# Patient Record
Sex: MALE | Race: WHITE | NOT HISPANIC OR LATINO | Employment: OTHER | URBAN - METROPOLITAN AREA
[De-identification: names, ages, dates, MRNs, and addresses within clinical notes are randomized per-mention and may not be internally consistent; named-entity substitution may affect disease eponyms.]

---

## 2017-02-08 RX ORDER — ALBUTEROL SULFATE 2.5 MG/3ML
2.5 SOLUTION RESPIRATORY (INHALATION) EVERY 6 HOURS PRN
COMMUNITY
End: 2019-03-04 | Stop reason: SDUPTHER

## 2017-02-08 RX ORDER — DIPHENOXYLATE HYDROCHLORIDE AND ATROPINE SULFATE 2.5; .025 MG/1; MG/1
1 TABLET ORAL DAILY
COMMUNITY
End: 2018-01-24 | Stop reason: SDUPTHER

## 2017-02-08 RX ORDER — BUDESONIDE AND FORMOTEROL FUMARATE DIHYDRATE 160; 4.5 UG/1; UG/1
2 AEROSOL RESPIRATORY (INHALATION) 2 TIMES DAILY
COMMUNITY
End: 2018-01-24 | Stop reason: SDUPTHER

## 2017-02-08 RX ORDER — ALBUTEROL SULFATE 90 UG/1
2 AEROSOL, METERED RESPIRATORY (INHALATION) EVERY 6 HOURS PRN
COMMUNITY
End: 2018-01-24 | Stop reason: ALTCHOICE

## 2017-02-16 ENCOUNTER — GENERIC CONVERSION - ENCOUNTER (OUTPATIENT)
Dept: OTHER | Facility: OTHER | Age: 62
End: 2017-02-16

## 2017-02-16 ENCOUNTER — LAB CONVERSION - ENCOUNTER (OUTPATIENT)
Dept: OTHER | Facility: OTHER | Age: 62
End: 2017-02-16

## 2017-02-16 LAB
ANTI-NUCLEAR ANTIBODY (ANA) (HISTORICAL): NEGATIVE
BASOPHILS # BLD AUTO: 0.9 %
BASOPHILS # BLD AUTO: 58 CELLS/UL (ref 0–200)
DEPRECATED RDW RBC AUTO: 14.3 % (ref 11–15)
EOSINOPHIL # BLD AUTO: 250 CELLS/UL (ref 15–500)
EOSINOPHIL # BLD AUTO: 3.9 %
ERYTHROCYTE SEDIMENTATION RATE (HISTORICAL): 2 MM/H
HCT VFR BLD AUTO: 48.9 % (ref 38.5–50)
HGB BLD-MCNC: 16.1 G/DL (ref 13.2–17.1)
LYME IGG/IGM AB (HISTORICAL): NORMAL INDEX
LYMPHOCYTES # BLD AUTO: 1344 CELLS/UL (ref 850–3900)
LYMPHOCYTES # BLD AUTO: 21 %
MCH RBC QN AUTO: 32.1 PG (ref 27–33)
MCHC RBC AUTO-ENTMCNC: 32.8 G/DL (ref 32–36)
MCV RBC AUTO: 97.8 FL (ref 80–100)
MONOCYTES # BLD AUTO: 589 CELLS/UL (ref 200–950)
MONOCYTES (HISTORICAL): 9.2 %
NEUTROPHILS # BLD AUTO: 4160 CELLS/UL (ref 1500–7800)
NEUTROPHILS # BLD AUTO: 65 %
PLATELET # BLD AUTO: 119 THOUSAND/UL (ref 140–400)
PMV BLD AUTO: 9.6 FL (ref 7.5–12.5)
RBC # BLD AUTO: 5 MILLION/UL (ref 4.2–5.8)
TSH SERPL DL<=0.05 MIU/L-ACNC: 2.01 MIU/L (ref 0.4–4.5)
WBC # BLD AUTO: 6.4 THOUSAND/UL (ref 3.8–10.8)

## 2017-02-20 ENCOUNTER — ALLSCRIPTS OFFICE VISIT (OUTPATIENT)
Dept: OTHER | Facility: OTHER | Age: 62
End: 2017-02-20

## 2017-02-20 ENCOUNTER — APPOINTMENT (OUTPATIENT)
Dept: LAB | Facility: MEDICAL CENTER | Age: 62
End: 2017-02-20
Payer: COMMERCIAL

## 2017-02-20 ENCOUNTER — HOSPITAL ENCOUNTER (OUTPATIENT)
Dept: RADIOLOGY | Facility: MEDICAL CENTER | Age: 62
Discharge: HOME | End: 2017-02-20
Payer: COMMERCIAL

## 2017-02-20 DIAGNOSIS — J44.9 CHRONIC OBSTRUCTIVE PULMONARY DISEASE (HCC): ICD-10-CM

## 2017-02-20 DIAGNOSIS — G47.33 OBSTRUCTIVE SLEEP APNEA: ICD-10-CM

## 2017-02-20 DIAGNOSIS — N18.30 CHRONIC KIDNEY DISEASE, STAGE III (MODERATE) (HCC): ICD-10-CM

## 2017-02-20 DIAGNOSIS — R91.1 SOLITARY PULMONARY NODULE: ICD-10-CM

## 2017-02-20 LAB
ALBUMIN SERPL BCP-MCNC: 3.7 G/DL (ref 3.5–5)
ALP SERPL-CCNC: 95 U/L (ref 46–116)
ALT SERPL W P-5'-P-CCNC: 34 U/L (ref 12–78)
ANION GAP SERPL CALCULATED.3IONS-SCNC: 7 MMOL/L (ref 4–13)
AST SERPL W P-5'-P-CCNC: 19 U/L (ref 5–45)
BILIRUB SERPL-MCNC: 0.59 MG/DL (ref 0.2–1)
BUN SERPL-MCNC: 22 MG/DL (ref 5–25)
CALCIUM SERPL-MCNC: 8.9 MG/DL (ref 8.3–10.1)
CHLORIDE SERPL-SCNC: 109 MMOL/L (ref 100–108)
CO2 SERPL-SCNC: 29 MMOL/L (ref 21–32)
CREAT SERPL-MCNC: 1.65 MG/DL (ref 0.6–1.3)
GFR SERPL CREATININE-BSD FRML MDRD: 42.8 ML/MIN/1.73SQ M
GLUCOSE SERPL-MCNC: 82 MG/DL (ref 65–140)
POTASSIUM SERPL-SCNC: 4.5 MMOL/L (ref 3.5–5.3)
PROT SERPL-MCNC: 6.8 G/DL (ref 6.4–8.2)
SODIUM SERPL-SCNC: 145 MMOL/L (ref 136–145)

## 2017-02-20 PROCEDURE — 80053 COMPREHEN METABOLIC PANEL: CPT

## 2017-02-20 PROCEDURE — 71020 HB CHEST X-RAY 2VW FRONTAL&LATL: CPT

## 2017-02-20 PROCEDURE — 36415 COLL VENOUS BLD VENIPUNCTURE: CPT

## 2017-02-21 ENCOUNTER — TRANSCRIBE ORDERS (OUTPATIENT)
Dept: ADMINISTRATIVE | Facility: HOSPITAL | Age: 62
End: 2017-02-21

## 2017-02-21 ENCOUNTER — ALLSCRIPTS OFFICE VISIT (OUTPATIENT)
Dept: OTHER | Facility: OTHER | Age: 62
End: 2017-02-21

## 2017-02-21 DIAGNOSIS — R91.1 PULMONARY NODULE: Primary | ICD-10-CM

## 2017-02-21 DIAGNOSIS — R91.1 COIN LESION: ICD-10-CM

## 2017-02-23 ENCOUNTER — ANESTHESIA (OUTPATIENT)
Dept: PERIOP | Facility: HOSPITAL | Age: 62
End: 2017-02-23
Payer: COMMERCIAL

## 2017-02-23 ENCOUNTER — HOSPITAL ENCOUNTER (OUTPATIENT)
Facility: HOSPITAL | Age: 62
Setting detail: OUTPATIENT SURGERY
Discharge: HOME | End: 2017-02-23
Attending: SURGERY | Admitting: SURGERY
Payer: COMMERCIAL

## 2017-02-23 ENCOUNTER — ANESTHESIA EVENT (OUTPATIENT)
Dept: PERIOP | Facility: HOSPITAL | Age: 62
End: 2017-02-23
Payer: COMMERCIAL

## 2017-02-23 VITALS
HEIGHT: 75 IN | SYSTOLIC BLOOD PRESSURE: 106 MMHG | HEART RATE: 77 BPM | DIASTOLIC BLOOD PRESSURE: 60 MMHG | TEMPERATURE: 99.6 F | RESPIRATION RATE: 16 BRPM | WEIGHT: 315 LBS | OXYGEN SATURATION: 95 % | BODY MASS INDEX: 39.17 KG/M2

## 2017-02-23 PROCEDURE — C1781 MESH (IMPLANTABLE): HCPCS | Performed by: SURGERY

## 2017-02-23 DEVICE — BARD VENTRALEX HERNIA PATCH, 4.3 CM (1.7"), SMALL CIRCLE WITH STRAP
Type: IMPLANTABLE DEVICE | Site: ABDOMEN | Status: FUNCTIONAL
Brand: VENTRALEX

## 2017-02-23 RX ORDER — OXYCODONE HYDROCHLORIDE 5 MG/1
5 TABLET ORAL EVERY 4 HOURS PRN
Qty: 30 TABLET | Refills: 0
Start: 2017-02-23 | End: 2017-03-05

## 2017-02-23 RX ORDER — PROPOFOL 10 MG/ML
INJECTION, EMULSION INTRAVENOUS AS NEEDED
Status: DISCONTINUED | OUTPATIENT
Start: 2017-02-23 | End: 2017-02-23 | Stop reason: SURG

## 2017-02-23 RX ORDER — ALBUTEROL SULFATE 2.5 MG/3ML
2.5 SOLUTION RESPIRATORY (INHALATION) ONCE
Status: DISCONTINUED | OUTPATIENT
Start: 2017-02-23 | End: 2017-02-23 | Stop reason: HOSPADM

## 2017-02-23 RX ORDER — FENTANYL CITRATE 50 UG/ML
INJECTION, SOLUTION INTRAMUSCULAR; INTRAVENOUS AS NEEDED
Status: DISCONTINUED | OUTPATIENT
Start: 2017-02-23 | End: 2017-02-23 | Stop reason: SURG

## 2017-02-23 RX ORDER — LIDOCAINE HYDROCHLORIDE 10 MG/ML
INJECTION, SOLUTION INFILTRATION; PERINEURAL AS NEEDED
Status: DISCONTINUED | OUTPATIENT
Start: 2017-02-23 | End: 2017-02-23 | Stop reason: SURG

## 2017-02-23 RX ORDER — SUCCINYLCHOLINE CHLORIDE 20 MG/ML
INJECTION INTRAMUSCULAR; INTRAVENOUS AS NEEDED
Status: DISCONTINUED | OUTPATIENT
Start: 2017-02-23 | End: 2017-02-23 | Stop reason: SURG

## 2017-02-23 RX ORDER — ONDANSETRON 2 MG/ML
4 INJECTION INTRAMUSCULAR; INTRAVENOUS ONCE
Status: DISCONTINUED | OUTPATIENT
Start: 2017-02-23 | End: 2017-02-23 | Stop reason: HOSPADM

## 2017-02-23 RX ORDER — SODIUM CHLORIDE, SODIUM LACTATE, POTASSIUM CHLORIDE, CALCIUM CHLORIDE 600; 310; 30; 20 MG/100ML; MG/100ML; MG/100ML; MG/100ML
20 INJECTION, SOLUTION INTRAVENOUS CONTINUOUS
Status: DISCONTINUED | OUTPATIENT
Start: 2017-02-23 | End: 2017-02-23 | Stop reason: HOSPADM

## 2017-02-23 RX ORDER — OXYCODONE HYDROCHLORIDE AND ACETAMINOPHEN 5; 325 MG/1; MG/1
2 TABLET ORAL EVERY 4 HOURS PRN
Status: DISCONTINUED | OUTPATIENT
Start: 2017-02-23 | End: 2017-02-23 | Stop reason: HOSPADM

## 2017-02-23 RX ORDER — ONDANSETRON 2 MG/ML
INJECTION INTRAMUSCULAR; INTRAVENOUS AS NEEDED
Status: DISCONTINUED | OUTPATIENT
Start: 2017-02-23 | End: 2017-02-23 | Stop reason: SURG

## 2017-02-23 RX ORDER — BUPIVACAINE HYDROCHLORIDE AND EPINEPHRINE 5; 5 MG/ML; UG/ML
INJECTION, SOLUTION PERINEURAL AS NEEDED
Status: DISCONTINUED | OUTPATIENT
Start: 2017-02-23 | End: 2017-02-23 | Stop reason: HOSPADM

## 2017-02-23 RX ADMIN — SODIUM CHLORIDE, SODIUM LACTATE, POTASSIUM CHLORIDE, AND CALCIUM CHLORIDE: .6; .31; .03; .02 INJECTION, SOLUTION INTRAVENOUS at 11:07

## 2017-02-23 RX ADMIN — OXYCODONE HYDROCHLORIDE AND ACETAMINOPHEN 2 TABLET: 5; 325 TABLET ORAL at 14:15

## 2017-02-23 RX ADMIN — ONDANSETRON 4 MG: 2 INJECTION INTRAMUSCULAR; INTRAVENOUS at 11:59

## 2017-02-23 RX ADMIN — SODIUM CHLORIDE, SODIUM LACTATE, POTASSIUM CHLORIDE, AND CALCIUM CHLORIDE 20 ML/HR: .6; .31; .03; .02 INJECTION, SOLUTION INTRAVENOUS at 10:50

## 2017-02-23 RX ADMIN — CEFAZOLIN SODIUM 1000 MG: 1 SOLUTION INTRAVENOUS at 11:41

## 2017-02-23 RX ADMIN — SUCCINYLCHOLINE CHLORIDE 100 MG: 20 INJECTION, SOLUTION INTRAMUSCULAR; INTRAVENOUS at 11:38

## 2017-02-23 RX ADMIN — HYDROMORPHONE HYDROCHLORIDE 0.4 MG: 1 INJECTION, SOLUTION INTRAMUSCULAR; INTRAVENOUS; SUBCUTANEOUS at 13:20

## 2017-02-23 RX ADMIN — LIDOCAINE HYDROCHLORIDE 100 MG: 10 INJECTION, SOLUTION INFILTRATION; PERINEURAL at 11:38

## 2017-02-23 RX ADMIN — SODIUM CHLORIDE, SODIUM LACTATE, POTASSIUM CHLORIDE, AND CALCIUM CHLORIDE 20 ML/HR: .6; .31; .03; .02 INJECTION, SOLUTION INTRAVENOUS at 13:20

## 2017-02-23 RX ADMIN — CEFAZOLIN SODIUM 2000 MG: 2 SOLUTION INTRAVENOUS at 11:41

## 2017-02-23 RX ADMIN — HYDROMORPHONE HYDROCHLORIDE 0.4 MG: 1 INJECTION, SOLUTION INTRAMUSCULAR; INTRAVENOUS; SUBCUTANEOUS at 13:33

## 2017-02-23 RX ADMIN — FENTANYL CITRATE 100 MCG: 50 INJECTION, SOLUTION INTRAMUSCULAR; INTRAVENOUS at 11:46

## 2017-02-23 RX ADMIN — PROPOFOL 200 MG: 10 INJECTION, EMULSION INTRAVENOUS at 11:38

## 2017-02-23 RX ADMIN — HYDROMORPHONE HYDROCHLORIDE 0.4 MG: 1 INJECTION, SOLUTION INTRAMUSCULAR; INTRAVENOUS; SUBCUTANEOUS at 13:03

## 2017-03-08 ENCOUNTER — ALLSCRIPTS OFFICE VISIT (OUTPATIENT)
Dept: OTHER | Facility: OTHER | Age: 62
End: 2017-03-08

## 2017-03-14 ENCOUNTER — HOSPITAL ENCOUNTER (OUTPATIENT)
Dept: CT IMAGING | Facility: HOSPITAL | Age: 62
Discharge: HOME | End: 2017-03-14
Attending: INTERNAL MEDICINE
Payer: COMMERCIAL

## 2017-03-14 DIAGNOSIS — R91.1 SOLITARY PULMONARY NODULE: ICD-10-CM

## 2017-03-14 PROCEDURE — 71250 CT THORAX DX C-: CPT

## 2017-04-12 ENCOUNTER — GENERIC CONVERSION - ENCOUNTER (OUTPATIENT)
Dept: OTHER | Facility: OTHER | Age: 62
End: 2017-04-12

## 2017-05-24 ENCOUNTER — ALLSCRIPTS OFFICE VISIT (OUTPATIENT)
Dept: OTHER | Facility: OTHER | Age: 62
End: 2017-05-24

## 2017-05-24 DIAGNOSIS — E78.5 HYPERLIPIDEMIA: ICD-10-CM

## 2017-05-24 DIAGNOSIS — N18.30 CHRONIC KIDNEY DISEASE, STAGE III (MODERATE) (HCC): ICD-10-CM

## 2017-05-24 DIAGNOSIS — J45.40 MODERATE PERSISTENT ASTHMA, UNCOMPLICATED: ICD-10-CM

## 2017-05-24 DIAGNOSIS — G47.9 SLEEP DISORDER: ICD-10-CM

## 2017-05-24 DIAGNOSIS — J44.9 CHRONIC OBSTRUCTIVE PULMONARY DISEASE (HCC): ICD-10-CM

## 2017-05-24 DIAGNOSIS — D69.6 THROMBOCYTOPENIA (HCC): ICD-10-CM

## 2017-05-24 LAB
BILIRUB UR QL STRIP: NEGATIVE
CLARITY UR: NORMAL
COLOR UR: YELLOW
GLUCOSE (HISTORICAL): NEGATIVE
HGB UR QL STRIP.AUTO: NEGATIVE
KETONES UR STRIP-MCNC: NEGATIVE MG/DL
LEUKOCYTE ESTERASE UR QL STRIP: NEGATIVE
NITRITE UR QL STRIP: NEGATIVE
PH UR STRIP.AUTO: 5 [PH]
PROT UR STRIP-MCNC: NEGATIVE MG/DL
SP GR UR STRIP.AUTO: 1.01
UROBILINOGEN UR QL STRIP.AUTO: NEGATIVE

## 2017-06-15 LAB
A/G RATIO (HISTORICAL): 1.8 (CALC) (ref 1–2.5)
ALBUMIN SERPL BCP-MCNC: 4 G/DL (ref 3.6–5.1)
ALP SERPL-CCNC: 85 U/L (ref 40–115)
ALT SERPL W P-5'-P-CCNC: 24 U/L (ref 9–46)
AST SERPL W P-5'-P-CCNC: 21 U/L (ref 10–35)
BASOPHILS # BLD AUTO: 0.6 %
BASOPHILS # BLD AUTO: 43 CELLS/UL (ref 0–200)
BILIRUB SERPL-MCNC: 0.9 MG/DL (ref 0.2–1.2)
BUN SERPL-MCNC: 26 MG/DL (ref 7–25)
BUN/CREA RATIO (HISTORICAL): 15 (CALC) (ref 6–22)
CALCIUM SERPL-MCNC: 9.3 MG/DL (ref 8.6–10.3)
CHLORIDE SERPL-SCNC: 108 MMOL/L (ref 98–110)
CHOLEST SERPL-MCNC: 137 MG/DL (ref 125–200)
CHOLEST/HDLC SERPL: 3.8 (CALC)
CO2 SERPL-SCNC: 26 MMOL/L (ref 20–31)
CREAT SERPL-MCNC: 1.7 MG/DL (ref 0.7–1.25)
DEPRECATED RDW RBC AUTO: 14.9 % (ref 11–15)
EGFR AFRICAN AMERICAN (HISTORICAL): 49 ML/MIN/1.73M2
EGFR-AMERICAN CALC (HISTORICAL): 43 ML/MIN/1.73M2
EOSINOPHIL # BLD AUTO: 298 CELLS/UL (ref 15–500)
EOSINOPHIL # BLD AUTO: 4.2 %
GAMMA GLOBULIN (HISTORICAL): 2.2 G/DL (CALC) (ref 1.9–3.7)
GLUCOSE (HISTORICAL): 94 MG/DL (ref 65–99)
HCT VFR BLD AUTO: 46.4 % (ref 38.5–50)
HDLC SERPL-MCNC: 36 MG/DL
HGB BLD-MCNC: 15.6 G/DL (ref 13.2–17.1)
LDL CHOLESTEROL (HISTORICAL): 83 MG/DL (CALC)
LYMPHOCYTES # BLD AUTO: 1519 CELLS/UL (ref 850–3900)
LYMPHOCYTES # BLD AUTO: 21.4 %
MCH RBC QN AUTO: 32.7 PG (ref 27–33)
MCHC RBC AUTO-ENTMCNC: 33.7 G/DL (ref 32–36)
MCV RBC AUTO: 97.2 FL (ref 80–100)
MONOCYTES # BLD AUTO: 738 CELLS/UL (ref 200–950)
MONOCYTES (HISTORICAL): 10.4 %
NEUTROPHILS # BLD AUTO: 4501 CELLS/UL (ref 1500–7800)
NEUTROPHILS # BLD AUTO: 63.4 %
NON-HDL-CHOL (CHOL-HDL) (HISTORICAL): 101 MG/DL (CALC)
PLATELET # BLD AUTO: 129 THOUSAND/UL (ref 140–400)
PMV BLD AUTO: 9.4 FL (ref 7.5–12.5)
POTASSIUM SERPL-SCNC: 4.6 MMOL/L (ref 3.5–5.3)
RBC # BLD AUTO: 4.77 MILLION/UL (ref 4.2–5.8)
SODIUM SERPL-SCNC: 142 MMOL/L (ref 135–146)
TOTAL PROTEIN (HISTORICAL): 6.2 G/DL (ref 6.1–8.1)
TRIGL SERPL-MCNC: 92 MG/DL
WBC # BLD AUTO: 7.1 THOUSAND/UL (ref 3.8–10.8)

## 2017-08-02 ENCOUNTER — ALLSCRIPTS OFFICE VISIT (OUTPATIENT)
Dept: OTHER | Facility: OTHER | Age: 62
End: 2017-08-02

## 2017-09-18 ENCOUNTER — HOSPITAL ENCOUNTER (OUTPATIENT)
Dept: CT IMAGING | Facility: HOSPITAL | Age: 62
Discharge: HOME | End: 2017-09-18
Attending: INTERNAL MEDICINE
Payer: COMMERCIAL

## 2017-09-18 DIAGNOSIS — R91.1 SOLITARY PULMONARY NODULE: ICD-10-CM

## 2017-09-18 PROCEDURE — 71250 CT THORAX DX C-: CPT

## 2017-09-20 ENCOUNTER — ALLSCRIPTS OFFICE VISIT (OUTPATIENT)
Dept: OTHER | Facility: OTHER | Age: 62
End: 2017-09-20

## 2017-09-25 ENCOUNTER — TRANSCRIBE ORDERS (OUTPATIENT)
Dept: ADMINISTRATIVE | Facility: HOSPITAL | Age: 62
End: 2017-09-25

## 2017-09-25 DIAGNOSIS — R91.1 COIN LESION: Primary | ICD-10-CM

## 2017-12-01 DIAGNOSIS — D69.6 THROMBOCYTOPENIA (HCC): ICD-10-CM

## 2017-12-01 DIAGNOSIS — N18.30 CHRONIC KIDNEY DISEASE, STAGE III (MODERATE) (HCC): ICD-10-CM

## 2017-12-01 DIAGNOSIS — E78.5 HYPERLIPIDEMIA: ICD-10-CM

## 2017-12-01 DIAGNOSIS — J44.9 CHRONIC OBSTRUCTIVE PULMONARY DISEASE (HCC): ICD-10-CM

## 2017-12-01 DIAGNOSIS — Z12.5 ENCOUNTER FOR SCREENING FOR MALIGNANT NEOPLASM OF PROSTATE: ICD-10-CM

## 2018-01-05 ENCOUNTER — APPOINTMENT (OUTPATIENT)
Dept: LAB | Facility: MEDICAL CENTER | Age: 63
End: 2018-01-05
Payer: COMMERCIAL

## 2018-01-05 DIAGNOSIS — Z12.5 ENCOUNTER FOR SCREENING FOR MALIGNANT NEOPLASM OF PROSTATE: ICD-10-CM

## 2018-01-05 DIAGNOSIS — J44.9 CHRONIC OBSTRUCTIVE PULMONARY DISEASE (HCC): ICD-10-CM

## 2018-01-05 DIAGNOSIS — N18.30 CHRONIC KIDNEY DISEASE, STAGE III (MODERATE) (HCC): ICD-10-CM

## 2018-01-05 DIAGNOSIS — D69.6 THROMBOCYTOPENIA (HCC): ICD-10-CM

## 2018-01-05 DIAGNOSIS — E78.5 HYPERLIPIDEMIA: ICD-10-CM

## 2018-01-05 LAB
ALBUMIN SERPL BCP-MCNC: 3.9 G/DL (ref 3.5–5)
ALP SERPL-CCNC: 88 U/L (ref 46–116)
ALT SERPL W P-5'-P-CCNC: 44 U/L (ref 12–78)
ANION GAP SERPL CALCULATED.3IONS-SCNC: 9 MMOL/L (ref 4–13)
AST SERPL W P-5'-P-CCNC: 29 U/L (ref 5–45)
BASOPHILS # BLD AUTO: 0.06 THOUSANDS/ΜL (ref 0–0.1)
BASOPHILS NFR BLD AUTO: 1 % (ref 0–1)
BILIRUB SERPL-MCNC: 0.98 MG/DL (ref 0.2–1)
BUN SERPL-MCNC: 22 MG/DL (ref 5–25)
CALCIUM SERPL-MCNC: 9 MG/DL (ref 8.3–10.1)
CHLORIDE SERPL-SCNC: 109 MMOL/L (ref 100–108)
CHOLEST SERPL-MCNC: 151 MG/DL (ref 50–200)
CO2 SERPL-SCNC: 25 MMOL/L (ref 21–32)
CREAT SERPL-MCNC: 1.54 MG/DL (ref 0.6–1.3)
EOSINOPHIL # BLD AUTO: 0.31 THOUSAND/ΜL (ref 0–0.61)
EOSINOPHIL NFR BLD AUTO: 4 % (ref 0–6)
ERYTHROCYTE [DISTWIDTH] IN BLOOD BY AUTOMATED COUNT: 14.1 % (ref 11.6–15.1)
GFR SERPL CREATININE-BSD FRML MDRD: 48 ML/MIN/1.73SQ M
GLUCOSE P FAST SERPL-MCNC: 78 MG/DL (ref 65–99)
HCT VFR BLD AUTO: 47.6 % (ref 36.5–49.3)
HDLC SERPL-MCNC: 33 MG/DL (ref 40–60)
HGB BLD-MCNC: 16 G/DL (ref 12–17)
LDLC SERPL CALC-MCNC: 92 MG/DL (ref 0–100)
LYMPHOCYTES # BLD AUTO: 1.57 THOUSANDS/ΜL (ref 0.6–4.47)
LYMPHOCYTES NFR BLD AUTO: 22 % (ref 14–44)
MCH RBC QN AUTO: 32.9 PG (ref 26.8–34.3)
MCHC RBC AUTO-ENTMCNC: 33.6 G/DL (ref 31.4–37.4)
MCV RBC AUTO: 98 FL (ref 82–98)
MONOCYTES # BLD AUTO: 0.86 THOUSAND/ΜL (ref 0.17–1.22)
MONOCYTES NFR BLD AUTO: 12 % (ref 4–12)
NEUTROPHILS # BLD AUTO: 4.28 THOUSANDS/ΜL (ref 1.85–7.62)
NEUTS SEG NFR BLD AUTO: 61 % (ref 43–75)
NRBC BLD AUTO-RTO: 0 /100 WBCS
PLATELET # BLD AUTO: 135 THOUSANDS/UL (ref 149–390)
PMV BLD AUTO: 11.3 FL (ref 8.9–12.7)
POTASSIUM SERPL-SCNC: 4.1 MMOL/L (ref 3.5–5.3)
PROT SERPL-MCNC: 7 G/DL (ref 6.4–8.2)
PSA SERPL-MCNC: 1.8 NG/ML (ref 0–4)
RBC # BLD AUTO: 4.87 MILLION/UL (ref 3.88–5.62)
SODIUM SERPL-SCNC: 143 MMOL/L (ref 136–145)
TRIGL SERPL-MCNC: 129 MG/DL
WBC # BLD AUTO: 7.1 THOUSAND/UL (ref 4.31–10.16)

## 2018-01-05 PROCEDURE — 80061 LIPID PANEL: CPT

## 2018-01-05 PROCEDURE — 80053 COMPREHEN METABOLIC PANEL: CPT

## 2018-01-05 PROCEDURE — 36415 COLL VENOUS BLD VENIPUNCTURE: CPT

## 2018-01-05 PROCEDURE — G0103 PSA SCREENING: HCPCS

## 2018-01-05 PROCEDURE — 85025 COMPLETE CBC W/AUTO DIFF WBC: CPT

## 2018-01-09 ENCOUNTER — ALLSCRIPTS OFFICE VISIT (OUTPATIENT)
Dept: OTHER | Facility: OTHER | Age: 63
End: 2018-01-09

## 2018-01-09 ENCOUNTER — TRANSCRIBE ORDERS (OUTPATIENT)
Dept: ADMINISTRATIVE | Facility: HOSPITAL | Age: 63
End: 2018-01-09

## 2018-01-09 DIAGNOSIS — J44.9 CHRONIC OBSTRUCTIVE PULMONARY DISEASE, UNSPECIFIED COPD TYPE (HCC): Primary | ICD-10-CM

## 2018-01-10 NOTE — PROGRESS NOTES
Assessment   1  Chronic obstructive pulmonary disease (496) (J44 9)   2  ARACELIS (obstructive sleep apnea) (327 23) (G47 33)   3  Pulmonary nodule (793 11) (R91 1)    Plan   Chronic obstructive pulmonary disease    · Breo Ellipta 100-25 MCG/INH Inhalation Aerosol Powder Breath Activated; USE 1    INHALATION ONCE DAILY   Rx By: Rosemary Aranda; Dispense: 30 Days ; #:1 Aerosol Powder Breath Activated; Refill: 11; For: Chronic obstructive pulmonary disease; GRISELDA = N; Print Rx   · Complete PFT; Status:Hold For - Scheduling; Requested for:Scheduled for Mon     01/15/2018 @ 7:15am [employer];    Perform:[facility]; Last Updated Marie Canal; 1/9/2018 3:14:33 PM;Ordered; For:Chronic obstructive pulmonary disease; Ordered By:Jackie Rivas;  ARACELIS (obstructive sleep apnea)    · CPAP Machine; Status:Active; Requested AAO:[id number]; Perform:Not Applicable; J:[id number]; Ordered;For:ARACELIS (obstructive sleep apnea); Ordered By:Jackie Rivas;  for CPAP? : Heated Humidification  of Mask for CPAP? : FULL FACE  for initial CPAP set up? : CHANGE MACHINE TO AUTO CPAP PRESSURE 7-15        CMH20  is the patient's Weight? : 368 lb  is the patient's Height? : 75 in  Study? : Yes    Discussion/Summary   Discussion Summary:    I suspect that some of his increasing shortness of breath is related to progressive weight gain  We will update pulmonary function testing to see if COPD/degree of obstruction has change  I did give him a sample of Breo Ellipta 100 mcg to use once daily in place of Symbicort  I also provided him with a co-pay coupon that will make the inhaler more affordable  After we have made that change, we can always consider transitioning Spiriva to Incruse Ellipta an use co-pay coupon as well  I will call him with PFT results  He is already scheduled for CT of the chest in March to follow up on previously noted pulmonary nodule     suspect that his issues with CPAP per also related to progressive weight gain  We will change his CPAP machine to an auto CPAP with pressures ranging between 7 and 15 cm water pressure  Hopefully, this will provide improved patient comfort  Goals and Barriers: The patient has the current Goals: Improved tolerance of CPAP  The patent has the current Barriers: Ongoing weight gain  Patient's Capacity to Self-Care: Patient is able to Self-Care  Active Problems   1  Arthritis (716 90) (M19 90)   2  Chronic obstructive pulmonary disease (496) (J44 9)   3  CKD (chronic kidney disease) stage 3, GFR 30-59 ml/min (585 3) (N18 3)   4  Depression screen (V79 0) (Z13 89)   5  Encounter for counseling for travel (V65 49) (Z71 89)   6  Hyperlipidemia with low HDL (272 4,272 5) (E78 5,E78 6)   7  Moderate persistent asthma (493 90) (J45 40)   8  Morbid obesity (278 01) (E66 01)   9  Muscle strain (848 9) (T14 8XXA)   10  Need for immunization against influenza (V04 81) (Z23)   11  Need for pneumococcal vaccination (V03 82) (Z23)   12  Nocturnal hypoxemia (327 24) (G47 34)   13  ARACELIS (obstructive sleep apnea) (327 23) (G47 33)   14  Pulmonary nodule (793 11) (R91 1)   15  Screening for colon cancer (V76 51) (Z12 11)   16  Screening for prostate cancer (V76 44) (Z12 5)   17  Skin lesion (709 9) (L98 9)   18  Sleep disorder (780 50) (G47 9)   19  Thrombocytopenia (287 5) (D69 6)   20  Vitamin D deficiency (268 9) (E55 9)    History of Present Illness   HPI: Mervin Faye is here today for follow-up regarding COPD and sleep apnea  He feels that his pulmonary status has declined since his last visit with me  He complains of worsening exertional dyspnea  He has no significant cough, wheeze or sputum production  He is compliant with Symbicort and Spiriva, but finds that extremely expensive and is asking for an alternative  is also having issues over the past month feeling as if he is not getting enough air with his CPAP   Often times, this is preceded by does are dreams and awakening with increasing shortness of breath  He has gained 10 lb since the holidays  When compared with his sleep study performed in August 2016, he has gained 25 lb  I reviewed his CPAP compliance data and he is using the machine at least 9 hours per night  AHI is 1 5 with a CPAP pressure of 7 cm water  Review of Systems   Complete-Male Pulm:      Constitutional: recent 10 lb weight gain  Eyes: no complaints of vision problems  ENT: no rhinitis, no PND, no epistaxis  Cardiovascular: lower extremity edema, but-- no chest pain  Respiratory: as noted in HPI  Gastrointestinal: no complaints of esophageal reflux, no abdominal pain  Musculoskeletal: no arthralgias, no joint swelling, no myalgias  Integumentary: no rash, no lesions  Neurological: no headache, no fainting, no weakness  Psychiatric: no anxiety, no depression  Endocrine: Negative  Hematologic/Lymphatic: no complaints of swollen glands  Past Medical History   1  History of Acute Respiratory Distress (518 82)   2  History of Atypical chest pain (786 59) (R07 89)   3  History of Back Pain   4  History of Decreased glomerular filtration rate (GFR) (794 4) (R94 4)   5  History of Genital warts (078 11) (A63 0)   6  History of Hamstring strain (843 8) (S76 319A)   7  History of Herniated lumbar intervertebral disc (722 10) (M51 26)   8  History of lipoma (V13 89) (Z86 018)   9  History of Lateral pain of left hip (719 45) (M25 552)   10  History of Low HDL (under 40) (272 5) (E78 6)   11  History of Lower back pain (724 2) (M54 5)   12  History of Lumbar radiculopathy (724 4) (M54 16)   13  History of Polyp of sigmoid colon (211 3) (D12 5)   14  History of Spasm of lumbar paraspinous muscle (724 8) (M62 830)   15  History of Umbilical hernia (090 2) (K42 9)   16  History of Uncomplicated alcohol abuse (305 00) (F10 10)    Surgical History   1  History of Adenoidectomy   2  History of Hip Surgery   3   History of Lipectomy Of Thigh   4  History of Tonsillectomy   5  History of Umbilical Hernia Repair   6  History of Wrist Excision Of Ganglion  Surgical History Reviewed: The surgical history was reviewed and updated today  Family History   Mother    1  Family history of cardiac disorder (V17 49) (Z82 49)   2  Family history of hypertension (V17 49) (Z82 49)  Father    3  Family history of chronic obstructive pulmonary disease (V17 6) (Z82 5)  Grandmother    4  Family history of diabetes mellitus (V18 0) (Z83 3)   5  Family history of glaucoma (V19 11) (Z83 511)   6  Family history of lung cancer (V16 1) (Z80 1)  Grandfather    7  Family history of cardiac disorder (V17 49) (Z82 49)   8  Family history of lung cancer (V16 1) (Z80 1)   9  Family history of Pulmonary emphysema, unspecified emphysema type  Family History Reviewed: The family history was reviewed and updated today  Social History    · Always uses seat belt   · Former smoker   · Smoked one pack per day for 25 years, quit October 2014   · Full-time employment   ·    · Recovering Alcoholic   · Uses Safety Equipment - Seatbelts  Social History Reviewed: The social history was reviewed and is unchanged  Current Meds    1  Albuterol Sulfate (2 5 MG/3ML) 0 083% Inhalation Nebulization Solution; USE 1 UNIT     DOSE EVERY 4-6 HOURS AS NEEDED FOR WHEEZING   Requested for: 17FDV1026;     Last Rx:20Sep2017 Ordered   2  Cyclobenzaprine HCl - 10 MG Oral Tablet; TAKE 1 TABLET AT BEDTIME AS NEEDED; Therapy: 11WFU5106 to (Renew:20Oct2017)  Requested for: 72MGS4670; Last     Rx:20Sep2017 Ordered   3  Multiple Vitamins Oral Tablet; TAKE 1 TABLET DAILY; Therapy: 98Udx0494 to (Evaluate:19Nov2014) Recorded   4  ProAir  (90 Base) MCG/ACT Inhalation Aerosol Solution; TAKE 2 PUFFS EVERY 4     HOURS AS NEEDED; Therapy: 74CZW5127 to (Joann Stephen)  Requested for: 91Svs5787; Last     Rx:07Aug2017 Ordered   5   Spiriva Respimat 2 5 MCG/ACT Inhalation Aerosol Solution; TAKE 2 PUFFS EVERY DAY; Therapy: 39Pcj9125 to (Evaluate:31Jan2018)  Requested for: 46Xws2001; Last     Rx:76Kjn4767 Ordered   6  Symbicort 160-4 5 MCG/ACT Inhalation Aerosol; inhale 2 puffs twice daily  rinse mouth     after use; Therapy: 84Dea2300 to (Evaluate:30Jan2018)  Requested for: 02Oct2017; Last     Rx:02Oct2017 Ordered   7  Vitamin D 1000 UNIT CAPS; TAKE AS DIRECTED; Therapy: 52Orm1613 to Recorded  Medication List Reviewed: The medication list was reviewed and updated today  Allergies   1  Darvon CAPS    Vitals   Vital Signs    Recorded: 79TUU7129 02:57PM Recorded: 79MDP1844 02:25PM   Temperature  97 9 F, Tympanic   Heart Rate  96   Respiration  16   Systolic  384, LUE, Sitting   Diastolic  82, LUE, Sitting   Height  6 ft 3 in   Weight 378 lb 2 oz    BMI Calculated 47 26    BSA Calculated 2 88    O2 Saturation  95     Physical Exam        Constitutional      General appearance: No acute distress, well appearing and well nourished  Ears, Nose, Mouth, and Throat      Oropharynx: Normal with no erythema, edema, exudate or lesions  Neck      Neck: Supple, symmetric, trachea midline, no masses  Pulmonary      Respiratory effort: No increased work of breathing or signs of respiratory distress  Auscultation of lungs: Clear to auscultation, no rales, no crackles, no wheezing  Cardiovascular      Auscultation of heart: Normal rate and rhythm, normal S1 and S2, no murmurs  Examination of extremities for edema and/or varicosities: Abnormal  -- trace LE edema  Abdomen      Abdomen: Abnormal  -- Obese, soft, nontender  Lymphatic      Palpation of lymph nodes in neck: No lymphadenopathy  Neurologic      Mental Status: Normal  Not confused, no evidence of dementia, good comprehension, good concentration         Psychiatric      Orientation to person, place and time: Normal        Mood and affect: Normal  Thank you very much for allowing me to participate in the care of this patient  If you have any questions, please do not hesitate to contact me        Future Appointments      Date/Time Provider Specialty Site   01/24/2018 06:30 PM Clare Paredes [facility]   04/11/2018 10:00 AM Yamileth Moya DO Pulmonary Medicine [facility] Jayce Lantigua    Electronically signed by : Fareed Mejía DO; Jan 9 2018  3:33PM EST                       (Author)

## 2018-01-12 VITALS
HEIGHT: 75 IN | TEMPERATURE: 98.4 F | RESPIRATION RATE: 12 BRPM | HEART RATE: 80 BPM | DIASTOLIC BLOOD PRESSURE: 78 MMHG | SYSTOLIC BLOOD PRESSURE: 148 MMHG | BODY MASS INDEX: 39.17 KG/M2 | WEIGHT: 315 LBS

## 2018-01-12 VITALS
WEIGHT: 315 LBS | HEART RATE: 78 BPM | RESPIRATION RATE: 16 BRPM | HEIGHT: 75 IN | TEMPERATURE: 97.8 F | BODY MASS INDEX: 39.17 KG/M2 | DIASTOLIC BLOOD PRESSURE: 72 MMHG | OXYGEN SATURATION: 96 % | SYSTOLIC BLOOD PRESSURE: 136 MMHG

## 2018-01-13 VITALS
DIASTOLIC BLOOD PRESSURE: 84 MMHG | RESPIRATION RATE: 14 BRPM | TEMPERATURE: 97.1 F | HEIGHT: 75 IN | BODY MASS INDEX: 39.17 KG/M2 | OXYGEN SATURATION: 96 % | WEIGHT: 315 LBS | SYSTOLIC BLOOD PRESSURE: 124 MMHG | HEART RATE: 89 BPM

## 2018-01-14 VITALS
RESPIRATION RATE: 14 BRPM | OXYGEN SATURATION: 97 % | WEIGHT: 315 LBS | BODY MASS INDEX: 39.17 KG/M2 | DIASTOLIC BLOOD PRESSURE: 78 MMHG | TEMPERATURE: 97.4 F | SYSTOLIC BLOOD PRESSURE: 114 MMHG | HEART RATE: 86 BPM | HEIGHT: 75 IN

## 2018-01-14 VITALS
HEIGHT: 75 IN | SYSTOLIC BLOOD PRESSURE: 122 MMHG | OXYGEN SATURATION: 97 % | TEMPERATURE: 97.3 F | HEART RATE: 89 BPM | BODY MASS INDEX: 39.17 KG/M2 | DIASTOLIC BLOOD PRESSURE: 78 MMHG | WEIGHT: 315 LBS | RESPIRATION RATE: 14 BRPM

## 2018-01-14 VITALS
WEIGHT: 315 LBS | BODY MASS INDEX: 39.17 KG/M2 | HEIGHT: 75 IN | TEMPERATURE: 97.1 F | OXYGEN SATURATION: 96 % | HEART RATE: 105 BPM | SYSTOLIC BLOOD PRESSURE: 120 MMHG | RESPIRATION RATE: 16 BRPM | DIASTOLIC BLOOD PRESSURE: 80 MMHG

## 2018-01-15 ENCOUNTER — GENERIC CONVERSION - ENCOUNTER (OUTPATIENT)
Dept: PULMONOLOGY | Facility: HOSPITAL | Age: 63
End: 2018-01-15

## 2018-01-15 ENCOUNTER — HOSPITAL ENCOUNTER (OUTPATIENT)
Dept: PULMONOLOGY | Facility: HOSPITAL | Age: 63
Discharge: HOME | End: 2018-01-15
Attending: INTERNAL MEDICINE
Payer: COMMERCIAL

## 2018-01-15 DIAGNOSIS — J44.9 CHRONIC OBSTRUCTIVE PULMONARY DISEASE, UNSPECIFIED COPD TYPE (HCC): ICD-10-CM

## 2018-01-15 PROCEDURE — 94726 PLETHYSMOGRAPHY LUNG VOLUMES: CPT

## 2018-01-15 PROCEDURE — 94760 N-INVAS EAR/PLS OXIMETRY 1: CPT

## 2018-01-15 PROCEDURE — 94060 EVALUATION OF WHEEZING: CPT

## 2018-01-15 PROCEDURE — 94729 DIFFUSING CAPACITY: CPT

## 2018-01-15 RX ORDER — ALBUTEROL SULFATE 2.5 MG/3ML
2.5 SOLUTION RESPIRATORY (INHALATION) ONCE
Status: COMPLETED | OUTPATIENT
Start: 2018-01-15 | End: 2018-01-15

## 2018-01-15 RX ADMIN — ALBUTEROL SULFATE 2.5 MG: 2.5 SOLUTION RESPIRATORY (INHALATION) at 07:33

## 2018-01-15 NOTE — RESULT NOTES
Verified Results  (Q) SED RATE BY MODIFIED WESTERGREN [id number] 08:06POLI Williamson     Test Name Result Flag Reference   SED RATE BY MODIFIED$WESTERGREN 2 mm/h  < OR = 20     (1) CBC/PLT/DIFF [id number] 08:06POLI Williamson     Test Name Result Flag Reference   WHITE BLOOD CELL COUNT 6 4 Thousand/uL  3 8-10 8   RED BLOOD CELL COUNT 5 00 Million/uL  4 20-5 80   HEMOGLOBIN 16 1 g/dL  13 2-17 1   HEMATOCRIT 48 9 %  38 5-50 0   MCV 97 8 fL  80 0-100 0   MCH 32 1 pg  27 0-33 0   MCHC 32 8 g/dL  32 0-36 0   RDW 14 3 %  11 0-15 0   PLATELET COUNT 484 Thousand/uL L 140-400   MPV 9 6 fL  7 5-12 5   ABSOLUTE NEUTROPHILS 4160 cells/uL  2036-3786   ABSOLUTE LYMPHOCYTES 1344 cells/uL  850-3900   ABSOLUTE MONOCYTES 589 cells/uL  200-950   ABSOLUTE EOSINOPHILS 250 cells/uL     ABSOLUTE BASOPHILS 58 cells/uL  0-200   NEUTROPHILS 65 0 %     LYMPHOCYTES 21 0 %     MONOCYTES 9 2 %     EOSINOPHILS 3 9 %     BASOPHILS 0 9 %       (Q) LYME DISEASE AB, TOTAL W/REFL WB (IGG, IGM) 58GOV2818 08:06POLI Williamson     Test Name Result Flag Reference   LYME AB SCREEN < OR = 0 90 index     Index                 Interpretation                     -----                 --------------                     < or = 0 90           Negative                     0  91-1 09             Equivocal                     > or = 1 10           Positive     The use of purified VlsE-1 and PepC10 antigens in this  assay provides improved specificity compared to assays  that utilize whole cell lysates of B  burgdorferi, the  causative agent of Lyme disease, and slightly better  sensitivity compared to the C6 antibody assay  As recommended by the Food and Drug   Administration (FDA), all samples with positive or   equivocal results in a Borrelia burgdorferi antibody    EIA (screening) will be tested using a blot method     Positive or equivocal screening test results should not   be interpreted as truly positive until verified as such   using a supplemental assay (e g , B  burgdorferi blot)  The screening test and/or blot for B  burgdorferi   antibodies may be falsely negative in early stages of   Lyme disease, including the period when erythema   migrans is apparent  (Q) ELIOT SCREEN, IFA, WITH REFLEX TO TITER AND PATTERN [id number] 08:06AM Chavez Seek     Test Name Result Flag Reference   ELIOT SCREEN, IFA NEGATIVE  NEGATIVE   ELIOT IFA is a first line screen for detecting the  presence of up to approximately 150 autoantibodies in  various autoimmune diseases  A negative ELIOT IFA result  suggests ELIOT-associated autoimmune diseases are not  present at this time  Visit Physician FAQs for interpretation of all  antibodies in the [place], prevalence, and association  with diseases at http://[employer]/  faq/YGC963     (Q) TSH, 3RD GENERATION [id number] 08:06AM Reece Patterson Mar   REPORT COMMENT:  FASTING:UNKNOWN     Test Name Result Flag Reference   TSH 2 01 mIU/L  0 40-4 50

## 2018-01-18 NOTE — RESULT NOTES
Verified Results  * XR CHEST PA & LATERAL [id number] 12:07PM Kathy Lazo   TW Order Number: [id number]     Test Name Result Flag Reference   XR CHEST PA & LATERAL (Report)     CHEST - DUAL ENERGY     INDICATION: COPD  Fatigue  COMPARISON: 7/2/2012     VIEWS: PA (including soft tissue/bone algorithms) and lateral projections; 5 images     FINDINGS:        Cardiomediastinal silhouette appears unremarkable  The lungs are clear  No pneumothorax or pleural effusion  Visualized osseous structures appear within normal limits for the patient's age  IMPRESSION:     No active pulmonary disease  Workstation performed: [id number]     Signed by:   Sagar Carrasquillo DO   3/22/16     (1) COMPREHENSIVE METABOLIC PANEL 46XCT5054 12:07WP Henlawson Violet   REPORT COMMENT:  FASTING:YES     Test Name Result Flag Reference   GLUCOSE 83 mg/dL  65-99   Fasting reference interval   UREA NITROGEN (BUN) 21 mg/dL  7-25   CREATININE 1 54 mg/dL H 0 70-1 33   For patients >52years of age, the reference limit  for Creatinine is approximately 13% higher for people  identified as -American  eGFR NON-AFR   AMERICAN 49 mL/min/1 73m2 L > OR = 60   eGFR AFRICAN AMERICAN 56 mL/min/1 73m2 L > OR = 60   BUN/CREATININE RATIO 14 (calc)  6-22   SODIUM 143 mmol/L  135-146   POTASSIUM 4 5 mmol/L  3 5-5 3   CHLORIDE 107 mmol/L     CARBON DIOXIDE 25 mmol/L  19-30   CALCIUM 9 5 mg/dL  8 6-10 3   PROTEIN, TOTAL 6 4 g/dL  6 1-8 1   ALBUMIN 4 3 g/dL  3 6-5 1   GLOBULIN 2 1 g/dL (calc)  1 9-3 7   ALBUMIN/GLOBULIN RATIO 2 0 (calc)  1 0-2 5   BILIRUBIN, TOTAL 0 9 mg/dL  0 2-1 2   ALKALINE PHOSPHATASE 93 U/L     AST 26 U/L  10-35   ALT 29 U/L  9-46     (1) LIPID PANEL, FASTING 22Mar2016 11:26AM Henlawsonjose Moffettbeatrice     Test Name Result Flag Reference   CHOLESTEROL, TOTAL 145 mg/dL  125-200   HDL CHOLESTEROL 31 mg/dL L > OR = 40   TRIGLICERIDES 564 mg/dL  <150   LDL-CHOLESTEROL 89 mg/dL (calc)  <130   Desirable range <100 mg/dL for patients with CHD or  diabetes and <70 mg/dL for diabetic patients with  known heart disease  CHOL/HDLC RATIO 4 7 (calc)  < OR = 5 0   NON HDL CHOLESTEROL 114 mg/dL (calc)     Target for non-HDL cholesterol is 30 mg/dL higher than   LDL cholesterol target

## 2018-01-23 VITALS
RESPIRATION RATE: 16 BRPM | OXYGEN SATURATION: 95 % | HEIGHT: 75 IN | WEIGHT: 315 LBS | HEART RATE: 96 BPM | SYSTOLIC BLOOD PRESSURE: 140 MMHG | BODY MASS INDEX: 39.17 KG/M2 | TEMPERATURE: 97.9 F | DIASTOLIC BLOOD PRESSURE: 82 MMHG

## 2018-01-23 PROBLEM — E66.01 MORBID OBESITY (HCC): Status: ACTIVE | Noted: 2017-09-20

## 2018-01-23 PROBLEM — N18.30 CKD (CHRONIC KIDNEY DISEASE) STAGE 3, GFR 30-59 ML/MIN (HCC): Status: ACTIVE | Noted: 2017-02-20

## 2018-01-23 PROBLEM — R91.1 PULMONARY NODULE: Status: ACTIVE | Noted: 2017-02-21

## 2018-01-23 PROBLEM — T14.8XXA MUSCLE STRAIN: Status: ACTIVE | Noted: 2017-09-20

## 2018-01-23 PROBLEM — L98.9 SKIN LESION: Status: ACTIVE | Noted: 2017-05-24

## 2018-01-23 RX ORDER — BIOTIN 1 MG
TABLET ORAL
COMMUNITY
Start: 2014-08-21

## 2018-01-23 RX ORDER — TIOTROPIUM BROMIDE INHALATION SPRAY 3.12 UG/1
SPRAY, METERED RESPIRATORY (INHALATION)
Refills: 5 | COMMUNITY
Start: 2017-12-29 | End: 2018-05-07 | Stop reason: ALTCHOICE

## 2018-01-23 RX ORDER — ALBUTEROL SULFATE 2.5 MG/3ML
1 SOLUTION RESPIRATORY (INHALATION)
COMMUNITY
End: 2018-01-24 | Stop reason: SDUPTHER

## 2018-01-23 RX ORDER — ALBUTEROL SULFATE 90 UG/1
2 AEROSOL, METERED RESPIRATORY (INHALATION) EVERY 4 HOURS PRN
COMMUNITY
Start: 2014-02-24 | End: 2019-03-04 | Stop reason: SDUPTHER

## 2018-01-23 RX ORDER — BUDESONIDE AND FORMOTEROL FUMARATE DIHYDRATE 160; 4.5 UG/1; UG/1
2 AEROSOL RESPIRATORY (INHALATION) 2 TIMES DAILY
COMMUNITY
Start: 2014-08-21 | End: 2018-01-29 | Stop reason: SDUPTHER

## 2018-01-23 RX ORDER — MULTIVITAMIN WITH IRON
1 TABLET ORAL DAILY
COMMUNITY
Start: 2014-08-21

## 2018-01-23 RX ORDER — CYCLOBENZAPRINE HCL 10 MG
1 TABLET ORAL
COMMUNITY
Start: 2017-09-20 | End: 2018-05-07 | Stop reason: ALTCHOICE

## 2018-01-23 RX ORDER — FLUTICASONE FUROATE AND VILANTEROL 100; 25 UG/1; UG/1
POWDER RESPIRATORY (INHALATION) DAILY
COMMUNITY
Start: 2018-01-09 | End: 2018-01-24 | Stop reason: ALTCHOICE

## 2018-01-24 ENCOUNTER — OFFICE VISIT (OUTPATIENT)
Dept: FAMILY MEDICINE CLINIC | Facility: CLINIC | Age: 63
End: 2018-01-24
Payer: COMMERCIAL

## 2018-01-24 ENCOUNTER — TELEPHONE (OUTPATIENT)
Dept: PULMONOLOGY | Facility: CLINIC | Age: 63
End: 2018-01-24

## 2018-01-24 VITALS
HEART RATE: 87 BPM | DIASTOLIC BLOOD PRESSURE: 84 MMHG | WEIGHT: 315 LBS | BODY MASS INDEX: 39.17 KG/M2 | OXYGEN SATURATION: 97 % | TEMPERATURE: 97.8 F | SYSTOLIC BLOOD PRESSURE: 134 MMHG | HEIGHT: 75 IN

## 2018-01-24 DIAGNOSIS — J44.9 CHRONIC OBSTRUCTIVE PULMONARY DISEASE, UNSPECIFIED COPD TYPE (HCC): ICD-10-CM

## 2018-01-24 DIAGNOSIS — Z12.11 SCREEN FOR COLON CANCER: ICD-10-CM

## 2018-01-24 DIAGNOSIS — E78.5 HYPERLIPIDEMIA WITH LOW HDL: ICD-10-CM

## 2018-01-24 DIAGNOSIS — G47.33 OSA (OBSTRUCTIVE SLEEP APNEA): Primary | ICD-10-CM

## 2018-01-24 DIAGNOSIS — E66.01 MORBID OBESITY (HCC): ICD-10-CM

## 2018-01-24 DIAGNOSIS — D69.6 THROMBOCYTOPENIA (HCC): ICD-10-CM

## 2018-01-24 DIAGNOSIS — E78.6 HYPERLIPIDEMIA WITH LOW HDL: ICD-10-CM

## 2018-01-24 DIAGNOSIS — N18.30 CKD (CHRONIC KIDNEY DISEASE) STAGE 3, GFR 30-59 ML/MIN (HCC): ICD-10-CM

## 2018-01-24 PROCEDURE — 99214 OFFICE O/P EST MOD 30 MIN: CPT | Performed by: PHYSICIAN ASSISTANT

## 2018-01-24 NOTE — PROGRESS NOTES
Assessment/Plan:    Chronic obstructive pulmonary disease (HCC)  Copd   Moderate  To severe  Pt  Currently  Stable  F/u  With  Pulmonary  s  directed    ARACELIS (obstructive sleep apnea)  Sleep  Apnea  On  cpap    CKD (chronic kidney disease) stage 3, GFR 30-59 ml/min  ckc  Stable  No  nsaids    Hyperlipidemia with low HDL  lipds  Are  Diet  Controlled   hdl  Is  Low  Mor[facility] ([facility]  Exercise  And  Weight loss  Encouraged  Exercise  Is  Limited  Due to  Arthritis  Of  The  knees    Thrombocytopenia (HCC)  Chronic  Thrombocytopenia  Stable  Carmen Dice       Diagnoses and all orders for this visit:    ARACELIS (obstructive sleep apnea)    Chronic obstructive pulmonary disease, unspecified COPD type ([facility] [address] )    Hyperlipidemia with low HDL    Morbid obesity (HCC)    CKD (chronic kidney disease) stage 3, GFR 30-59 ml/min    Thrombocytopenia (HCC)    Screen for colon cancer  -     Ambulatory referral to Gastroenterology; Future    Other orders  -     Discontinue: fluticasone furoate-vilanterol (BREO ELLIPTA); Inhale daily  -     cyclobenzaprine (FLEXERIL) 10 mg tablet; Take 1 tablet by mouth  -     Multiple Vitamins tablet; Take 1 tablet by mouth daily  -     SPIRIVA RESPIMAT 2 5 MCG/ACT AERS; TAKE 2 PUFFS EVERY DAY  -     Cholecalciferol (VITAMIN D3) 1000 units CAPS; Take by mouth  -     Discontinue: albuterol (2 5 mg/3 mL) 0 083 % nebulizer solution; Inhale 1 each  -     albuterol (PROAIR HFA) 90 mcg/act inhaler; Inhale 2 puffs every 4 (four) hours as needed  -     budesonide-formoterol (SYMBICORT) 160-4 5 mcg/act inhaler; Inhale 2 puffs 2 (two) times a day          Subjective:      Patient ID: Marine Sesay is a 65 y o  male  Patient presents for follow up chronic conditions  Patient has COPD currently stable COPD is moderate to severe patient has nebulizer for which he uses albuterol inhalation solution as needed ProAir for rescue inhaler patient currently on Symbicort and Spiriva    Patient is Co manage by Pulmonary  Patient also has sleep apnea  Patient has chronic kidney disease  Lipids with low HDL patient is also overweight  Labs reviewed with patient show PSA of 1 8 platelets 328 BUN 22 creatinine 1 54 GFR is 48  Renal functions are stable lipid panel shows total cholesterol 151 triglycerides 129 LDL  HDL at 33 and LDL at 92        The following portions of the patient's history were reviewed and updated as appropriate: allergies, current medications, past family history, past medical history, past social history, past surgical history and problem list     Review of Systems   Constitutional: Negative for appetite change and fatigue  Eyes: Negative for discharge  Respiratory: Positive for cough and shortness of breath  Negative for wheezing  Cardiovascular: Positive for leg swelling  Negative for chest pain  Gastrointestinal: Negative for abdominal pain, constipation and diarrhea  Genitourinary: Negative for difficulty urinating  Musculoskeletal: Positive for gait problem  Arthralgias: knee  arthritis  Neurological: Negative for dizziness, syncope and headaches  Objective:     Physical Exam   Constitutional: He is oriented to person, place, and time  He appears well-developed  Morbidly  obese   HENT:   Head: Normocephalic  Left Ear: External ear normal    Mouth/Throat: No oropharyngeal exudate  Eyes: Pupils are equal, round, and reactive to light  Neck: Neck supple  No thyromegaly present  Cardiovascular: Normal rate, regular rhythm and normal heart sounds  No murmur heard  Pulmonary/Chest: Effort normal  No stridor  He has wheezes  Lymphadenopathy:     He has no cervical adenopathy  Neurological: He is alert and oriented to person, place, and time  Skin: Skin is warm and dry

## 2018-01-24 NOTE — PROGRESS NOTES
Assessment/Plan:    No problem-specific Assessment & Plan notes found for this encounter  Diagnoses and all orders for this visit:    ARACELIS (obstructive sleep apnea)    Chronic obstructive pulmonary disease, unspecified COPD type ([facility] [address] )    Hyperlipidemia with low HDL    Morbid obesity (HCC)    CKD (chronic kidney disease) stage 3, GFR 30-59 ml/min    Other orders  -     fluticasone furoate-vilanterol (BREO ELLIPTA); Inhale daily  -     cyclobenzaprine (FLEXERIL) 10 mg tablet; Take 1 tablet by mouth  -     Multiple Vitamins tablet; Take 1 tablet by mouth daily  -     SPIRIVA RESPIMAT 2 5 MCG/ACT AERS; TAKE 2 PUFFS EVERY DAY  -     Cholecalciferol (VITAMIN D3) 1000 units CAPS; Take by mouth  -     Discontinue: albuterol (2 5 mg/3 mL) 0 083 % nebulizer solution; Inhale 1 each  -     albuterol (PROAIR HFA) 90 mcg/act inhaler; Inhale 2 puffs every 4 (four) hours as needed  -     budesonide-formoterol (SYMBICORT) 160-4 5 mcg/act inhaler; Inhale 2 puffs 2 (two) times a day          Subjective:      Patient ID: Maribel Murray is a 65 y o  male  Patient presents for follow up chronic conditions  Patient has severe COPD and sleep apnea and patient is morbidly obese  Patient has recently seen Pulmonary  Patient has diffuse airway of bilateral lower knees  Patient has chronic kidney disease  Hyperlipidemia with low LDL  Current labs reviewed with patient show PSA of 1 8 platelets 348 and stable BUN 22 creatinine 1 54 with a GFR of 48 renal functions are stable    Total cholesterol is 151 triglycerides        The following portions of the patient's history were reviewed and updated as appropriate: allergies, current medications, past family history, past medical history, past social history, past surgical history and problem list     Review of Systems      Objective:     Physical Exam

## 2018-01-29 DIAGNOSIS — J44.9 COPD (CHRONIC OBSTRUCTIVE PULMONARY DISEASE) (HCC): Primary | ICD-10-CM

## 2018-01-29 RX ORDER — BUDESONIDE AND FORMOTEROL FUMARATE DIHYDRATE 160; 4.5 UG/1; UG/1
AEROSOL RESPIRATORY (INHALATION)
Qty: 10.2 INHALER | Refills: 3 | Status: SHIPPED | OUTPATIENT
Start: 2018-01-29 | End: 2018-01-30 | Stop reason: SDUPTHER

## 2018-01-30 DIAGNOSIS — J44.9 COPD (CHRONIC OBSTRUCTIVE PULMONARY DISEASE) (HCC): ICD-10-CM

## 2018-01-30 RX ORDER — BUDESONIDE AND FORMOTEROL FUMARATE DIHYDRATE 160; 4.5 UG/1; UG/1
AEROSOL RESPIRATORY (INHALATION)
Qty: 10.2 INHALER | Refills: 3 | Status: SHIPPED | OUTPATIENT
Start: 2018-01-30 | End: 2018-05-07 | Stop reason: ALTCHOICE

## 2018-01-31 ENCOUNTER — TELEPHONE (OUTPATIENT)
Dept: PULMONOLOGY | Facility: CLINIC | Age: 63
End: 2018-01-31

## 2018-01-31 NOTE — TELEPHONE ENCOUNTER
Spoke with patient  Results reviewed  Questions answered  He would like to use up his remaining supply of Symbicort and then try the Patti Diver sample that I gave him in the office

## 2018-03-26 ENCOUNTER — HOSPITAL ENCOUNTER (OUTPATIENT)
Dept: CT IMAGING | Facility: HOSPITAL | Age: 63
Discharge: HOME | End: 2018-03-26
Attending: INTERNAL MEDICINE
Payer: COMMERCIAL

## 2018-03-26 DIAGNOSIS — R91.1 COIN LESION: ICD-10-CM

## 2018-03-26 PROCEDURE — 71250 CT THORAX DX C-: CPT

## 2018-05-07 ENCOUNTER — OFFICE VISIT (OUTPATIENT)
Dept: PULMONOLOGY | Facility: CLINIC | Age: 63
End: 2018-05-07

## 2018-05-07 VITALS
BODY MASS INDEX: 39.17 KG/M2 | SYSTOLIC BLOOD PRESSURE: 148 MMHG | WEIGHT: 315 LBS | TEMPERATURE: 97.3 F | OXYGEN SATURATION: 96 % | HEART RATE: 101 BPM | HEIGHT: 75 IN | DIASTOLIC BLOOD PRESSURE: 90 MMHG

## 2018-05-07 DIAGNOSIS — R91.1 PULMONARY NODULE: ICD-10-CM

## 2018-05-07 DIAGNOSIS — G47.33 OSA (OBSTRUCTIVE SLEEP APNEA): Primary | ICD-10-CM

## 2018-05-07 DIAGNOSIS — J44.9 COPD, SEVERE (HCC): ICD-10-CM

## 2018-05-07 PROCEDURE — 94618 PULMONARY STRESS TESTING: CPT | Performed by: INTERNAL MEDICINE

## 2018-05-07 PROCEDURE — 99214 OFFICE O/P EST MOD 30 MIN: CPT | Performed by: INTERNAL MEDICINE

## 2018-05-07 RX ORDER — FLUTICASONE FUROATE AND VILANTEROL TRIFENATATE 100; 25 UG/1; UG/1
1 POWDER RESPIRATORY (INHALATION) DAILY
COMMUNITY
Start: 2018-05-02 | End: 2018-06-06 | Stop reason: ALTCHOICE

## 2018-05-07 RX ORDER — ACETAMINOPHEN AND CODEINE PHOSPHATE 300; 30 MG/1; MG/1
TABLET ORAL
Refills: 0 | COMMUNITY
Start: 2018-04-05 | End: 2018-06-06 | Stop reason: ALTCHOICE

## 2018-05-07 NOTE — PATIENT INSTRUCTIONS
Continue Breo Ellipta once daily with incruse once daily  You may use Symbicort in place of [employer], but they should not be used simultaneously  Please mail paperwork to my office for me to fill out to apply for disability

## 2018-05-07 NOTE — PROGRESS NOTES
Pulmonary Follow Up Note   Yomi Kwon 65 y o  male MRN: [id number]  5/7/2018      Assessment/Plan:    COPD, severe [facility]  He was instructed to use either Breo or Symbicort on a daily basis  Once his Symbicort supply is exhausted, he will use Breo as his ICS/LABA  I also provided him with samples of Incruse Ellipta to use once daily  We will supply him with a co-pay coupon for this to make it affordable  During the 6 minute walk today, he dropped no lower than 89%, therefore he does not qualify for oxygen at this point  We can reassess at a future visit  He will continue to monitor at home as well  ARACELIS (obstructive sleep apnea)  He will continue with CPAP during hours of sleep  He has demonstrated good compliance and is deriving significant benefit from it  Pulmonary nodule  There is a stable 5 mm pulmonary nodule  We may go back to once yearly chest CT for lung cancer screening purposes  This will be due in March 2019  Visit orders:    Diagnoses and all orders for this visit:    ARACELIS (obstructive sleep apnea)    COPD, severe (HCC)  -     POCT 6 minute walk    Pulmonary nodule    Other orders  -     acetaminophen-codeine (TYLENOL #3) 300-30 mg per tablet; TAKE 2 TABLETS BY MOUTH EVERY 6 HOURS AS NEEDED FOR PAIN  -     BREO ELLIPTA; 1 puff daily    -     POTASSIUM AMINOBENZOATE PO; Take by mouth        Return in about 2 months (around 7/7/2018)  History of Present Illness   HPI:  Yomi Kwon is a 65 y o  male who is here for follow-up regarding severe COPD and obstructive sleep apnea  Patient complains progressive worsening of exertional dyspnea  He purchased a pulse oximeter and finds that his saturations will drop as low as 84% with ambulation  He does not use supplemental oxygen at home  He has an occasional cough with wheezing  At our last visit, I gave him Lashaun Gray to use in place Symbicort    Unfortunately, he misunderstood and has been using both inhaler simultaneously  He is no longer using Spiriva, stating that it was too costly  He has been using the nebulizer at least once per day, usually at night  He is compliant with CPAP during hours of sleep  I have reviewed his compliance data and he is using it on average, 9 hours and 27 minutes per night  Average pressure is 8 2 cm water  Average AHI 0 9  He also states that has been very difficult for him to work and is asking whether he would qualify for disability  Review of Systems   Constitutional: Negative for chills, fever and unexpected weight change  HENT: Negative for postnasal drip and sore throat  Eyes: Negative for visual disturbance  Respiratory:        As noted in HPI   Cardiovascular: Negative for chest pain  Gastrointestinal: Negative for abdominal pain, diarrhea and vomiting  Genitourinary: Negative for difficulty urinating  Skin: Negative for rash  Neurological: Negative for headaches  Hematological: Negative for adenopathy  Psychiatric/Behavioral: Negative  All other systems reviewed and are negative        Historical Information   Past Medical History:   Diagnosis Date    Arthritis     Asthma     Chest pain     atypical    Chronic kidney disease     COPD (chronic obstructive pulmonary disease) (HCC)     CPAP (continuous positive airway pressure) dependence     Decreased glomerular filtration rate     Hamstring strain     Herniated lumbar intervertebral disc     History of alcohol use     uncomplicated    History of back pain     lower    History of colon polyps     sigmoid    History of genital warts     History of lipoma     History of muscle spasm     lumbar paraspinous muscle    History of umbilical hernia     Lateral pain of left hip     Low HDL (under 40)     Lumbar radiculopathy     Obesity     Respiratory distress     acute    Shortness of breath     Sleep apnea     CPAP PT WILL BRING     Past Surgical History:   Procedure Laterality Date  ADENOIDECTOMY      APPENDECTOMY      COLONOSCOPY      HAND SURGERY Left     CYST REMOVAL    HIP SURGERY Bilateral     LIPECTOMY      thigh    LIPOMA RESECTION Left     HIP    SC REPAIR UMBILICAL CNFR,1+U/W,EJJBA N/A 2/23/2017    Procedure: REPAIR HERNIA UMBILICAL;  Surgeon: Blake Victor MD;  [facility];  Service: General    TONSILLECTOMY      WRIST GANGLION EXCISION       Family History   Problem Relation Age of Onset    Heart disease Mother      cardiac disorder    Hypertension Mother     COPD Father     Heart disease Family      cardiac disorder    Cancer Family      lung    Emphysema Family      pulmonary unspecified emphysema    Diabetes Family     Glaucoma Family     Cancer Family      lung       History   Smoking Status    Former Smoker    Packs/day: 1 00    Years: 25 00    Types: Cigarettes    Quit date: 10/2014   Smokeless Tobacco    Never Used     Meds/Allergies     Current Outpatient Prescriptions:     acetaminophen-codeine (TYLENOL #3) 300-30 mg per tablet, TAKE 2 TABLETS BY MOUTH EVERY 6 HOURS AS NEEDED FOR PAIN, Disp: , Rfl: 0    albuterol (2 5 mg/3 mL) 0 083 % nebulizer solution, Take 2 5 mg by nebulization every 6 (six) hours as needed for wheezing, Disp: , Rfl:     albuterol (PROAIR HFA) 90 mcg/act inhaler, Inhale 2 puffs every 4 (four) hours as needed, Disp: , Rfl:     BREO ELLIPTA, 1 puff daily  , Disp: , Rfl:     Cholecalciferol (VITAMIN D3) 1000 units CAPS, Take by mouth, Disp: , Rfl:     Multiple Vitamins tablet, Take 1 tablet by mouth daily, Disp: , Rfl:     Nutritional Supplements (VITAMIN D MAINTENANCE PO), Take 1 capsule by mouth daily, Disp: , Rfl:     POTASSIUM AMINOBENZOATE PO, Take by mouth, Disp: , Rfl:   Allergies   Allergen Reactions    Darvon [Propoxyphene] Other (See Comments)     hallucinations     Vitals: Blood pressure 148/90, pulse 101, temperature (!) 97 3 °F (36 3 °C), temperature source Tympanic, height 6' 3" (1 905 m), weight (!) 170 kg (375 lb 9 6 oz), SpO2 96 %  Body mass index is 46 95 kg/m²  Oxygen Therapy  SpO2: 96 %  Oxygen Therapy: None (Room air)    Physical Exam   Physical Exam   Constitutional: He is oriented to person, place, and time  No distress  HENT:   Head: Normocephalic  Mouth/Throat: No oropharyngeal exudate  Eyes: Pupils are equal, round, and reactive to light  No scleral icterus  Neck: Neck supple  No JVD present  Cardiovascular: Normal rate and regular rhythm  Pulmonary/Chest: He has no wheezes  He has no rales  Decreased breath sounds throughout   Abdominal: Soft  There is no tenderness  Musculoskeletal: He exhibits edema (Trace)  Lymphadenopathy:     He has no cervical adenopathy  Neurological: He is alert and oriented to person, place, and time  Skin: Skin is warm and dry  Psychiatric: He has a normal mood and affect  Labs: I have personally reviewed pertinent lab results  Lab Results   Component Value Date    WBC 7 10 01/05/2018    HGB 16 0 01/05/2018    HCT 47 6 01/05/2018    MCV 98 01/05/2018     (L) 01/05/2018     Lab Results   Component Value Date    GLUCOSE 82 02/20/2017    CALCIUM 9 0 01/05/2018     01/05/2018    K 4 1 01/05/2018    CO2 25 01/05/2018     (H) 01/05/2018    BUN 22 01/05/2018    CREATININE 1 54 (H) 01/05/2018     No results found for: IGE  Lab Results   Component Value Date    ALT 44 01/05/2018    AST 29 01/05/2018    ALKPHOS 88 01/05/2018    BILITOT 0 98 01/05/2018       Imaging and other studies: I have personally reviewed pertinent films in PACS chest CT from March 2018 shows stable 5 mm left lower lobe nodule    There are additional fissural nodules and a small density in the right costophrenic angle, also stable    Pulmonary function testing:  Performed 01/15/2018   FEV1/FVC ratio 60%   FEV1 40% predicted  FVC 51% predicted  No response to bronchodilators  TLC 79 % predicted   % predicted  DLCO corrected for hemoglobin 34 % predicted  This study shows severe obstruction with reduced vital capacity due to body habitus and mild air trapping  Diffusion capacity is severely reduced  6MWT - performed today  Baseline pulse oximetry is 95% on room air with a heart rate of 103 beats per minute  Patient walked for 6 minutes, needing to take a break for a few minutes due to extreme shortness of breath and hip pain  Saturations dropped to a low of 89%  Maximal heart rate 130 beats per minute  Patient demonstrated moderate distress a conclusion of testing  He took 2 inhalations of his rescue inhaler

## 2018-05-07 NOTE — ASSESSMENT & PLAN NOTE
He will continue with CPAP during hours of sleep  He has demonstrated good compliance and is deriving significant benefit from it

## 2018-05-07 NOTE — ASSESSMENT & PLAN NOTE
There is a stable 5 mm pulmonary nodule  We may go back to once yearly chest CT for lung cancer screening purposes  This will be due in March 2019

## 2018-05-07 NOTE — ASSESSMENT & PLAN NOTE
He was instructed to use either Breo or Symbicort on a daily basis  Once his Symbicort supply is exhausted, he will use Breo as his ICS/LABA  I also provided him with samples of Incruse Ellipta to use once daily  We will supply him with a co-pay coupon for this to make it affordable  During the 6 minute walk today, he dropped no lower than 89%, therefore he does not qualify for oxygen at this point  We can reassess at a future visit  He will continue to monitor at home as well

## 2018-06-03 DIAGNOSIS — J44.9 COPD (CHRONIC OBSTRUCTIVE PULMONARY DISEASE) (HCC): ICD-10-CM

## 2018-06-04 RX ORDER — BUDESONIDE AND FORMOTEROL FUMARATE DIHYDRATE 160; 4.5 UG/1; UG/1
AEROSOL RESPIRATORY (INHALATION)
Qty: 10.2 INHALER | Refills: 3 | Status: SHIPPED | OUTPATIENT
Start: 2018-06-04 | End: 2020-01-17

## 2018-06-06 ENCOUNTER — OFFICE VISIT (OUTPATIENT)
Dept: FAMILY MEDICINE CLINIC | Facility: CLINIC | Age: 63
End: 2018-06-06
Payer: COMMERCIAL

## 2018-06-06 VITALS
DIASTOLIC BLOOD PRESSURE: 76 MMHG | BODY MASS INDEX: 39.17 KG/M2 | TEMPERATURE: 97.9 F | OXYGEN SATURATION: 96 % | HEART RATE: 107 BPM | HEIGHT: 75 IN | WEIGHT: 315 LBS | SYSTOLIC BLOOD PRESSURE: 142 MMHG

## 2018-06-06 DIAGNOSIS — N18.30 CKD (CHRONIC KIDNEY DISEASE) STAGE 3, GFR 30-59 ML/MIN (HCC): ICD-10-CM

## 2018-06-06 DIAGNOSIS — E78.6 HYPERLIPIDEMIA WITH LOW HDL: ICD-10-CM

## 2018-06-06 DIAGNOSIS — Z86.19 HISTORY OF GENITAL WARTS: ICD-10-CM

## 2018-06-06 DIAGNOSIS — J44.9 COPD, SEVERE (HCC): Primary | ICD-10-CM

## 2018-06-06 DIAGNOSIS — E66.01 MORBID OBESITY (HCC): ICD-10-CM

## 2018-06-06 DIAGNOSIS — D69.6 THROMBOCYTOPENIA (HCC): ICD-10-CM

## 2018-06-06 DIAGNOSIS — E78.5 HYPERLIPIDEMIA WITH LOW HDL: ICD-10-CM

## 2018-06-06 PROBLEM — Z87.898 HISTORY OF ALCOHOL USE: Status: RESOLVED | Noted: 2018-06-06 | Resolved: 2018-06-06

## 2018-06-06 PROBLEM — T14.8XXA MUSCLE STRAIN: Status: RESOLVED | Noted: 2017-09-20 | Resolved: 2018-06-06

## 2018-06-06 PROBLEM — R06.03 RESPIRATORY DISTRESS: Status: RESOLVED | Noted: 2018-06-06 | Resolved: 2018-06-06

## 2018-06-06 PROBLEM — R07.9 CHEST PAIN: Status: RESOLVED | Noted: 2018-06-06 | Resolved: 2018-06-06

## 2018-06-06 PROBLEM — S76.319A HAMSTRING STRAIN: Status: RESOLVED | Noted: 2018-06-06 | Resolved: 2018-06-06

## 2018-06-06 PROBLEM — Z87.39 HISTORY OF MUSCLE SPASM: Status: RESOLVED | Noted: 2018-06-06 | Resolved: 2018-06-06

## 2018-06-06 PROBLEM — R06.02 SHORTNESS OF BREATH: Status: RESOLVED | Noted: 2018-06-06 | Resolved: 2018-06-06

## 2018-06-06 PROCEDURE — 99214 OFFICE O/P EST MOD 30 MIN: CPT | Performed by: PHYSICIAN ASSISTANT

## 2018-06-06 RX ORDER — PODOFILOX 5 MG/ML
SOLUTION TOPICAL 2 TIMES DAILY
Qty: 3.5 ML | Refills: 3 | Status: SHIPPED | OUTPATIENT
Start: 2018-06-06 | End: 2019-08-27 | Stop reason: ALTCHOICE

## 2018-06-06 NOTE — PROGRESS NOTES
Assessment/Plan:       Diagnoses and all orders for this visit:    COPD, severe ([facility] [address] )  Comments:    COPD severe but currently stable continue current regimen    CKD (chronic kidney disease) stage 3, GFR 30-59 ml/min  Comments:    Chronic kidney disease blood pressure is normal no NSAIDs c  Orders:  -     Comprehensive metabolic panel    Hyperlipidemia with low HDL  -     Comprehensive metabolic panel  -     Lipid panel    Morbid obesity (HCC)  Comments:    Exercise and weight loss encouraged  Thrombocytopenia (HCC)  Comments:    Chronic thrombocytopenia check CBC  Orders:  -     CBC and differential    History of genital warts  -     podofilox (CONDYLOX) 0 5 % external solution; Apply topically 2 (two) times a day          Subjective:      Patient ID: Karan Askew is a 59 y o  male  Patient presents will find for follow up chronic conditions  Patient has COPD severe  Patient is followed by Pulmonary  Patient has albuterol for his nebulizer  He is on in cruise and Symbicort for maintenance and ProAir rescue  Patient also has obstructive sleep apnea on CPAP  Patient has hyperlipidemia which is diet controlled  Chronic kidney disease from past chronic NSAID use  Patient has diffuse degenerative joint disease  Patient due for labs  The following portions of the patient's history were reviewed and updated as appropriate:   He  has a past medical history of Arthritis; Asthma; Chest pain; Chronic kidney disease; COPD (chronic obstructive pulmonary disease) (HCC); CPAP (continuous positive airway pressure) dependence; Decreased glomerular filtration rate; Hamstring strain; Herniated lumbar intervertebral disc; History of alcohol use; History of back pain; History of colon polyps; History of genital warts; History of lipoma; History of muscle spasm; History of umbilical hernia; Lateral pain of left hip; Low HDL (under 40); Lumbar radiculopathy; Obesity; Respiratory distress;  Shortness of breath; and Sleep apnea  He   Patient Active Problem List    Diagnosis Date Noted    History of genital warts 06/06/2018    Morbid obesity ([facility] [address] ) 09/20/2017    Skin lesion 05/24/2017    Pulmonary nodule 02/21/2017    CKD (chronic kidney disease) stage 3, GFR 30-59 ml/min 02/20/2017    ARACELIS (obstructive sleep apnea) 07/22/2016    Nocturnal hypoxemia 04/01/2016    Hyperlipidemia with low HDL 11/19/2015    Arthritis 08/20/2015    Moderate persistent asthma 08/20/2015    Thrombocytopenia (HCC) 12/11/2014    Sleep disorder 08/24/2012    Vitamin D deficiency 08/24/2012    COPD, severe ([facility] [address] ) 07/12/2012     Current Outpatient Prescriptions   Medication Sig Dispense Refill    albuterol (2 5 mg/3 mL) 0 083 % nebulizer solution Take 2 5 mg by nebulization every 6 (six) hours as needed for wheezing      albuterol (PROAIR HFA) 90 mcg/act inhaler Inhale 2 puffs every 4 (four) hours as needed      Cholecalciferol (VITAMIN D3) 1000 units CAPS Take by mouth      Multiple Vitamins tablet Take 1 tablet by mouth daily      Nutritional Supplements (VITAMIN D MAINTENANCE PO) Take 1 capsule by mouth daily      podofilox (CONDYLOX) 0 5 % external solution Apply topically 2 (two) times a day 3 5 mL 3    POTASSIUM AMINOBENZOATE PO Take by mouth      SYMBICORT 160-4 5 MCG/ACT inhaler INHALE 2 PUFFS TWICE DAILY  RINSE MOUTH AFTER USE  10 2 Inhaler 3    Umeclidinium Bromide (INCRUSE ELLIPTA) 62 5 MCG/INH AEPB Inhale 1 puff daily 30 each 11     No current facility-administered medications for this visit        Current Outpatient Prescriptions on File Prior to Visit   Medication Sig    albuterol (2 5 mg/3 mL) 0 083 % nebulizer solution Take 2 5 mg by nebulization every 6 (six) hours as needed for wheezing    albuterol (PROAIR HFA) 90 mcg/act inhaler Inhale 2 puffs every 4 (four) hours as needed    Cholecalciferol (VITAMIN D3) 1000 units CAPS Take by mouth    Multiple Vitamins tablet Take 1 tablet by mouth daily    Nutritional Supplements (VITAMIN D MAINTENANCE PO) Take 1 capsule by mouth daily    POTASSIUM AMINOBENZOATE PO Take by mouth    SYMBICORT 160-4 5 MCG/ACT inhaler INHALE 2 PUFFS TWICE DAILY  RINSE MOUTH AFTER USE   Umeclidinium Bromide (INCRUSE ELLIPTA) 62 5 MCG/INH AEPB Inhale 1 puff daily     No current facility-administered medications on file prior to visit  He is allergic to darvon [propoxyphene]       Review of Systems   Constitutional: Negative for activity change, appetite change, fatigue and unexpected weight change  HENT: Negative for ear pain and sore throat  Eyes: Negative for visual disturbance  Respiratory: Positive for cough, shortness of breath and wheezing  Cardiovascular: Negative for chest pain and leg swelling  Gastrointestinal: Negative for abdominal pain, constipation and diarrhea  Genitourinary: Negative for difficulty urinating  Musculoskeletal: Positive for arthralgias, back pain and gait problem  Skin: Negative for rash  Neurological: Negative for dizziness, syncope and light-headedness  Psychiatric/Behavioral: The patient is not nervous/anxious  Objective:      /76 (BP Location: Right arm, Patient Position: Sitting, Cuff Size: Standard)   Pulse (!) 107   Temp 97 9 °F (36 6 °C)   Ht 6' 3" (1 905 m)   Wt (!) 169 kg (373 lb 9 6 oz)   SpO2 96%   BMI 46 70 kg/m²          Physical Exam   Constitutional: He is oriented to person, place, and time  He appears well-developed and well-nourished  No distress  Morbidly  Obese  White  male   HENT:   Right Ear: External ear normal    Left Ear: External ear normal    Mouth/Throat: Oropharynx is clear and moist    Eyes: Conjunctivae are normal  Pupils are equal, round, and reactive to light  Neck: Neck supple  No thyromegaly present  Cardiovascular: Normal rate and regular rhythm  Exam reveals distant heart sounds  No murmur heard  Pulmonary/Chest: No respiratory distress  He has no wheezes   He has no rales  Decreased  Breath  Sounds  all  fields   Musculoskeletal: He exhibits no edema  Lymphadenopathy:     He has no cervical adenopathy  Neurological: He is alert and oriented to person, place, and time  Skin: Skin is warm and dry  Psychiatric: He has a normal mood and affect  His behavior is normal  Thought content normal    Nursing note and vitals reviewed

## 2018-06-19 ENCOUNTER — TELEPHONE (OUTPATIENT)
Dept: PULMONOLOGY | Facility: CLINIC | Age: 63
End: 2018-06-19

## 2018-07-31 ENCOUNTER — OFFICE VISIT (OUTPATIENT)
Dept: PULMONOLOGY | Facility: CLINIC | Age: 63
End: 2018-07-31

## 2018-07-31 VITALS
TEMPERATURE: 95.9 F | WEIGHT: 315 LBS | OXYGEN SATURATION: 96 % | HEART RATE: 87 BPM | BODY MASS INDEX: 42.66 KG/M2 | HEIGHT: 72 IN | SYSTOLIC BLOOD PRESSURE: 110 MMHG | DIASTOLIC BLOOD PRESSURE: 70 MMHG

## 2018-07-31 DIAGNOSIS — G47.34 NOCTURNAL HYPOXEMIA: ICD-10-CM

## 2018-07-31 DIAGNOSIS — G47.33 OSA (OBSTRUCTIVE SLEEP APNEA): ICD-10-CM

## 2018-07-31 DIAGNOSIS — J44.9 COPD, SEVERE (HCC): Primary | ICD-10-CM

## 2018-07-31 PROCEDURE — 99212 OFFICE O/P EST SF 10 MIN: CPT | Performed by: INTERNAL MEDICINE

## 2018-07-31 NOTE — PROGRESS NOTES
Pulmonary Follow Up Note   Juana Mckeon 59 y o  male MRN: [id number]  7/31/2018      Assessment/Plan:    COPD, severe [facility])   He will continue with Symbicort twice daily and Incruse Ellipta once daily  ARACELIS (obstructive sleep apnea)    He is compliant with CPAP during hours of sleep and is demonstrating significant benefit      Return in about 4 months (around 11/30/2018)  History of Present Illness   HPI:  Juana Mckeon is a 59 y o  male whoIs here today for follow-up regarding severe COPD and obstructive sleep apnea  He has been doing well from pulmonary perspective  He is maintained on Symbicort and Incruse Ellipta  He rarely needs a rescue inhaler  He does have exertional dyspnea which improves with resting  As of May 11th, he has retired from working and is currently getting disability  He denies cough or sputum production  He denies wheezing  Review of Systems    He has lost 8 pound since last visit  He denies chest pains or palpitations  He has lower extremity edema toward the end of the day  He denies abdominal pain      Historical Information   Past Medical History:   Diagnosis Date    Arthritis     Asthma     Chest pain     atypical    Chronic kidney disease     [facility]     CPAP (continuous positive airway pressure) dependence     Decreased glomerular filtration rate     Hamstring strain     Herniated lumbar intervertebral disc     History of alcohol use     uncomplicated    History of back pain     lower    History of colon polyps     sigmoid    History of genital warts     History of lipoma     History of muscle spasm     lumbar paraspinous muscle    History of umbilical hernia     Lateral pain of left hip     Low HDL (under 40)     Lumbar radiculopathy     Obesity     Respiratory distress     acute    Shortness of breath     Sleep apnea     CPAP PT WILL BRING     Past Surgical History:   Procedure Laterality Date  ADENOIDECTOMY      APPENDECTOMY      COLONOSCOPY      HAND SURGERY Left     CYST REMOVAL    HIP SURGERY Bilateral     LIPECTOMY      thigh    LIPOMA RESECTION Left     HIP    [place] REPAIR UMBILICAL UFKW,7+V/E,UUOVZ N/A 2/23/2017    Procedure: REPAIR HERNIA UMBILICAL;  Surgeon: Ash Nelson MD;  Location: [facility];  Service: General    TONSILLECTOMY      WRIST GANGLION EXCISION       Family History   Problem Relation Age of Onset    Heart disease Mother         cardiac disorder    Hypertension Mother     COPD Father     Heart disease Family         cardiac disorder    Cancer Family         lung    Emphysema Family         pulmonary unspecified emphysema    Diabetes Family     Glaucoma Family     Cancer Family         lung       History   Smoking Status    Former Smoker    Packs/day: 1 00    Years: 25 00    Types: Cigarettes    Start date: 0    Quit date: 10/2014   Smokeless Tobacco    Never Used         Meds/Allergies     Current Outpatient Prescriptions:     albuterol (2 5 mg/3 mL) 0 083 % nebulizer solution, Take 2 5 mg by nebulization every 6 (six) hours as needed for wheezing, Disp: , Rfl:     albuterol (PROAIR HFA) 90 mcg/act inhaler, Inhale 2 puffs every 4 (four) hours as needed, Disp: , Rfl:     Cholecalciferol (VITAMIN D3) 1000 units CAPS, Take by mouth, Disp: , Rfl:     Multiple Vitamins tablet, Take 1 tablet by mouth daily, Disp: , Rfl:     podofilox (CONDYLOX) 0 5 % external solution, Apply topically 2 (two) times a day, Disp: 3 5 mL, Rfl: 3    POTASSIUM AMINOBENZOATE PO, Take by mouth, Disp: , Rfl:     SYMBICORT 160-4 5 MCG/ACT inhaler, INHALE 2 PUFFS TWICE DAILY   RINSE MOUTH AFTER USE , Disp: 10 2 Inhaler, Rfl: 3    Umeclidinium Bromide (INCRUSE ELLIPTA) 62 5 MCG/INH AEPB, Inhale 1 puff daily, Disp: 30 each, Rfl: 11  Allergies   Allergen Reactions    Darvon [Propoxyphene] Other (See Comments)     hallucinations       Vitals: Blood pressure 110/70, pulse 87, temperature (!) 95 9 °F (35 5 °C), temperature source Tympanic, height 6' (1 829 m), weight (!) 167 kg (368 lb 6 4 oz), SpO2 96 %  Body mass index is 49 96 kg/m²  Oxygen Therapy  SpO2: 96 %    Physical Exam   Physical Exam   Constitutional: He is oriented to person, place, and time  Eyes: No scleral icterus  Pulmonary/Chest: He has no wheezes  He has no rales  Musculoskeletal: He exhibits edema (  Trace)  Neurological: He is alert and oriented to person, place, and time  Labs: I have personally reviewed pertinent lab results  Lab Results   Component Value Date    WBC 7 10 01/05/2018    HGB 16 0 01/05/2018    HCT 47 6 01/05/2018    MCV 98 01/05/2018     (L) 01/05/2018     Lab Results   Component Value Date    GLUCOSE 82 02/20/2017    CALCIUM 9 0 01/05/2018     01/05/2018    K 4 1 01/05/2018    CO2 25 01/05/2018     (H) 01/05/2018    BUN 22 01/05/2018    CREATININE 1 54 (H) 01/05/2018     No results found for: IGE  Lab Results   Component Value Date    ALT 44 01/05/2018    AST 29 01/05/2018    ALKPHOS 88 01/05/2018    BILITOT 0 98 01/05/2018     Pulmonary function testing:  Performed 01/15/2018   FEV1/FVC ratio 60%   FEV1 40% predicted  FVC 51% predicted  No response to bronchodilators  TLC 79 % predicted   % predicted  DLCO corrected for hemoglobin 34 % predicted  This study shows severe obstruction with reduced vital capacity due to body habitus and mild air trapping  Diffusion capacity is severely reduced          Other Studies: I have personally reviewed pertinent reports  CPAP compliance report for the period of 6/30/18 through 7/29/18 is reviewed  Patient used the device 100 % of nights    Average use is 8 hours 56 minutes

## 2018-09-07 ENCOUNTER — OFFICE VISIT (OUTPATIENT)
Dept: FAMILY MEDICINE CLINIC | Facility: CLINIC | Age: 63
End: 2018-09-07

## 2018-09-07 VITALS
SYSTOLIC BLOOD PRESSURE: 128 MMHG | DIASTOLIC BLOOD PRESSURE: 68 MMHG | OXYGEN SATURATION: 96 % | HEART RATE: 101 BPM | HEIGHT: 72 IN | TEMPERATURE: 97.8 F | WEIGHT: 315 LBS | BODY MASS INDEX: 42.66 KG/M2

## 2018-09-07 DIAGNOSIS — N18.30 CKD (CHRONIC KIDNEY DISEASE) STAGE 3, GFR 30-59 ML/MIN (HCC): ICD-10-CM

## 2018-09-07 DIAGNOSIS — E78.6 HYPERLIPIDEMIA WITH LOW HDL: ICD-10-CM

## 2018-09-07 DIAGNOSIS — J44.9 COPD, SEVERE (HCC): Primary | ICD-10-CM

## 2018-09-07 DIAGNOSIS — E66.01 MORBID OBESITY WITH BMI OF 45.0-49.9, ADULT (HCC): ICD-10-CM

## 2018-09-07 DIAGNOSIS — Z23 NEED FOR INFLUENZA VACCINATION: ICD-10-CM

## 2018-09-07 DIAGNOSIS — E78.5 HYPERLIPIDEMIA WITH LOW HDL: ICD-10-CM

## 2018-09-07 DIAGNOSIS — G47.33 OSA (OBSTRUCTIVE SLEEP APNEA): ICD-10-CM

## 2018-09-07 PROCEDURE — 99213 OFFICE O/P EST LOW 20 MIN: CPT | Performed by: PHYSICIAN ASSISTANT

## 2018-09-07 NOTE — PROGRESS NOTES
Assessment/Plan:     Diagnoses and all orders for this visit:    COPD, severe ([facility] )  Comments:  Severe COPD continue inhalers and neb treatments as directed    Need for influenza vaccination  -     Cancel: influenza vaccine, [id number], quadrivalent, recombinant, PF, 0 5 mL, for patients 18 yr+ ([id number])    ARACELIS (obstructive sleep apnea)  Comments:   continue CPAP  CKD (chronic kidney disease) stage 3, GFR 30-59 ml/min  Comments:    Patient has no current medical insurance to proceed with labs    Hyperlipidemia with low HDL  Comments:   lipids are diet controlled  Morbid obesity with BMI of 45 0-49 9, adult ([facility] )  Comments:    Exercise and weight loss suggested          Subjective:      Patient ID: Emory Helms is a 59 y o  male  Patient presents for follow up chronic conditions patient would also like flu vaccine today  Patient 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  Patient currently has no medical insurance due to the high cost   Patient is waiting for open enrollment in [employer]  Patient states he was unable to do his lab work for this reason  Patient has severe COPD  Short of breath with minimal exertion  Patient is on Symbicort in cruise ProAir albuterol nebulizer treatments  As needed  Patient does see pulmonology as well  Patient has obstructive sleep apnea on CPAP at night  Patient should be on nocturnal O2  But cannot afford at this time  Patient has known dyslipidemia with low HDL  No medication just diet  Patient also has history of chronic kidney disease  Hypertension is controlled with no meds  Patient will try to proceed with labs when insurance is obtained  Patient is morbidly obese  Patient also has severe osteoarthritis of his knees ambulates with cane has pain in knees with ambulation  The following portions of the patient's history were reviewed and updated as appropriate:   He  has a past medical history of Arthritis; Asthma;  Chest pain; Chronic kidney disease; COPD (chronic obstructive pulmonary disease) ([facility]); CPAP (continuous positive airway pressure) dependence; Decreased glomerular filtration rate; Hamstring strain; Herniated lumbar intervertebral disc; History of alcohol use; History of back pain; History of colon polyps; History of genital warts; History of lipoma; History of muscle spasm; History of umbilical hernia; Lateral pain of left hip; Low HDL (under 40); Lumbar radiculopathy; Obesity; Respiratory distress; Shortness of breath; and Sleep apnea  He   Patient Active Problem List    Diagnosis Date Noted    History of genital warts 06/06/2018    Morbid obesity with BMI of 45 0-49 9, adult ([place] [address] ) 09/20/2017    Skin lesion 05/24/2017    Pulmonary nodule 02/21/2017    CKD (chronic kidney disease) stage 3, GFR 30-59 ml/min 02/20/2017    ARACELIS (obstructive sleep apnea) 07/22/2016    Nocturnal hypoxemia 04/01/2016    Hyperlipidemia with low HDL 11/19/2015    Arthritis 08/20/2015    Moderate persistent asthma 08/20/2015    Thrombocytopenia ([facility]) 12/11/2014    Sleep disorder 08/24/2012    Vitamin D deficiency 08/24/2012    COPD, severe ([place] [address] ) 07/12/2012     Current Outpatient Prescriptions   Medication Sig Dispense Refill    albuterol (2 5 mg/3 mL) 0 083 % nebulizer solution Take 2 5 mg by nebulization every 6 (six) hours as needed for wheezing      albuterol (PROAIR HFA) 90 mcg/act inhaler Inhale 2 puffs every 4 (four) hours as needed      Cholecalciferol (VITAMIN D3) 1000 units CAPS Take by mouth      Multiple Vitamins tablet Take 1 tablet by mouth daily      podofilox (CONDYLOX) 0 5 % external solution Apply topically 2 (two) times a day 3 5 mL 3    POTASSIUM AMINOBENZOATE PO Take by mouth      SYMBICORT 160-4 5 MCG/ACT inhaler INHALE 2 PUFFS TWICE DAILY   RINSE MOUTH AFTER USE  10 2 Inhaler 3    Umeclidinium Bromide (INCRUSE ELLIPTA) 62 5 MCG/INH AEPB Inhale 1 puff daily 30 each 11     No current facility-administered medications for this visit  Current Outpatient Prescriptions on File Prior to Visit   Medication Sig    albuterol (2 5 mg/3 mL) 0 083 % nebulizer solution Take 2 5 mg by nebulization every 6 (six) hours as needed for wheezing    albuterol (PROAIR HFA) 90 mcg/act inhaler Inhale 2 puffs every 4 (four) hours as needed    Cholecalciferol (VITAMIN D3) 1000 units CAPS Take by mouth    Multiple Vitamins tablet Take 1 tablet by mouth daily    podofilox (CONDYLOX) 0 5 % external solution Apply topically 2 (two) times a day    POTASSIUM AMINOBENZOATE PO Take by mouth    SYMBICORT 160-4 5 MCG/ACT inhaler INHALE 2 PUFFS TWICE DAILY  RINSE MOUTH AFTER USE   Umeclidinium Bromide (INCRUSE ELLIPTA) 62 5 MCG/INH AEPB Inhale 1 puff daily     No current facility-administered medications on file prior to visit  He is allergic to darvon [propoxyphene]       Review of Systems   Constitutional: Negative for activity change, appetite change and unexpected weight change  HENT: Negative for ear pain and sore throat  Eyes: Negative for visual disturbance  Respiratory: Positive for shortness of breath  Cardiovascular: Negative for chest pain and leg swelling  Gastrointestinal: Negative for abdominal pain, constipation and diarrhea  Genitourinary: Negative for difficulty urinating  Musculoskeletal: Positive for arthralgias and gait problem  Skin: Negative for rash  Neurological: Negative for dizziness and headaches  Hematological: Bruises/bleeds easily  Psychiatric/Behavioral: Positive for sleep disturbance  Objective:        Physical Exam   Constitutional: He is oriented to person, place, and time  He appears well-developed and well-nourished  Morbidly  Obese  White  male   HENT:   Right Ear: External ear normal    Left Ear: External ear normal    Mouth/Throat: Oropharynx is clear and moist    Eyes: Conjunctivae are normal  Pupils are equal, round, and reactive to light  Neck: Neck supple   No thyromegaly present  Cardiovascular: Normal rate, regular rhythm and normal heart sounds  No murmur heard  Pulmonary/Chest: No respiratory distress  Pt  Has  Sob  With  Exertion  Lungs  have  Decreased  Bs  No  cough  No  Rales  Or  Rhonchi  Musculoskeletal:   Trace  Pretibial  edema   Lymphadenopathy:     He has no cervical adenopathy  Neurological: He is alert and oriented to person, place, and time  Skin: Skin is warm and dry  Psychiatric: He has a normal mood and affect  His behavior is normal  Judgment and thought content normal    Nursing note and vitals reviewed

## 2019-01-22 ENCOUNTER — TRANSCRIBE ORDERS (OUTPATIENT)
Dept: ADMINISTRATIVE | Facility: HOSPITAL | Age: 64
End: 2019-01-22

## 2019-01-22 ENCOUNTER — APPOINTMENT (OUTPATIENT)
Dept: LAB | Facility: MEDICAL CENTER | Age: 64
End: 2019-01-22
Payer: COMMERCIAL

## 2019-01-22 ENCOUNTER — OFFICE VISIT (OUTPATIENT)
Dept: FAMILY MEDICINE CLINIC | Facility: CLINIC | Age: 64
End: 2019-01-22

## 2019-01-22 VITALS
DIASTOLIC BLOOD PRESSURE: 74 MMHG | HEART RATE: 107 BPM | OXYGEN SATURATION: 96 % | WEIGHT: 315 LBS | BODY MASS INDEX: 42.66 KG/M2 | SYSTOLIC BLOOD PRESSURE: 126 MMHG | HEIGHT: 72 IN | TEMPERATURE: 97.8 F

## 2019-01-22 DIAGNOSIS — N18.30 CKD (CHRONIC KIDNEY DISEASE) STAGE 3, GFR 30-59 ML/MIN (HCC): ICD-10-CM

## 2019-01-22 DIAGNOSIS — Z12.5 SCREENING FOR PROSTATE CANCER: ICD-10-CM

## 2019-01-22 DIAGNOSIS — E78.6 FAMILIAL LIPOPROTEIN DEFICIENCY: ICD-10-CM

## 2019-01-22 DIAGNOSIS — E78.5 HYPERLIPIDEMIA WITH LOW HDL: ICD-10-CM

## 2019-01-22 DIAGNOSIS — Z12.5 SCREENING FOR PROSTATE CANCER: Primary | ICD-10-CM

## 2019-01-22 DIAGNOSIS — E66.01 MORBID OBESITY WITH BMI OF 45.0-49.9, ADULT (HCC): ICD-10-CM

## 2019-01-22 DIAGNOSIS — E78.5 HYPERLIPIDEMIA, UNSPECIFIED HYPERLIPIDEMIA TYPE: ICD-10-CM

## 2019-01-22 DIAGNOSIS — J44.9 COPD, SEVERE (HCC): ICD-10-CM

## 2019-01-22 DIAGNOSIS — E78.6 HYPERLIPIDEMIA WITH LOW HDL: ICD-10-CM

## 2019-01-22 DIAGNOSIS — J45.40 MODERATE PERSISTENT ASTHMA, UNSPECIFIED WHETHER COMPLICATED: Primary | ICD-10-CM

## 2019-01-22 LAB
ALBUMIN SERPL BCP-MCNC: 3.8 G/DL (ref 3.5–5)
ALP SERPL-CCNC: 100 U/L (ref 46–116)
ALT SERPL W P-5'-P-CCNC: 37 U/L (ref 12–78)
ANION GAP SERPL CALCULATED.3IONS-SCNC: 6 MMOL/L (ref 4–13)
AST SERPL W P-5'-P-CCNC: 28 U/L (ref 5–45)
BILIRUB SERPL-MCNC: 0.7 MG/DL (ref 0.2–1)
BUN SERPL-MCNC: 22 MG/DL (ref 5–25)
CALCIUM SERPL-MCNC: 9.5 MG/DL (ref 8.3–10.1)
CHLORIDE SERPL-SCNC: 105 MMOL/L (ref 100–108)
CHOLEST SERPL-MCNC: 158 MG/DL (ref 50–200)
CO2 SERPL-SCNC: 28 MMOL/L (ref 21–32)
CREAT SERPL-MCNC: 1.7 MG/DL (ref 0.6–1.3)
GFR SERPL CREATININE-BSD FRML MDRD: 42 ML/MIN/1.73SQ M
GLUCOSE SERPL-MCNC: 142 MG/DL (ref 65–140)
HDLC SERPL-MCNC: 34 MG/DL (ref 40–60)
LDLC SERPL CALC-MCNC: 94 MG/DL (ref 0–100)
NONHDLC SERPL-MCNC: 124 MG/DL
POTASSIUM SERPL-SCNC: 4 MMOL/L (ref 3.5–5.3)
PROT SERPL-MCNC: 7 G/DL (ref 6.4–8.2)
PSA SERPL-MCNC: 2.1 NG/ML (ref 0–4)
SODIUM SERPL-SCNC: 139 MMOL/L (ref 136–145)
TRIGL SERPL-MCNC: 150 MG/DL

## 2019-01-22 PROCEDURE — 36415 COLL VENOUS BLD VENIPUNCTURE: CPT | Performed by: PHYSICIAN ASSISTANT

## 2019-01-22 PROCEDURE — 99213 OFFICE O/P EST LOW 20 MIN: CPT | Performed by: PHYSICIAN ASSISTANT

## 2019-01-22 PROCEDURE — 80053 COMPREHEN METABOLIC PANEL: CPT | Performed by: PHYSICIAN ASSISTANT

## 2019-01-22 PROCEDURE — 80061 LIPID PANEL: CPT | Performed by: PHYSICIAN ASSISTANT

## 2019-01-22 PROCEDURE — G0103 PSA SCREENING: HCPCS | Performed by: PHYSICIAN ASSISTANT

## 2019-01-22 NOTE — PROGRESS NOTES
Assessment/Plan:     Diagnoses and all orders for this visit:    Moderate persistent asthma, unspecified whether complicated    COPD, severe ([facility] )  Comments:  COPD is currently stable continue current inhalers  CKD (chronic kidney disease) stage 3, GFR 30-59 ml/min ([facility])  Comments:  No NSAIDs over-the-counter  Orders:  -     Comprehensive metabolic panel    Morbid obesity with BMI of 45 0-49 9, adult ([facility])  Comments:  Exercise and weight loss encouraged    Screening for prostate cancer  -     PSA, Total Screen    Hyperlipidemia with low HDL  -     Lipid panel          Subjective:      Patient ID: Rosario Puente is a 59 y o  male  Patient presents for follow up chronic conditions  Patient currently has no active medical insurance  No lab work was completed due to that  Colonoscopy is on hold  Patient has obstructive sleep apnea on CPAP  He also has severe COPD  Patient is on in cruise, Symbicort, ProAir, and albuterol via nebulizer as needed  Patient has hyperlipidemia which is currently diet controlled  Patient has chronic kidney disease stage 3 which is nondiabetic in nature  The following portions of the patient's history were reviewed and updated as appropriate:   He  has a past medical history of Arthritis; Asthma; Chest pain; Chronic kidney disease; COPD (chronic obstructive pulmonary disease) ([facility]); CPAP (continuous positive airway pressure) dependence; Decreased glomerular filtration rate; Hamstring strain; Herniated lumbar intervertebral disc; History of alcohol use; History of back pain; History of colon polyps; History of genital warts; History of lipoma; History of muscle spasm; History of umbilical hernia; Lateral pain of left hip; Low HDL (under 40); Lumbar radiculopathy; Obesity; Respiratory distress; Shortness of breath; and Sleep apnea    He   Patient Active Problem List    Diagnosis Date Noted    Screening for prostate cancer 01/22/2019    History of genital warts 06/06/2018    Morbid obesity with BMI of 45 0-49 9, adult ([place] [id number] ) 09/20/2017    Skin lesion 05/24/2017    Pulmonary nodule 02/21/2017    CKD (chronic kidney disease) stage 3, GFR 30-59 ml/min ([facility]) 02/20/2017    ARACELIS on CPAP 07/22/2016    Nocturnal hypoxemia 04/01/2016    Hyperlipidemia with low HDL 11/19/2015    Arthritis 08/20/2015    Moderate persistent asthma 08/20/2015    Thrombocytopenia ([facility]) 12/11/2014    Sleep disorder 08/24/2012    Vitamin D deficiency 08/24/2012    COPD, severe ([place] [address] ) 07/12/2012     Current Outpatient Prescriptions   Medication Sig Dispense Refill    albuterol (2 5 mg/3 mL) 0 083 % nebulizer solution Take 2 5 mg by nebulization every 6 (six) hours as needed for wheezing      albuterol (PROAIR HFA) 90 mcg/act inhaler Inhale 2 puffs every 4 (four) hours as needed      Cholecalciferol (VITAMIN D3) 1000 units CAPS Take by mouth      Multiple Vitamins tablet Take 1 tablet by mouth daily      podofilox (CONDYLOX) 0 5 % external solution Apply topically 2 (two) times a day 3 5 mL 3    POTASSIUM AMINOBENZOATE PO Take by mouth      SYMBICORT 160-4 5 MCG/ACT inhaler INHALE 2 PUFFS TWICE DAILY  RINSE MOUTH AFTER USE  10 2 Inhaler 3    Umeclidinium Bromide (INCRUSE ELLIPTA) 62 5 MCG/INH AEPB Inhale 1 puff daily 30 each 11     No current facility-administered medications for this visit        Current Outpatient Prescriptions on File Prior to Visit   Medication Sig    albuterol (2 5 mg/3 mL) 0 083 % nebulizer solution Take 2 5 mg by nebulization every 6 (six) hours as needed for wheezing    albuterol (PROAIR HFA) 90 mcg/act inhaler Inhale 2 puffs every 4 (four) hours as needed    Cholecalciferol (VITAMIN D3) 1000 units CAPS Take by mouth    Multiple Vitamins tablet Take 1 tablet by mouth daily    podofilox (CONDYLOX) 0 5 % external solution Apply topically 2 (two) times a day    POTASSIUM AMINOBENZOATE PO Take by mouth    SYMBICORT 160-4 5 MCG/ACT inhaler INHALE 2 PUFFS TWICE DAILY  RINSE MOUTH AFTER USE   Umeclidinium Bromide (INCRUSE ELLIPTA) 62 5 MCG/INH AEPB Inhale 1 puff daily     No current facility-administered medications on file prior to visit  He is allergic to darvon [propoxyphene]       Review of Systems   Constitutional: Negative for activity change, appetite change, chills, fatigue and fever  HENT: Negative for ear pain and sore throat  Eyes: Negative for visual disturbance  Respiratory: Negative for cough and shortness of breath  Cardiovascular: Negative for chest pain, palpitations and leg swelling  Gastrointestinal: Negative for abdominal pain, blood in stool, constipation, diarrhea and nausea  Genitourinary: Negative for difficulty urinating  Nocturia times 2-3  No hesitancy   Musculoskeletal: Positive for arthralgias and gait problem  Negative for back pain and myalgias  Osteoarthritis of both knees  Patient ambulates with cane  Skin: Negative for rash  Neurological: Negative for dizziness, syncope and headaches  Psychiatric/Behavioral: Negative for sleep disturbance  Objective:        Physical Exam   Constitutional: He is oriented to person, place, and time  He appears well-developed and well-nourished  No distress  Morbidly  Obese   White   male   HENT:   Head: Normocephalic and atraumatic  Right Ear: Tympanic membrane, external ear and ear canal normal    Left Ear: Tympanic membrane, external ear and ear canal normal    Nose: Nose normal  No rhinorrhea  Right sinus exhibits no maxillary sinus tenderness and no frontal sinus tenderness  Left sinus exhibits no maxillary sinus tenderness and no frontal sinus tenderness  Mouth/Throat: Oropharynx is clear and moist  No oropharyngeal exudate or posterior oropharyngeal erythema  Eyes: Pupils are equal, round, and reactive to light  Conjunctivae are normal    Neck: Normal range of motion  Neck supple     Cardiovascular: Normal rate, regular rhythm and normal heart sounds  No murmur heard  Pulmonary/Chest: Effort normal and breath sounds normal  No respiratory distress  He has no wheezes  He has no rales  Musculoskeletal: Normal range of motion  He exhibits tenderness  He exhibits no edema  Patient has tenderness right ankle due to fall 2 weeks ago  Has a sprain   Lymphadenopathy:     He has no cervical adenopathy  Neurological: He is alert and oriented to person, place, and time  Skin: Skin is warm and dry  He is not diaphoretic  Psychiatric: He has a normal mood and affect  His behavior is normal  Judgment and thought content normal    Nursing note and vitals reviewed

## 2019-01-28 ENCOUNTER — TELEPHONE (OUTPATIENT)
Dept: FAMILY MEDICINE CLINIC | Facility: CLINIC | Age: 64
End: 2019-01-28

## 2019-01-28 NOTE — TELEPHONE ENCOUNTER
----- Message from Gentry Posey PA-C sent at 1/24/2019 10:27 AM EST -----  Call  Pt  His  lipids  Are  Ok   psa  Is  Normal    Renal  Functions  Are  Stable

## 2019-03-04 DIAGNOSIS — J44.9 COPD, SEVERE (HCC): Primary | ICD-10-CM

## 2019-03-04 RX ORDER — ALBUTEROL SULFATE 2.5 MG/3ML
2.5 SOLUTION RESPIRATORY (INHALATION) EVERY 6 HOURS PRN
Qty: 50 VIAL | Refills: 3 | Status: SHIPPED | OUTPATIENT
Start: 2019-03-04 | End: 2020-08-21 | Stop reason: SDUPTHER

## 2019-03-04 RX ORDER — ALBUTEROL SULFATE 90 UG/1
2 AEROSOL, METERED RESPIRATORY (INHALATION) EVERY 4 HOURS PRN
Qty: 1 INHALER | Refills: 5 | Status: SHIPPED | OUTPATIENT
Start: 2019-03-04 | End: 2020-05-20

## 2019-03-12 LAB
DIFF CAP.CO: 23.17 ML/MMHG SEC
FEV1/FVC: 53 %
FEV1: 1.68 LITERS
FVC VOL RESPIRATORY: 3.18 LITERS

## 2019-03-12 ASSESSMENT — PULMONARY FUNCTION TESTS
FEV1/FVC: 53
FEV1: 1.68
FVC: 3.18

## 2019-04-18 ENCOUNTER — OFFICE VISIT (OUTPATIENT)
Dept: PULMONOLOGY | Facility: CLINIC | Age: 64
End: 2019-04-18

## 2019-04-18 VITALS
SYSTOLIC BLOOD PRESSURE: 136 MMHG | TEMPERATURE: 98 F | WEIGHT: 315 LBS | DIASTOLIC BLOOD PRESSURE: 90 MMHG | OXYGEN SATURATION: 94 % | HEIGHT: 74 IN | HEART RATE: 112 BPM | BODY MASS INDEX: 40.43 KG/M2

## 2019-04-18 DIAGNOSIS — J44.9 COPD, SEVERE (HCC): ICD-10-CM

## 2019-04-18 DIAGNOSIS — Z99.89 OSA ON CPAP: Primary | ICD-10-CM

## 2019-04-18 DIAGNOSIS — J45.40 MODERATE PERSISTENT ASTHMA, UNSPECIFIED WHETHER COMPLICATED: ICD-10-CM

## 2019-04-18 DIAGNOSIS — G47.33 OSA ON CPAP: Primary | ICD-10-CM

## 2019-04-18 PROCEDURE — 99214 OFFICE O/P EST MOD 30 MIN: CPT | Performed by: INTERNAL MEDICINE

## 2019-08-01 ENCOUNTER — OFFICE VISIT (OUTPATIENT)
Dept: PULMONOLOGY | Facility: CLINIC | Age: 64
End: 2019-08-01
Payer: COMMERCIAL

## 2019-08-01 ENCOUNTER — APPOINTMENT (OUTPATIENT)
Dept: RADIOLOGY | Facility: MEDICAL CENTER | Age: 64
End: 2019-08-01
Payer: COMMERCIAL

## 2019-08-01 ENCOUNTER — HOSPITAL ENCOUNTER (OUTPATIENT)
Dept: RADIOLOGY | Facility: MEDICAL CENTER | Age: 64
Discharge: HOME | End: 2019-08-01
Payer: COMMERCIAL

## 2019-08-01 VITALS
SYSTOLIC BLOOD PRESSURE: 140 MMHG | OXYGEN SATURATION: 97 % | HEIGHT: 75 IN | HEART RATE: 97 BPM | TEMPERATURE: 97.7 F | WEIGHT: 315 LBS | DIASTOLIC BLOOD PRESSURE: 82 MMHG | BODY MASS INDEX: 39.17 KG/M2

## 2019-08-01 DIAGNOSIS — R91.1 PULMONARY NODULE: ICD-10-CM

## 2019-08-01 DIAGNOSIS — Z99.89 OSA ON CPAP: ICD-10-CM

## 2019-08-01 DIAGNOSIS — J44.9 COPD, SEVERE (HCC): Primary | ICD-10-CM

## 2019-08-01 DIAGNOSIS — G47.33 OSA ON CPAP: ICD-10-CM

## 2019-08-01 DIAGNOSIS — J44.9 COPD, SEVERE (HCC): ICD-10-CM

## 2019-08-01 PROCEDURE — 99214 OFFICE O/P EST MOD 30 MIN: CPT | Performed by: INTERNAL MEDICINE

## 2019-08-01 PROCEDURE — G0297 LDCT FOR LUNG CA SCREEN: HCPCS

## 2019-08-01 NOTE — ASSESSMENT & PLAN NOTE
He is demonstrating compliance with nocturnal CPAP  He is deriving significant benefit and will continue nightly use

## 2019-08-01 NOTE — ASSESSMENT & PLAN NOTE
He is now on disability related to severe COPD  He is compliant with Symbicort twice daily  I encouraged him to use the Spiriva on a daily basis which I suspect will reduce his rescue inhaler needs  I provided him with additional samples today to help with the cost   He will be tested for alpha 1 antitrypsin deficiency today

## 2019-08-01 NOTE — ASSESSMENT & PLAN NOTE
Pulmonary nodule demonstrated stability on CT from March 2018  Patient has a 25 pack year smoking history, just under the cutoff for annual chest CT for lung cancer screening purposes  While I suspect that the lung nodule is benign, I would like to update chest x-ray to confirm gross stability

## 2019-08-27 ENCOUNTER — OFFICE VISIT (OUTPATIENT)
Dept: FAMILY MEDICINE CLINIC | Facility: CLINIC | Age: 64
End: 2019-08-27

## 2019-08-27 VITALS
BODY MASS INDEX: 39.17 KG/M2 | HEART RATE: 108 BPM | SYSTOLIC BLOOD PRESSURE: 124 MMHG | OXYGEN SATURATION: 94 % | DIASTOLIC BLOOD PRESSURE: 78 MMHG | TEMPERATURE: 96.4 F | RESPIRATION RATE: 24 BRPM | HEIGHT: 75 IN | WEIGHT: 315 LBS

## 2019-08-27 DIAGNOSIS — E78.6 HYPERLIPIDEMIA WITH LOW HDL: ICD-10-CM

## 2019-08-27 DIAGNOSIS — E66.01 MORBID OBESITY WITH BMI OF 45.0-49.9, ADULT (HCC): ICD-10-CM

## 2019-08-27 DIAGNOSIS — J44.9 COPD, SEVERE (HCC): Primary | ICD-10-CM

## 2019-08-27 DIAGNOSIS — Z86.19 HISTORY OF GENITAL WARTS: ICD-10-CM

## 2019-08-27 DIAGNOSIS — R16.1 SPLENOMEGALY: ICD-10-CM

## 2019-08-27 DIAGNOSIS — N18.30 CKD (CHRONIC KIDNEY DISEASE) STAGE 3, GFR 30-59 ML/MIN (HCC): ICD-10-CM

## 2019-08-27 DIAGNOSIS — D69.6 THROMBOCYTOPENIA (HCC): ICD-10-CM

## 2019-08-27 DIAGNOSIS — Z11.59 ENCOUNTER FOR HEPATITIS C SCREENING TEST FOR LOW RISK PATIENT: ICD-10-CM

## 2019-08-27 DIAGNOSIS — E78.5 HYPERLIPIDEMIA WITH LOW HDL: ICD-10-CM

## 2019-08-27 PROCEDURE — 99213 OFFICE O/P EST LOW 20 MIN: CPT | Performed by: PHYSICIAN ASSISTANT

## 2019-08-27 RX ORDER — IMIQUIMOD 12.5 MG/.25G
1 CREAM TOPICAL 3 TIMES WEEKLY
Qty: 12 EACH | Refills: 0 | Status: SHIPPED | OUTPATIENT
Start: 2019-08-28 | End: 2021-02-13

## 2019-08-27 NOTE — PROGRESS NOTES
BMI Counseling: Body mass index is 48 62 kg/m²  Discussed the patient's BMI with him  The BMI is above average  BMI counseling and education was provided to the patient  Nutrition recommendations include reducing portion sizes, decreasing overall calorie intake and moderation in carbohydrate intake  Exercise recommendations include exercising 3-5 times per week  pt's  Exercise  Is limited   Due  To copdAssessment/Plan:     Diagnoses and all orders for this visit:    COPD, severe ([facility] )  Comments:  Continue current regimen as per Pulmonary  CKD (chronic kidney disease) stage 3, GFR 30-59 ml/min ([facility])  Comments:  Check labs  Patient is aware not to take any over-the-counter NSAIDs    Hyperlipidemia with low HDL  Comments:  Continue low-fat diet  Exercise to promote weight loss  Orders:  -     Comprehensive metabolic panel    Morbid obesity with BMI of 45 0-49 9, adult ([facility])    Thrombocytopenia ([facility])  Comments:  Check CBC  Orders:  -     CBC and differential    History of genital warts  Comments:  Try aldara c ream  Orders:  -     imiquimod (ALDARA) 5 % cream; Apply 1 packet topically 3 (three) times a week    Splenomegaly  Comments: This is stable on CT results  Encounter for hepatitis C screening test for low risk patient  -     Hepatitis C antibody; Future          Subjective:      Patient ID: Matthew Camarena is a 62 y o  male  Patient presents for short interval follow-up for his chronic conditions  Patient has moderate to severe COPD  He is under care of pulmonology  He is currently on Spiriva Respimat, Symbicort, ProAir, and nebulizer treatments  Patient gets very short of breath with little ambulation  Patient is also on CPAP due to obstructive sleep apnea  Patient states he uses this regularly  He is sleeping well and has poor attention span during the day  Patient has chronic kidney disease stage 3 which is not due to diabetes  Hyperlipidemia which is currently diet controlled  Thrombocytopenia S CBC platelets were stable  Patient was concerned he had a recent CT scan on his lungs  He has stable nodules and stable lymph nodes in the upper abdomen  Test indicated a stable enlarged spleen  Explained to patient this is probably due to the thrombocytopenia also due to a patient's large body habitus  The following portions of the patient's history were reviewed and updated as appropriate:   He   Patient Active Problem List    Diagnosis Date Noted    Splenomegaly 08/27/2019    Screening for prostate cancer 01/22/2019    History of genital warts 06/06/2018    Morbid obesity with BMI of 45 0-49 9, adult ([facility] ) 09/20/2017    Skin lesion 05/24/2017    Pulmonary nodule 02/21/2017    CKD (chronic kidney disease) stage 3, GFR 30-59 ml/min ([facility]) 02/20/2017    ARACELIS on CPAP 07/22/2016    Nocturnal hypoxemia 04/01/2016    Hyperlipidemia with low HDL 11/19/2015    Arthritis 08/20/2015    Moderate persistent asthma 08/20/2015    Thrombocytopenia ([facility]) 12/11/2014    Sleep disorder 08/24/2012    Vitamin D deficiency 08/24/2012    COPD, severe ([facility] ) 07/12/2012     Current Outpatient Medications   Medication Sig Dispense Refill    albuterol (2 5 mg/3 mL) 0 083 % nebulizer solution Take 1 vial (2 5 mg total) by nebulization every 6 (six) hours as needed for wheezing 50 vial 3    albuterol (PROAIR HFA) 90 mcg/act inhaler Inhale 2 puffs every 4 (four) hours as needed for shortness of breath 1 Inhaler 5    Cholecalciferol (VITAMIN D3) 1000 units CAPS Take by mouth      Multiple Vitamins tablet Take 1 tablet by mouth daily      POTASSIUM AMINOBENZOATE PO Take by mouth      SYMBICORT 160-4 5 MCG/ACT inhaler INHALE 2 PUFFS TWICE DAILY   RINSE MOUTH AFTER USE  10 2 Inhaler 3    tiotropium (SPIRIVA RESPIMAT) 2 5 MCG/ACT AERS inhaler Inhale 2 puffs daily      [START ON 8/28/2019] imiquimod (ALDARA) 5 % cream Apply 1 packet topically 3 (three) times a week 12 each 0     No current facility-administered medications for this visit  Current Outpatient Medications on File Prior to Visit   Medication Sig    albuterol (2 5 mg/3 mL) 0 083 % nebulizer solution Take 1 vial (2 5 mg total) by nebulization every 6 (six) hours as needed for wheezing    albuterol (PROAIR HFA) 90 mcg/act inhaler Inhale 2 puffs every 4 (four) hours as needed for shortness of breath    Cholecalciferol (VITAMIN D3) 1000 units CAPS Take by mouth    Multiple Vitamins tablet Take 1 tablet by mouth daily    POTASSIUM AMINOBENZOATE PO Take by mouth    SYMBICORT 160-4 5 MCG/ACT inhaler INHALE 2 PUFFS TWICE DAILY  RINSE MOUTH AFTER USE   tiotropium (SPIRIVA RESPIMAT) 2 5 MCG/ACT AERS inhaler Inhale 2 puffs daily    [DISCONTINUED] podofilox (CONDYLOX) 0 5 % external solution Apply topically 2 (two) times a day (Patient not taking: Reported on 8/27/2019)     No current facility-administered medications on file prior to visit  He is allergic to darvon [propoxyphene]       Review of Systems   Constitutional: Negative for activity change, appetite change, chills, fatigue and fever  HENT: Negative for ear pain and sore throat  Eyes: Negative for visual disturbance  Respiratory: Positive for cough, shortness of breath and wheezing  Cardiovascular: Negative for chest pain, palpitations and leg swelling  Gastrointestinal: Negative for abdominal pain, blood in stool, constipation, diarrhea and nausea  Genitourinary: Negative for difficulty urinating  Nocturia  X  3   Musculoskeletal: Positive for arthralgias  Negative for back pain and myalgias  Knee  Pain  Due  To  oa   Skin: Negative for rash  Neurological: Negative for dizziness, syncope and headaches  Psychiatric/Behavioral: Negative for sleep disturbance  Objective:        Physical Exam   Constitutional: He appears well-developed and well-nourished  No distress     Morbidly  obese  White  male   HENT:   Head: Normocephalic and atraumatic  Left Ear: External ear normal    Mouth/Throat: Oropharynx is clear and moist    Skin: He is not diaphoretic  Nursing note and vitals reviewed

## 2019-09-05 ENCOUNTER — APPOINTMENT (OUTPATIENT)
Dept: LAB | Facility: MEDICAL CENTER | Age: 64
End: 2019-09-05
Payer: COMMERCIAL

## 2019-09-05 DIAGNOSIS — Z11.59 ENCOUNTER FOR HEPATITIS C SCREENING TEST FOR LOW RISK PATIENT: ICD-10-CM

## 2019-09-05 LAB
ALBUMIN SERPL BCP-MCNC: 4.2 G/DL (ref 3.5–5)
ALP SERPL-CCNC: 93 U/L (ref 46–116)
ALT SERPL W P-5'-P-CCNC: 39 U/L (ref 12–78)
ANION GAP SERPL CALCULATED.3IONS-SCNC: 8 MMOL/L (ref 4–13)
AST SERPL W P-5'-P-CCNC: 30 U/L (ref 5–45)
BASOPHILS # BLD AUTO: 0.07 THOUSANDS/ΜL (ref 0–0.1)
BASOPHILS NFR BLD AUTO: 1 % (ref 0–1)
BILIRUB SERPL-MCNC: 1.03 MG/DL (ref 0.2–1)
BUN SERPL-MCNC: 24 MG/DL (ref 5–25)
CALCIUM SERPL-MCNC: 9.3 MG/DL (ref 8.3–10.1)
CHLORIDE SERPL-SCNC: 110 MMOL/L (ref 100–108)
CO2 SERPL-SCNC: 25 MMOL/L (ref 21–32)
CREAT SERPL-MCNC: 1.68 MG/DL (ref 0.6–1.3)
EOSINOPHIL # BLD AUTO: 0.35 THOUSAND/ΜL (ref 0–0.61)
EOSINOPHIL NFR BLD AUTO: 6 % (ref 0–6)
ERYTHROCYTE [DISTWIDTH] IN BLOOD BY AUTOMATED COUNT: 14 % (ref 11.6–15.1)
GFR SERPL CREATININE-BSD FRML MDRD: 43 ML/MIN/1.73SQ M
GLUCOSE SERPL-MCNC: 99 MG/DL (ref 65–140)
HCT VFR BLD AUTO: 48.1 % (ref 36.5–49.3)
HGB BLD-MCNC: 16 G/DL (ref 12–17)
IMM GRANULOCYTES # BLD AUTO: 0.01 THOUSAND/UL (ref 0–0.2)
IMM GRANULOCYTES NFR BLD AUTO: 0 % (ref 0–2)
LYMPHOCYTES # BLD AUTO: 1.55 THOUSANDS/ΜL (ref 0.6–4.47)
LYMPHOCYTES NFR BLD AUTO: 24 % (ref 14–44)
MCH RBC QN AUTO: 32.7 PG (ref 26.8–34.3)
MCHC RBC AUTO-ENTMCNC: 33.3 G/DL (ref 31.4–37.4)
MCV RBC AUTO: 98 FL (ref 82–98)
MONOCYTES # BLD AUTO: 0.77 THOUSAND/ΜL (ref 0.17–1.22)
MONOCYTES NFR BLD AUTO: 12 % (ref 4–12)
NEUTROPHILS # BLD AUTO: 3.61 THOUSANDS/ΜL (ref 1.85–7.62)
NEUTS SEG NFR BLD AUTO: 57 % (ref 43–75)
NRBC BLD AUTO-RTO: 0 /100 WBCS
PLATELET # BLD AUTO: 138 THOUSANDS/UL (ref 149–390)
PMV BLD AUTO: 11.2 FL (ref 8.9–12.7)
POTASSIUM SERPL-SCNC: 4.3 MMOL/L (ref 3.5–5.3)
PROT SERPL-MCNC: 7 G/DL (ref 6.4–8.2)
RBC # BLD AUTO: 4.9 MILLION/UL (ref 3.88–5.62)
SODIUM SERPL-SCNC: 143 MMOL/L (ref 136–145)
WBC # BLD AUTO: 6.36 THOUSAND/UL (ref 4.31–10.16)

## 2019-09-05 PROCEDURE — 85025 COMPLETE CBC W/AUTO DIFF WBC: CPT | Performed by: PHYSICIAN ASSISTANT

## 2019-09-05 PROCEDURE — 36415 COLL VENOUS BLD VENIPUNCTURE: CPT | Performed by: PHYSICIAN ASSISTANT

## 2019-09-05 PROCEDURE — 86803 HEPATITIS C AB TEST: CPT

## 2019-09-05 PROCEDURE — 80053 COMPREHEN METABOLIC PANEL: CPT | Performed by: PHYSICIAN ASSISTANT

## 2019-09-06 LAB — HCV AB SER QL: NORMAL

## 2019-10-11 DIAGNOSIS — J44.9 COPD, SEVERE (HCC): Primary | ICD-10-CM

## 2019-10-14 RX ORDER — UMECLIDINIUM 62.5 UG/1
AEROSOL, POWDER ORAL
Qty: 1 INHALER | Refills: 11 | Status: SHIPPED | OUTPATIENT
Start: 2019-10-14 | End: 2021-02-17

## 2019-12-03 ENCOUNTER — OFFICE VISIT (OUTPATIENT)
Dept: PULMONOLOGY | Facility: CLINIC | Age: 64
End: 2019-12-03

## 2019-12-03 VITALS
TEMPERATURE: 97.4 F | HEART RATE: 90 BPM | WEIGHT: 315 LBS | SYSTOLIC BLOOD PRESSURE: 134 MMHG | BODY MASS INDEX: 40.43 KG/M2 | HEIGHT: 74 IN | DIASTOLIC BLOOD PRESSURE: 74 MMHG | OXYGEN SATURATION: 94 %

## 2019-12-03 DIAGNOSIS — G47.33 OSA ON CPAP: ICD-10-CM

## 2019-12-03 DIAGNOSIS — J44.9 COPD, SEVERE (HCC): ICD-10-CM

## 2019-12-03 DIAGNOSIS — J45.40 MODERATE PERSISTENT ASTHMA, UNSPECIFIED WHETHER COMPLICATED: Primary | ICD-10-CM

## 2019-12-03 DIAGNOSIS — Z99.89 OSA ON CPAP: ICD-10-CM

## 2019-12-03 PROCEDURE — 99214 OFFICE O/P EST MOD 30 MIN: CPT | Performed by: INTERNAL MEDICINE

## 2019-12-03 NOTE — ASSESSMENT & PLAN NOTE
He is using CPAP during hours of sleep and is doing well  He is using it on a nightly basis and is deriving benefit  He wakes up feeling refreshed

## 2019-12-03 NOTE — PROGRESS NOTES
Pulmonary Follow Up Note   Halle Flores 62 y o  male MRN: [id number]  12/3/2019      Assessment/Plan:     COPD, severe ([facility] )    He is having some increased cough over the past few months  I suggested the possibility of transitioning his inhaler regimen to Trelegy  He would rather continue with Symbicort Spiriva for now  He will try using his nebulizer more frequently, up to 4 times per day  If he does not have improvement in the cough, he was instructed to call the office  Recent chest CT was unremarkable  ARACELIS on CPAP    He is using CPAP during hours of sleep and is doing well  He is using it on a nightly basis and is deriving benefit  He wakes up feeling refreshed  Visit orders:    Diagnoses and all orders for this visit:    Moderate persistent asthma, unspecified whether complicated    COPD, severe ([facility] )    ARACELIS on CPAP        Return in about 4 months (around 4/3/2020)  History of Present Illness   HPI:  Halle Flores is a 62 y o  male who is here today for follow-up regarding severe COPD and obstructive sleep apnea  He remains dyspneic with exertion which has been stable  Over the past few months, he notes some increase in his cough  Sputum is clear in color  He has episodic wheezing  He is using his nebulizer twice daily  He is compliant with Symbicort and Spiriva  He denies fevers or chills  Weight is overall stable  He has CPAP which he is using during hours of sleep  Review of Systems   Constitutional: Negative for chills, fever and unexpected weight change  HENT: Negative for postnasal drip and sore throat  Eyes: Negative for visual disturbance  Respiratory:        As noted in HPI   Cardiovascular: Negative for chest pain  Gastrointestinal: Negative for abdominal pain, diarrhea and vomiting  Genitourinary: Negative for difficulty urinating  Skin: Negative for rash  Neurological: Negative for headaches  Hematological: Negative for adenopathy  Psychiatric/Behavioral: Negative  All other systems reviewed and are negative          Historical Information   Past Medical History:   Diagnosis Date    Arthritis     Asthma     Chest pain     atypical    Chronic kidney disease     COPD (chronic obstructive pulmonary disease) (HCC)     CPAP (continuous positive airway pressure) dependence     Decreased glomerular filtration rate     Hamstring strain     Herniated lumbar intervertebral disc     History of alcohol use     uncomplicated    History of back pain     lower    History of colon polyps     sigmoid    History of genital warts     History of lipoma     History of muscle spasm     lumbar paraspinous muscle    History of umbilical hernia     Lateral pain of left hip     Low HDL (under 40)     Lumbar radiculopathy     Obesity     Respiratory distress     acute    Shortness of breath     Sleep apnea     CPAP PT WILL BRING     Past Surgical History:   Procedure Laterality Date    ADENOIDECTOMY      APPENDECTOMY      COLONOSCOPY      HAND SURGERY Left     CYST REMOVAL    HIP SURGERY Bilateral     LIPECTOMY      thigh    LIPOMA RESECTION Left     HIP    MO REPAIR UMBILICAL IHAC,3+N/S,CWABU N/A 2/23/2017    Procedure: REPAIR HERNIA UMBILICAL;  Surgeon: Santos Fleming MD;  Location: [facility];  Service: General    TONSILLECTOMY      WRIST GANGLION EXCISION       Family History   Problem Relation Age of Onset    Heart disease Mother         cardiac disorder    Hypertension Mother     COPD Father     Heart disease Family         cardiac disorder    Cancer Family         lung    Emphysema Family         pulmonary unspecified emphysema    Diabetes Family     Glaucoma Family     Cancer Family         lung       Social History     Tobacco Use   Smoking Status Former Smoker    Packs/day: 2 00    Years: 25 00    Pack years: 50 00    Types: Cigarettes    Start date: 0    Last attempt to quit: 10/2014    Years since quittin 1   Smokeless Tobacco Never Used     Meds/Allergies     Current Outpatient Medications:     albuterol (2 5 mg/3 mL) 0 083 % nebulizer solution, Take 1 vial (2 5 mg total) by nebulization every 6 (six) hours as needed for wheezing, Disp: 50 vial, Rfl: 3    albuterol (PROAIR HFA) 90 mcg/act inhaler, Inhale 2 puffs every 4 (four) hours as needed for shortness of breath, Disp: 1 Inhaler, Rfl: 5    Cholecalciferol (VITAMIN D3) 1000 units CAPS, Take by mouth, Disp: , Rfl:     imiquimod (ALDARA) 5 % cream, Apply 1 packet topically 3 (three) times a week, Disp: 12 each, Rfl: 0    INCRUSE ELLIPTA 62 5 MCG/INH AEPB inhaler, INHALE 1 PUFF DAILY, Disp: 1 Inhaler, Rfl: 11    Multiple Vitamins tablet, Take 1 tablet by mouth daily, Disp: , Rfl:     POTASSIUM AMINOBENZOATE PO, Take by mouth, Disp: , Rfl:     SYMBICORT 160-4 5 MCG/ACT inhaler, INHALE 2 PUFFS TWICE DAILY  RINSE MOUTH AFTER USE , Disp: 10 2 Inhaler, Rfl: 3  Allergies   Allergen Reactions    Darvon [Propoxyphene] Other (See Comments)     hallucinations       Vitals: Blood pressure 134/74, pulse 90, temperature (!) 97 4 °F (36 3 °C), temperature source Tympanic, height 6' 2" (1 88 m), weight (!) 170 kg (375 lb), SpO2 94 %  Body mass index is 48 15 kg/m²  Oxygen Therapy  SpO2: 94 %  Oxygen Therapy: None (Room air)      Physical Exam   Constitutional: He is oriented to person, place, and time  No distress  HENT:   Head: Normocephalic  Mouth/Throat: No oropharyngeal exudate  Eyes: Pupils are equal, round, and reactive to light  No scleral icterus  Neck: Neck supple  No JVD present  Cardiovascular: Normal rate and regular rhythm  Pulmonary/Chest: He has no wheezes  He has no rales  Abdominal: Soft  There is no tenderness  Musculoskeletal: He exhibits no edema  Lymphadenopathy:     He has no cervical adenopathy  Neurological: He is alert and oriented to person, place, and time  Skin: Skin is warm and dry     Psychiatric: He has a normal mood and affect  Labs: I have personally reviewed pertinent lab results  Lab Results   Component Value Date    WBC 6 36 09/05/2019    HGB 16 0 09/05/2019    HCT 48 1 09/05/2019    MCV 98 09/05/2019     (L) 09/05/2019     Lab Results   Component Value Date    CALCIUM 9 3 09/05/2019     06/14/2017    K 4 3 09/05/2019    CO2 25 09/05/2019     (H) 09/05/2019    BUN 24 09/05/2019    CREATININE 1 68 (H) 09/05/2019     No results found for: IGE  Lab Results   Component Value Date    ALT 39 09/05/2019    AST 30 09/05/2019    ALKPHOS 93 09/05/2019    BILITOT 0 9 06/14/2017     Imaging and other studies: I have personally reviewed pertinent reports  and I have personally reviewed pertinent films in PACS  Chest CT from 8/1/19 shows stable right-sided nodules and posterior left lower lobe nodule  Pulmonary function testing:  Performed   3/12/19   FEV1/FVC ratio 46%   FEV1 36% predicted  FVC 68% predicted    (+) response to bronchodilators   post bronchodilator FEV1 is 47% predicted

## 2019-12-03 NOTE — ASSESSMENT & PLAN NOTE
He is having some increased cough over the past few months  I suggested the possibility of transitioning his inhaler regimen to Trelegy  He would rather continue with Symbicort Spiriva for now  He will try using his nebulizer more frequently, up to 4 times per day  If he does not have improvement in the cough, he was instructed to call the office  Recent chest CT was unremarkable

## 2019-12-09 ENCOUNTER — OFFICE VISIT (OUTPATIENT)
Dept: FAMILY MEDICINE CLINIC | Facility: CLINIC | Age: 64
End: 2019-12-09

## 2019-12-09 ENCOUNTER — APPOINTMENT (OUTPATIENT)
Dept: RADIOLOGY | Facility: MEDICAL CENTER | Age: 64
End: 2019-12-09
Payer: COMMERCIAL

## 2019-12-09 VITALS
RESPIRATION RATE: 20 BRPM | WEIGHT: 315 LBS | SYSTOLIC BLOOD PRESSURE: 140 MMHG | DIASTOLIC BLOOD PRESSURE: 72 MMHG | BODY MASS INDEX: 40.43 KG/M2 | HEART RATE: 105 BPM | TEMPERATURE: 97.9 F | OXYGEN SATURATION: 94 % | HEIGHT: 74 IN

## 2019-12-09 DIAGNOSIS — M54.6 ACUTE RIGHT-SIDED THORACIC BACK PAIN: Primary | ICD-10-CM

## 2019-12-09 DIAGNOSIS — M54.6 ACUTE RIGHT-SIDED THORACIC BACK PAIN: ICD-10-CM

## 2019-12-09 PROCEDURE — 72072 X-RAY EXAM THORAC SPINE 3VWS: CPT

## 2019-12-09 PROCEDURE — 99213 OFFICE O/P EST LOW 20 MIN: CPT | Performed by: PHYSICIAN ASSISTANT

## 2019-12-09 RX ORDER — GUAIFENESIN 600 MG
1200 TABLET, EXTENDED RELEASE 12 HR ORAL DAILY
COMMUNITY

## 2019-12-09 RX ORDER — CYCLOBENZAPRINE HCL 10 MG
10 TABLET ORAL 3 TIMES DAILY PRN
Qty: 30 TABLET | Refills: 3 | Status: SHIPPED | OUTPATIENT
Start: 2019-12-09 | End: 2021-09-14

## 2019-12-09 NOTE — PROGRESS NOTES
Assessment/Plan:     Diagnoses and all orders for this visit:    Acute right-sided thoracic back pain  Comments:  Patient will rest   We will obtain x-ray to rule out compression fracture  Will start p o  Flexeril 3 times a day as needed spasms  Orders:  -     XR spine thoracic 3 vw; Future  -     cyclobenzaprine (FLEXERIL) 10 mg tablet; Take 1 tablet (10 mg total) by mouth 3 (three) times a day as needed for muscle spasms    Other orders  -     guaiFENesin (MUCINEX) 600 mg 12 hr tablet; Take 1,200 mg by mouth every 12 (twelve) hours          Subjective:      Patient ID: Verna Gómez is a 62 y o  male  Patient presents with right-sided lower back pain  Patient states pain is been present 2 weeks  Two weeks ago he had a lift something heavy to put in the car  The pain is on the right side which waxes and wanes  Patient states the pain becomes excruciating  States almost like he is having his spasm  The pain is non radiating into the buttocks or down the leg  No change in bowel or bladder habits  Patient has point tenderness on the right side of the lower thoracic area  Will obtain x-ray  Patient to take Tylenol over-the-counter due to kidney disease  Will add Flexeril muscle relaxer        The following portions of the patient's history were reviewed and updated as appropriate:   He   Patient Active Problem List    Diagnosis Date Noted    Acute right-sided thoracic back pain 12/09/2019    Splenomegaly 08/27/2019    Screening for prostate cancer 01/22/2019    History of genital warts 06/06/2018    Morbid obesity with BMI of 45 0-49 9, adult ([facility] ) 09/20/2017    Skin lesion 05/24/2017    Pulmonary nodule 02/21/2017    CKD (chronic kidney disease) stage 3, GFR 30-59 ml/min ([facility]) 02/20/2017    ARACELIS on CPAP 07/22/2016    Nocturnal hypoxemia 04/01/2016    Hyperlipidemia with low HDL 11/19/2015    Arthritis 08/20/2015    Moderate persistent asthma 08/20/2015    Thrombocytopenia ([facility] ) 12/11/2014    Sleep disorder 08/24/2012    Vitamin D deficiency 08/24/2012    COPD, severe ([facility] ) 07/12/2012     Current Outpatient Medications   Medication Sig Dispense Refill    albuterol (2 5 mg/3 mL) 0 083 % nebulizer solution Take 1 vial (2 5 mg total) by nebulization every 6 (six) hours as needed for wheezing 50 vial 3    albuterol (PROAIR HFA) 90 mcg/act inhaler Inhale 2 puffs every 4 (four) hours as needed for shortness of breath 1 Inhaler 5    Cholecalciferol (VITAMIN D3) 1000 units CAPS Take by mouth      guaiFENesin (MUCINEX) 600 mg 12 hr tablet Take 1,200 mg by mouth every 12 (twelve) hours      imiquimod (ALDARA) 5 % cream Apply 1 packet topically 3 (three) times a week 12 each 0    INCRUSE ELLIPTA 62 5 MCG/INH AEPB inhaler INHALE 1 PUFF DAILY 1 Inhaler 11    Multiple Vitamins tablet Take 1 tablet by mouth daily      POTASSIUM AMINOBENZOATE PO Take by mouth      SYMBICORT 160-4 5 MCG/ACT inhaler INHALE 2 PUFFS TWICE DAILY  RINSE MOUTH AFTER USE  10 2 Inhaler 3    cyclobenzaprine (FLEXERIL) 10 mg tablet Take 1 tablet (10 mg total) by mouth 3 (three) times a day as needed for muscle spasms 30 tablet 3     No current facility-administered medications for this visit        Current Outpatient Medications on File Prior to Visit   Medication Sig    albuterol (2 5 mg/3 mL) 0 083 % nebulizer solution Take 1 vial (2 5 mg total) by nebulization every 6 (six) hours as needed for wheezing    albuterol (PROAIR HFA) 90 mcg/act inhaler Inhale 2 puffs every 4 (four) hours as needed for shortness of breath    Cholecalciferol (VITAMIN D3) 1000 units CAPS Take by mouth    guaiFENesin (MUCINEX) 600 mg 12 hr tablet Take 1,200 mg by mouth every 12 (twelve) hours    imiquimod (ALDARA) 5 % cream Apply 1 packet topically 3 (three) times a week    INCRUSE ELLIPTA 62 5 MCG/INH AEPB inhaler INHALE 1 PUFF DAILY    Multiple Vitamins tablet Take 1 tablet by mouth daily    POTASSIUM AMINOBENZOATE PO Take by mouth    SYMBICORT 160-4 5 MCG/ACT inhaler INHALE 2 PUFFS TWICE DAILY  RINSE MOUTH AFTER USE  No current facility-administered medications on file prior to visit  He is allergic to darvon [propoxyphene]       Review of Systems   Constitutional: Positive for activity change  Negative for appetite change  Musculoskeletal: Positive for arthralgias and back pain  Skin: Negative for rash  Objective:        Physical Exam   Constitutional: He is oriented to person, place, and time  He appears well-developed and well-nourished  No distress  Obese  Weight   Male  Ambulates  With  Cane  Due  To  oa   knees   HENT:   Head: Normocephalic and atraumatic  Right Ear: External ear normal    Left Ear: External ear normal    Eyes: Conjunctivae are normal    Musculoskeletal: He exhibits tenderness  He exhibits no edema  Patient has point tenderness around right side of T11-T10 no ecchymosis or swelling seen in the area  Lower extremity strength intact to passive resistance  Patellar deep tendon reflexes unable to elicit  Neurological: He is alert and oriented to person, place, and time  Skin: Skin is warm and dry  He is not diaphoretic  Psychiatric: He has a normal mood and affect  His behavior is normal  Judgment and thought content normal    Nursing note and vitals reviewed

## 2020-01-17 DIAGNOSIS — J44.9 COPD (CHRONIC OBSTRUCTIVE PULMONARY DISEASE) (HCC): ICD-10-CM

## 2020-01-17 RX ORDER — BUDESONIDE AND FORMOTEROL FUMARATE DIHYDRATE 160; 4.5 UG/1; UG/1
AEROSOL RESPIRATORY (INHALATION)
Qty: 10.2 INHALER | Refills: 3 | Status: SHIPPED | OUTPATIENT
Start: 2020-01-17 | End: 2021-02-17

## 2020-02-27 ENCOUNTER — OFFICE VISIT (OUTPATIENT)
Dept: FAMILY MEDICINE CLINIC | Facility: CLINIC | Age: 65
End: 2020-02-27

## 2020-02-27 VITALS
OXYGEN SATURATION: 95 % | TEMPERATURE: 97.8 F | HEIGHT: 74 IN | DIASTOLIC BLOOD PRESSURE: 70 MMHG | RESPIRATION RATE: 16 BRPM | SYSTOLIC BLOOD PRESSURE: 130 MMHG | WEIGHT: 315 LBS | BODY MASS INDEX: 40.43 KG/M2 | HEART RATE: 120 BPM

## 2020-02-27 DIAGNOSIS — G47.33 OSA ON CPAP: ICD-10-CM

## 2020-02-27 DIAGNOSIS — N18.30 CKD (CHRONIC KIDNEY DISEASE) STAGE 3, GFR 30-59 ML/MIN (HCC): ICD-10-CM

## 2020-02-27 DIAGNOSIS — Z99.89 OSA ON CPAP: ICD-10-CM

## 2020-02-27 DIAGNOSIS — Z12.5 SCREENING FOR PROSTATE CANCER: ICD-10-CM

## 2020-02-27 DIAGNOSIS — D69.6 THROMBOCYTOPENIA (HCC): ICD-10-CM

## 2020-02-27 DIAGNOSIS — E78.5 HYPERLIPIDEMIA WITH LOW HDL: ICD-10-CM

## 2020-02-27 DIAGNOSIS — E66.01 MORBID OBESITY WITH BMI OF 45.0-49.9, ADULT (HCC): ICD-10-CM

## 2020-02-27 DIAGNOSIS — J44.9 COPD, SEVERE (HCC): Primary | ICD-10-CM

## 2020-02-27 DIAGNOSIS — E78.6 HYPERLIPIDEMIA WITH LOW HDL: ICD-10-CM

## 2020-02-27 PROBLEM — M54.6 ACUTE RIGHT-SIDED THORACIC BACK PAIN: Status: RESOLVED | Noted: 2019-12-09 | Resolved: 2020-02-27

## 2020-02-27 PROCEDURE — 99213 OFFICE O/P EST LOW 20 MIN: CPT | Performed by: PHYSICIAN ASSISTANT

## 2020-02-27 PROCEDURE — 1036F TOBACCO NON-USER: CPT | Performed by: PHYSICIAN ASSISTANT

## 2020-02-27 PROCEDURE — 3008F BODY MASS INDEX DOCD: CPT | Performed by: PHYSICIAN ASSISTANT

## 2020-02-27 NOTE — PROGRESS NOTES
BMI Counseling: Body mass index is 50 46 kg/m²  The BMI is above normal  Nutrition recommendations include decreasing portion sizes, encouraging healthy choices of fruits and vegetables, limiting drinks that contain sugar and moderation in carbohydrate intake  No pharmacotherapy was ordered  Severe  Copd/  Severe   ddd      Assessment/Plan:     Diagnoses and all orders for this visit:    COPD, severe ([facility] )  Comments:  Continue current regimen of nebulizer and inhalers  Follow-up with pulmonology    CKD (chronic kidney disease) stage 3, GFR 30-59 ml/min ([facility])  Comments:  Patient knows not to take over-the-counter NSAIDs  Orders:  -     Comprehensive metabolic panel; Future    Hyperlipidemia with low HDL  Comments:  Continue low-fat diet  Orders:  -     Comprehensive metabolic panel; Future  -     Lipid panel; Future    Screening for prostate cancer  -     PSA, Total Screen; Future    ARACELIS on CPAP    Morbid obesity with BMI of 45 0-49 9, [facility])  Comments:  Exercise is limited due to health problems  Discussed decreasing quantity of food intake    Thrombocytopenia (HCC)  Comments:  Check CBC  Orders:  -     CBC and differential; Future          Subjective:      Patient ID: Al Cardozo is a 62 y o  male  Patient presents for follow up chronic conditions  Patient has severe COPD  He also has sleep apnea on CPAP  He has a nebulizer at home for she uses albuterol inhalation solution  He has ProAir, Mucinex, Incruse and Symbicort inhalers  Patient follows regularly with his pulmonologist   Patient has chronic kidney disease stage 3 which is not due to diabetes  Patient's lipids are currently diet controlled  Patient has a history of thrombocytopenia  We have been monitoring this regularly  Patient states he now has a rash on his elbows  The rash is consistent with psoriasis  Patient also has a rash on his right brow for his CPAP mask is rubbing    Patient has gained 10 lb his blood pressure is elevated today will recheck his blood pressure  Concerning since patient already has chronic kidney disease      The following portions of the patient's history were reviewed and updated as appropriate:   He   Patient Active Problem List    Diagnosis Date Noted    Splenomegaly 08/27/2019    Screening for prostate cancer 01/22/2019    History of genital warts 06/06/2018    Morbid obesity with BMI of 45 0-49 9, adult ([place] [address] ) 09/20/2017    Skin lesion 05/24/2017    Pulmonary nodule 02/21/2017    CKD (chronic kidney disease) stage 3, GFR 30-59 ml/min ([facility]) 02/20/2017    ARACELIS on CPAP 07/22/2016    Nocturnal hypoxemia 04/01/2016    Hyperlipidemia with low HDL 11/19/2015    Arthritis 08/20/2015    Moderate persistent asthma 08/20/2015    Thrombocytopenia ([place] [address] ) 12/11/2014    Sleep disorder 08/24/2012    Vitamin D deficiency 08/24/2012    COPD, severe ([place] [address] ) 07/12/2012     Current Outpatient Medications   Medication Sig Dispense Refill    albuterol (2 5 mg/3 mL) 0 083 % nebulizer solution Take 1 vial (2 5 mg total) by nebulization every 6 (six) hours as needed for wheezing 50 vial 3    albuterol (PROAIR HFA) 90 mcg/act inhaler Inhale 2 puffs every 4 (four) hours as needed for shortness of breath 1 Inhaler 5    Cholecalciferol (VITAMIN D3) 1000 units CAPS Take by mouth      cyclobenzaprine (FLEXERIL) 10 mg tablet Take 1 tablet (10 mg total) by mouth 3 (three) times a day as needed for muscle spasms 30 tablet 3    guaiFENesin (MUCINEX) 600 mg 12 hr tablet Take 1,200 mg by mouth every 12 (twelve) hours      imiquimod (ALDARA) 5 % cream Apply 1 packet topically 3 (three) times a week 12 each 0    INCRUSE ELLIPTA 62 5 MCG/INH AEPB inhaler INHALE 1 PUFF DAILY 1 Inhaler 11    Multiple Vitamins tablet Take 1 tablet by mouth daily      POTASSIUM AMINOBENZOATE PO Take by mouth      SYMBICORT 160-4 5 MCG/ACT inhaler INHALE 2 PUFFS TWICE DAILY   RINSE MOUTH AFTER USE  10 2 Inhaler 3     No current facility-administered medications for this visit  Current Outpatient Medications on File Prior to Visit   Medication Sig    albuterol (2 5 mg/3 mL) 0 083 % nebulizer solution Take 1 vial (2 5 mg total) by nebulization every 6 (six) hours as needed for wheezing    albuterol (PROAIR HFA) 90 mcg/act inhaler Inhale 2 puffs every 4 (four) hours as needed for shortness of breath    Cholecalciferol (VITAMIN D3) 1000 units CAPS Take by mouth    cyclobenzaprine (FLEXERIL) 10 mg tablet Take 1 tablet (10 mg total) by mouth 3 (three) times a day as needed for muscle spasms    guaiFENesin (MUCINEX) 600 mg 12 hr tablet Take 1,200 mg by mouth every 12 (twelve) hours    imiquimod (ALDARA) 5 % cream Apply 1 packet topically 3 (three) times a week    INCRUSE ELLIPTA 62 5 MCG/INH AEPB inhaler INHALE 1 PUFF DAILY    Multiple Vitamins tablet Take 1 tablet by mouth daily    POTASSIUM AMINOBENZOATE PO Take by mouth    SYMBICORT 160-4 5 MCG/ACT inhaler INHALE 2 PUFFS TWICE DAILY  RINSE MOUTH AFTER USE  No current facility-administered medications on file prior to visit  He is allergic to darvon [propoxyphene]       Review of Systems   Constitutional: Negative for activity change, appetite change and unexpected weight change  Eyes: Negative for visual disturbance  Respiratory: Positive for shortness of breath  Cardiovascular: Negative for chest pain and leg swelling  Gastrointestinal: Negative for abdominal pain, constipation, diarrhea and nausea  Genitourinary: Negative for difficulty urinating and dysuria  Nocturia   Times  2-3   Musculoskeletal: Positive for arthralgias and gait problem  Neurological: Negative for dizziness and headaches  Objective:        Physical Exam   Constitutional: He is oriented to person, place, and time  He appears well-developed and well-nourished  No distress  Morbidly  Obese   White  male   HENT:   Head: Normocephalic and atraumatic     Right Ear: External ear normal    Left Ear: External ear normal    Mouth/Throat: Oropharynx is clear and moist    Eyes: Conjunctivae are normal    Neck: No thyromegaly present  Cardiovascular: Normal rate and regular rhythm  Exam reveals distant heart sounds  No murmur heard  Pulmonary/Chest: Effort normal and breath sounds normal  He has no rales  Musculoskeletal: He exhibits edema  ambulates  With  Cane  Pt  Has   Nonpitting  Lower   Leg  Edema  Lymphadenopathy:     He has no cervical adenopathy  Neurological: He is alert and oriented to person, place, and time  Skin: Skin is warm and dry  He is not diaphoretic  No erythema  Patient has a callus looking lesion on the left elbow  His white silvery scales which may be consistent with psoriasis  On right brow patient has a raise try scaly patch of skin  Patient will continue cortisone at night  Add Lotrimin over-the-counter during the day   Psychiatric: He has a normal mood and affect  His behavior is normal  Judgment and thought content normal    Nursing note and vitals reviewed

## 2020-05-20 DIAGNOSIS — J44.9 COPD, SEVERE (HCC): ICD-10-CM

## 2020-05-20 RX ORDER — ALBUTEROL SULFATE 90 UG/1
2 AEROSOL, METERED RESPIRATORY (INHALATION) EVERY 4 HOURS PRN
Qty: 8.5 INHALER | Refills: 5 | Status: SHIPPED | OUTPATIENT
Start: 2020-05-20 | End: 2021-06-08

## 2020-06-02 ENCOUNTER — TELEPHONE (OUTPATIENT)
Dept: PULMONOLOGY | Facility: CLINIC | Age: 65
End: 2020-06-02

## 2020-06-03 ENCOUNTER — OFFICE VISIT (OUTPATIENT)
Dept: PULMONOLOGY | Facility: CLINIC | Age: 65
End: 2020-06-03

## 2020-06-03 VITALS
DIASTOLIC BLOOD PRESSURE: 90 MMHG | BODY MASS INDEX: 40.43 KG/M2 | SYSTOLIC BLOOD PRESSURE: 148 MMHG | TEMPERATURE: 97.2 F | HEIGHT: 74 IN | HEART RATE: 105 BPM | WEIGHT: 315 LBS | OXYGEN SATURATION: 96 % | RESPIRATION RATE: 18 BRPM

## 2020-06-03 DIAGNOSIS — G47.33 OSA ON CPAP: ICD-10-CM

## 2020-06-03 DIAGNOSIS — R06.00 DOE (DYSPNEA ON EXERTION): ICD-10-CM

## 2020-06-03 DIAGNOSIS — J44.9 COPD, SEVERE (HCC): Primary | ICD-10-CM

## 2020-06-03 DIAGNOSIS — Z99.89 OSA ON CPAP: ICD-10-CM

## 2020-06-03 DIAGNOSIS — J45.40 MODERATE PERSISTENT ASTHMA, UNSPECIFIED WHETHER COMPLICATED: ICD-10-CM

## 2020-06-03 DIAGNOSIS — R06.02 SOB (SHORTNESS OF BREATH) ON EXERTION: ICD-10-CM

## 2020-06-03 PROBLEM — R06.09 DOE (DYSPNEA ON EXERTION): Status: ACTIVE | Noted: 2020-06-03

## 2020-06-03 PROCEDURE — 3008F BODY MASS INDEX DOCD: CPT | Performed by: INTERNAL MEDICINE

## 2020-06-03 PROCEDURE — 1036F TOBACCO NON-USER: CPT | Performed by: INTERNAL MEDICINE

## 2020-06-03 PROCEDURE — 99215 OFFICE O/P EST HI 40 MIN: CPT | Performed by: INTERNAL MEDICINE

## 2020-07-23 ENCOUNTER — APPOINTMENT (OUTPATIENT)
Dept: LAB | Facility: MEDICAL CENTER | Age: 65
End: 2020-07-23
Payer: COMMERCIAL

## 2020-07-23 ENCOUNTER — HOSPITAL ENCOUNTER (OUTPATIENT)
Dept: NON INVASIVE DIAGNOSTICS | Facility: MEDICAL CENTER | Age: 65
Discharge: HOME | End: 2020-07-23
Payer: COMMERCIAL

## 2020-07-23 DIAGNOSIS — Z12.5 SCREENING FOR PROSTATE CANCER: ICD-10-CM

## 2020-07-23 DIAGNOSIS — N18.30 CKD (CHRONIC KIDNEY DISEASE) STAGE 3, GFR 30-59 ML/MIN (HCC): ICD-10-CM

## 2020-07-23 DIAGNOSIS — E78.6 HYPERLIPIDEMIA WITH LOW HDL: ICD-10-CM

## 2020-07-23 DIAGNOSIS — D69.6 THROMBOCYTOPENIA (HCC): ICD-10-CM

## 2020-07-23 DIAGNOSIS — R06.02 SOB (SHORTNESS OF BREATH) ON EXERTION: ICD-10-CM

## 2020-07-23 DIAGNOSIS — E78.5 HYPERLIPIDEMIA WITH LOW HDL: ICD-10-CM

## 2020-07-23 DIAGNOSIS — J44.9 COPD, SEVERE (HCC): ICD-10-CM

## 2020-07-23 LAB
ALBUMIN SERPL BCP-MCNC: 3.6 G/DL (ref 3.5–5)
ALP SERPL-CCNC: 88 U/L (ref 46–116)
ALT SERPL W P-5'-P-CCNC: 44 U/L (ref 12–78)
ANION GAP SERPL CALCULATED.3IONS-SCNC: 5 MMOL/L (ref 4–13)
AST SERPL W P-5'-P-CCNC: 30 U/L (ref 5–45)
BASOPHILS # BLD AUTO: 0.04 THOUSANDS/ΜL (ref 0–0.1)
BASOPHILS NFR BLD AUTO: 1 % (ref 0–1)
BILIRUB SERPL-MCNC: 1.04 MG/DL (ref 0.2–1)
BUN SERPL-MCNC: 24 MG/DL (ref 5–25)
CALCIUM SERPL-MCNC: 9.5 MG/DL (ref 8.3–10.1)
CHLORIDE SERPL-SCNC: 107 MMOL/L (ref 100–108)
CHOLEST SERPL-MCNC: 142 MG/DL (ref 50–200)
CO2 SERPL-SCNC: 29 MMOL/L (ref 21–32)
CREAT SERPL-MCNC: 1.68 MG/DL (ref 0.6–1.3)
EOSINOPHIL # BLD AUTO: 0.27 THOUSAND/ΜL (ref 0–0.61)
EOSINOPHIL NFR BLD AUTO: 4 % (ref 0–6)
ERYTHROCYTE [DISTWIDTH] IN BLOOD BY AUTOMATED COUNT: 14 % (ref 11.6–15.1)
GFR SERPL CREATININE-BSD FRML MDRD: 42 ML/MIN/1.73SQ M
GLUCOSE P FAST SERPL-MCNC: 107 MG/DL (ref 65–99)
HCT VFR BLD AUTO: 48 % (ref 36.5–49.3)
HDLC SERPL-MCNC: 27 MG/DL
HGB BLD-MCNC: 16.1 G/DL (ref 12–17)
IMM GRANULOCYTES # BLD AUTO: 0.03 THOUSAND/UL (ref 0–0.2)
IMM GRANULOCYTES NFR BLD AUTO: 0 % (ref 0–2)
LDLC SERPL CALC-MCNC: 92 MG/DL (ref 0–100)
LYMPHOCYTES # BLD AUTO: 1.13 THOUSANDS/ΜL (ref 0.6–4.47)
LYMPHOCYTES NFR BLD AUTO: 17 % (ref 14–44)
MCH RBC QN AUTO: 33.2 PG (ref 26.8–34.3)
MCHC RBC AUTO-ENTMCNC: 33.5 G/DL (ref 31.4–37.4)
MCV RBC AUTO: 99 FL (ref 82–98)
MONOCYTES # BLD AUTO: 1.01 THOUSAND/ΜL (ref 0.17–1.22)
MONOCYTES NFR BLD AUTO: 15 % (ref 4–12)
NEUTROPHILS # BLD AUTO: 4.29 THOUSANDS/ΜL (ref 1.85–7.62)
NEUTS SEG NFR BLD AUTO: 63 % (ref 43–75)
NONHDLC SERPL-MCNC: 115 MG/DL
NRBC BLD AUTO-RTO: 0 /100 WBCS
PLATELET # BLD AUTO: 131 THOUSANDS/UL (ref 149–390)
PMV BLD AUTO: 11.7 FL (ref 8.9–12.7)
POTASSIUM SERPL-SCNC: 4.3 MMOL/L (ref 3.5–5.3)
PROT SERPL-MCNC: 6.6 G/DL (ref 6.4–8.2)
PSA SERPL-MCNC: 4.6 NG/ML (ref 0–4)
RBC # BLD AUTO: 4.85 MILLION/UL (ref 3.88–5.62)
SODIUM SERPL-SCNC: 141 MMOL/L (ref 136–145)
TRIGL SERPL-MCNC: 115 MG/DL
WBC # BLD AUTO: 6.77 THOUSAND/UL (ref 4.31–10.16)

## 2020-07-23 PROCEDURE — G0103 PSA SCREENING: HCPCS

## 2020-07-23 PROCEDURE — 93306 TTE W/DOPPLER COMPLETE: CPT

## 2020-07-23 PROCEDURE — 80061 LIPID PANEL: CPT

## 2020-07-23 PROCEDURE — 80053 COMPREHEN METABOLIC PANEL: CPT

## 2020-07-23 PROCEDURE — 36415 COLL VENOUS BLD VENIPUNCTURE: CPT

## 2020-07-23 PROCEDURE — 85025 COMPLETE CBC W/AUTO DIFF WBC: CPT

## 2020-07-23 PROCEDURE — 93306 TTE W/DOPPLER COMPLETE: CPT | Performed by: INTERNAL MEDICINE

## 2020-07-28 DIAGNOSIS — J44.9 COPD, SEVERE (HCC): Primary | ICD-10-CM

## 2020-08-03 ENCOUNTER — HOSPITAL ENCOUNTER (OUTPATIENT)
Dept: RADIOLOGY | Facility: MEDICAL CENTER | Age: 65
Discharge: HOME | End: 2020-08-03
Payer: COMMERCIAL

## 2020-08-03 DIAGNOSIS — J44.9 COPD, SEVERE (HCC): ICD-10-CM

## 2020-08-03 PROCEDURE — G0297 LDCT FOR LUNG CA SCREEN: HCPCS

## 2020-08-07 ENCOUNTER — TELEPHONE (OUTPATIENT)
Dept: PULMONOLOGY | Facility: CLINIC | Age: 65
End: 2020-08-07

## 2020-08-07 DIAGNOSIS — R91.1 PULMONARY NODULE: Primary | ICD-10-CM

## 2020-08-07 NOTE — TELEPHONE ENCOUNTER
Prince Martinez from [facility] calling with significant findings CT lung

## 2020-08-07 NOTE — TELEPHONE ENCOUNTER
Called patient reported that he has a stable left lower lobe nodule however a new secondary nodule has been noted upon this CT    -  patient agreeable to repeat chest CT in 3 months

## 2020-08-12 ENCOUNTER — TELEPHONE (OUTPATIENT)
Dept: FAMILY MEDICINE CLINIC | Facility: CLINIC | Age: 65
End: 2020-08-12

## 2020-08-21 ENCOUNTER — OFFICE VISIT (OUTPATIENT)
Dept: FAMILY MEDICINE CLINIC | Facility: CLINIC | Age: 65
End: 2020-08-21

## 2020-08-21 VITALS
HEIGHT: 74 IN | BODY MASS INDEX: 40.43 KG/M2 | WEIGHT: 315 LBS | OXYGEN SATURATION: 95 % | HEART RATE: 100 BPM | TEMPERATURE: 97.2 F | DIASTOLIC BLOOD PRESSURE: 76 MMHG | RESPIRATION RATE: 20 BRPM | SYSTOLIC BLOOD PRESSURE: 140 MMHG

## 2020-08-21 DIAGNOSIS — J44.9 COPD, SEVERE (HCC): ICD-10-CM

## 2020-08-21 DIAGNOSIS — E66.01 MORBID OBESITY WITH BMI OF 45.0-49.9, ADULT (HCC): ICD-10-CM

## 2020-08-21 DIAGNOSIS — E78.5 HYPERLIPIDEMIA WITH LOW HDL: ICD-10-CM

## 2020-08-21 DIAGNOSIS — J44.9 COPD, SEVERE (HCC): Primary | ICD-10-CM

## 2020-08-21 DIAGNOSIS — E78.6 HYPERLIPIDEMIA WITH LOW HDL: ICD-10-CM

## 2020-08-21 DIAGNOSIS — R97.20 ELEVATED PSA: ICD-10-CM

## 2020-08-21 DIAGNOSIS — N18.30 CKD (CHRONIC KIDNEY DISEASE) STAGE 3, GFR 30-59 ML/MIN (HCC): ICD-10-CM

## 2020-08-21 PROCEDURE — 99213 OFFICE O/P EST LOW 20 MIN: CPT | Performed by: PHYSICIAN ASSISTANT

## 2020-08-21 PROCEDURE — 3008F BODY MASS INDEX DOCD: CPT | Performed by: PHYSICIAN ASSISTANT

## 2020-08-21 PROCEDURE — 1036F TOBACCO NON-USER: CPT | Performed by: PHYSICIAN ASSISTANT

## 2020-08-21 RX ORDER — ALBUTEROL SULFATE 2.5 MG/3ML
2.5 SOLUTION RESPIRATORY (INHALATION) EVERY 6 HOURS PRN
Qty: 50 VIAL | Refills: 3 | Status: SHIPPED | OUTPATIENT
Start: 2020-08-21

## 2020-08-21 NOTE — PROGRESS NOTES
Assessment/Plan:     Diagnoses and all orders for this visit:    COPD, severe ([facility] 75 )  Comments:  Continue current regimen  Follow-up with Pulmonary as directed  Orders:  -     albuterol (2 5 mg/3 mL) 0 083 % nebulizer solution; Take 1 vial (2 5 mg total) by nebulization every 6 (six) hours as needed for wheezing    CKD (chronic kidney disease) stage 3, GFR 30-59 ml/min ([facility])  Comments: This is stable  No over-the-counter NSAIDs    Hyperlipidemia with low HDL  Comments:  Currently this is diet controlled  Morbid obesity with BMI of 45 0-49 9, adult ([place] [address] )  Comments:  Weight loss encouraged    Elevated PSA  Comments:  Referred to Urology  Orders:  -     Ambulatory referral to Urology; Future    COPD, severe ([facility] )  -     albuterol (2 5 mg/3 mL) 0 083 % nebulizer solution; Take 1 vial (2 5 mg total) by nebulization every 6 (six) hours as needed for wheezing          Subjective:      Patient ID: Brian Thibodeaux is a 60 y o  male  Patient presents in the office for follow up chronic conditions  Patient has advanced COPD  He is under care pulmonology  He has a nebulizer  He is on Incruse and Symbicort currently with Proventil HFA as needed  He is also on CPAP  Patient has nondiabetic chronic kidney disease stage 3  This is presumed to be from extensive NSAID use from his osteoarthritis of his knees  Lipids are currently diet controlled  He does not have hypertension  Lab work reviewed with patient shows fasting blood sugar 107 BUN and creatinine are stable with a GFR at 42  CBC shows stable thrombocytopenia  Patient's PSA has doubled since last year    Does have symptoms of nocturia, hesitancy and weak stream   We will be sending patient to urology for eval        The following portions of the patient's history were reviewed and updated as appropriate:   He   Patient Active Problem List    Diagnosis Date Noted    Elevated PSA 08/21/2020    GARNETT (dyspnea on exertion) 06/03/2020    Splenomegaly 08/27/2019  Screening for prostate cancer 01/22/2019    History of genital warts 06/06/2018    Morbid obesity with BMI of 45 0-49 9, adult ([facility] ) 09/20/2017    Skin lesion 05/24/2017    Pulmonary nodule 02/21/2017    CKD (chronic kidney disease) stage 3, GFR 30-59 ml/min ([facility]) 02/20/2017    ARACELIS on CPAP 07/22/2016    Nocturnal hypoxemia 04/01/2016    Hyperlipidemia with low HDL 11/19/2015    Arthritis 08/20/2015    Moderate persistent asthma 08/20/2015    Thrombocytopenia ([facility]) 12/11/2014    Sleep disorder 08/24/2012    Vitamin D deficiency 08/24/2012    COPD, severe ([facility] ) 07/12/2012     Current Outpatient Medications   Medication Sig Dispense Refill    albuterol (2 5 mg/3 mL) 0 083 % nebulizer solution Take 1 vial (2 5 mg total) by nebulization every 6 (six) hours as needed for wheezing 50 vial 3    albuterol (PROVENTIL HFA,VENTOLIN HFA) 90 mcg/act inhaler INHALE 2 PUFFS EVERY 4 (FOUR) HOURS AS NEEDED FOR SHORTNESS OF BREATH 8 5 Inhaler 5    Cholecalciferol (VITAMIN D3) 1000 units CAPS Take by mouth      cyclobenzaprine (FLEXERIL) 10 mg tablet Take 1 tablet (10 mg total) by mouth 3 (three) times a day as needed for muscle spasms 30 tablet 3    guaiFENesin (MUCINEX) 600 mg 12 hr tablet Take 1,200 mg by mouth every 12 (twelve) hours      imiquimod (ALDARA) 5 % cream Apply 1 packet topically 3 (three) times a week 12 each 0    INCRUSE ELLIPTA 62 5 MCG/INH AEPB inhaler INHALE 1 PUFF DAILY 1 Inhaler 11    Multiple Vitamins tablet Take 1 tablet by mouth daily      POTASSIUM AMINOBENZOATE PO Take by mouth      SYMBICORT 160-4 5 MCG/ACT inhaler INHALE 2 PUFFS TWICE DAILY  RINSE MOUTH AFTER USE  10 2 Inhaler 3     No current facility-administered medications for this visit        Current Outpatient Medications on File Prior to Visit   Medication Sig    albuterol (PROVENTIL HFA,VENTOLIN HFA) 90 mcg/act inhaler INHALE 2 PUFFS EVERY 4 (FOUR) HOURS AS NEEDED FOR SHORTNESS OF BREATH    Cholecalciferol (VITAMIN D3) 1000 units CAPS Take by mouth    cyclobenzaprine (FLEXERIL) 10 mg tablet Take 1 tablet (10 mg total) by mouth 3 (three) times a day as needed for muscle spasms    guaiFENesin (MUCINEX) 600 mg 12 hr tablet Take 1,200 mg by mouth every 12 (twelve) hours    imiquimod (ALDARA) 5 % cream Apply 1 packet topically 3 (three) times a week    INCRUSE ELLIPTA 62 5 MCG/INH AEPB inhaler INHALE 1 PUFF DAILY    Multiple Vitamins tablet Take 1 tablet by mouth daily    POTASSIUM AMINOBENZOATE PO Take by mouth    SYMBICORT 160-4 5 MCG/ACT inhaler INHALE 2 PUFFS TWICE DAILY  RINSE MOUTH AFTER USE  No current facility-administered medications on file prior to visit  He is allergic to darvon [propoxyphene]       Review of Systems   Constitutional: Negative for activity change, appetite change, chills, fatigue and fever  HENT: Negative for ear pain and sore throat  Eyes: Negative for visual disturbance  Respiratory: Positive for shortness of breath  Negative for cough  Cardiovascular: Positive for leg swelling  Negative for chest pain and palpitations  Gastrointestinal: Negative for abdominal pain, blood in stool, constipation, diarrhea and nausea  Genitourinary: Negative for difficulty urinating  Nocturia, hesitancy and stream is weak   Musculoskeletal: Negative for arthralgias, back pain and myalgias  Skin: Negative for rash  Neurological: Negative for dizziness, syncope and headaches  Psychiatric/Behavioral: Negative for sleep disturbance  Objective:        Physical Exam  Vitals signs and nursing note reviewed  Constitutional:       General: He is not in acute distress  Appearance: He is obese  He is not ill-appearing  HENT:      Head: Normocephalic and atraumatic        Right Ear: Tympanic membrane and external ear normal       Left Ear: Tympanic membrane and external ear normal    Eyes:      Conjunctiva/sclera: Conjunctivae normal    Cardiovascular:      Rate and Rhythm: Normal rate and regular rhythm  Heart sounds: No murmur  Pulmonary:      Effort: Pulmonary effort is normal       Breath sounds: Normal breath sounds  No wheezing  Musculoskeletal:      Right lower leg: No edema  Left lower leg: No edema  Lymphadenopathy:      Cervical: No cervical adenopathy  Skin:     General: Skin is warm and dry  Neurological:      General: No focal deficit present  Mental Status: He is alert  Psychiatric:         Mood and Affect: Mood normal          Behavior: Behavior normal          Thought Content:  Thought content normal          Judgment: Judgment normal

## 2020-11-17 ENCOUNTER — CONSULT (OUTPATIENT)
Dept: UROLOGY | Facility: CLINIC | Age: 65
End: 2020-11-17
Payer: MEDICARE

## 2020-11-17 VITALS
TEMPERATURE: 98.4 F | SYSTOLIC BLOOD PRESSURE: 144 MMHG | BODY MASS INDEX: 40.43 KG/M2 | HEIGHT: 74 IN | DIASTOLIC BLOOD PRESSURE: 76 MMHG | HEART RATE: 102 BPM | WEIGHT: 315 LBS

## 2020-11-17 DIAGNOSIS — R35.1 BPH ASSOCIATED WITH NOCTURIA: Primary | ICD-10-CM

## 2020-11-17 DIAGNOSIS — N40.1 BPH ASSOCIATED WITH NOCTURIA: Primary | ICD-10-CM

## 2020-11-17 DIAGNOSIS — R97.20 ELEVATED PSA: ICD-10-CM

## 2020-11-17 PROCEDURE — 99204 OFFICE O/P NEW MOD 45 MIN: CPT | Performed by: UROLOGY

## 2020-11-17 RX ORDER — TAMSULOSIN HYDROCHLORIDE 0.4 MG/1
0.4 CAPSULE ORAL
Qty: 30 CAPSULE | Refills: 9 | Status: SHIPPED | OUTPATIENT
Start: 2020-11-17 | End: 2021-05-18

## 2020-11-20 ENCOUNTER — OFFICE VISIT (OUTPATIENT)
Dept: FAMILY MEDICINE CLINIC | Facility: CLINIC | Age: 65
End: 2020-11-20
Payer: MEDICARE

## 2020-11-20 VITALS
TEMPERATURE: 97.1 F | OXYGEN SATURATION: 97 % | HEIGHT: 74 IN | HEART RATE: 108 BPM | DIASTOLIC BLOOD PRESSURE: 74 MMHG | SYSTOLIC BLOOD PRESSURE: 126 MMHG | RESPIRATION RATE: 24 BRPM | BODY MASS INDEX: 40.43 KG/M2 | WEIGHT: 315 LBS

## 2020-11-20 DIAGNOSIS — Z00.00 MEDICARE ANNUAL WELLNESS VISIT, INITIAL: ICD-10-CM

## 2020-11-20 DIAGNOSIS — Z00.00 WELCOME TO MEDICARE PREVENTIVE VISIT: Primary | ICD-10-CM

## 2020-11-20 PROCEDURE — G0403 EKG FOR INITIAL PREVENT EXAM: HCPCS | Performed by: PHYSICIAN ASSISTANT

## 2020-11-20 PROCEDURE — G0402 INITIAL PREVENTIVE EXAM: HCPCS | Performed by: PHYSICIAN ASSISTANT

## 2020-11-20 RX ORDER — ZINC GLUCONATE 50 MG
50 TABLET ORAL DAILY
COMMUNITY
End: 2021-09-14

## 2021-01-05 ENCOUNTER — OFFICE VISIT (OUTPATIENT)
Dept: FAMILY MEDICINE CLINIC | Facility: CLINIC | Age: 66
End: 2021-01-05
Payer: MEDICARE

## 2021-01-05 VITALS
HEART RATE: 121 BPM | OXYGEN SATURATION: 95 % | RESPIRATION RATE: 28 BRPM | BODY MASS INDEX: 40.43 KG/M2 | TEMPERATURE: 97.5 F | WEIGHT: 315 LBS | HEIGHT: 74 IN | SYSTOLIC BLOOD PRESSURE: 150 MMHG | DIASTOLIC BLOOD PRESSURE: 80 MMHG

## 2021-01-05 DIAGNOSIS — J45.40 MODERATE PERSISTENT ASTHMA, UNSPECIFIED WHETHER COMPLICATED: ICD-10-CM

## 2021-01-05 DIAGNOSIS — J44.9 COPD, SEVERE (HCC): Primary | ICD-10-CM

## 2021-01-05 DIAGNOSIS — J44.1 COPD EXACERBATION (HCC): ICD-10-CM

## 2021-01-05 PROCEDURE — 99214 OFFICE O/P EST MOD 30 MIN: CPT | Performed by: PHYSICIAN ASSISTANT

## 2021-01-05 RX ORDER — PREDNISONE 10 MG/1
10 TABLET ORAL 2 TIMES DAILY WITH MEALS
Qty: 10 TABLET | Refills: 0 | Status: SHIPPED | OUTPATIENT
Start: 2021-01-05 | End: 2021-02-17 | Stop reason: ALTCHOICE

## 2021-01-05 RX ORDER — AZITHROMYCIN 250 MG/1
TABLET, FILM COATED ORAL
Qty: 6 TABLET | Refills: 0 | Status: SHIPPED | OUTPATIENT
Start: 2021-01-05 | End: 2021-01-10

## 2021-01-05 NOTE — PROGRESS NOTES
Assessment/Plan:     Diagnoses and all orders for this visit:    COPD, severe ([facility] )  Comments:  Continue current inhalers  Suggest to use nebulizer    COPD exacerbation ([facility] 75 )  Comments:  P o  Prednisone and azithromycin ordered  Orders:  -     azithromycin (ZITHROMAX) 250 mg tablet; Take 2 tablets today then 1 tablet daily x 4 days  -     predniSONE 10 mg tablet; Take 1 tablet (10 mg total) by mouth 2 (two) times a day with meals    Moderate persistent asthma, unspecified whether complicated          Subjective:      Patient ID: Cierra Mata is a 60 y o  male  Patient presents in the office with cough and congestion  Patient developed COVID symptoms around December 4th  He was diagnosed with COVID December 11th  Patient states he was pretty sick  He has greatly improved  However his cough persists  Does have a lot of postnasal drip and the cough is productive at times  He has no more fever  He has severe COPD he is currently on Symbicort, Incruse and Proventil inhaler  He does have a nebulizer if needed  He has been taking the COVID vitamin therapy is zinc vitamin-D and Mucinex    Patient states at home sometimes his oxygen does drop down into the 82 use with ambulation with quick rebound into the 90s with rest       The following portions of the patient's history were reviewed and updated as appropriate:   He   Patient Active Problem List    Diagnosis Date Noted    BPH associated with nocturia 11/17/2020    Elevated PSA 08/21/2020    GARNETT (dyspnea on exertion) 06/03/2020    Splenomegaly 08/27/2019    Screening for prostate cancer 01/22/2019    History of genital warts 06/06/2018    Morbid obesity with BMI of 45 0-49 9, adult ([facility] ) 09/20/2017    Skin lesion 05/24/2017    Pulmonary nodule 02/21/2017    CKD (chronic kidney disease) stage 3, GFR 30-59 ml/min 02/20/2017    ARACELIS on CPAP 07/22/2016    Nocturnal hypoxemia 04/01/2016    Hyperlipidemia with low HDL 11/19/2015    Arthritis 08/20/2015    Moderate persistent asthma 08/20/2015    Thrombocytopenia ([facility] ) 12/11/2014    Sleep disorder 08/24/2012    Vitamin D deficiency 08/24/2012    COPD, severe ([facility] ) 07/12/2012     Current Outpatient Medications   Medication Sig Dispense Refill    albuterol (2 5 mg/3 mL) 0 083 % nebulizer solution Take 1 vial (2 5 mg total) by nebulization every 6 (six) hours as needed for wheezing 50 vial 3    albuterol (PROVENTIL HFA,VENTOLIN HFA) 90 mcg/act inhaler INHALE 2 PUFFS EVERY 4 (FOUR) HOURS AS NEEDED FOR SHORTNESS OF BREATH 8 5 Inhaler 5    azithromycin (ZITHROMAX) 250 mg tablet Take 2 tablets today then 1 tablet daily x 4 days 6 tablet 0    Cholecalciferol (VITAMIN D3) 1000 units CAPS Take by mouth      cyclobenzaprine (FLEXERIL) 10 mg tablet Take 1 tablet (10 mg total) by mouth 3 (three) times a day as needed for muscle spasms 30 tablet 3    guaiFENesin (MUCINEX) 600 mg 12 hr tablet Take 1,200 mg by mouth every 12 (twelve) hours      imiquimod (ALDARA) 5 % cream Apply 1 packet topically 3 (three) times a week 12 each 0    INCRUSE ELLIPTA 62 5 MCG/INH AEPB inhaler INHALE 1 PUFF DAILY 1 Inhaler 11    Multiple Vitamins tablet Take 1 tablet by mouth daily      POTASSIUM AMINOBENZOATE PO Take by mouth      predniSONE 10 mg tablet Take 1 tablet (10 mg total) by mouth 2 (two) times a day with meals 10 tablet 0    SYMBICORT 160-4 5 MCG/ACT inhaler INHALE 2 PUFFS TWICE DAILY  RINSE MOUTH AFTER USE  10 2 Inhaler 3    tamsulosin (FLOMAX) 0 4 mg Take 1 capsule (0 4 mg total) by mouth daily with dinner 30 capsule 9    zinc gluconate 50 mg tablet Take 50 mg by mouth daily       No current facility-administered medications for this visit        Current Outpatient Medications on File Prior to Visit   Medication Sig    albuterol (2 5 mg/3 mL) 0 083 % nebulizer solution Take 1 vial (2 5 mg total) by nebulization every 6 (six) hours as needed for wheezing    albuterol (PROVENTIL HFA,VENTOLIN HFA) 90 mcg/act inhaler INHALE 2 PUFFS EVERY 4 (FOUR) HOURS AS NEEDED FOR SHORTNESS OF BREATH    Cholecalciferol (VITAMIN D3) 1000 units CAPS Take by mouth    cyclobenzaprine (FLEXERIL) 10 mg tablet Take 1 tablet (10 mg total) by mouth 3 (three) times a day as needed for muscle spasms    guaiFENesin (MUCINEX) 600 mg 12 hr tablet Take 1,200 mg by mouth every 12 (twelve) hours    imiquimod (ALDARA) 5 % cream Apply 1 packet topically 3 (three) times a week    INCRUSE ELLIPTA 62 5 MCG/INH AEPB inhaler INHALE 1 PUFF DAILY    Multiple Vitamins tablet Take 1 tablet by mouth daily    POTASSIUM AMINOBENZOATE PO Take by mouth    SYMBICORT 160-4 5 MCG/ACT inhaler INHALE 2 PUFFS TWICE DAILY  RINSE MOUTH AFTER USE   tamsulosin (FLOMAX) 0 4 mg Take 1 capsule (0 4 mg total) by mouth daily with dinner    zinc gluconate 50 mg tablet Take 50 mg by mouth daily     No current facility-administered medications on file prior to visit  He is allergic to darvon [propoxyphene]       Review of Systems   Constitutional: Positive for fatigue  Negative for activity change, appetite change, chills and fever  HENT: Positive for postnasal drip  Negative for ear pain, rhinorrhea, sinus pressure, sinus pain, sneezing and sore throat  Respiratory: Positive for cough and shortness of breath  Objective:        Physical Exam  Vitals signs and nursing note reviewed  Constitutional:       General: He is not in acute distress  Appearance: He is well-developed  He is obese  He is not ill-appearing or diaphoretic  HENT:      Head: Normocephalic and atraumatic  Right Ear: Tympanic membrane, ear canal and external ear normal       Left Ear: Tympanic membrane, ear canal and external ear normal       Nose:      Right Sinus: No maxillary sinus tenderness or frontal sinus tenderness  Left Sinus: No maxillary sinus tenderness or frontal sinus tenderness     Eyes:      Conjunctiva/sclera: Conjunctivae normal       Pupils: Pupils are equal, round, and reactive to light  Cardiovascular:      Rate and Rhythm: Regular rhythm  Tachycardia present  Heart sounds: Normal heart sounds  No murmur  Pulmonary:      Effort: Pulmonary effort is normal  No respiratory distress  Breath sounds: Normal breath sounds  No stridor  No wheezing, rhonchi or rales  Lymphadenopathy:      Cervical: No cervical adenopathy  Skin:     General: Skin is warm and dry  Neurological:      General: No focal deficit present  Mental Status: He is alert and oriented to person, place, and time  Psychiatric:         Mood and Affect: Mood normal          Behavior: Behavior normal          Thought Content:  Thought content normal          Judgment: Judgment normal

## 2021-02-12 DIAGNOSIS — Z86.19 HISTORY OF GENITAL WARTS: ICD-10-CM

## 2021-02-13 RX ORDER — IMIQUIMOD 12.5 MG/.25G
1 CREAM TOPICAL 3 TIMES WEEKLY
Qty: 12 PACKET | Refills: 0 | Status: SHIPPED | OUTPATIENT
Start: 2021-02-15

## 2021-02-17 ENCOUNTER — OFFICE VISIT (OUTPATIENT)
Dept: PULMONOLOGY | Facility: CLINIC | Age: 66
End: 2021-02-17
Payer: MEDICARE

## 2021-02-17 VITALS
HEART RATE: 110 BPM | WEIGHT: 315 LBS | OXYGEN SATURATION: 97 % | DIASTOLIC BLOOD PRESSURE: 90 MMHG | BODY MASS INDEX: 39.17 KG/M2 | SYSTOLIC BLOOD PRESSURE: 140 MMHG | TEMPERATURE: 97.5 F | HEIGHT: 75 IN

## 2021-02-17 DIAGNOSIS — Z86.16 HISTORY OF 2019 NOVEL CORONAVIRUS DISEASE (COVID-19): ICD-10-CM

## 2021-02-17 DIAGNOSIS — G47.33 OSA ON CPAP: ICD-10-CM

## 2021-02-17 DIAGNOSIS — J44.9 COPD, SEVERE (HCC): Primary | ICD-10-CM

## 2021-02-17 DIAGNOSIS — R91.1 PULMONARY NODULE: ICD-10-CM

## 2021-02-17 DIAGNOSIS — Z99.89 OSA ON CPAP: ICD-10-CM

## 2021-02-17 PROCEDURE — 99214 OFFICE O/P EST MOD 30 MIN: CPT | Performed by: INTERNAL MEDICINE

## 2021-02-17 NOTE — ASSESSMENT & PLAN NOTE
Kait Maciel has severe COPD and continues to have symptoms despite taking Symbicort on a regular basis  He finds Incruse Ellipta to be beneficial, however tries to use it sparingly due to its cost   He has albuterol which he is using several times per day  I have given him a sample of Trelegy Ellipta to try using in place of the Symbicort and Incruse  My hope is that this is more affordable and also provide him with maximal, triple therapy benefit

## 2021-02-17 NOTE — ASSESSMENT & PLAN NOTE
CT of the chest in August showed a new nodule in the left lower lobe for which he will have repeat CT performed  He has the prescription and states he will schedule it at his convenience  I will call him with those results

## 2021-02-17 NOTE — PROGRESS NOTES
Pulmonary Follow Up Note   Som Turner 60 y o  male MRN: [id number]  2/17/2021      Assessment/Plan:     COPD, severe ([address] )   Peter Hernández has severe COPD and continues to have symptoms despite taking Symbicort on a regular basis  He finds Incruse Ellipta to be beneficial, however tries to use it sparingly due to its cost   He has albuterol which he is using several times per day  I have given him a sample of Trelegy Ellipta to try using in place of the Symbicort and Incruse  My hope is that this is more affordable and also provide him with maximal, triple therapy benefit  ARACELIS on CPAP   I have reviewed compliance data for January 18th through 2/16/21  He is using the device every night for an average of 10 hours and 39 minutes  Average AHI is 0 4  He is demonstrating compliance and is deriving clinical benefit  Ongoing use is medically necessary  He is up-to-date on his supplies  History of 2019 novel coronavirus disease (COVID-19)    He tested positive for COVID in December  He is on the waiting list to get the COVID vaccine through [facility]  Pulmonary nodule    CT of the chest in August showed a new nodule in the left lower lobe for which he will have repeat CT performed  He has the prescription and states he will schedule it at his convenience  I will call him with those results  Visit orders:    Diagnoses and all orders for this visit:    COPD, severe ([address] )  -     fluticasone-umeclidinium-vilanterol (TRELEGY) 100-62 5-25 MCG/INH inhaler; Inhale 1 puff daily Rinse mouth after use  ARACELIS on CPAP    History of 2019 novel coronavirus disease (COVID-19)    Pulmonary nodule        No follow-ups on file  History of Present Illness   HPI:  Som Turner is a 60 y o  male who  Is here today for follow-up regarding severe COPD and obstructive sleep apnea  He was diagnosed with COVID-19 in December, shortly after his wife contracted it at work    He developed excessive fatigue, cough and shortness of breath  He took approximately 5 weeks to recover, and still feels somewhat weak  He is trying to increase his activity levels  While he was sick, he was using his CPAP more frequently  He has a pulse ox and states that he briefly dropped into the 80s, but largely remained above 90%  He reports that his pulmonary status has returned fairly close to his baseline  He still has shortness of breath with exertion  He is compliant with Symbicort twice daily and has Incruse but uses it sparingly due to the cost   He uses his albuterol inhaler several times per day  He is wearing CPAP every night and wakes up feeling refreshed  Review of Systems   Constitutional: Negative for chills, fever and unexpected weight change  HENT: Negative for postnasal drip and sore throat  Eyes: Negative for visual disturbance  Respiratory:        As noted in HPI   Cardiovascular: Negative for chest pain  Gastrointestinal: Negative for abdominal pain, diarrhea and vomiting  Musculoskeletal: Negative for arthralgias  Skin: Negative for rash  Neurological: Negative for headaches  Hematological: Negative for adenopathy  Psychiatric/Behavioral: Negative  All other systems reviewed and are negative          Medical, Family and Social history reviewed and updated as appropriate    Historical Information   Past Medical History:   Diagnosis Date    Arthritis     Asthma     Chest pain     atypical    Chronic kidney disease     COPD [facility]chronic obstructive pulmonary disease) [facility]     CPAP (continuous positive airway pressure) dependence     Decreased glomerular filtration rate     Hamstring strain     Herniated lumbar intervertebral disc     History of alcohol use     uncomplicated    History of back pain     lower    History of colon polyps     sigmoid    History of genital warts     History of lipoma     History of muscle spasm     lumbar paraspinous muscle    History of umbilical hernia  Lateral pain of left hip     Low HDL (under 40)     Lumbar radiculopathy     Obesity     Respiratory distress     acute    Shortness of breath     Sleep apnea     CPAP PT WILL BRING     Past Surgical History:   Procedure Laterality Date    ADENOIDECTOMY      APPENDECTOMY      COLONOSCOPY      HAND SURGERY Left     CYST REMOVAL    HIP SURGERY Bilateral     LIPECTOMY      thigh    LIPOMA RESECTION Left     HIP    NM REPAIR UMBILICAL DOOD,7+L/Z,TRZGH N/A 2017    Procedure: REPAIR HERNIA UMBILICAL;  Surgeon: Bijal Valdez MD;  Location: [facility];  Service: General    TONSILLECTOMY      WRIST GANGLION EXCISION       Family History   Problem Relation Age of Onset    Heart disease Mother         cardiac disorder    Hypertension Mother     COPD Father     Heart disease Family         cardiac disorder    Cancer Family         lung    Emphysema Family         pulmonary unspecified emphysema    Diabetes Family     Glaucoma Family     Cancer Family         lung       Social History     Tobacco Use   Smoking Status Former Smoker    Packs/day: 2 00    Years: 25 00    Pack years: 50 00    Types: Cigarettes    Start date: 0    Quit date: 10/2014    Years since quittin 3   Smokeless Tobacco Never Used         Meds/Allergies     Current Outpatient Medications:     albuterol (2 5 mg/3 mL) 0 083 % nebulizer solution, Take 1 vial (2 5 mg total) by nebulization every 6 (six) hours as needed for wheezing, Disp: 50 vial, Rfl: 3    albuterol (PROVENTIL HFA,VENTOLIN HFA) 90 mcg/act inhaler, INHALE 2 PUFFS EVERY 4 (FOUR) HOURS AS NEEDED FOR SHORTNESS OF BREATH, Disp: 8 5 Inhaler, Rfl: 5    Cholecalciferol (VITAMIN D3) 1000 units CAPS, Take by mouth, Disp: , Rfl:     guaiFENesin (MUCINEX) 600 mg 12 hr tablet, Take 1,200 mg by mouth every 12 (twelve) hours, Disp: , Rfl:     imiquimod (ALDARA) 5 % cream, APPLY 1 PACKET TOPICALLY 3 (THREE) TIMES A WEEK, Disp: 12 packet, Rfl: 0    Multiple Vitamins tablet, Take 1 tablet by mouth daily, Disp: , Rfl:     POTASSIUM AMINOBENZOATE PO, Take by mouth, Disp: , Rfl:     tamsulosin (FLOMAX) 0 4 mg, Take 1 capsule (0 4 mg total) by mouth daily with dinner, Disp: 30 capsule, Rfl: 9    zinc gluconate 50 mg tablet, Take 50 mg by mouth daily, Disp: , Rfl:     cyclobenzaprine (FLEXERIL) 10 mg tablet, Take 1 tablet (10 mg total) by mouth 3 (three) times a day as needed for muscle spasms (Patient not taking: Reported on 2/17/2021), Disp: 30 tablet, Rfl: 3    fluticasone-umeclidinium-vilanterol (TRELEGY) 100-62 5-25 MCG/INH inhaler, Inhale 1 puff daily Rinse mouth after use , Disp: 1 Inhaler, Rfl: 5  Allergies   Allergen Reactions    Darvon [Propoxyphene] Other (See Comments)     hallucinations       Vitals: Blood pressure 140/90, pulse (!) 110, temperature 97 5 °F (36 4 °C), temperature source Temporal, height 6' 3" (1 905 m), weight (!) 179 kg (395 lb), SpO2 97 %  Body mass index is 49 37 kg/m²  Oxygen Therapy  SpO2: 97 %  Oxygen Therapy: None (Room air)    Physical Exam   Physical Exam  Constitutional:       General: He is not in acute distress  HENT:      Head: Normocephalic  Eyes:      General: No scleral icterus  Neck:      Musculoskeletal: Neck supple  Vascular: No JVD  Cardiovascular:      Rate and Rhythm: Normal rate and regular rhythm  Pulmonary:      Breath sounds: No wheezing or rhonchi  Abdominal:      Palpations: Abdomen is soft  Tenderness: There is no abdominal tenderness  Lymphadenopathy:      Cervical: No cervical adenopathy  Skin:     General: Skin is warm and dry  Neurological:      Mental Status: He is alert and oriented to person, place, and time  Psychiatric:         Mood and Affect: Mood normal          Behavior: Behavior normal          Labs: I have personally reviewed pertinent lab results    Lab Results   Component Value Date    WBC 6 77 07/23/2020    HGB 16 1 07/23/2020    HCT 48 0 07/23/2020    MCV 99 (H) 07/23/2020     (L) 07/23/2020     Lab Results   Component Value Date    CALCIUM 9 5 07/23/2020     06/14/2017    K 4 3 07/23/2020    CO2 29 07/23/2020     07/23/2020    BUN 24 07/23/2020    CREATININE 1 68 (H) 07/23/2020     No results found for: IGE  Lab Results   Component Value Date    ALT 44 07/23/2020    AST 30 07/23/2020    ALKPHOS 88 07/23/2020    BILITOT 0 9 06/14/2017       Imaging and other studies: I have personally reviewed pertinent reports  and I have personally reviewed pertinent films in PACS   CT of the chest performed on 8/3/20 shows 6 mm left lower lobe pulmonary nodule, new from prior study  There is an additional left lower lobe nodule, stable from prior  Pulmonary function testing:  PerformedMarch 2019   FEV1/FVC ratio 46%   FEV1 36% predicted  FVC 60% predicted  significant response to bronchodilators   post bronchodilator FEV1 is 47% of predicted  DLCO corrected for hemoglobin 60 % predicted  PFT shows severe obstruction with reduced vital capacity which is likely related to body habitus and air trapping  Diffusion capacity is reduced

## 2021-02-17 NOTE — ASSESSMENT & PLAN NOTE
He tested positive for COVID in December  He is on the waiting list to get the COVID vaccine through [facility]

## 2021-02-17 NOTE — ASSESSMENT & PLAN NOTE
I have reviewed compliance data for January 18th through 2/16/21  He is using the device every night for an average of 10 hours and 39 minutes  Average AHI is 0 4  He is demonstrating compliance and is deriving clinical benefit  Ongoing use is medically necessary  He is up-to-date on his supplies

## 2021-03-10 DIAGNOSIS — Z23 ENCOUNTER FOR IMMUNIZATION: ICD-10-CM

## 2021-03-16 ENCOUNTER — TELEPHONE (OUTPATIENT)
Dept: FAMILY MEDICINE CLINIC | Facility: CLINIC | Age: 66
End: 2021-03-16

## 2021-03-16 NOTE — TELEPHONE ENCOUNTER
Patient contacted for COVID19 plasma donation  Questionnaire completed  Patient provided with [facility] website / phone number for appointment scheduling     Philippe   [phone]

## 2021-05-14 ENCOUNTER — APPOINTMENT (OUTPATIENT)
Dept: LAB | Facility: CLINIC | Age: 66
End: 2021-05-14
Payer: COMMERCIAL

## 2021-05-14 DIAGNOSIS — R97.20 ELEVATED PSA: ICD-10-CM

## 2021-05-14 DIAGNOSIS — J44.9 COPD, SEVERE (HCC): ICD-10-CM

## 2021-05-14 LAB — PSA SERPL-MCNC: 1.5 NG/ML (ref 0–4)

## 2021-05-14 PROCEDURE — 84153 ASSAY OF PSA TOTAL: CPT

## 2021-05-18 ENCOUNTER — OFFICE VISIT (OUTPATIENT)
Dept: UROLOGY | Facility: CLINIC | Age: 66
End: 2021-05-18
Payer: COMMERCIAL

## 2021-05-18 VITALS
HEART RATE: 116 BPM | HEIGHT: 75 IN | SYSTOLIC BLOOD PRESSURE: 160 MMHG | WEIGHT: 315 LBS | DIASTOLIC BLOOD PRESSURE: 100 MMHG | BODY MASS INDEX: 39.17 KG/M2

## 2021-05-18 DIAGNOSIS — N40.1 BPH WITH OBSTRUCTION/LOWER URINARY TRACT SYMPTOMS: Primary | ICD-10-CM

## 2021-05-18 DIAGNOSIS — N13.8 BPH WITH OBSTRUCTION/LOWER URINARY TRACT SYMPTOMS: Primary | ICD-10-CM

## 2021-05-18 DIAGNOSIS — E66.01 MORBID OBESITY WITH BMI OF 45.0-49.9, ADULT (HCC): ICD-10-CM

## 2021-05-18 PROCEDURE — 99213 OFFICE O/P EST LOW 20 MIN: CPT | Performed by: PHYSICIAN ASSISTANT

## 2021-05-18 NOTE — PROGRESS NOTES
UROLOGY PROGRESS NOTE   Patient Identifiers: Miguel Angel Spears (MRN [id number])  Date of Service: 5/18/2021    Subjective:      41-year-old man seen previously for elevated PSA of 4 6  He also had lower tract symptoms mainly nocturia  He was started on tamsulosin at the time  He returns for follow-up  Now his PSA is down to normal 1 5  He has persistent lower tract symptoms  He is requesting to switch to Cardura recommended by a friend  He has no significant incontinence or hematuria        Reason for visit:  Elevated PSA and BPH follow-up    Objective:     VITALS:    Vitals:    05/18/21 1258   BP: 160/100   Pulse: (!) 116           LABS:  Lab Results   Component Value Date    HGB 16 1 07/23/2020    HCT 48 0 07/23/2020    WBC 6 77 07/23/2020     (L) 07/23/2020   ]    Lab Results   Component Value Date     06/14/2017    K 4 3 07/23/2020     07/23/2020    CO2 29 07/23/2020    BUN 24 07/23/2020    CREATININE 1 68 (H) 07/23/2020    CALCIUM 9 5 07/23/2020   ]        INPATIENT MEDS:    Current Outpatient Medications:     albuterol (2 5 mg/3 mL) 0 083 % nebulizer solution, Take 1 vial (2 5 mg total) by nebulization every 6 (six) hours as needed for wheezing, Disp: 50 vial, Rfl: 3    albuterol (PROVENTIL HFA,VENTOLIN HFA) 90 mcg/act inhaler, INHALE 2 PUFFS EVERY 4 (FOUR) HOURS AS NEEDED FOR SHORTNESS OF BREATH, Disp: 8 5 Inhaler, Rfl: 5    Cholecalciferol (VITAMIN D3) 1000 units CAPS, Take by mouth, Disp: , Rfl:     cyclobenzaprine (FLEXERIL) 10 mg tablet, Take 1 tablet (10 mg total) by mouth 3 (three) times a day as needed for muscle spasms, Disp: 30 tablet, Rfl: 3    fluticasone-umeclidinium-vilanterol (TRELEGY) 100-62 5-25 MCG/INH inhaler, Inhale 1 puff daily Rinse mouth after use , Disp: 1 Inhaler, Rfl: 5    guaiFENesin (MUCINEX) 600 mg 12 hr tablet, Take 1,200 mg by mouth every 12 (twelve) hours, Disp: , Rfl:     imiquimod (ALDARA) 5 % cream, APPLY 1 PACKET TOPICALLY 3 (THREE) TIMES A WEEK, Disp: 12 packet, Rfl: 0    Multiple Vitamins tablet, Take 1 tablet by mouth daily, Disp: , Rfl:     POTASSIUM AMINOBENZOATE PO, Take by mouth, Disp: , Rfl:     zinc gluconate 50 mg tablet, Take 50 mg by mouth daily, Disp: , Rfl:     doxazosin (CARDURA XL) 8 MG 24 hr tablet, Take 1 tablet (8 mg total) by mouth daily with breakfast, Disp: 30 tablet, Rfl: 10      Physical Exam:   /100   Pulse (!) 116   Ht 6' 3" (1 905 m)   Wt (!) 182 kg (401 lb)   BMI 50 12 kg/m²   GEN: no acute distress    RESP: breathing comfortably with no accessory muscle use    ABD: soft, non-tender, non-distended   INCISION:    EXT: no significant peripheral edema     RADIOLOGY:    none     Assessment:    1  BPH with obstruction   2   Elevated PSA     Plan:   - I explained to him that the difference between Cardura and tamsulosin was nominal and that tamsulosin was actually new or  - I ordered Cardura 8 mg sent to his pharmacy and he will follow-up in 3 months for office cystoscopy  - return to annual PSA testing  -

## 2021-05-19 ENCOUNTER — HOSPITAL ENCOUNTER (OUTPATIENT)
Dept: RADIOLOGY | Facility: MEDICAL CENTER | Age: 66
Discharge: HOME | End: 2021-05-19
Payer: COMMERCIAL

## 2021-05-19 DIAGNOSIS — R91.1 PULMONARY NODULE: ICD-10-CM

## 2021-05-19 PROCEDURE — G1004 CDSM NDSC: HCPCS

## 2021-05-19 PROCEDURE — 71250 CT THORAX DX C-: CPT

## 2021-06-07 DIAGNOSIS — J44.9 COPD, SEVERE (HCC): ICD-10-CM

## 2021-06-08 RX ORDER — ALBUTEROL SULFATE 90 UG/1
2 AEROSOL, METERED RESPIRATORY (INHALATION) EVERY 4 HOURS PRN
Qty: 8.5 G | Refills: 5 | Status: SHIPPED | OUTPATIENT
Start: 2021-06-08 | End: 2022-08-10

## 2021-06-14 ENCOUNTER — OFFICE VISIT (OUTPATIENT)
Dept: FAMILY MEDICINE CLINIC | Facility: CLINIC | Age: 66
End: 2021-06-14
Payer: COMMERCIAL

## 2021-06-14 ENCOUNTER — APPOINTMENT (OUTPATIENT)
Dept: LAB | Facility: MEDICAL CENTER | Age: 66
End: 2021-06-14
Payer: COMMERCIAL

## 2021-06-14 VITALS
HEART RATE: 103 BPM | WEIGHT: 315 LBS | TEMPERATURE: 97.1 F | HEIGHT: 75 IN | SYSTOLIC BLOOD PRESSURE: 160 MMHG | DIASTOLIC BLOOD PRESSURE: 80 MMHG | BODY MASS INDEX: 39.17 KG/M2 | OXYGEN SATURATION: 96 % | RESPIRATION RATE: 18 BRPM

## 2021-06-14 DIAGNOSIS — E78.6 HYPERLIPIDEMIA WITH LOW HDL: ICD-10-CM

## 2021-06-14 DIAGNOSIS — R60.0 EDEMA OF BOTH LOWER LEGS: ICD-10-CM

## 2021-06-14 DIAGNOSIS — E78.5 HYPERLIPIDEMIA WITH LOW HDL: ICD-10-CM

## 2021-06-14 DIAGNOSIS — D69.6 THROMBOCYTOPENIA (HCC): ICD-10-CM

## 2021-06-14 DIAGNOSIS — M10.372 ACUTE GOUT DUE TO RENAL IMPAIRMENT INVOLVING TOE OF LEFT FOOT: Primary | ICD-10-CM

## 2021-06-14 DIAGNOSIS — M10.372 ACUTE GOUT DUE TO RENAL IMPAIRMENT INVOLVING TOE OF LEFT FOOT: ICD-10-CM

## 2021-06-14 PROBLEM — M10.30 ACUTE GOUT DUE TO RENAL IMPAIRMENT: Status: ACTIVE | Noted: 2021-06-14

## 2021-06-14 LAB
ALBUMIN SERPL BCP-MCNC: 4 G/DL (ref 3.5–5)
ALP SERPL-CCNC: 97 U/L (ref 46–116)
ALT SERPL W P-5'-P-CCNC: 41 U/L (ref 12–78)
ANION GAP SERPL CALCULATED.3IONS-SCNC: 6 MMOL/L (ref 4–13)
AST SERPL W P-5'-P-CCNC: 31 U/L (ref 5–45)
BASOPHILS # BLD AUTO: 0.05 THOUSANDS/ΜL (ref 0–0.1)
BASOPHILS NFR BLD AUTO: 1 % (ref 0–1)
BILIRUB SERPL-MCNC: 1.07 MG/DL (ref 0.2–1)
BUN SERPL-MCNC: 23 MG/DL (ref 5–25)
CALCIUM SERPL-MCNC: 9.3 MG/DL (ref 8.3–10.1)
CHLORIDE SERPL-SCNC: 110 MMOL/L (ref 100–108)
CHOLEST SERPL-MCNC: 150 MG/DL (ref 50–200)
CO2 SERPL-SCNC: 26 MMOL/L (ref 21–32)
CREAT SERPL-MCNC: 1.62 MG/DL (ref 0.6–1.3)
EOSINOPHIL # BLD AUTO: 0.29 THOUSAND/ΜL (ref 0–0.61)
EOSINOPHIL NFR BLD AUTO: 4 % (ref 0–6)
ERYTHROCYTE [DISTWIDTH] IN BLOOD BY AUTOMATED COUNT: 14.1 % (ref 11.6–15.1)
GFR SERPL CREATININE-BSD FRML MDRD: 44 ML/MIN/1.73SQ M
GLUCOSE P FAST SERPL-MCNC: 99 MG/DL (ref 65–99)
HCT VFR BLD AUTO: 47.4 % (ref 36.5–49.3)
HDLC SERPL-MCNC: 30 MG/DL
HGB BLD-MCNC: 15.8 G/DL (ref 12–17)
IMM GRANULOCYTES # BLD AUTO: 0.02 THOUSAND/UL (ref 0–0.2)
IMM GRANULOCYTES NFR BLD AUTO: 0 % (ref 0–2)
LDLC SERPL CALC-MCNC: 90 MG/DL (ref 0–100)
LYMPHOCYTES # BLD AUTO: 1.31 THOUSANDS/ΜL (ref 0.6–4.47)
LYMPHOCYTES NFR BLD AUTO: 17 % (ref 14–44)
MCH RBC QN AUTO: 33.3 PG (ref 26.8–34.3)
MCHC RBC AUTO-ENTMCNC: 33.3 G/DL (ref 31.4–37.4)
MCV RBC AUTO: 100 FL (ref 82–98)
MONOCYTES # BLD AUTO: 0.98 THOUSAND/ΜL (ref 0.17–1.22)
MONOCYTES NFR BLD AUTO: 13 % (ref 4–12)
NEUTROPHILS # BLD AUTO: 4.92 THOUSANDS/ΜL (ref 1.85–7.62)
NEUTS SEG NFR BLD AUTO: 65 % (ref 43–75)
NONHDLC SERPL-MCNC: 120 MG/DL
NRBC BLD AUTO-RTO: 0 /100 WBCS
PLATELET # BLD AUTO: 107 THOUSANDS/UL (ref 149–390)
PMV BLD AUTO: 11.2 FL (ref 8.9–12.7)
POTASSIUM SERPL-SCNC: 4.3 MMOL/L (ref 3.5–5.3)
PROT SERPL-MCNC: 7.2 G/DL (ref 6.4–8.2)
RBC # BLD AUTO: 4.75 MILLION/UL (ref 3.88–5.62)
SODIUM SERPL-SCNC: 142 MMOL/L (ref 136–145)
TRIGL SERPL-MCNC: 150 MG/DL
URATE SERPL-MCNC: 9.3 MG/DL (ref 4.2–8)
WBC # BLD AUTO: 7.57 THOUSAND/UL (ref 4.31–10.16)

## 2021-06-14 PROCEDURE — 82104 ALPHA-1-ANTITRYPSIN PHENO: CPT

## 2021-06-14 PROCEDURE — 3725F SCREEN DEPRESSION PERFORMED: CPT | Performed by: PHYSICIAN ASSISTANT

## 2021-06-14 PROCEDURE — 84550 ASSAY OF BLOOD/URIC ACID: CPT

## 2021-06-14 PROCEDURE — 36415 COLL VENOUS BLD VENIPUNCTURE: CPT

## 2021-06-14 PROCEDURE — 80061 LIPID PANEL: CPT | Performed by: PHYSICIAN ASSISTANT

## 2021-06-14 PROCEDURE — 85025 COMPLETE CBC W/AUTO DIFF WBC: CPT | Performed by: PHYSICIAN ASSISTANT

## 2021-06-14 PROCEDURE — 99214 OFFICE O/P EST MOD 30 MIN: CPT | Performed by: PHYSICIAN ASSISTANT

## 2021-06-14 PROCEDURE — 80053 COMPREHEN METABOLIC PANEL: CPT | Performed by: PHYSICIAN ASSISTANT

## 2021-06-14 PROCEDURE — 82103 ALPHA-1-ANTITRYPSIN TOTAL: CPT

## 2021-06-14 RX ORDER — COLCHICINE 0.6 MG/1
TABLET ORAL
Qty: 30 TABLET | Refills: 5 | Status: SHIPPED | OUTPATIENT
Start: 2021-06-14

## 2021-06-14 NOTE — PROGRESS NOTES
Assessment/Plan:     Diagnoses and all orders for this visit:    Acute gout due to renal impairment involving toe of left foot  Comments:  Colchicine ordered  Check uric acid level  Low purine diet  Orders:  -     Uric acid; Future  -     colchicine (COLCRYS) 0 6 mg tablet; Take  One  Po  Every f  4-6  Hours  Prn  Gout  Edema of both lower legs  Comments:  Patient to elevate legs  Thrombocytopenia ([facility] )  Comments:  Check CBC  Orders:  -     CBC and differential    Hyperlipidemia with low HDL  -     Comprehensive metabolic panel  -     Lipid panel          Subjective:      Patient ID: Gurwinder Zhou is a 73 y o  male  Presents in the office with continued pain in left foot  Patient states he has a history of gout  He has pain in the left big toe  Because of his chronic kidney disease he has not taken any over-the-counter medication patient took some Tylenol with little relief        The following portions of the patient's history were reviewed and updated as appropriate:   He   Patient Active Problem List    Diagnosis Date Noted    Edema of both lower legs 06/14/2021    Acute gout due to renal impairment 06/14/2021    History of 2019 novel coronavirus disease (COVID-19) 02/17/2021    BPH associated with nocturia 11/17/2020    Elevated PSA 08/21/2020    GARNETT (dyspnea on exertion) 06/03/2020    Splenomegaly 08/27/2019    Screening for prostate cancer 01/22/2019    History of genital warts 06/06/2018    Morbid obesity with BMI of 45 0-49 9, adult ([facility] ) 09/20/2017    Skin lesion 05/24/2017    Pulmonary nodule 02/21/2017    CKD (chronic kidney disease) stage 3, GFR 30-59 ml/min ([facility]) 02/20/2017    ARACELIS on CPAP 07/22/2016    Nocturnal hypoxemia 04/01/2016    Hyperlipidemia with low HDL 11/19/2015    Arthritis 08/20/2015    Moderate persistent asthma 08/20/2015    Thrombocytopenia ([facility] ) 12/11/2014    Sleep disorder 08/24/2012    Vitamin D deficiency 08/24/2012    [facility], [facility] ) 07/12/2012     Current Outpatient Medications   Medication Sig Dispense Refill    albuterol (2 5 mg/3 mL) 0 083 % nebulizer solution Take 1 vial (2 5 mg total) by nebulization every 6 (six) hours as needed for wheezing 50 vial 3    albuterol (PROVENTIL HFA,VENTOLIN HFA) 90 mcg/act inhaler INHALE 2 PUFFS EVERY 4 (FOUR) HOURS AS NEEDED FOR SHORTNESS OF BREATH 8 5 g 5    Cholecalciferol (VITAMIN D3) 1000 units CAPS Take by mouth      colchicine (COLCRYS) 0 6 mg tablet Take  One  Po  Every f  4-6  Hours  Prn  Gout  30 tablet 5    cyclobenzaprine (FLEXERIL) 10 mg tablet Take 1 tablet (10 mg total) by mouth 3 (three) times a day as needed for muscle spasms 30 tablet 3    fluticasone-umeclidinium-vilanterol (TRELEGY) 100-62 5-25 MCG/INH inhaler Inhale 1 puff daily Rinse mouth after use  1 Inhaler 5    guaiFENesin (MUCINEX) 600 mg 12 hr tablet Take 1,200 mg by mouth every 12 (twelve) hours      imiquimod (ALDARA) 5 % cream APPLY 1 PACKET TOPICALLY 3 (THREE) TIMES A WEEK 12 packet 0    Multiple Vitamins tablet Take 1 tablet by mouth daily      zinc gluconate 50 mg tablet Take 50 mg by mouth daily       No current facility-administered medications for this visit  He is allergic to darvon [propoxyphene]       Review of Systems   Respiratory: Positive for shortness of breath  Negative for cough  Musculoskeletal: Arthralgias: pain  left  big  toe  Objective:        Physical Exam  Vitals and nursing note reviewed  Constitutional:       General: He is not in acute distress  Appearance: Normal appearance  He is obese  He is not ill-appearing  HENT:      Head: Normocephalic and atraumatic  Neurological:      Mental Status: He is alert

## 2021-06-15 ENCOUNTER — OFFICE VISIT (OUTPATIENT)
Dept: PULMONOLOGY | Facility: CLINIC | Age: 66
End: 2021-06-15
Payer: COMMERCIAL

## 2021-06-15 ENCOUNTER — TELEPHONE (OUTPATIENT)
Dept: FAMILY MEDICINE CLINIC | Facility: CLINIC | Age: 66
End: 2021-06-15

## 2021-06-15 VITALS
SYSTOLIC BLOOD PRESSURE: 150 MMHG | BODY MASS INDEX: 39.17 KG/M2 | OXYGEN SATURATION: 97 % | HEIGHT: 75 IN | RESPIRATION RATE: 18 BRPM | HEART RATE: 87 BPM | DIASTOLIC BLOOD PRESSURE: 78 MMHG | WEIGHT: 315 LBS | TEMPERATURE: 95.7 F

## 2021-06-15 DIAGNOSIS — J44.9 COPD, SEVERE (HCC): ICD-10-CM

## 2021-06-15 DIAGNOSIS — G47.33 OSA ON CPAP: Primary | ICD-10-CM

## 2021-06-15 DIAGNOSIS — J45.40 MODERATE PERSISTENT ASTHMA, UNSPECIFIED WHETHER COMPLICATED: ICD-10-CM

## 2021-06-15 DIAGNOSIS — Z99.89 OSA ON CPAP: Primary | ICD-10-CM

## 2021-06-15 PROBLEM — F17.211 CIGARETTE NICOTINE DEPENDENCE IN REMISSION: Status: ACTIVE | Noted: 2021-06-15

## 2021-06-15 PROBLEM — R91.1 PULMONARY NODULE: Status: RESOLVED | Noted: 2017-02-21 | Resolved: 2021-06-15

## 2021-06-15 PROCEDURE — 1036F TOBACCO NON-USER: CPT | Performed by: INTERNAL MEDICINE

## 2021-06-15 PROCEDURE — 3008F BODY MASS INDEX DOCD: CPT | Performed by: INTERNAL MEDICINE

## 2021-06-15 PROCEDURE — 99214 OFFICE O/P EST MOD 30 MIN: CPT | Performed by: INTERNAL MEDICINE

## 2021-06-15 NOTE — PROGRESS NOTES
Pulmonary Follow Up Note   Haja Lira 73 y o  male MRN: [id number]  6/15/2021      Assessment/Plan:     COPD, severe [facility])   He will continue with Kayla Holloway in the hopes of maintaining control of his asthma / COPD  Unfortunately, he continues to struggle with shortness of breath  Some of this is related to his weight, though it has been very difficult for him to exercise in an effort to achieve weight loss  He was questioning whether he would benefit from home oxygen therapy  In order to determine this, he would need a 6 minutes walk test   He is currently suffering from a gout attack and is unable to complete ambulatory testing  He will come back for testing at his convenience, or we can perform at his next visit with me    ARACELIS on CPAP   Patient has demonstrated compliance with CPAP during hours of sleep  Cigarette nicotine dependence in remission   He has a 50 pack-year smoking history and quit in 2014, therefore he continues to qualify for annual low-dose chest CT  He will be due for his next scan in May 2022  Visit orders:    Diagnoses and all orders for this visit:    ARACELIS on CPAP    Moderate persistent asthma, unspecified whether complicated    COPD, severe ([facility] )     He will follow-up in 4 months or sooner if needed  History of Present Illness   HPI:  Haja Lira is a 73 y o  male who  Is here today for follow-up regarding COPD/ asthma  He has been compliant with Trelegy Ellipta once daily, but still struggles with shortness of breath on exertion  He uses his rescue inhaler fairly frequently  No significant wheezing, cough or sputum production  He has been unable to exercise, both due to shortness of breath and pain related to gout  He is questioning whether he would qualify for home oxygen therapy  He is using CPAP during hours of sleep and wakes up feeling fairly refreshed  Review of Systems   Constitutional: Negative for appetite change and fever     HENT: Positive for postnasal drip, rhinorrhea and sneezing  Negative for ear pain, sore throat and trouble swallowing  Respiratory: Positive for shortness of breath  Cardiovascular: Negative for chest pain  Musculoskeletal: Positive for myalgias  Neurological: Negative for headaches  Psychiatric/Behavioral: Negative  All other systems reviewed and are negative          Medical, Family and Social history reviewed and updated as appropriate    Historical Information   Past Medical History:   Diagnosis Date    Arthritis     Asthma     Chest pain     atypical    Chronic kidney disease     COPD (chronic obstructive pulmonary disease) (HCC)     CPAP (continuous positive airway pressure) dependence     Decreased glomerular filtration rate     Hamstring strain     Herniated lumbar intervertebral disc     History of alcohol use     uncomplicated    History of back pain     lower    History of colon polyps     sigmoid    History of genital warts     History of lipoma     History of muscle spasm     lumbar paraspinous muscle    History of umbilical hernia     Lateral pain of left hip     Low HDL (under 40)     Lumbar radiculopathy     Obesity     Respiratory distress     acute    Shortness of breath     Sleep apnea     CPAP PT WILL BRING     Past Surgical History:   Procedure Laterality Date    ADENOIDECTOMY      APPENDECTOMY      COLONOSCOPY      HAND SURGERY Left     CYST REMOVAL    HIP SURGERY Bilateral     LIPECTOMY      thigh    LIPOMA RESECTION Left     HIP    MN REPAIR UMBILICAL PDDO,0+Z/K,GPPEP N/A 2/23/2017    Procedure: REPAIR HERNIA UMBILICAL;  Surgeon: Haily Hollins MD;  Location: [facility];  Service: General    TONSILLECTOMY      WRIST GANGLION EXCISION       Family History   Problem Relation Age of Onset    Heart disease Mother         cardiac disorder    Hypertension Mother     COPD Father     Heart disease Family         cardiac disorder    Cancer Family         lung    Emphysema Family         pulmonary unspecified emphysema    Diabetes Family     Glaucoma Family     Cancer Family         lung       Social History     Tobacco Use   Smoking Status Former Smoker    Packs/day: 2 00    Years: 25 00    Pack years: 50 00    Types: Cigarettes    Start date: 0    Quit date: 10/2014    Years since quittin 7   Smokeless Tobacco Never Used     Meds/Allergies     Current Outpatient Medications:     albuterol (2 5 mg/3 mL) 0 083 % nebulizer solution, Take 1 vial (2 5 mg total) by nebulization every 6 (six) hours as needed for wheezing, Disp: 50 vial, Rfl: 3    albuterol (PROVENTIL HFA,VENTOLIN HFA) 90 mcg/act inhaler, INHALE 2 PUFFS EVERY 4 (FOUR) HOURS AS NEEDED FOR SHORTNESS OF BREATH, Disp: 8 5 g, Rfl: 5    Cholecalciferol (VITAMIN D3) 1000 units CAPS, Take by mouth, Disp: , Rfl:     colchicine (COLCRYS) 0 6 mg tablet, Take  One  Po  Every f  4-6  Hours  Prn  Gout , Disp: 30 tablet, Rfl: 5    fluticasone-umeclidinium-vilanterol (TRELEGY) 100-62 5-25 MCG/INH inhaler, Inhale 1 puff daily Rinse mouth after use , Disp: 1 Inhaler, Rfl: 5    guaiFENesin (MUCINEX) 600 mg 12 hr tablet, Take 1,200 mg by mouth every 12 (twelve) hours, Disp: , Rfl:     imiquimod (ALDARA) 5 % cream, APPLY 1 PACKET TOPICALLY 3 (THREE) TIMES A WEEK, Disp: 12 packet, Rfl: 0    Multiple Vitamins tablet, Take 1 tablet by mouth daily, Disp: , Rfl:     zinc gluconate 50 mg tablet, Take 50 mg by mouth daily, Disp: , Rfl:     cyclobenzaprine (FLEXERIL) 10 mg tablet, Take 1 tablet (10 mg total) by mouth 3 (three) times a day as needed for muscle spasms (Patient not taking: Reported on 6/15/2021), Disp: 30 tablet, Rfl: 3  Allergies   Allergen Reactions    Darvon [Propoxyphene] Other (See Comments)     hallucinations     Vitals: Blood pressure 150/78, pulse 87, temperature (!) 95 7 °F (35 4 °C), temperature source Tympanic, resp  rate 18, height 6' 3" (1 905 m), weight (!) 182 kg (402 lb), SpO2 97 %  Body mass index is 50 25 kg/m²  Oxygen Therapy  SpO2: 97 %  Oxygen Therapy: None (Room air)    Physical Exam   Physical Exam  Constitutional:       General: He is not in acute distress  HENT:      Head: Normocephalic  Eyes:      General: No scleral icterus  Neck:      Vascular: No JVD  Cardiovascular:      Rate and Rhythm: Normal rate and regular rhythm  Pulmonary:      Breath sounds: No wheezing or rhonchi  Musculoskeletal:      Cervical back: Neck supple  Right lower leg: Edema present  Left lower leg: Edema present  Skin:     General: Skin is warm and dry  Neurological:      Mental Status: He is alert and oriented to person, place, and time  Psychiatric:         Mood and Affect: Mood normal          Behavior: Behavior normal          Labs: I have personally reviewed pertinent lab results  Lab Results   Component Value Date    WBC 7 57 06/14/2021    HGB 15 8 06/14/2021    HCT 47 4 06/14/2021     (H) 06/14/2021     (L) 06/14/2021     Lab Results   Component Value Date    CALCIUM 9 3 06/14/2021     06/14/2017    K 4 3 06/14/2021    CO2 26 06/14/2021     (H) 06/14/2021    BUN 23 06/14/2021    CREATININE 1 62 (H) 06/14/2021     No results found for: IGE  Lab Results   Component Value Date    ALT 41 06/14/2021    AST 31 06/14/2021    ALKPHOS 97 06/14/2021    BILITOT 0 9 06/14/2017     Imaging and other studies: I have personally reviewed pertinent reports  and I have personally reviewed pertinent films in PACS   CT of chest performed on 5/19/21 was reviewed with the patient  Previously noted left lower lobe nodule has resolved  There is a stable 6 mm pleural-based left lower lobe nodule  Pulmonary function testing:  Performed   1/15/18 and personally interpreted  FEV1/FVC ratio 60%   FEV1 40% predicted  FVC 51% predicted  (-) response to bronchodilators  TLC 79 % predicted   % predicted  DLCO corrected for hemoglobin 35 % predicted     PFT shows severe obstruction and severe diffusion impairment

## 2021-06-15 NOTE — ASSESSMENT & PLAN NOTE
He will continue with Yoly Eastman in the hopes of maintaining control of his asthma / COPD  Unfortunately, he continues to struggle with shortness of breath  Some of this is related to his weight, though it has been very difficult for him to exercise in an effort to achieve weight loss  He was questioning whether he would benefit from home oxygen therapy  In order to determine this, he would need a 6 minutes walk test   He is currently suffering from a gout attack and is unable to complete ambulatory testing    He will come back for testing at his convenience, or we can perform at his next visit with me

## 2021-06-15 NOTE — ASSESSMENT & PLAN NOTE
He has a 50 pack-year smoking history and quit in 2014, therefore he continues to qualify for annual low-dose chest CT  He will be due for his next scan in May 2022

## 2021-06-17 ENCOUNTER — TELEPHONE (OUTPATIENT)
Dept: FAMILY MEDICINE CLINIC | Facility: CLINIC | Age: 66
End: 2021-06-17

## 2021-06-17 DIAGNOSIS — D69.6 THROMBOCYTOPENIA (HCC): Primary | ICD-10-CM

## 2021-06-17 DIAGNOSIS — E79.0 HYPERURICEMIA: ICD-10-CM

## 2021-06-17 DIAGNOSIS — Z86.010 HISTORY OF COLON POLYPS: ICD-10-CM

## 2021-06-17 RX ORDER — ALLOPURINOL 100 MG/1
100 TABLET ORAL DAILY
Qty: 30 TABLET | Refills: 5 | Status: SHIPPED | OUTPATIENT
Start: 2021-06-17 | End: 2021-08-16 | Stop reason: SDUPTHER

## 2021-06-17 NOTE — TELEPHONE ENCOUNTER
Spoke with patient   labs reviewed  CBC shows low platelets  Referred to Hematology  Uric acid was also elevated    We are going to start allopurinol 100 mg once a day,chronic kidney disease is currently stable

## 2021-06-18 ENCOUNTER — TELEPHONE (OUTPATIENT)
Dept: SURGICAL ONCOLOGY | Facility: CLINIC | Age: 66
End: 2021-06-18

## 2021-06-18 LAB
A1AT PHENOTYP SERPL IFE: NORMAL
A1AT SERPL-MCNC: 150 MG/DL (ref 101–187)

## 2021-06-18 NOTE — TELEPHONE ENCOUNTER
New Patient Encounter    New Patient Intake Form   Patient Details:  Delphine Park  1955  [id number]    Background Information:  [address] starts by opening a telephone encounter and gathering the following information   Who is calling to schedule? If not self, relationship to patient? Patient   Referring Provider Ramiro Cardona   What is the diagnosis? thrombocytopenia   Is this Cancer or Non-Cancer? Non-Cancer   Is this diagnosis confirmed? Yes   When was the diagnosis? 6/2021   Is there a confirmed diagnosis from a biopsy/tissue reviewed by pathology? NA   Were outside slides requested? NA   Is patient aware of diagnosis? Yes   Is there a personal history and what kind? No   Is there a family history and what kind? No   Reason for visit? New Diagnosis   Have you had any imaging or labs done? If so: when, where? yes  [facility]   Are records in Doctor Evidence? yes   If patient has a prior history of cancer were old records obtained? NA   Was the patient told to bring a disk? No   Does the patient smoke or Vape? No   If yes, how many packs or cartridges per day? Scheduling Information:   Preferred [place]:  [facility]     Are there any dates/time the patient cannot be seen? Miscellaneous:    After completing the above information, please route to Financial Counselor and the appropriate Nurse Navigator for review

## 2021-06-22 ENCOUNTER — TELEPHONE (OUTPATIENT)
Dept: HEMATOLOGY ONCOLOGY | Facility: CLINIC | Age: 66
End: 2021-06-22

## 2021-06-23 ENCOUNTER — APPOINTMENT (OUTPATIENT)
Dept: LAB | Facility: MEDICAL CENTER | Age: 66
End: 2021-06-23
Payer: COMMERCIAL

## 2021-06-23 ENCOUNTER — CONSULT (OUTPATIENT)
Dept: HEMATOLOGY ONCOLOGY | Facility: CLINIC | Age: 66
End: 2021-06-23
Payer: COMMERCIAL

## 2021-06-23 VITALS
HEART RATE: 61 BPM | WEIGHT: 315 LBS | TEMPERATURE: 97.4 F | HEIGHT: 75 IN | DIASTOLIC BLOOD PRESSURE: 80 MMHG | RESPIRATION RATE: 17 BRPM | SYSTOLIC BLOOD PRESSURE: 155 MMHG | BODY MASS INDEX: 39.17 KG/M2 | OXYGEN SATURATION: 96 %

## 2021-06-23 DIAGNOSIS — D69.6 THROMBOCYTOPENIA (HCC): ICD-10-CM

## 2021-06-23 DIAGNOSIS — D69.6 THROMBOCYTOPENIA (HCC): Primary | ICD-10-CM

## 2021-06-23 LAB
ANION GAP SERPL CALCULATED.3IONS-SCNC: 6 MMOL/L (ref 4–13)
BASOPHILS # BLD AUTO: 0.06 THOUSANDS/ΜL (ref 0–0.1)
BASOPHILS NFR BLD AUTO: 1 % (ref 0–1)
BUN SERPL-MCNC: 21 MG/DL (ref 5–25)
CALCIUM SERPL-MCNC: 9.4 MG/DL (ref 8.3–10.1)
CHLORIDE SERPL-SCNC: 111 MMOL/L (ref 100–108)
CO2 SERPL-SCNC: 26 MMOL/L (ref 21–32)
CREAT SERPL-MCNC: 1.6 MG/DL (ref 0.6–1.3)
EOSINOPHIL # BLD AUTO: 0.36 THOUSAND/ΜL (ref 0–0.61)
EOSINOPHIL NFR BLD AUTO: 5 % (ref 0–6)
ERYTHROCYTE [DISTWIDTH] IN BLOOD BY AUTOMATED COUNT: 14.2 % (ref 11.6–15.1)
FOLATE SERPL-MCNC: 14.7 NG/ML (ref 3.1–17.5)
GFR SERPL CREATININE-BSD FRML MDRD: 45 ML/MIN/1.73SQ M
GLUCOSE P FAST SERPL-MCNC: 99 MG/DL (ref 65–99)
HBV SURFACE AB SER-ACNC: <3.1 MIU/ML
HBV SURFACE AG SER QL: NORMAL
HCT VFR BLD AUTO: 47.8 % (ref 36.5–49.3)
HGB BLD-MCNC: 16 G/DL (ref 12–17)
IMM GRANULOCYTES # BLD AUTO: 0.01 THOUSAND/UL (ref 0–0.2)
IMM GRANULOCYTES NFR BLD AUTO: 0 % (ref 0–2)
LYMPHOCYTES # BLD AUTO: 1.41 THOUSANDS/ΜL (ref 0.6–4.47)
LYMPHOCYTES NFR BLD AUTO: 21 % (ref 14–44)
MCH RBC QN AUTO: 32.6 PG (ref 26.8–34.3)
MCHC RBC AUTO-ENTMCNC: 33.5 G/DL (ref 31.4–37.4)
MCV RBC AUTO: 97 FL (ref 82–98)
MONOCYTES # BLD AUTO: 0.92 THOUSAND/ΜL (ref 0.17–1.22)
MONOCYTES NFR BLD AUTO: 13 % (ref 4–12)
NEUTROPHILS # BLD AUTO: 4.09 THOUSANDS/ΜL (ref 1.85–7.62)
NEUTS SEG NFR BLD AUTO: 60 % (ref 43–75)
NRBC BLD AUTO-RTO: 0 /100 WBCS
PLATELET # BLD AUTO: 123 THOUSANDS/UL (ref 149–390)
PMV BLD AUTO: 11.3 FL (ref 8.9–12.7)
POTASSIUM SERPL-SCNC: 4.4 MMOL/L (ref 3.5–5.3)
RBC # BLD AUTO: 4.91 MILLION/UL (ref 3.88–5.62)
SODIUM SERPL-SCNC: 143 MMOL/L (ref 136–145)
TSH SERPL DL<=0.05 MIU/L-ACNC: 1.65 UIU/ML (ref 0.36–3.74)
VIT B12 SERPL-MCNC: 744 PG/ML (ref 100–900)
WBC # BLD AUTO: 6.85 THOUSAND/UL (ref 4.31–10.16)

## 2021-06-23 PROCEDURE — 84443 ASSAY THYROID STIM HORMONE: CPT

## 2021-06-23 PROCEDURE — 80048 BASIC METABOLIC PNL TOTAL CA: CPT

## 2021-06-23 PROCEDURE — 85025 COMPLETE CBC W/AUTO DIFF WBC: CPT

## 2021-06-23 PROCEDURE — 82607 VITAMIN B-12: CPT

## 2021-06-23 PROCEDURE — 82746 ASSAY OF FOLIC ACID SERUM: CPT

## 2021-06-23 PROCEDURE — 99205 OFFICE O/P NEW HI 60 MIN: CPT | Performed by: INTERNAL MEDICINE

## 2021-06-23 PROCEDURE — 36415 COLL VENOUS BLD VENIPUNCTURE: CPT

## 2021-06-23 PROCEDURE — 87340 HEPATITIS B SURFACE AG IA: CPT

## 2021-06-23 PROCEDURE — 86706 HEP B SURFACE ANTIBODY: CPT

## 2021-06-23 NOTE — PROGRESS NOTES
Hematology/Oncology Consult Note    Date of Service: 6/23/2021    [facility] [place]  [address] [facility] [address]  [phone]    Reason for Consultation: Thrombocytopenia    Cancer Stage: NA    Referral Physician: Radha Crocker PA-C    Advance Care Planning/Advance Directives:  Full code    Oncology/Hematology History:   Chronic thrombocytopenia with platelet count between 100-140 , started at least in 2017 when the platelet count was 336  Normal Hemoglobin and WBC   January 5, 2018, platelet count 185   September 5, 2019 platelet count 558    July 23, 2020, platelet count of 145, normal WBC and hemoglobin   December 11, 2020 COVID test positive   A port 2021, patient received COVID vaccine   June 14, 2021, WBC 7 57, hemoglobin 15 8, platelet count 906  Monocytes 13%  No immature cells in peripheral blood    Assessment and Recommendations:   1  Chronic thrombocytopenia,  likely ITP  The platelet count decreased to 107 in June 14, 2021  Hemoglobin and WBC normal  Hepatitis-C negative in 2019  Patient has low risk for HIV infection  Patient had COVID infection and received COVID vaccine as well  Both of them can cause thrombocytopenia or make the thrombocytopenia worse  Patient complained of a bruise but no major bleeding  I will repeat CBC, check B12, folate, TSH, ultrasound abdomen, and hepatitis-B profile  No intervention necessary unless platelet count less than 50 or active bleeding  2  CKD stage III  CKD does increase risk of developing anemia  Patient will continue self hydration, follow up with primary care physician  Monitoring kidney function  3  Stable lung nodule  CT lung cancer screening every year  4  Follow-up: Return to clinic in 1-2 weeks with MD          Thank you very much for your consultation and making us part of this nice patient's care   I will continue to follow closely with you  Please contact me with any additional questions  Disclaimer: This document was prepared using [employer] Direct technology  If a word or phrase is confusing, or does not make sense, this is likely due to recognition error which was not discovered during the providers review  If you believe an error has occurred, please contact me through [address] line for chayito? cation  HPI:   Rod Dueñas is a 73 y o  male with a complicated past medical history including but not limited to asthma, COPD, CKD stage 3, ARACELIS on CPAP and chronic thrombocytopenia with platelet count between 100-140 in the past 5 years  CBC in June 14, 2021 showed a hemoglobin of 15 8  Platelet count 461  Normal WBC  No immature cells in peripheral blood  The patient is referred to Hematology Clinic for management of thrombocytopenia  Patient feels fine  He had COVID testing positive in December 2020  He had also received COVID vaccine starting in April x2 doses  He is   He has low risk for HIV infection  Patient had a hepatitis C test negative in 2019  Emre Fontana Patient denied fever or chills  No chest pain or shortness of breath  Patient denied cough and phlegm  No hemoptysis  No GI or  symptoms  Appetite good  Patient patient gain weight in the past to 3 years  He denies bleeding anywhere      Review of system:  12-point review of system was performed, pertinent positive and negative were detailed as above    Past Medical History:   Diagnosis Date    Arthritis     Asthma     Chest pain     atypical    Chronic kidney disease     COPD (chronic obstructive pulmonary disease) (HCC)     CPAP (continuous positive airway pressure) dependence     Decreased glomerular filtration rate     Hamstring strain     Herniated lumbar intervertebral disc     History of alcohol use     uncomplicated    History of back pain     lower    History of colon polyps     sigmoid    History of genital warts     History of lipoma     History of muscle spasm     lumbar paraspinous muscle    History of umbilical hernia     Lateral pain of left hip     Low HDL (under 40)     Lumbar radiculopathy     Obesity     Respiratory distress     acute    Shortness of breath     Sleep apnea     CPAP PT WILL BRING       Past Surgical History:   Procedure Laterality Date    ADENOIDECTOMY      APPENDECTOMY      COLONOSCOPY      HAND SURGERY Left     CYST REMOVAL    HIP SURGERY Bilateral     LIPECTOMY      thigh    LIPOMA RESECTION Left     HIP    GA REPAIR UMBILICAL TWIQ,6+C/O,VGGLF N/A 2017    Procedure: REPAIR HERNIA UMBILICAL;  Surgeon: Cynthia Waldrop MD;  [facility];  Service: General    TONSILLECTOMY      WRIST GANGLION EXCISION         Family History   Problem Relation Age of Onset    Heart disease Mother         cardiac disorder    Hypertension Mother     COPD Father     Heart disease Family         cardiac disorder    Cancer Family         lung    Emphysema Family         pulmonary unspecified emphysema    Diabetes Family     Glaucoma Family     Cancer Family         lung       Social History     Socioeconomic History    Marital status: /Civil Union     Spouse name: Not on file    Number of children: Not on file    Years of education: Not on file    Highest education level: Not on file   Occupational History    Occupation: Fulltime employment   Tobacco Use    Smoking status: Former Smoker     Packs/day: 2 00     Years: 25 00     Pack years: 50 00     Types: Cigarettes     Start date: 0     Quit date: 10/2014     Years since quittin 7    Smokeless tobacco: Never Used   Vaping Use    Vaping Use: Former   Substance and Sexual Activity    Alcohol use: No     Comment: recovering alcoholic    Drug use: No    Sexual activity: Not on file   Other Topics Concern    Not on file   Social History Narrative    Always uses seatbelt     Social Determinants of Health Financial Resource Strain:     Difficulty of Paying Living Expenses:    Food Insecurity:     Worried About Running Out of Food in the Last Year:     [address] in the Last Year:    Transportation Needs:     Lack of Transportation (Medical):  Lack of Transportation (Non-Medical):    Physical Activity:     Days of Exercise per Week:     Minutes of Exercise per Session:    Stress:     Feeling of Stress :    Social Connections:     Frequency of Communication with Friends and Family:     Frequency of Social Gatherings with Friends and Family:     Attends Hinduism Services:     Active Member of Clubs or Organizations:     Attends Club or Organization Meetings:     Marital Status:    Intimate Partner Violence:     Fear of Current or Ex-Partner:     Emotionally Abused:     Physically Abused:     Sexually Abused: Allergies   Allergen Reactions    Darvon [Propoxyphene] Other (See Comments)     hallucinations       Current Outpatient Medications   Medication Sig Dispense Refill    albuterol (2 5 mg/3 mL) 0 083 % nebulizer solution Take 1 vial (2 5 mg total) by nebulization every 6 (six) hours as needed for wheezing 50 vial 3    albuterol (PROVENTIL HFA,VENTOLIN HFA) 90 mcg/act inhaler INHALE 2 PUFFS EVERY 4 (FOUR) HOURS AS NEEDED FOR SHORTNESS OF BREATH 8 5 g 5    allopurinol (ZYLOPRIM) 100 mg tablet Take 1 tablet (100 mg total) by mouth daily 30 tablet 5    Cholecalciferol (VITAMIN D3) 1000 units CAPS Take by mouth      colchicine (COLCRYS) 0 6 mg tablet Take  One  Po  Every f  4-6  Hours  Prn  Gout  30 tablet 5    fluticasone-umeclidinium-vilanterol (TRELEGY) 100-62 5-25 MCG/INH inhaler Inhale 1 puff daily Rinse mouth after use   1 Inhaler 5    guaiFENesin (MUCINEX) 600 mg 12 hr tablet Take 1,200 mg by mouth every 12 (twelve) hours      imiquimod (ALDARA) 5 % cream APPLY 1 PACKET TOPICALLY 3 (THREE) TIMES A WEEK 12 packet 0    Multiple Vitamins tablet Take 1 tablet by mouth daily      Tamsulosin HCl (FLOMAX PO) Take by mouth      zinc gluconate 50 mg tablet Take 50 mg by mouth daily      cyclobenzaprine (FLEXERIL) 10 mg tablet Take 1 tablet (10 mg total) by mouth 3 (three) times a day as needed for muscle spasms (Patient not taking: Reported on 6/15/2021) 30 tablet 3     No current facility-administered medications for this visit  (Not in a hospital admission)      Pain Score: 0    ECOG PS: 1/5    Objective:     24 Hour Vitals Assessment:     Vitals:    06/23/21 1400   BP: 155/80   Pulse: 61   Resp: 17   Temp: (!) 97 4 °F (36 3 °C)   SpO2: 96%       PHYSICIAN EXAM:    General: Appearance: Morbid obese gentleman walking with a cane  Alert, cooperative, no distress  HEENT: Normocephalic, atraumatic  No scleral icterus  conjunctivae clear  PERRL, EOM's intact  No sinus drainage or tenderness  Chest: No tenderness  Lungs: Clear to auscultation bilaterally, Respirations unlabored  Cardiac: Regular rate and rhythm, S1and S2 are normal, no murmur, click, rubs or gallops  Abdomen: Soft, non-tender, non-distended  Bowel sounds are normal  No masses, no organomegaly  Pelvic: deferred  Extremities:  No edema, cyanosis, clubbing  Skin: Skin color, turgor are normal  No rashes  Lymphatics: no palpable adenopathy  Neurologic: Alert and oriented X 3, Cranial nerve 2-12 grosely intact  Normal strength and sensation  DATA REVIEW:    Image Results:   Image result are reviewed and documented in Hematology/Oncology history    CT chest wo contrast  Narrative: CT CHEST WITHOUT IV CONTRAST    INDICATION:   R91 1: Solitary pulmonary nodule  COMPARISON:  8/3/2020    TECHNIQUE: CT examination of the chest was performed without intravenous contrast   Axial, sagittal, and coronal 2D reformatted images were created from the source data and submitted for interpretation  Radiation dose length product (DLP) for this visit:  1013 mGy-cm     This examination, like all CT scans performed in the [facility], was performed utilizing techniques to minimize radiation dose exposure, including the use of iterative   reconstruction and automated exposure control  FINDINGS:    LUNGS:  There is a stable 6 mm pleural-based nodule left lower lobe series 3 image 65  Previously described 6 mm density left lower lobe has resolved  Stable intrapulmonary lymph nodes right major fissure  No tracheal or bronchial abnormality  PLEURA:  Unremarkable  HEART/GREAT VESSELS:  Atherosclerotic changes are noted in thoracic aorta and coronary arteries  MEDIASTINUM AND QUETA:  Unremarkable  CHEST WALL AND LOWER NECK:   Unremarkable  VISUALIZED STRUCTURES IN THE UPPER ABDOMEN:  Suspected cholelithiasis  OSSEOUS STRUCTURES:  No acute fracture or destructive osseous lesion  Impression: Previously described new left lower lobe density has resolved  Otherwise stable 6 mm pleural-based left lower lobe nodule  The patient may return to annual screening  Workstation performed: [id number]    LABS:  Lab data are reviewed and documented in HemOnc history  Lab Results   Component Value Date    WBC 7 57 06/14/2021    HGB 15 8 06/14/2021    HCT 47 4 06/14/2021     (H) 06/14/2021     (L) 06/14/2021     Lab Results   Component Value Date     06/14/2017    SODIUM 142 06/14/2021    K 4 3 06/14/2021     (H) 06/14/2021    CO2 26 06/14/2021    AGAP 6 06/14/2021    BUN 23 06/14/2021    CREATININE 1 62 (H) 06/14/2021    GLUC 99 09/05/2019    GLUF 99 06/14/2021    CALCIUM 9 3 06/14/2021    AST 31 06/14/2021    ALT 41 06/14/2021    ALKPHOS 97 06/14/2021    PROT 6 2 06/14/2017    TP 7 2 06/14/2021    BILITOT 0 9 06/14/2017    TBILI 1 07 (H) 06/14/2021    EGFR 44 06/14/2021       No results found for this or any previous visit (from the past 48 hour(s))          By:  Melina Kowalski MD, 6/23/2021, 2:27 PM                                  Primary Care Physician:  Elvan Schirmer, SHIN

## 2021-06-28 ENCOUNTER — HOSPITAL ENCOUNTER (OUTPATIENT)
Dept: RADIOLOGY | Facility: MEDICAL CENTER | Age: 66
Discharge: HOME | End: 2021-06-28
Payer: COMMERCIAL

## 2021-06-28 DIAGNOSIS — D69.6 THROMBOCYTOPENIA (HCC): ICD-10-CM

## 2021-06-28 PROCEDURE — 76700 US EXAM ABDOM COMPLETE: CPT

## 2021-07-07 ENCOUNTER — OFFICE VISIT (OUTPATIENT)
Dept: HEMATOLOGY ONCOLOGY | Facility: CLINIC | Age: 66
End: 2021-07-07
Payer: COMMERCIAL

## 2021-07-07 VITALS
WEIGHT: 315 LBS | TEMPERATURE: 96.4 F | HEIGHT: 75 IN | BODY MASS INDEX: 39.17 KG/M2 | OXYGEN SATURATION: 97 % | SYSTOLIC BLOOD PRESSURE: 146 MMHG | DIASTOLIC BLOOD PRESSURE: 76 MMHG | HEART RATE: 105 BPM | RESPIRATION RATE: 20 BRPM

## 2021-07-07 DIAGNOSIS — R16.0 HEPATOMEGALY: ICD-10-CM

## 2021-07-07 DIAGNOSIS — R16.1 SPLENOMEGALY: Primary | ICD-10-CM

## 2021-07-07 DIAGNOSIS — D69.3 CHRONIC ITP (IDIOPATHIC THROMBOCYTOPENIA) (HCC): ICD-10-CM

## 2021-07-07 DIAGNOSIS — D69.6 THROMBOCYTOPENIA (HCC): ICD-10-CM

## 2021-07-07 PROCEDURE — 1036F TOBACCO NON-USER: CPT | Performed by: INTERNAL MEDICINE

## 2021-07-07 PROCEDURE — 3008F BODY MASS INDEX DOCD: CPT | Performed by: INTERNAL MEDICINE

## 2021-07-07 PROCEDURE — 99214 OFFICE O/P EST MOD 30 MIN: CPT | Performed by: INTERNAL MEDICINE

## 2021-07-07 NOTE — PROGRESS NOTES
Hematology/Oncology Progress Note    Date of Service: 7/7/2021    [facility] [place]  [address] [address] [facility] [address] [phone]    Hem/Onc Problem List:   1  Chronic thrombocytopenia, likely ITP  2  CKD stage 3  3  Lung nodule    Chief Complaint:   Routine follow-up to discuss the workup result     Assessment/Plan:   1  ITP, CBC showed stable thrombocytopenia with platelet count 888  Patient denies bleeding anywhere  Will continue to monitor  2  CKD stage 3  Patient will continue to follow with his primary care physician and nephrologist   Self hydration is recommended  3  Hepatomegaly, possible HOLL[place]  Patient stopped drinking more than 10 years ago  Patient already has an appointment with GI on August 26, 2021   4  Splenomegaly, no masses  Patient is asymptomatic  Hemoglobin and WBC are normal   We continue to monitor  Repeat ultrasound in 6 months  5  Follow-up:  MD follow-up in 6 months with labs and ultrasound prior  Disclaimer: This document was prepared using [employer] Direct technology  If a word or phrase is confusing, or does not make sense, this is likely due to recognition error which was not discovered during the providers review  If you believe an error has occurred, please Contact me through PALS for chayito? cation  Pain Score and Plan:    0    Hematology/Oncology History:   · Chronic thrombocytopenia with platelet count between 100-140 , started at least in 2017 when the platelet count was 148  Normal Hemoglobin and WBC  · January 5, 2018, platelet count 298  · September 5, 2019 platelet count 785   · July 23, 2020, platelet count of 348, normal WBC and hemoglobin  · December 11, 2020 COVID test positive  · A port 2021, patient received COVID vaccine  · June 14, 2021, WBC 7 57, hemoglobin 15 8, platelet count 058  Monocytes 13%    No immature cells in peripheral blood  · June 23, 2021, CKD 3  Normal B12 and folate  Normal WBC and hemoglobin  Platelet count of 146  TSH normal   Negative hepatitis B and hepatitis-C  · June 28, 2021 ultrasound abdomen shows splenomegaly, hepatomegaly and evidence of fatty liver  No masses in liver and spleen  History of Present Illiness:   Juana Mckeon is a 73 y o  male with the above-noted HemOnc history who is here to discuss workup results  Repeat the lab showed improving thrombocytopenia  Platelet count 217  WBC and hemoglobin are normal   Ultrasound abdomen showed splenomegaly and hepatomegaly with evidence of fatty liver  Patient denies bleeding anywhere  Patient already has an appointment with GI to discuss colonoscopy in August 26, 2021  Otherwise, patient feels fine  Denies fever or chills  No chest pain or shortness of breath  No cough and phlegm  No GI  symptoms  Appetite good  His weight has been stable  Patient walks with a cane  No headache or blurred vision  ROS: A 12-point of review of systems is obtained and other than the above is noncontributory  Objective:   VITALS:   /76 (BP Location: Left arm, Patient Position: Sitting, Cuff Size: Large)   Pulse 105   Temp (!) 96 4 °F (35 8 °C) (Tympanic)   Resp 20   Ht 6' 3" (1 905 m)   Wt (!) 183 kg (404 lb)   SpO2 97%   BMI 50 50 kg/m²     Physical EXAM:  General:  Morbid obese gentleman, Alert, cooperative, no distress, appears stated age  Head:  Normocephalic, without obvious abnormality, atraumatic  Eyes:  Conjunctivae/corneas clear  PERRL, EOMs intact  No evidence of conjunctivitis     Throat: Lips, mucosa, and tongue normal  No lesions in the oropharynx   Neck: Supple, symmetrical, trachea midline, no adenopathy    Lungs:   Clear to auscultation bilaterally  Respiratory effort easy, nonlabored    Heart:  Regular rate and rhythm, S1, S2 normal, no murmur, click, rub or gallop  Abdomen: Morbid obese abdomen    Soft, non-tender,nondistended  Bowel sounds normal  No masses,  No organomegaly  Extremities: Extremities normal, atraumatic, no cyanosis or edema  No axillary or inguinal adenopathy   Skin: Skin color, texture, turgor normal  No rashes or lesions    Neurologic: A&Ox4   No focal neuro deficits       Allergies   Allergen Reactions    Darvon [Propoxyphene] Other (See Comments)     hallucinations       Past Medical History:   Diagnosis Date    Arthritis     Asthma     Chest pain     atypical    Chronic kidney disease     COPD (chronic obstructive pulmonary disease) ([facility])     CPAP (continuous positive airway pressure) dependence     Decreased glomerular filtration rate     Hamstring strain     Herniated lumbar intervertebral disc     History of alcohol use     uncomplicated    History of back pain     lower    History of colon polyps     sigmoid    History of genital warts     History of lipoma     History of muscle spasm     lumbar paraspinous muscle    History of umbilical hernia     Lateral pain of left hip     Low HDL (under 40)     Lumbar radiculopathy     Obesity     Respiratory distress     acute    Shortness of breath     Sleep apnea     CPAP PT WILL BRING       Past Surgical History:   Procedure Laterality Date    ADENOIDECTOMY      APPENDECTOMY      COLONOSCOPY      HAND SURGERY Left     CYST REMOVAL    HIP SURGERY Bilateral     LIPECTOMY      thigh    LIPOMA RESECTION Left     HIP    RI REPAIR UMBILICAL PHBC,3+I/E,SSUSH N/A 2/23/2017    Procedure: REPAIR HERNIA UMBILICAL;  Surgeon: Ulysess Ormond, MD;  [facility];  Service: General    TONSILLECTOMY      WRIST GANGLION EXCISION         Family History   Problem Relation Age of Onset    Heart disease Mother         cardiac disorder    Hypertension Mother     COPD Father     Heart disease Family         cardiac disorder    Cancer Family         lung    Emphysema Family         pulmonary unspecified emphysema    Diabetes Family     Glaucoma Family     Cancer Family         lung       Social History     Socioeconomic History    Marital status: /Civil Union     Spouse name: Not on file    Number of children: Not on file    Years of education: Not on file    Highest education level: Not on file   Occupational History    Occupation: Fulltime employment   Tobacco Use    Smoking status: Former Smoker     Packs/day: 2 00     Years: 25 00     Pack years: 50 00     Types: Cigarettes     Start date: 0     Quit date: 10/2014     Years since quittin 7    Smokeless tobacco: Never Used   Vaping Use    Vaping Use: Former   Substance and Sexual Activity    Alcohol use: No     Comment: recovering alcoholic    Drug use: No    Sexual activity: Not on file   Other Topics Concern    Not on file   Social History Narrative    Always uses seatbelt     Social Determinants of Health     Financial Resource Strain:     Difficulty of Paying Living Expenses:    Food Insecurity:     Worried About [address] in the Last Year:     [address] in the Last Year:    Transportation Needs:     Lack of Transportation (Medical):      Lack of Transportation (Non-Medical):    Physical Activity:     Days of Exercise per Week:     Minutes of Exercise per Session:    Stress:     Feeling of Stress :    Social Connections:     Frequency of Communication with Friends and Family:     Frequency of Social Gatherings with Friends and Family:     Attends Episcopal Services:     Active Member of Clubs or Organizations:     Attends Club or Organization Meetings:     Marital Status:    Intimate Partner Violence:     Fear of Current or Ex-Partner:     Emotionally Abused:     Physically Abused:     Sexually Abused:        Current Outpatient Medications   Medication Sig Dispense Refill    albuterol (2 5 mg/3 mL) 0 083 % nebulizer solution Take 1 vial (2 5 mg total) by nebulization every 6 (six) hours as needed for wheezing 50 vial 3    albuterol (PROVENTIL HFA,VENTOLIN HFA) 90 mcg/act inhaler INHALE 2 PUFFS EVERY 4 (FOUR) HOURS AS NEEDED FOR SHORTNESS OF BREATH 8 5 g 5    allopurinol (ZYLOPRIM) 100 mg tablet Take 1 tablet (100 mg total) by mouth daily 30 tablet 5    Cholecalciferol (VITAMIN D3) 1000 units CAPS Take by mouth      colchicine (COLCRYS) 0 6 mg tablet Take  One  Po  Every f  4-6  Hours  Prn  Gout  30 tablet 5    fluticasone-umeclidinium-vilanterol (TRELEGY) 100-62 5-25 MCG/INH inhaler Inhale 1 puff daily Rinse mouth after use  1 Inhaler 5    guaiFENesin (MUCINEX) 600 mg 12 hr tablet Take 1,200 mg by mouth every 12 (twelve) hours      imiquimod (ALDARA) 5 % cream APPLY 1 PACKET TOPICALLY 3 (THREE) TIMES A WEEK 12 packet 0    Multiple Vitamins tablet Take 1 tablet by mouth daily      Tamsulosin HCl (FLOMAX PO) Take by mouth      zinc gluconate 50 mg tablet Take 50 mg by mouth daily      cyclobenzaprine (FLEXERIL) 10 mg tablet Take 1 tablet (10 mg total) by mouth 3 (three) times a day as needed for muscle spasms (Patient not taking: Reported on 6/15/2021) 30 tablet 3     No current facility-administered medications for this visit  DATA REVIEW:    Pathology Result:    [unfilled]    Image Results: They are reviewed and documented in Hematology/Oncology history    US abdomen complete  Narrative: ABDOMEN ULTRASOUND, COMPLETE     INDICATION:   D69 6: Thrombocytopenia, unspecified  Chronic cytopenia, likely ITP, per clinician progress notes  COMPARISON: 3/19/2021 chest CT, which included the upper abdomen  TECHNIQUE:   Real-time ultrasound of the abdomen was performed with a curvilinear transducer with both volumetric sweeps and still imaging techniques  FINDINGS:    PANCREAS:  Evidence of fatty infiltration, similar to the CT  No acute findings  AORTA AND IVC:  Visualized portions are normal for patient age  LIVER:  Size:  Enlarged    The liver measures 21 5 cm in the midclavicular line   Contour:  Surface contour is smooth  Parenchyma: There is moderate diffuse increased echogenicity with smooth echotexture and acoustic beam attenuation  Most consistent with moderate hepatic steatosis  No evidence of suspicious mass  Limited imaging of the main portal vein shows it to be patent and hepatopetal     BILIARY:  Gallstone  No gallbladder wall thickening, pericholecystic fluid or sonographic Treviño sign  No intrahepatic biliary dilatation  CBD measures 4 mm  No choledocholithiasis  KIDNEY:   Right kidney measures 11 4 cm    0 5 cm lower pole cortical cyst without suspicious features  Otherwise within normal limits  Left kidney measures 12 2 cm  Within normal limits  SPLEEN:   Measures 20 8 x 6 9 x 20 0 cm  (length X depth X width)  Enlarged  Homogeneous, normal echotexture and preserved morphology  No masses  ASCITES:  None  Impression: Splenomegaly  Hepatomegaly and evidence of fatty liver  Other nonemergent findings above  Workstation performed: [id number]      US abdomen complete    Result Date: 7/6/2021  Narrative: ABDOMEN ULTRASOUND, COMPLETE INDICATION:   D69 6: Thrombocytopenia, unspecified  Chronic cytopenia, likely ITP, per clinician progress notes  COMPARISON: 3/19/2021 chest CT, which included the upper abdomen  TECHNIQUE:   Real-time ultrasound of the abdomen was performed with a curvilinear transducer with both volumetric sweeps and still imaging techniques  FINDINGS: PANCREAS:  Evidence of fatty infiltration, similar to the CT  No acute findings  AORTA AND IVC:  Visualized portions are normal for patient age  LIVER: Size:  Enlarged  The liver measures 21 5 cm in the midclavicular line  Contour:  Surface contour is smooth  Parenchyma: There is moderate diffuse increased echogenicity with smooth echotexture and acoustic beam attenuation  Most consistent with moderate hepatic steatosis  No evidence of suspicious mass   Limited imaging of the main portal vein shows it to be patent and hepatopetal  BILIARY: Gallstone  No gallbladder wall thickening, pericholecystic fluid or sonographic Treviño sign  No intrahepatic biliary dilatation  CBD measures 4 mm  No choledocholithiasis  KIDNEY: Right kidney measures 11 4 cm  0 5 cm lower pole cortical cyst without suspicious features  Otherwise within normal limits  Left kidney measures 12 2 cm  Within normal limits  SPLEEN: Measures 20 8 x 6 9 x 20 0 cm  (length X depth X width) Enlarged  Homogeneous, normal echotexture and preserved morphology  No masses  ASCITES:  None  Impression: Splenomegaly  Hepatomegaly and evidence of fatty liver  Other nonemergent findings above  Workstation performed: [id number]       LABS:  Lab data are reviewed and documented in HemOnc history  No results found for this or any previous visit (from the past 48 hour(s))      Jose Ibarra MD  7/7/2021, 12:14 PM

## 2021-08-16 ENCOUNTER — OFFICE VISIT (OUTPATIENT)
Dept: FAMILY MEDICINE CLINIC | Facility: CLINIC | Age: 66
End: 2021-08-16
Payer: COMMERCIAL

## 2021-08-16 VITALS
TEMPERATURE: 97.4 F | SYSTOLIC BLOOD PRESSURE: 160 MMHG | HEART RATE: 120 BPM | OXYGEN SATURATION: 94 % | RESPIRATION RATE: 18 BRPM | WEIGHT: 315 LBS | HEIGHT: 75 IN | DIASTOLIC BLOOD PRESSURE: 80 MMHG | BODY MASS INDEX: 39.17 KG/M2

## 2021-08-16 DIAGNOSIS — E79.0 HYPERURICEMIA: Primary | ICD-10-CM

## 2021-08-16 DIAGNOSIS — L20.9 ATOPIC DERMATITIS, UNSPECIFIED TYPE: ICD-10-CM

## 2021-08-16 PROCEDURE — 99214 OFFICE O/P EST MOD 30 MIN: CPT | Performed by: PHYSICIAN ASSISTANT

## 2021-08-16 RX ORDER — ALLOPURINOL 100 MG/1
100 TABLET ORAL 2 TIMES DAILY
Qty: 60 TABLET | Refills: 5 | Status: SHIPPED | OUTPATIENT
Start: 2021-08-16 | End: 2022-02-11

## 2021-08-16 RX ORDER — BETAMETHASONE DIPROPIONATE 0.5 MG/G
CREAM TOPICAL 2 TIMES DAILY
Qty: 50 G | Refills: 2 | Status: SHIPPED | OUTPATIENT
Start: 2021-08-16 | End: 2022-01-13

## 2021-08-16 NOTE — PROGRESS NOTES
Assessment/Plan:     Diagnoses and all orders for this visit:    Hyperuricemia  Comments:  Increase allopurinol 100 mg twice a day  Orders:  -     allopurinol (ZYLOPRIM) 100 mg tablet; Take 1 tablet (100 mg total) by mouth 2 (two) times a day    Atopic dermatitis, unspecified type  Comments:   betamethasone   cream ordered  Orders:  -     betamethasone, augmented, (DIPROLENE-AF) 0 05 % cream; Apply topically 2 (two) times a day          Subjective:      Patient ID: Charisse Malik is a 73 y o  male  Presents in the office with a rash in the left anterior shin  Patient states the rash is been present for weeks  It is spreading a little bit  It is very pruritic  The rash is noninfected  Peers to be chronic with red raised scales and scabs  Appears to be an atopic dermatitis  Patient has a history of gout  He is on allopurinol 100 mg  BMP is stable  His CBC showed decreased platelets  His uric acid was elevated 9 3  We are going to increase his allopurinol to 200 mg a day  Patient did see Hematology for his thrombocytopenia    Patient also has dyslipidemia with low HDL      The following portions of the patient's history were reviewed and updated as appropriate:   He   Patient Active Problem List    Diagnosis Date Noted    Chronic ITP (idiopathic thrombocytopenia) ([facility]) 07/07/2021    Hyperuricemia 06/17/2021    Cigarette nicotine dependence in remission 06/15/2021    Edema of both lower legs 06/14/2021    Acute gout due to renal impairment 06/14/2021    History of 2019 novel coronavirus disease (COVID-19) 02/17/2021    BPH associated with nocturia 11/17/2020    Elevated PSA 08/21/2020    GARNETT (dyspnea on exertion) 06/03/2020    Splenomegaly 08/27/2019    Screening for prostate cancer 01/22/2019    History of genital warts 06/06/2018    Morbid obesity with BMI of 45 0-49 9, adult ([facility] [address] ) 09/20/2017    Skin lesion 05/24/2017    CKD (chronic kidney disease) stage 3, GFR 30-59 ml/min ([facility]) 02/20/2017    ARACELIS on CPAP 07/22/2016    Nocturnal hypoxemia 04/01/2016    Hyperlipidemia with low HDL 11/19/2015    Arthritis 08/20/2015    Moderate persistent asthma 08/20/2015    Thrombocytopenia (HCC) 12/11/2014    Sleep disorder 08/24/2012    Vitamin D deficiency 08/24/2012    COPD, severe ([facility] [address] ) 07/12/2012     Current Outpatient Medications   Medication Sig Dispense Refill    albuterol (2 5 mg/3 mL) 0 083 % nebulizer solution Take 1 vial (2 5 mg total) by nebulization every 6 (six) hours as needed for wheezing 50 vial 3    albuterol (PROVENTIL HFA,VENTOLIN HFA) 90 mcg/act inhaler INHALE 2 PUFFS EVERY 4 (FOUR) HOURS AS NEEDED FOR SHORTNESS OF BREATH 8 5 g 5    allopurinol (ZYLOPRIM) 100 mg tablet Take 1 tablet (100 mg total) by mouth 2 (two) times a day 60 tablet 5    Cholecalciferol (VITAMIN D3) 1000 units CAPS Take by mouth      colchicine (COLCRYS) 0 6 mg tablet Take  One  Po  Every f  4-6  Hours  Prn  Gout  30 tablet 5    cyclobenzaprine (FLEXERIL) 10 mg tablet Take 1 tablet (10 mg total) by mouth 3 (three) times a day as needed for muscle spasms 30 tablet 3    fluticasone-umeclidinium-vilanterol (TRELEGY) 100-62 5-25 MCG/INH inhaler Inhale 1 puff daily Rinse mouth after use  1 Inhaler 5    guaiFENesin (MUCINEX) 600 mg 12 hr tablet Take 1,200 mg by mouth every 12 (twelve) hours      imiquimod (ALDARA) 5 % cream APPLY 1 PACKET TOPICALLY 3 (THREE) TIMES A WEEK 12 packet 0    Multiple Vitamins tablet Take 1 tablet by mouth daily      Tamsulosin HCl (FLOMAX PO) Take by mouth      zinc gluconate 50 mg tablet Take 50 mg by mouth daily      betamethasone, augmented, (DIPROLENE-AF) 0 05 % cream Apply topically 2 (two) times a day 50 g 2     No current facility-administered medications for this visit  He is allergic to darvon [propoxyphene]       Review of Systems   Respiratory: Positive for shortness of breath  Cardiovascular: Negative for chest pain and leg swelling     Skin: Positive for rash          Objective:        Physical Exam  Nursing note reviewed  Constitutional:       General: He is not in acute distress  Appearance: He is obese  He is not ill-appearing  HENT:      Head: Normocephalic and atraumatic  Right Ear: External ear normal       Left Ear: External ear normal    Eyes:      Conjunctiva/sclera: Conjunctivae normal    Cardiovascular:      Rate and Rhythm: Normal rate and regular rhythm  Heart sounds: Normal heart sounds  Pulmonary:      Effort: Pulmonary effort is normal       Breath sounds: Normal breath sounds  No rhonchi  Musculoskeletal:      Right lower leg: No edema  Left lower leg: No edema  Skin:     General: Skin is warm and dry  Comments: Rash left anterior shin  This appears chronic with red raised scabs and scales   Neurological:      General: No focal deficit present  Mental Status: He is oriented to person, place, and time  Psychiatric:         Mood and Affect: Mood normal          Behavior: Behavior normal          Thought Content:  Thought content normal          Judgment: Judgment normal

## 2021-08-26 ENCOUNTER — CONSULT (OUTPATIENT)
Dept: GASTROENTEROLOGY | Facility: CLINIC | Age: 66
End: 2021-08-26
Payer: COMMERCIAL

## 2021-08-26 VITALS
SYSTOLIC BLOOD PRESSURE: 178 MMHG | WEIGHT: 315 LBS | HEART RATE: 108 BPM | BODY MASS INDEX: 39.17 KG/M2 | HEIGHT: 75 IN | DIASTOLIC BLOOD PRESSURE: 90 MMHG

## 2021-08-26 DIAGNOSIS — D69.3 CHRONIC ITP (IDIOPATHIC THROMBOCYTOPENIA) (HCC): ICD-10-CM

## 2021-08-26 DIAGNOSIS — R16.2 HEPATOSPLENOMEGALY: Primary | ICD-10-CM

## 2021-08-26 DIAGNOSIS — Z86.010 HISTORY OF COLON POLYPS: ICD-10-CM

## 2021-08-26 DIAGNOSIS — E66.3 OVERWEIGHT: ICD-10-CM

## 2021-08-26 PROCEDURE — 99204 OFFICE O/P NEW MOD 45 MIN: CPT | Performed by: INTERNAL MEDICINE

## 2021-08-26 PROCEDURE — 1036F TOBACCO NON-USER: CPT | Performed by: INTERNAL MEDICINE

## 2021-08-26 PROCEDURE — 3008F BODY MASS INDEX DOCD: CPT | Performed by: INTERNAL MEDICINE

## 2021-08-26 NOTE — PROGRESS NOTES
[facility] - Outpatient Consultation  Hermes Matos 73 y o  male MRN: [id number]  Encounter: [id number]          ASSESSMENT AND PLAN:      1  History of colon polyps    We went through the logistics for colonoscopy  We will get him schedule a [facility] hospital   I addressed all of his questions and concerns  - Ambulatory referral to Gastroenterology  - Colonoscopy; Future    2  Hepatosplenomegaly    Could be secondary to body habitus however with the mildly low platelet count will get a liver spleen scan he will follow-up with me after that  - NM liver spleen imaging static only; Future    3  Chronic ITP (idiopathic thrombocytopenia) (HCC)    According to chart  Last platelet count 793     4  Overweight   you will work on portion control and weight loss  ______________________________________________________________________    HPI:   Very pleasant 61-year-old gentleman presents to discuss screening colonoscopy  His last 1 was 14 years ago  He had 3 in the past and does have a history of polyps type unknown  She has no family history of colorectal cancer  He denies any lower GI symptoms  And no upper GI symptoms  He tells me an ultrasound done because low platelets was found to have hepatosplenomegaly  And fatty liver  Jeremy Canales His LFTs are normal except for a very minimal elevation of his total bilirubin  This is been longstanding  Additionally has low platelets and his chart indicates chronic idiopathic thrombocytopenia  We discussed fatty liver at length and he was given handout  He tells me he has been evaluated for hepatitis C in the past and is negative additionally he does not drink  REVIEW OF SYSTEMS:    CONSTITUTIONAL: Denies any fever, chills, rigors, and weight loss  HEENT: No earache or tinnitus  Denies hearing loss or visual disturbances  CARDIOVASCULAR: No chest pain or palpitations     RESPIRATORY: Denies any cough, hemoptysis, shortness of breath or dyspnea on exertion  GASTROINTESTINAL: As noted in the History of Present Illness  GENITOURINARY: No problems with urination  Denies any hematuria or dysuria  NEUROLOGIC: No dizziness or vertigo, denies headaches  MUSCULOSKELETAL: Denies any muscle or joint pain  SKIN: Denies skin rashes or itching  ENDOCRINE: Denies excessive thirst  Denies intolerance to heat or cold  PSYCHOSOCIAL: Denies depression or anxiety  Denies any recent memory loss         Historical Information   Past Medical History:   Diagnosis Date    Arthritis     Asthma     Chest pain     atypical    Chronic kidney disease     COPD (chronic obstructive pulmonary disease) (HCC)     CPAP (continuous positive airway pressure) dependence     Decreased glomerular filtration rate     Hamstring strain     Herniated lumbar intervertebral disc     History of alcohol use     uncomplicated    History of back pain     lower    History of colon polyps     sigmoid    History of genital warts     History of lipoma     History of muscle spasm     lumbar paraspinous muscle    History of umbilical hernia     Lateral pain of left hip     Low HDL (under 40)     Lumbar radiculopathy     Obesity     Respiratory distress     acute    Shortness of breath     Sleep apnea     CPAP PT WILL BRING     Past Surgical History:   Procedure Laterality Date    ADENOIDECTOMY      APPENDECTOMY      COLONOSCOPY      HAND SURGERY Left     CYST REMOVAL    HIP SURGERY Bilateral     LIPECTOMY      thigh    LIPOMA RESECTION Left     HIP    LA REPAIR UMBILICAL UJHD,4+D/A,WCJJJ N/A 2/23/2017    Procedure: REPAIR HERNIA UMBILICAL;  Surgeon: Brent Sanchez MD;  [facility];  Service: General    TONSILLECTOMY      WRIST GANGLION EXCISION       Social History   Social History     Substance and Sexual Activity   Alcohol Use No    Comment: recovering alcoholic     Social History     Substance and Sexual Activity   Drug Use No     Social History Tobacco Use   Smoking Status Former Smoker    Packs/day: 2 00    Years: 25 00    Pack years: 50 00    Types: Cigarettes    Start date: 0    Quit date: 10/2014    Years since quittin 9   Smokeless Tobacco Never Used     Family History   Problem Relation Age of Onset    Heart disease Mother         cardiac disorder    Hypertension Mother     COPD Father     Heart disease Family         cardiac disorder    Cancer Family         lung    Emphysema Family         pulmonary unspecified emphysema    Diabetes Family     Glaucoma Family     Cancer Family         lung       Meds/Allergies       Current Outpatient Medications:     albuterol (2 5 mg/3 mL) 0 083 % nebulizer solution    albuterol (PROVENTIL HFA,VENTOLIN HFA) 90 mcg/act inhaler    allopurinol (ZYLOPRIM) 100 mg tablet    betamethasone, augmented, (DIPROLENE-AF) 0 05 % cream    Cholecalciferol (VITAMIN D3) 1000 units CAPS    colchicine (COLCRYS) 0 6 mg tablet    fluticasone-umeclidinium-vilanterol (TRELEGY) 100-62 5-25 MCG/INH inhaler    guaiFENesin (MUCINEX) 600 mg 12 hr tablet    imiquimod (ALDARA) 5 % cream    Multiple Vitamins tablet    Tamsulosin HCl (FLOMAX PO)    cyclobenzaprine (FLEXERIL) 10 mg tablet    zinc gluconate 50 mg tablet    Allergies   Allergen Reactions    Darvon [Propoxyphene] Other (See Comments)     hallucinations           Objective     Blood pressure (!) 178/90, pulse (!) 108, height 6' 3" (1 905 m), weight (!) 186 kg (409 lb)  Body mass index is 51 12 kg/m²  PHYSICAL EXAM:      General Appearance:   Alert, cooperative, no distress   HEENT:   Normocephalic, atraumatic, anicteric      Neck:  Supple, symmetrical, trachea midline   Lungs:   Clear to auscultation bilaterally; no rales, rhonchi or wheezing; respirations unlabored    Heart[de-identified]   Regular rate and rhythm; no murmur, rub, or gallop     Abdomen:   Soft, non-tender, non-distended; normal bowel sounds; no masses, no organomegaly    Genitalia:   Deferred    Rectal:   Deferred    Extremities:  No cyanosis, clubbing or edema    Pulses:  2+ and symmetric    Skin:  No jaundice, rashes, or lesions    Lymph nodes:  No palpable cervical lymphadenopathy        Lab Results:   No visits with results within 1 Day(s) from this visit  Latest known visit with results is:   Appointment on 06/23/2021   Component Date Value    WBC 06/23/2021 6 85     RBC 06/23/2021 4 91     Hemoglobin 06/23/2021 16 0     Hematocrit 06/23/2021 47 8     MCV 06/23/2021 97     MCH 06/23/2021 32 6     MCHC 06/23/2021 33 5     RDW 06/23/2021 14 2     MPV 06/23/2021 11 3     Platelets 21/76/8146 123*    nRBC 06/23/2021 0     Neutrophils Relative 06/23/2021 60     Immat GRANS % 06/23/2021 0     Lymphocytes Relative 06/23/2021 21     Monocytes Relative 06/23/2021 13*    Eosinophils Relative 06/23/2021 5     Basophils Relative 06/23/2021 1     Neutrophils Absolute 06/23/2021 4 09     Immature Grans Absolute 06/23/2021 0 01     Lymphocytes Absolute 06/23/2021 1 41     Monocytes Absolute 06/23/2021 0 92     Eosinophils Absolute 06/23/2021 0 36     Basophils Absolute 06/23/2021 0 06     Sodium 06/23/2021 143     Potassium 06/23/2021 4 4     Chloride 06/23/2021 111*    CO2 06/23/2021 26     ANION GAP 06/23/2021 6     BUN 06/23/2021 21     Creatinine 06/23/2021 1 60*    Glucose, Fasting 06/23/2021 99     Calcium 06/23/2021 9 4     eGFR 06/23/2021 45     Hepatitis B Surface Ag 06/23/2021 Non-reactive     Hep B S Ab 06/23/2021 <3 10     TSH 3RD GENERATON 06/23/2021 1 650     Vitamin B-12 06/23/2021 744     Folate 06/23/2021 14 7          Radiology Results:   No results found

## 2021-09-09 ENCOUNTER — TELEPHONE (OUTPATIENT)
Dept: UROLOGY | Facility: CLINIC | Age: 66
End: 2021-09-09

## 2021-09-14 ENCOUNTER — HOSPITAL ENCOUNTER (OUTPATIENT)
Dept: NUCLEAR MEDICINE | Facility: HOSPITAL | Age: 66
Discharge: HOME | End: 2021-09-14
Attending: INTERNAL MEDICINE
Payer: COMMERCIAL

## 2021-09-14 ENCOUNTER — PROCEDURE VISIT (OUTPATIENT)
Dept: UROLOGY | Facility: CLINIC | Age: 66
End: 2021-09-14
Payer: COMMERCIAL

## 2021-09-14 VITALS
WEIGHT: 315 LBS | BODY MASS INDEX: 39.17 KG/M2 | HEIGHT: 75 IN | DIASTOLIC BLOOD PRESSURE: 90 MMHG | SYSTOLIC BLOOD PRESSURE: 178 MMHG

## 2021-09-14 DIAGNOSIS — R60.0 EDEMA OF BOTH LOWER LEGS: ICD-10-CM

## 2021-09-14 DIAGNOSIS — E66.01 MORBID OBESITY WITH BMI OF 50.0-59.9, ADULT (HCC): ICD-10-CM

## 2021-09-14 DIAGNOSIS — J44.9 COPD, SEVERE (HCC): ICD-10-CM

## 2021-09-14 DIAGNOSIS — R35.1 BPH ASSOCIATED WITH NOCTURIA: ICD-10-CM

## 2021-09-14 DIAGNOSIS — R16.2 HEPATOSPLENOMEGALY: ICD-10-CM

## 2021-09-14 DIAGNOSIS — G47.34 NOCTURNAL HYPOXEMIA: Primary | ICD-10-CM

## 2021-09-14 DIAGNOSIS — Z99.89 OSA ON CPAP: ICD-10-CM

## 2021-09-14 DIAGNOSIS — N40.1 BPH ASSOCIATED WITH NOCTURIA: ICD-10-CM

## 2021-09-14 DIAGNOSIS — N18.30 STAGE 3 CHRONIC KIDNEY DISEASE, UNSPECIFIED WHETHER STAGE 3A OR 3B CKD (HCC): ICD-10-CM

## 2021-09-14 DIAGNOSIS — G47.33 OSA ON CPAP: ICD-10-CM

## 2021-09-14 PROCEDURE — G1004 CDSM NDSC: HCPCS

## 2021-09-14 PROCEDURE — 78215 LVR&SPLEEN IMG STATIC ONLY: CPT

## 2021-09-14 PROCEDURE — 99214 OFFICE O/P EST MOD 30 MIN: CPT | Performed by: UROLOGY

## 2021-09-14 PROCEDURE — A9541 TC99M SULFUR COLLOID: HCPCS

## 2021-09-14 RX ORDER — TAMSULOSIN HYDROCHLORIDE 0.4 MG/1
0.4 CAPSULE ORAL
Qty: 90 CAPSULE | Refills: 3 | Status: SHIPPED | OUTPATIENT
Start: 2021-09-14

## 2021-09-14 NOTE — PROGRESS NOTES
Problem List Items Addressed This Visit        Respiratory    COPD, severe ([facility] [address] )    Nocturnal hypoxemia - Primary    ARACELIS on CPAP       Genitourinary    CKD (chronic kidney disease) stage 3, GFR 30-59 ml/min ([facility])       Other    BPH associated with nocturia    Edema of both lower legs    Morbid obesity with BMI of 50 0-59 9, adult ([facility]            Discussion:    Radha Smith and I had a productive discussion today  He does have a history of severe COPD, nocturnal hypoxemia, obstructive sleep apnea, chronic kidney disease, edema in his lower legs, and morbid obesity with a body mass index of 50 5  Since he 1st saw us in 2020 his weight has gone up by 10 pounds  He does state that this is the heaviest that he has been  He has had some improvement with tamsulosin, I have refilled this medication for him  He states that of all of his medical problems his lower urinary tract symptoms are the least of his worries at this time  He does have nocturia 4 times per night  I explained to him the pathophysiology of obstructive sleep apnea and Pickwickian syndrome on increased nocturnal urine volumes, he had not understood this link previously  I did offer him referral to the bariatric surgery team, he would not consider this at this time, but he would consider potential consultation with a nutritionist but did not want this referral at this time  I did tell him about calorie counting and tracking his macro nutrients and that if he is able to make small changes over time he will be able to potentially lose a good amount of weight over the long-term  He has successfully stopped drinking many years ago after a driving under the influence charge, he also stopped smoking many years ago, indicating that he does have the self awareness and self control to potentially make diet changes as well    I did tell him that many of his complaints, including his benign prostatic enlargement with nocturia and lower urinary tract symptoms will likely improve significantly with weight loss  I did tell him that his weight does preclude our use of our procedure tables here in the clinic as the maximum weight limit is exceeded  Additionally I do not believe him to be a good operative candidate and I would do everything possible to not operate on him in the future  I did  him on the initiation of finasteride for his lower urinary tract symptoms, he is able to still achieve an erection and does not want to run the potential 10% chance of having some decrease in ejaculate volume or sexual dysfunction from this medication  I recommend he continue tamsulosin at this time, he will see us on a yearly basis at which time a postvoid residual urine volume can be determined  All parties are hopeful that he will be less heavy in the future      Importantly, I did tell him about the difference between screening and diagnosis, I do not believe he should have a screening PSA in the future given competing risk factors        Assessment and plan:       Please see problem oriented charting for the assessment plan of today's urological complaints    Marina Quevedo MD      Chief Complaint     Chief Complaint   Patient presents with    Benign Prostatic Hypertrophy         History of Present Illness     Javi Santamaria is a 73 y o  man with LUTS, on tamsulosin at this time  Working well overall, with persistent nocturia 4 times per night  I did tell him that I do not believe him to be a surgical candidate at this time  He is in agreement with this and would like to avoid surgery as well  He has no new complaints  Extensive discussion as above        The following portions of the patient's history were reviewed and updated as appropriate: allergies, current medications, past family history, past medical history, past social history, past surgical history and problem list     Detailed Urologic History     - please refer to HPI    Review of Systems     Review of Systems   Constitutional: Negative  HENT: Negative  Eyes: Negative  Respiratory: Positive for shortness of breath  Cardiovascular: Negative  Gastrointestinal: Negative  Endocrine: Negative  Genitourinary: Negative  As per HPI   Musculoskeletal: Positive for arthralgias  Skin: Negative  Allergic/Immunologic: Negative  Neurological: Negative  Hematological: Negative  Psychiatric/Behavioral: Negative  Allergies     Allergies   Allergen Reactions    Darvon [Propoxyphene] Other (See Comments)     hallucinations       Physical Exam     Physical Exam  Vitals reviewed  Constitutional:       General: He is not in acute distress  Appearance: He is obese  He is ill-appearing  He is not toxic-appearing or diaphoretic  HENT:      Head: Normocephalic and atraumatic  Eyes:      General: No scleral icterus  Right eye: No discharge  Left eye: No discharge  Cardiovascular:      Pulses: Normal pulses  Pulmonary:      Effort: Pulmonary effort is normal    Abdominal:      General: There is no distension  Musculoskeletal:         General: No swelling  Skin:     General: Skin is warm  Neurological:      General: No focal deficit present  Mental Status: He is alert and oriented to person, place, and time  Cranial Nerves: No cranial nerve deficit  Psychiatric:         Mood and Affect: Mood normal          Behavior: Behavior normal          Thought Content:  Thought content normal          Judgment: Judgment normal              Vital Signs  Vitals:    09/14/21 0754   Weight: (!) 183 kg (404 lb)   Height: 6' 3" (1 905 m)         Current Medications       Current Outpatient Medications:     albuterol (2 5 mg/3 mL) 0 083 % nebulizer solution, Take 1 vial (2 5 mg total) by nebulization every 6 (six) hours as needed for wheezing, Disp: 50 vial, Rfl: 3    albuterol (PROVENTIL HFA,VENTOLIN HFA) 90 mcg/act inhaler, INHALE 2 PUFFS EVERY 4 (FOUR) HOURS AS NEEDED FOR SHORTNESS OF BREATH, Disp: 8 5 g, Rfl: 5    allopurinol (ZYLOPRIM) 100 mg tablet, Take 1 tablet (100 mg total) by mouth 2 (two) times a day, Disp: 60 tablet, Rfl: 5    betamethasone, augmented, (DIPROLENE-AF) 0 05 % cream, Apply topically 2 (two) times a day, Disp: 50 g, Rfl: 2    Cholecalciferol (VITAMIN D3) 1000 units CAPS, Take by mouth, Disp: , Rfl:     colchicine (COLCRYS) 0 6 mg tablet, Take  One  Po  Every f  4-6  Hours  Prn  Gout , Disp: 30 tablet, Rfl: 5    fluticasone-umeclidinium-vilanterol (TRELEGY) 100-62 5-25 MCG/INH inhaler, Inhale 1 puff daily Rinse mouth after use , Disp: 1 Inhaler, Rfl: 5    guaiFENesin (MUCINEX) 600 mg 12 hr tablet, Take 1,200 mg by mouth every 12 (twelve) hours, Disp: , Rfl:     imiquimod (ALDARA) 5 % cream, APPLY 1 PACKET TOPICALLY 3 (THREE) TIMES A WEEK, Disp: 12 packet, Rfl: 0    Multiple Vitamins tablet, Take 1 tablet by mouth daily, Disp: , Rfl:     Tamsulosin HCl (FLOMAX PO), Take by mouth, Disp: , Rfl:     zinc gluconate 50 mg tablet, Take 50 mg by mouth daily, Disp: , Rfl:       Active Problems     Patient Active Problem List   Diagnosis    Arthritis    COPD, severe ([facility] [address] )    CKD (chronic kidney disease) stage 3, GFR 30-59 ml/min ([facility])    Hyperlipidemia with low HDL    Moderate persistent asthma    Morbid obesity with BMI of 45 0-49 9, adult ([facility])    Nocturnal hypoxemia    ARACELIS on CPAP    Skin lesion    Sleep disorder    Thrombocytopenia ([facility])    Vitamin D deficiency    History of genital warts    Screening for prostate cancer    Splenomegaly    GARNETT (dyspnea on exertion)    Elevated PSA    BPH associated with nocturia    History of 2019 novel coronavirus disease (COVID-19)    Edema of both lower legs    Acute gout due to renal impairment    Cigarette nicotine dependence in remission    Hyperuricemia    Chronic ITP (idiopathic thrombocytopenia) ([facility])         Past Medical History Past Medical History:   Diagnosis Date    Arthritis     Asthma     Chest pain     atypical    Chronic kidney disease     COPD (chronic obstructive pulmonary disease) (HCC)     CPAP (continuous positive airway pressure) dependence     Decreased glomerular filtration rate     Hamstring strain     Herniated lumbar intervertebral disc     History of alcohol use     uncomplicated    History of back pain     lower    History of colon polyps     sigmoid    History of genital warts     History of lipoma     History of muscle spasm     lumbar paraspinous muscle    History of umbilical hernia     Lateral pain of left hip     Low HDL (under 40)     Lumbar radiculopathy     Obesity     Respiratory distress     acute    Shortness of breath     Sleep apnea     CPAP PT WILL BRING         Surgical History     Past Surgical History:   Procedure Laterality Date    ADENOIDECTOMY      APPENDECTOMY      COLONOSCOPY      HAND SURGERY Left     CYST REMOVAL    HIP SURGERY Bilateral     LIPECTOMY      thigh    LIPOMA RESECTION Left     HIP    [place] REPAIR UMBILICAL USMP,1+C/V,RVQNX N/A 2017    Procedure: REPAIR HERNIA UMBILICAL;  Surgeon: Katerina Campa MD;  [facility];  Service: General    TONSILLECTOMY      WRIST GANGLION EXCISION           Family History     Family History   Problem Relation Age of Onset    Heart disease Mother         cardiac disorder    Hypertension Mother     COPD Father     Heart disease Family         cardiac disorder    Cancer Family         lung    Emphysema Family         pulmonary unspecified emphysema    Diabetes Family     Glaucoma Family     Cancer Family         lung         Social History     Social History     Social History     Tobacco Use   Smoking Status Former Smoker    Packs/day: 2 00    Years: 25 00    Pack years: 50 00    Types: Cigarettes    Start date: 0    Quit date: 10/2014    Years since quittin 9   Smokeless Tobacco Never Used         Pertinent Lab Values     Lab Results   Component Value Date    CREATININE 1 60 (H) 06/23/2021       Lab Results   Component Value Date    PSA 1 5 05/14/2021    PSA 4 6 (H) 07/23/2020    PSA 2 1 01/22/2019             Pertinent Imaging      no new imaging for my review

## 2021-09-14 NOTE — PATIENT INSTRUCTIONS
Decision Aid for Benign Prostatic Hyperplasia   WHAT YOU NEED TO KNOW:   What do I need to know about decisions for benign prostatic hyperplasia (BPH)? You can work with your healthcare provider to make decisions about being screened or treated for BPH  Screening is a test done to find BPH early  Screening is different from diagnosis  Screening is used if you are at increased risk for BPH but do not have symptoms  This means management or treatment can start early  You can also help plan treatment if BPH is found with screening, or you develop it later on  Your treatment choices include nonsurgical options and surgery  Your healthcare provider may recommend nonsurgical treatments first  Learn about the benefits and risks of nonsurgical treatment and surgery so you can make an informed choice  What do I need to know about BPH?   · BPH is an enlarged prostate  The prostate is normally the size of a walnut and wraps around the urethra  An enlarged prostate will press on the urethra  This may cause problems with storing urine or emptying your bladder completely  · BPH is common in men older than 40 years  The risk increases with age  · Benign means it is not cancer  BPH is not a life-threatening condition, but it can cause problems with your daily activities  BPH usually gets worse over time  Left untreated, BPH can also lead to blood in your urine, bladder stones, or kidney failure  Am I a good candidate for BPH screening? Screening may be helpful for you if any of the following is true:  · You are 40 years or older  · You have a family history of BPH or other prostate problems  · You have a medical condition such as a heart disease or type 2 diabetes, or you are obese  · You do not get much physical activity  · You want to have treatment as early as possible if needed  · You take a beta-blocker medicine  How is screening done?   Your healthcare provider will ask about your medical history and family history of prostate problems  If you are at increased risk, your provider may use any of the following to check for BPH:  · The International Prostate Symptom Score  is a set of questions about your ability to urinate over the past month  You will be asked how often you have any of the following:     ? Urinating 8 or more times each day    ? A feeling of not fully emptying your bladder when you urinate    ? An urgent need to urinate that you could not put off, or urinating again within 2 hours    ? Being woken from sleep because you needed to urinate    ? Trouble starting your urine flow, or a need to push or strain to get it to start    ? Urine that stops and starts several times when you urinate    ? A weak urine stream, or dribbling after you urinate    · A digital rectal exam  is used to check the size of your prostate  Your healthcare provider will insert a gloved finger into your rectum  The provider will be able to feel your prostate  The exam may be repeated over time to check the prostate size  · A PSA test  is used to measure the amount of a protein made by your prostate gland  A blood sample is taken for this test  A high PSA level can increase your risk for more severe urination problems or the need for surgery  What are the benefits and risks of screening? Talk with your healthcare provider about the risks and benefits of screening:  · Benefits  include finding BPH early  This means you can make more decisions about treatments  The PSA test can also find prostate cancer early  Treatment of prostate cancer is more successful when it starts early  · Risks  include a false belief that you will not develop BPH if your screening result is negative  Even though your screening result is negative, you may still develop it later on  You may also need more tests if you have problems urinating but screening shows you do not have BPH      What questions should I ask my healthcare provider to help me make decisions about screening? · How high is my risk for BPH? · How often do I need to have screening? · Where is the screening done? · Do I need to do anything to get ready to have screening? What happens after I have screening? You will meet with your healthcare provider to go over the results of your screening  You may need more tests to diagnose anything that showed up on the screening test  Common tests include a urine test to check for an infection or a biopsy (tissue sample) to check for cancer  You may also need tests to measure the amount of urine left in your bladder after you urinate  The force of your urine flow may also be measured  You and your healthcare provider can talk about your treatment options  Together you can decide which treatment is right for you  How is BPH treated, and what are the benefits of treatment? · Watchful waiting  means you do not receive treatment right away  Your signs and symptoms will be monitored over time to see if they get worse  You may be asked to keep a record  The record will include when you urinate, how easy or difficult it was, and any changes in urination  You will bring the record to follow-up visits  Your healthcare provider may also recommend ways to improve your symptoms during watchful waiting  · Medicines  may be given to help your symptoms and to prevent BPH from getting worse  Medicines may help relax certain muscles to make it easier for you to urinate  You may also need medicine to make your prostate smaller or to relieve an overactive bladder  Medicines may start to relieve your symptoms quickly  Medicines can improve your quality of life  You may also be able to manage your symptoms without surgery if medicine keeps your BPH from getting worse  · A procedure  may be used to place a stent into your urethra to hold it open  A stent is a short, tiny mesh tube   A prostatic urethral lift, or UroLift, may be used to hold the prostate away from the urethra  This makes the urethra wider so urine can pass through more easily  · Surgery  may be used to relieve your symptoms if other treatments do not work  An ablation is surgery that uses a needle to destroy extra tissue that is causing your symptoms  A laser may instead be used to destroy the tissue  Your prostate may be heated  A tool that gives off heat is inserted into your urethra  Prostate tissue is destroyed, and no other tissues are damaged  Parts of your prostate may be removed during another type of surgery  What are the risks of BPH treatment? · Watchful waiting  may allow BPH to get worse and be more difficult to treat than at an early stage  If you have a high PSA level, you may need to have BPH treated right away  · Medicines  can cause certain side effects, such as erectile dysfunction (ED) or a lowered sex drive  You may also develop urinary retention (trouble starting your urine to flow)  Some medicines can cause hypotension (low blood pressure) when you stand, or dizziness  · Surgery  can damage tissue around your prostate  Surgery can also increase your risk for trouble urinating, incontinence (leaking), or urinary retention  You may bleed more than expected during surgery or develop an infection  You may also need to be treated again if the surgery you have does not relieve your symptoms  What questions should I ask my provider to help me make decisions about treatment? · How will I feel if I continue to have these symptoms for the rest of my life? · Which medicines may work best to treat my symptoms? · What other steps can I take to decrease my symptoms? · Will I have to take medicine for the rest of my life? · What side effects might happen with each medicine I can try? · Am I a good candidate for surgery? · Which surgery may work best to treat my symptoms? · Where is the surgery done?     · How long is recovery after surgery? · What are the possible side effects of surgery? CARE AGREEMENT:   You have the right to help plan your care  Learn about your health condition and how it may be treated  Discuss treatment options with your healthcare providers to decide what care you want to receive  You always have the right to refuse treatment  The above information is an  only  It is not intended as medical advice for individual conditions or treatments  Talk to your doctor, nurse or pharmacist before following any medical regimen to see if it is safe and effective for you  © Copyright [employer] 2021 Information is for End User's use only and may not be sold, redistributed or otherwise used for commercial purposes   All illustrations and images included in CareNotes® are the copyrighted property of A D A M , Inc  or [address] [place] [address]

## 2021-09-15 ENCOUNTER — DOCUMENTATION (OUTPATIENT)
Dept: PULMONOLOGY | Facility: CLINIC | Age: 66
End: 2021-09-15

## 2021-09-15 ENCOUNTER — OFFICE VISIT (OUTPATIENT)
Dept: PULMONOLOGY | Facility: CLINIC | Age: 66
End: 2021-09-15
Payer: COMMERCIAL

## 2021-09-15 VITALS
OXYGEN SATURATION: 96 % | HEIGHT: 75 IN | BODY MASS INDEX: 39.17 KG/M2 | RESPIRATION RATE: 18 BRPM | TEMPERATURE: 96.4 F | WEIGHT: 315 LBS | HEART RATE: 100 BPM | SYSTOLIC BLOOD PRESSURE: 138 MMHG | DIASTOLIC BLOOD PRESSURE: 74 MMHG

## 2021-09-15 DIAGNOSIS — R60.0 EDEMA OF BOTH LOWER LEGS: ICD-10-CM

## 2021-09-15 DIAGNOSIS — N18.31 STAGE 3A CHRONIC KIDNEY DISEASE (HCC): Primary | ICD-10-CM

## 2021-09-15 DIAGNOSIS — Z99.89 OSA ON CPAP: ICD-10-CM

## 2021-09-15 DIAGNOSIS — J44.9 COPD, SEVERE (HCC): ICD-10-CM

## 2021-09-15 DIAGNOSIS — G47.33 OSA ON CPAP: ICD-10-CM

## 2021-09-15 PROCEDURE — 1036F TOBACCO NON-USER: CPT | Performed by: INTERNAL MEDICINE

## 2021-09-15 PROCEDURE — 3008F BODY MASS INDEX DOCD: CPT | Performed by: INTERNAL MEDICINE

## 2021-09-15 PROCEDURE — 99214 OFFICE O/P EST MOD 30 MIN: CPT | Performed by: INTERNAL MEDICINE

## 2021-09-15 NOTE — ASSESSMENT & PLAN NOTE
Patient has increasing lower extremity edema with known chronic kidney disease    I will send him for a urinalysis to evaluate for proteinuria and I have also suggested that he see a nephrologist

## 2021-09-15 NOTE — ASSESSMENT & PLAN NOTE
Patient is using the CPAP device on a nightly basis  I reviewed his compliance data and he is using it on average 10 hours and 44 minutes per night  Average AHI is 0 6  His machine is greater than 11years old  I will send a new prescription to his home care company to get an auto titrating CPAP with pressures ranging from 7-15 cm water pressure  For now, he will continue using his current device, as there is no evidence of obvious breakdown  In the event we are not able to get a new device through his home care company, he will need to call the company directly about the recent recall

## 2021-09-17 ENCOUNTER — APPOINTMENT (OUTPATIENT)
Dept: LAB | Facility: MEDICAL CENTER | Age: 66
End: 2021-09-17
Payer: COMMERCIAL

## 2021-09-17 LAB
BACTERIA UR QL AUTO: NORMAL /HPF
BILIRUB UR QL STRIP: NEGATIVE
CLARITY UR: CLEAR
COLOR UR: YELLOW
GLUCOSE UR STRIP-MCNC: NEGATIVE MG/DL
HGB UR QL STRIP.AUTO: NEGATIVE
KETONES UR STRIP-MCNC: NEGATIVE MG/DL
LEUKOCYTE ESTERASE UR QL STRIP: NEGATIVE
NITRITE UR QL STRIP: NEGATIVE
NON-SQ EPI CELLS URNS QL MICRO: NORMAL /HPF
PH UR STRIP.AUTO: 6 [PH]
PROT UR STRIP-MCNC: NEGATIVE MG/DL
RBC #/AREA URNS AUTO: NORMAL /HPF
SP GR UR STRIP.AUTO: 1.02 (ref 1–1.03)
UROBILINOGEN UR QL STRIP.AUTO: 1 E.U./DL
WBC #/AREA URNS AUTO: NORMAL /HPF

## 2021-09-17 PROCEDURE — 81001 URINALYSIS AUTO W/SCOPE: CPT | Performed by: INTERNAL MEDICINE

## 2021-10-01 ENCOUNTER — CONSULT (OUTPATIENT)
Dept: NEPHROLOGY | Facility: CLINIC | Age: 66
End: 2021-10-01
Payer: COMMERCIAL

## 2021-10-01 VITALS — WEIGHT: 315 LBS | BODY MASS INDEX: 39.17 KG/M2 | HEIGHT: 75 IN

## 2021-10-01 DIAGNOSIS — N18.31 STAGE 3A CHRONIC KIDNEY DISEASE (HCC): ICD-10-CM

## 2021-10-01 PROCEDURE — 1036F TOBACCO NON-USER: CPT | Performed by: STUDENT IN AN ORGANIZED HEALTH CARE EDUCATION/TRAINING PROGRAM

## 2021-10-01 PROCEDURE — 3008F BODY MASS INDEX DOCD: CPT | Performed by: STUDENT IN AN ORGANIZED HEALTH CARE EDUCATION/TRAINING PROGRAM

## 2021-10-01 PROCEDURE — 99204 OFFICE O/P NEW MOD 45 MIN: CPT | Performed by: STUDENT IN AN ORGANIZED HEALTH CARE EDUCATION/TRAINING PROGRAM

## 2021-10-06 ENCOUNTER — TELEPHONE (OUTPATIENT)
Dept: GASTROENTEROLOGY | Facility: HOSPITAL | Age: 66
End: 2021-10-06

## 2021-10-08 DIAGNOSIS — J44.9 COPD, SEVERE (HCC): ICD-10-CM

## 2021-10-08 RX ORDER — FLUTICASONE FUROATE, UMECLIDINIUM BROMIDE AND VILANTEROL TRIFENATATE 100; 62.5; 25 UG/1; UG/1; UG/1
POWDER RESPIRATORY (INHALATION)
Qty: 60 EACH | Refills: 5 | Status: SHIPPED | OUTPATIENT
Start: 2021-10-08 | End: 2022-07-05 | Stop reason: SDUPTHER

## 2021-10-18 ENCOUNTER — TELEPHONE (OUTPATIENT)
Dept: GASTROENTEROLOGY | Facility: HOSPITAL | Age: 66
End: 2021-10-18

## 2021-10-19 ENCOUNTER — ANESTHESIA (OUTPATIENT)
Dept: GASTROENTEROLOGY | Facility: HOSPITAL | Age: 66
End: 2021-10-19

## 2021-10-19 ENCOUNTER — ANESTHESIA EVENT (OUTPATIENT)
Dept: GASTROENTEROLOGY | Facility: HOSPITAL | Age: 66
End: 2021-10-19

## 2021-10-19 ENCOUNTER — HOSPITAL ENCOUNTER (OUTPATIENT)
Dept: GASTROENTEROLOGY | Facility: HOSPITAL | Age: 66
Setting detail: OUTPATIENT SURGERY
Discharge: HOME | End: 2021-10-19
Attending: INTERNAL MEDICINE | Admitting: INTERNAL MEDICINE
Payer: COMMERCIAL

## 2021-10-19 VITALS
HEIGHT: 75 IN | HEART RATE: 80 BPM | TEMPERATURE: 97 F | BODY MASS INDEX: 39.17 KG/M2 | OXYGEN SATURATION: 97 % | DIASTOLIC BLOOD PRESSURE: 56 MMHG | WEIGHT: 315 LBS | SYSTOLIC BLOOD PRESSURE: 111 MMHG | RESPIRATION RATE: 16 BRPM

## 2021-10-19 DIAGNOSIS — Z86.010 HISTORY OF COLON POLYPS: ICD-10-CM

## 2021-10-19 PROCEDURE — 45385 COLONOSCOPY W/LESION REMOVAL: CPT | Performed by: INTERNAL MEDICINE

## 2021-10-19 PROCEDURE — 88305 TISSUE EXAM BY PATHOLOGIST: CPT | Performed by: PATHOLOGY

## 2021-10-19 RX ORDER — PROPOFOL 10 MG/ML
INJECTION, EMULSION INTRAVENOUS AS NEEDED
Status: DISCONTINUED | OUTPATIENT
Start: 2021-10-19 | End: 2021-10-19

## 2021-10-19 RX ORDER — SODIUM CHLORIDE, SODIUM LACTATE, POTASSIUM CHLORIDE, CALCIUM CHLORIDE 600; 310; 30; 20 MG/100ML; MG/100ML; MG/100ML; MG/100ML
INJECTION, SOLUTION INTRAVENOUS CONTINUOUS PRN
Status: DISCONTINUED | OUTPATIENT
Start: 2021-10-19 | End: 2021-10-19

## 2021-10-19 RX ORDER — LIDOCAINE HYDROCHLORIDE 10 MG/ML
INJECTION, SOLUTION EPIDURAL; INFILTRATION; INTRACAUDAL; PERINEURAL AS NEEDED
Status: DISCONTINUED | OUTPATIENT
Start: 2021-10-19 | End: 2021-10-19

## 2021-10-19 RX ADMIN — PROPOFOL 100 MG: 10 INJECTION, EMULSION INTRAVENOUS at 12:06

## 2021-10-19 RX ADMIN — LIDOCAINE HYDROCHLORIDE 50 MG: 10 INJECTION, SOLUTION EPIDURAL; INFILTRATION; INTRACAUDAL; PERINEURAL at 12:06

## 2021-10-19 RX ADMIN — PROPOFOL 50 MG: 10 INJECTION, EMULSION INTRAVENOUS at 12:30

## 2021-10-19 RX ADMIN — PROPOFOL 50 MG: 10 INJECTION, EMULSION INTRAVENOUS at 12:14

## 2021-10-19 RX ADMIN — PROPOFOL 100 MG: 10 INJECTION, EMULSION INTRAVENOUS at 12:08

## 2021-10-19 RX ADMIN — PROPOFOL 50 MG: 10 INJECTION, EMULSION INTRAVENOUS at 12:10

## 2021-10-19 RX ADMIN — SODIUM CHLORIDE, SODIUM LACTATE, POTASSIUM CHLORIDE, AND CALCIUM CHLORIDE: .6; .31; .03; .02 INJECTION, SOLUTION INTRAVENOUS at 11:58

## 2021-10-19 RX ADMIN — PROPOFOL 50 MG: 10 INJECTION, EMULSION INTRAVENOUS at 12:24

## 2021-10-19 RX ADMIN — PROPOFOL 50 MG: 10 INJECTION, EMULSION INTRAVENOUS at 12:35

## 2021-10-19 RX ADMIN — PROPOFOL 50 MG: 10 INJECTION, EMULSION INTRAVENOUS at 12:18

## 2021-12-01 ENCOUNTER — OFFICE VISIT (OUTPATIENT)
Dept: GASTROENTEROLOGY | Facility: CLINIC | Age: 66
End: 2021-12-01
Payer: COMMERCIAL

## 2021-12-01 VITALS
SYSTOLIC BLOOD PRESSURE: 181 MMHG | HEIGHT: 75 IN | WEIGHT: 315 LBS | DIASTOLIC BLOOD PRESSURE: 85 MMHG | HEART RATE: 110 BPM | BODY MASS INDEX: 39.17 KG/M2

## 2021-12-01 DIAGNOSIS — R16.2 HEPATOSPLENOMEGALY: ICD-10-CM

## 2021-12-01 DIAGNOSIS — K64.8 INTERNAL HEMORRHOIDS: ICD-10-CM

## 2021-12-01 DIAGNOSIS — Z86.010 HISTORY OF COLON POLYPS: Primary | ICD-10-CM

## 2021-12-01 PROBLEM — K80.20 GALLSTONE: Status: ACTIVE | Noted: 2021-12-01

## 2021-12-01 PROCEDURE — 99214 OFFICE O/P EST MOD 30 MIN: CPT | Performed by: INTERNAL MEDICINE

## 2021-12-03 ENCOUNTER — OFFICE VISIT (OUTPATIENT)
Dept: FAMILY MEDICINE CLINIC | Facility: CLINIC | Age: 66
End: 2021-12-03
Payer: COMMERCIAL

## 2021-12-03 VITALS
TEMPERATURE: 97.1 F | WEIGHT: 315 LBS | HEART RATE: 104 BPM | DIASTOLIC BLOOD PRESSURE: 68 MMHG | SYSTOLIC BLOOD PRESSURE: 130 MMHG | BODY MASS INDEX: 39.17 KG/M2 | HEIGHT: 75 IN | OXYGEN SATURATION: 97 %

## 2021-12-03 DIAGNOSIS — N18.30 STAGE 3 CHRONIC KIDNEY DISEASE, UNSPECIFIED WHETHER STAGE 3A OR 3B CKD (HCC): ICD-10-CM

## 2021-12-03 DIAGNOSIS — Z00.00 MEDICARE ANNUAL WELLNESS VISIT, SUBSEQUENT: Primary | ICD-10-CM

## 2021-12-03 DIAGNOSIS — L98.9 SKIN LESION OF RIGHT ARM: ICD-10-CM

## 2021-12-03 DIAGNOSIS — N40.1 BPH ASSOCIATED WITH NOCTURIA: ICD-10-CM

## 2021-12-03 DIAGNOSIS — J44.9 COPD, SEVERE (HCC): ICD-10-CM

## 2021-12-03 DIAGNOSIS — R35.1 BPH ASSOCIATED WITH NOCTURIA: ICD-10-CM

## 2021-12-03 DIAGNOSIS — E78.6 HYPERLIPIDEMIA WITH LOW HDL: ICD-10-CM

## 2021-12-03 DIAGNOSIS — Z99.89 OSA ON CPAP: ICD-10-CM

## 2021-12-03 DIAGNOSIS — E78.5 HYPERLIPIDEMIA WITH LOW HDL: ICD-10-CM

## 2021-12-03 DIAGNOSIS — G47.33 OSA ON CPAP: ICD-10-CM

## 2021-12-03 DIAGNOSIS — D69.3 CHRONIC ITP (IDIOPATHIC THROMBOCYTOPENIA) (HCC): ICD-10-CM

## 2021-12-03 PROBLEM — K63.5 COLON POLYPS: Status: ACTIVE | Noted: 2021-12-03

## 2021-12-03 PROCEDURE — 3008F BODY MASS INDEX DOCD: CPT | Performed by: PHYSICIAN ASSISTANT

## 2021-12-03 PROCEDURE — 3288F FALL RISK ASSESSMENT DOCD: CPT | Performed by: PHYSICIAN ASSISTANT

## 2021-12-03 PROCEDURE — 99214 OFFICE O/P EST MOD 30 MIN: CPT | Performed by: PHYSICIAN ASSISTANT

## 2021-12-03 PROCEDURE — 1036F TOBACCO NON-USER: CPT | Performed by: PHYSICIAN ASSISTANT

## 2021-12-03 PROCEDURE — G0439 PPPS, SUBSEQ VISIT: HCPCS | Performed by: PHYSICIAN ASSISTANT

## 2022-01-05 ENCOUNTER — APPOINTMENT (OUTPATIENT)
Dept: LAB | Facility: MEDICAL CENTER | Age: 67
End: 2022-01-05
Payer: COMMERCIAL

## 2022-01-05 ENCOUNTER — HOSPITAL ENCOUNTER (OUTPATIENT)
Dept: RADIOLOGY | Facility: MEDICAL CENTER | Age: 67
Discharge: HOME | End: 2022-01-05
Payer: COMMERCIAL

## 2022-01-05 DIAGNOSIS — R16.1 SPLENOMEGALY: ICD-10-CM

## 2022-01-05 DIAGNOSIS — D69.3 CHRONIC ITP (IDIOPATHIC THROMBOCYTOPENIA) (HCC): ICD-10-CM

## 2022-01-05 DIAGNOSIS — N18.31 STAGE 3A CHRONIC KIDNEY DISEASE (HCC): ICD-10-CM

## 2022-01-05 LAB
25(OH)D3 SERPL-MCNC: 31 NG/ML (ref 30–100)
ALBUMIN SERPL BCP-MCNC: 4 G/DL (ref 3.5–5)
ALP SERPL-CCNC: 106 U/L (ref 46–116)
ALT SERPL W P-5'-P-CCNC: 39 U/L (ref 12–78)
ANION GAP SERPL CALCULATED.3IONS-SCNC: 5 MMOL/L (ref 4–13)
AST SERPL W P-5'-P-CCNC: 28 U/L (ref 5–45)
BACTERIA UR QL AUTO: NORMAL /HPF
BASOPHILS # BLD AUTO: 0.05 THOUSANDS/ΜL (ref 0–0.1)
BASOPHILS NFR BLD AUTO: 1 % (ref 0–1)
BILIRUB SERPL-MCNC: 1.27 MG/DL (ref 0.2–1)
BILIRUB UR QL STRIP: NEGATIVE
BUN SERPL-MCNC: 21 MG/DL (ref 5–25)
CALCIUM SERPL-MCNC: 9.6 MG/DL (ref 8.3–10.1)
CHLORIDE SERPL-SCNC: 108 MMOL/L (ref 100–108)
CLARITY UR: CLEAR
CO2 SERPL-SCNC: 28 MMOL/L (ref 21–32)
COLOR UR: YELLOW
CREAT SERPL-MCNC: 1.63 MG/DL (ref 0.6–1.3)
CREAT UR-MCNC: 112 MG/DL
EOSINOPHIL # BLD AUTO: 0.34 THOUSAND/ΜL (ref 0–0.61)
EOSINOPHIL NFR BLD AUTO: 5 % (ref 0–6)
ERYTHROCYTE [DISTWIDTH] IN BLOOD BY AUTOMATED COUNT: 14.2 % (ref 11.6–15.1)
GFR SERPL CREATININE-BSD FRML MDRD: 43 ML/MIN/1.73SQ M
GLUCOSE P FAST SERPL-MCNC: 82 MG/DL (ref 65–99)
GLUCOSE UR STRIP-MCNC: NEGATIVE MG/DL
HCT VFR BLD AUTO: 48.6 % (ref 36.5–49.3)
HGB BLD-MCNC: 16 G/DL (ref 12–17)
HGB UR QL STRIP.AUTO: NEGATIVE
HYALINE CASTS #/AREA URNS LPF: NORMAL /LPF
IMM GRANULOCYTES # BLD AUTO: 0.02 THOUSAND/UL (ref 0–0.2)
IMM GRANULOCYTES NFR BLD AUTO: 0 % (ref 0–2)
KETONES UR STRIP-MCNC: NEGATIVE MG/DL
LEUKOCYTE ESTERASE UR QL STRIP: NEGATIVE
LYMPHOCYTES # BLD AUTO: 1.44 THOUSANDS/ΜL (ref 0.6–4.47)
LYMPHOCYTES NFR BLD AUTO: 21 % (ref 14–44)
MCH RBC QN AUTO: 32.8 PG (ref 26.8–34.3)
MCHC RBC AUTO-ENTMCNC: 32.9 G/DL (ref 31.4–37.4)
MCV RBC AUTO: 100 FL (ref 82–98)
MONOCYTES # BLD AUTO: 0.73 THOUSAND/ΜL (ref 0.17–1.22)
MONOCYTES NFR BLD AUTO: 11 % (ref 4–12)
NEUTROPHILS # BLD AUTO: 4.18 THOUSANDS/ΜL (ref 1.85–7.62)
NEUTS SEG NFR BLD AUTO: 62 % (ref 43–75)
NITRITE UR QL STRIP: NEGATIVE
NON-SQ EPI CELLS URNS QL MICRO: NORMAL /HPF
NRBC BLD AUTO-RTO: 0 /100 WBCS
PH UR STRIP.AUTO: 6 [PH]
PHOSPHATE SERPL-MCNC: 2.8 MG/DL (ref 2.3–4.1)
PLATELET # BLD AUTO: 113 THOUSANDS/UL (ref 149–390)
PMV BLD AUTO: 11.2 FL (ref 8.9–12.7)
POTASSIUM SERPL-SCNC: 4.1 MMOL/L (ref 3.5–5.3)
PROT SERPL-MCNC: 7.2 G/DL (ref 6.4–8.2)
PROT UR STRIP-MCNC: NEGATIVE MG/DL
PROT UR-MCNC: 10 MG/DL
PROT/CREAT UR: 0.09 MG/G{CREAT} (ref 0–0.1)
PTH-INTACT SERPL-MCNC: 43.3 PG/ML (ref 18.4–80.1)
RBC # BLD AUTO: 4.88 MILLION/UL (ref 3.88–5.62)
RBC #/AREA URNS AUTO: NORMAL /HPF
SODIUM SERPL-SCNC: 141 MMOL/L (ref 136–145)
SP GR UR STRIP.AUTO: 1.02 (ref 1–1.03)
URATE SERPL-MCNC: 7.8 MG/DL (ref 4.2–8)
UROBILINOGEN UR QL STRIP.AUTO: 0.2 E.U./DL
WBC # BLD AUTO: 6.76 THOUSAND/UL (ref 4.31–10.16)
WBC #/AREA URNS AUTO: NORMAL /HPF

## 2022-01-05 PROCEDURE — 82306 VITAMIN D 25 HYDROXY: CPT

## 2022-01-05 PROCEDURE — 36415 COLL VENOUS BLD VENIPUNCTURE: CPT

## 2022-01-05 PROCEDURE — 76705 ECHO EXAM OF ABDOMEN: CPT

## 2022-01-05 PROCEDURE — 84156 ASSAY OF PROTEIN URINE: CPT | Performed by: STUDENT IN AN ORGANIZED HEALTH CARE EDUCATION/TRAINING PROGRAM

## 2022-01-05 PROCEDURE — 84550 ASSAY OF BLOOD/URIC ACID: CPT | Performed by: STUDENT IN AN ORGANIZED HEALTH CARE EDUCATION/TRAINING PROGRAM

## 2022-01-05 PROCEDURE — 84100 ASSAY OF PHOSPHORUS: CPT

## 2022-01-05 PROCEDURE — 83970 ASSAY OF PARATHORMONE: CPT

## 2022-01-05 PROCEDURE — 85025 COMPLETE CBC W/AUTO DIFF WBC: CPT

## 2022-01-05 PROCEDURE — 81001 URINALYSIS AUTO W/SCOPE: CPT | Performed by: STUDENT IN AN ORGANIZED HEALTH CARE EDUCATION/TRAINING PROGRAM

## 2022-01-05 PROCEDURE — 80053 COMPREHEN METABOLIC PANEL: CPT

## 2022-01-05 PROCEDURE — 82570 ASSAY OF URINE CREATININE: CPT | Performed by: STUDENT IN AN ORGANIZED HEALTH CARE EDUCATION/TRAINING PROGRAM

## 2022-01-07 ENCOUNTER — TELEPHONE (OUTPATIENT)
Dept: OTHER | Facility: HOSPITAL | Age: 67
End: 2022-01-07

## 2022-01-07 NOTE — TELEPHONE ENCOUNTER
Labs reviewed:    I called patient to discuss recent blood work  Creatinine stable at 1 6mg/dL     Results for Destinee Bowser (MRN [id number]) as of 1/7/2022 15:09   Ref   Range 1/5/2022 11:18   Sodium Latest Ref Range: 136 - 145 mmol/L 141   Potassium Latest Ref Range: 3 5 - 5 3 mmol/L 4 1   Chloride Latest Ref Range: 100 - 108 mmol/L 108   CO2 Latest Ref Range: 21 - 32 mmol/L 28   Anion Gap Latest Ref Range: 4 - 13 mmol/L 5   BUN Latest Ref Range: 5 - 25 mg/dL 21   Creatinine Latest Ref Range: 0 60 - 1 30 mg/dL 1 63 (H)   GLUCOSE FASTING Latest Ref Range: 65 - 99 mg/dL 82   Calcium Latest Ref Range: 8 3 - 10 1 mg/dL 9 6   AST Latest Ref Range: 5 - 45 U/L 28   ALT Latest Ref Range: 12 - 78 U/L 39   Alkaline Phosphatase Latest Ref Range: 46 - 116 U/L 106   Total Protein Latest Ref Range: 6 4 - 8 2 g/dL 7 2   Albumin Latest Ref Range: 3 5 - 5 0 g/dL 4 0   TOTAL BILIRUBIN Latest Ref Range: 0 20 - 1 00 mg/dL 1 27 (H)   eGFR Latest Units: ml/min/1 73sq m 43   Phosphorus Latest Ref Range: 2 3 - 4 1 mg/dL 2 8   Vit D, 25-Hydroxy Latest Ref Range: 30 0 - 100 0 ng/mL 31 0     Plan:  F/u on next visit       Dayton Smith MD  Nephrology Attending

## 2022-01-12 ENCOUNTER — TELEPHONE (OUTPATIENT)
Dept: PULMONOLOGY | Facility: CLINIC | Age: 67
End: 2022-01-12

## 2022-01-12 ENCOUNTER — OFFICE VISIT (OUTPATIENT)
Dept: HEMATOLOGY ONCOLOGY | Facility: CLINIC | Age: 67
End: 2022-01-12
Payer: COMMERCIAL

## 2022-01-12 VITALS
TEMPERATURE: 98.3 F | HEART RATE: 96 BPM | BODY MASS INDEX: 39.17 KG/M2 | SYSTOLIC BLOOD PRESSURE: 152 MMHG | RESPIRATION RATE: 20 BRPM | HEIGHT: 75 IN | WEIGHT: 315 LBS | DIASTOLIC BLOOD PRESSURE: 72 MMHG | OXYGEN SATURATION: 97 %

## 2022-01-12 DIAGNOSIS — L20.9 ATOPIC DERMATITIS, UNSPECIFIED TYPE: ICD-10-CM

## 2022-01-12 DIAGNOSIS — D69.3 CHRONIC ITP (IDIOPATHIC THROMBOCYTOPENIA) (HCC): Primary | ICD-10-CM

## 2022-01-12 PROCEDURE — 99214 OFFICE O/P EST MOD 30 MIN: CPT | Performed by: INTERNAL MEDICINE

## 2022-01-12 NOTE — PROGRESS NOTES
Hematology/Oncology Progress Note    Date of Service: 1/12/2022    [facility] AZIZA Hernandez [place] [address] [place] [id number] [phone]    Hem/Onc Problem List:   1  Chronic thrombocytopenia, likely ITP  2  CKD stage 3  3  Lung nodule    Chief Complaint:   Routine follow-up to discuss the workup result     Assessment/Plan:   1  ITP, CBC showed stable thrombocytopenia with platelet count more than 100  Patient denies bleeding anywhere  Will continue to monitor  2  CKD stage 3  Patient will continue to follow with his primary care physician and nephrologist   Self hydration is recommended  3  Hepatomegaly, possible HOLLAND  Patient stopped drinking more than 10 years ago  GI follow-up  4  Colonic polyps  Status post colonoscopy with polypectomy in October 19, 2021  Pathology showed no evidence of malignancy  Patient will have a repeated colonoscopy this week  5  Splenomegaly, no masses  Patient is asymptomatic  Hemoglobin and WBC are normal     6  Follow-up:  MD follow-up in 6 months with labs and ultrasound prior  Disclaimer: This document was prepared using [employer] Direct technology  If a word or phrase is confusing, or does not make sense, this is likely due to recognition error which was not discovered during the providers review  If you believe an error has occurred, please Contact me through PALS for chayito? cation  Pain Score and Plan:    0    Hematology/Oncology History:   · Chronic thrombocytopenia with platelet count between 100-140 , started at least in 2017 when the platelet count was 035  Normal Hemoglobin and WBC  · January 5, 2018, platelet count 104  · September 5, 2019 platelet count 257   · July 23, 2020, platelet count of 570, normal WBC and hemoglobin  · December 11, 2020 COVID test positive  · A port 2021, patient received COVID vaccine    · June 14, 2021, WBC 7 57, hemoglobin 15 8, platelet count 727  Monocytes 13%  No immature cells in peripheral blood  · June 23, 2021, CKD 3  Normal B12 and folate  Normal WBC and hemoglobin  Platelet count of 887  TSH normal   Negative hepatitis B and hepatitis-C  · June 28, 2021 ultrasound abdomen shows splenomegaly, hepatomegaly and evidence of fatty liver  No masses in liver and spleen  History of Present Illiness:   Jonathan Garcia is a 73 y o  male with the above-noted HemOnc history who is here for routine follow-up  Patient has a history of TTP on observation  Platelet count 637 in January 5, 2022  WBC and hemoglobin are normal   CMP showed CKD stage 3  Patient follows with Nephrology  He had a colonoscopy in November 2021 which showed multiple colonic polyps  Status post polypectomy  Pathology showed no evidence of malignancy  Patient will have another colonoscopy this week  Patient feels fine  Denies fever or chills  No chest pain or shortness of breath  No cough and phlegm  No GI  symptoms  Appetite good  His weight has been stable  Patient walks with a cane  No headache or blurred vision  ROS: A 12-point of review of systems is obtained and other than the above is noncontributory  Objective:   VITALS:   /72 (BP Location: Left arm, Patient Position: Sitting, Cuff Size: Standard)   Pulse 96   Temp 98 3 °F (36 8 °C) (Tympanic)   Resp 20   Ht 6' 3" (1 905 m)   Wt (!) 183 kg (404 lb 3 2 oz)   SpO2 97%   BMI 50 52 kg/m²     Physical EXAM:  General:  Morbid obese gentleman, Alert, cooperative, no distress, appears stated age  Head:  Normocephalic, without obvious abnormality, atraumatic  Eyes:  Conjunctivae/corneas clear No evidence of conjunctivitis     Throat: No nose or gum bleeding  Neck: Supple, symmetrical, trachea midline, no adenopathy    Lungs:   Clear to auscultation bilaterally   Respiratory effort easy, nonlabored    Heart:  Regular rate and rhythm, S1, S2 normal, no murmur  Abdomen: Morbid obese abdomen  Soft, non-tender,nondistended  Bowel sounds normal  No masses,  No organomegaly  Extremities: Extremities normal, atraumatic, no cyanosis or edema  No axillary or inguinal adenopathy   Skin: Skin color, texture, turgor normal  No rashes or lesions    Neurologic: A&Ox4   No focal neuro deficits       Allergies   Allergen Reactions    Darvon [Propoxyphene] Other (See Comments)     hallucinations       Past Medical History:   Diagnosis Date    Arthritis     Asthma     Chest pain     atypical    Chronic kidney disease     COPD [facility]chronic obstructive pulmonary disease) ([facility]     CPAP (continuous positive airway pressure) dependence     Decreased glomerular filtration rate     Hamstring strain     Herniated lumbar intervertebral disc     History of alcohol use     uncomplicated    History of back pain     lower    History of colon polyps     sigmoid    History of genital warts     History of lipoma     History of muscle spasm     lumbar paraspinous muscle    History of umbilical hernia     Lateral pain of left hip     Low HDL (under 40)     Lumbar radiculopathy     Obesity     Respiratory distress     acute    Shortness of breath     Sleep apnea     CPAP PT WILL BRING       Past Surgical History:   Procedure Laterality Date    ADENOIDECTOMY      APPENDECTOMY      COLONOSCOPY      HAND SURGERY Left     CYST REMOVAL    HIP SURGERY Bilateral     LIPECTOMY      thigh    LIPOMA RESECTION Left     HIP    ME REPAIR UMBILICAL VZLQ,9+O/H,JUQGY N/A 2/23/2017    Procedure: REPAIR HERNIA UMBILICAL;  Surgeon: Ibis Braswell MD;  Location: [facility];  Service: General    TONSILLECTOMY      WRIST GANGLION EXCISION         Family History   Problem Relation Age of Onset    Heart disease Mother         cardiac disorder    Hypertension Mother     COPD Father     Heart disease Family         cardiac disorder    Cancer Family         lung    Emphysema Family         pulmonary unspecified emphysema    Diabetes Family     Glaucoma Family     Cancer Family         lung       Social History     Socioeconomic History    Marital status: /Civil Union     Spouse name: Not on file    Number of children: Not on file    Years of education: Not on file    Highest education level: Not on file   Occupational History    Occupation: Fulltime employment   Tobacco Use    Smoking status: Former Smoker     Packs/day: 2 00     Years: 25 00     Pack years: 50 00     Types: Cigarettes     Start date: 0     Quit date: 10/2014     Years since quittin 2    Smokeless tobacco: Never Used   Vaping Use    Vaping Use: Former   Substance and Sexual Activity    Alcohol use: No     Comment: recovering alcoholic    Drug use: No    Sexual activity: Not on file   Other Topics Concern    Not on file   Social History Narrative    Always uses seatbelt     Social Determinants of Health     Financial Resource Strain: Not on file   Food Insecurity: Not on file   Transportation Needs: Not on file   Physical Activity: Not on file   Stress: Not on file   Social Connections: Not on file   Intimate Partner Violence: Not on file   Housing Stability: Not on file       Current Outpatient Medications   Medication Sig Dispense Refill    albuterol (2 5 mg/3 mL) 0 083 % nebulizer solution Take 1 vial (2 5 mg total) by nebulization every 6 (six) hours as needed for wheezing 50 vial 3    albuterol (PROVENTIL HFA,VENTOLIN HFA) 90 mcg/act inhaler INHALE 2 PUFFS EVERY 4 (FOUR) HOURS AS NEEDED FOR SHORTNESS OF BREATH 8 5 g 5    allopurinol (ZYLOPRIM) 100 mg tablet Take 1 tablet (100 mg total) by mouth 2 (two) times a day 60 tablet 5    betamethasone, augmented, (DIPROLENE-AF) 0 05 % cream Apply topically 2 (two) times a day 50 g 2    Cholecalciferol (VITAMIN D3) 1000 units CAPS Take by mouth      colchicine (COLCRYS) 0 6 mg tablet Take  One  Po  Every f  4-6  Hours  Prn  Gout   30 tablet 5  guaiFENesin (MUCINEX) 600 mg 12 hr tablet Take 1,200 mg by mouth daily       imiquimod (ALDARA) 5 % cream APPLY 1 PACKET TOPICALLY 3 (THREE) TIMES A WEEK 12 packet 0    Multiple Vitamins tablet Take 1 tablet by mouth daily      Multiple Vitamins-Minerals (ZINC PO) Take by mouth daily      tamsulosin (Flomax) 0 4 mg Take 1 capsule (0 4 mg total) by mouth daily with dinner 90 capsule 3    Trelegy Ellipta 100-62 5-25 MCG/INH inhaler INHALE 1 PUFF DAILY RINSE MOUTH AFTER USE  60 each 5     No current facility-administered medications for this visit  DATA REVIEW:    Pathology Result:    [unfilled]    Image Results: They are reviewed and documented in Hematology/Oncology history    US left upper quadrant  Narrative: LEFT UPPER QUADRANT ULTRASOUND    INDICATION: R16 1: Splenomegaly, not elsewhere classified  D69 3: Immune thrombocytopenic purpura  COMPARISON:   None    TECHNIQUE:   Real-time ultrasound of the abdomen was performed with a curvilinear transducer with both volumetric sweeps and still imaging techniques  FINDINGS:    SPLEEN:    21 6 x 21 1 x 6 6 cm   Splenic volume: 1585 0 mL  Normal contour  No mass  No perisplenic collections  1 2 cm splenule    LEFT KIDNEY:    12 6 x 5 3 cm  Normal caliber and echogenicity  No hydronephrosis  No nephrolithiasis  No solid mass lesions  1 1 cm upper pole cyst    ABDOMINAL CAVITY:  No left upper quadrant ascites  Impression: Splenomegaly    Workstation performed: [id number]      US abdomen complete    Result Date: 7/6/2021  Narrative: ABDOMEN ULTRASOUND, COMPLETE INDICATION:   D69 6: Thrombocytopenia, unspecified  Chronic cytopenia, likely ITP, per clinician progress notes  COMPARISON: 3/19/2021 chest CT, which included the upper abdomen  TECHNIQUE:   Real-time ultrasound of the abdomen was performed with a curvilinear transducer with both volumetric sweeps and still imaging techniques   FINDINGS: PANCREAS:  Evidence of fatty infiltration, similar to the CT  No acute findings  AORTA AND IVC:  Visualized portions are normal for patient age  LIVER: Size:  Enlarged  The liver measures 21 5 cm in the midclavicular line  Contour:  Surface contour is smooth  Parenchyma: There is moderate diffuse increased echogenicity with smooth echotexture and acoustic beam attenuation  Most consistent with moderate hepatic steatosis  No evidence of suspicious mass  Limited imaging of the main portal vein shows it to be patent and hepatopetal  BILIARY: Gallstone  No gallbladder wall thickening, pericholecystic fluid or sonographic Treviño sign  No intrahepatic biliary dilatation  CBD measures 4 mm  No choledocholithiasis  KIDNEY: Right kidney measures 11 4 cm  0 5 cm lower pole cortical cyst without suspicious features  Otherwise within normal limits  Left kidney measures 12 2 cm  Within normal limits  SPLEEN: Measures 20 8 x 6 9 x 20 0 cm  (length X depth X width) Enlarged  Homogeneous, normal echotexture and preserved morphology  No masses  ASCITES:  None  Impression: Splenomegaly  Hepatomegaly and evidence of fatty liver  Other nonemergent findings above  Workstation performed: [id number]       LABS:  Lab data are reviewed and documented in HemOnc history  No results found for this or any previous visit (from the past 48 hour(s))      Marito East MD  1/12/2022, 11:18 AM

## 2022-01-13 RX ORDER — BETAMETHASONE DIPROPIONATE 0.5 MG/G
CREAM TOPICAL
Qty: 50 G | Refills: 2 | Status: SHIPPED | OUTPATIENT
Start: 2022-01-13 | End: 2022-03-14 | Stop reason: SDUPTHER

## 2022-01-26 ENCOUNTER — OFFICE VISIT (OUTPATIENT)
Dept: NEPHROLOGY | Facility: CLINIC | Age: 67
End: 2022-01-26
Payer: COMMERCIAL

## 2022-01-26 VITALS
WEIGHT: 315 LBS | BODY MASS INDEX: 39.17 KG/M2 | SYSTOLIC BLOOD PRESSURE: 130 MMHG | HEIGHT: 75 IN | DIASTOLIC BLOOD PRESSURE: 80 MMHG

## 2022-01-26 DIAGNOSIS — N18.32 STAGE 3B CHRONIC KIDNEY DISEASE (HCC): Primary | ICD-10-CM

## 2022-01-26 PROCEDURE — 3008F BODY MASS INDEX DOCD: CPT | Performed by: STUDENT IN AN ORGANIZED HEALTH CARE EDUCATION/TRAINING PROGRAM

## 2022-01-26 PROCEDURE — 1036F TOBACCO NON-USER: CPT | Performed by: STUDENT IN AN ORGANIZED HEALTH CARE EDUCATION/TRAINING PROGRAM

## 2022-01-26 PROCEDURE — 1160F RVW MEDS BY RX/DR IN RCRD: CPT | Performed by: STUDENT IN AN ORGANIZED HEALTH CARE EDUCATION/TRAINING PROGRAM

## 2022-01-26 PROCEDURE — 99214 OFFICE O/P EST MOD 30 MIN: CPT | Performed by: STUDENT IN AN ORGANIZED HEALTH CARE EDUCATION/TRAINING PROGRAM

## 2022-01-26 NOTE — PROGRESS NOTES
NEPHROLOGY OUTPATIENT PROGRESS NOTE   Shirley Randall 73 y o  male MRN: [id number]  DATE: 1/27/2022    Reason for visit:   Chief Complaint   Patient presents with    Follow-up     CKD 3        Patient Instructions   Thank you for coming to your visit today  As we discussed you kidney function is abnormal but stable, your creatinine is 1 6mg/dL Your electrolytes are normal  Please follow the recommendations below        Recommend low sodium (salt) food     Avoid nonsteroidal anti-inflammatory drugs such as Naprosyn, ibuprofen, Aleve, Advil, Celebrex, Meloxicam (Mobic) etc   You can use Tylenol as needed if you do not have any liver condition to be concerned about     Try to avoid medications such as pantoprazole or  Protonix/Nexium or Esomeprazole)/Prilosec or omeprazole/Prevacid or lansoprazole/AcipHex or Rabeprazole  If you are able to, use Pepcid as this is safer for your kidneys   Try to exercise at least 30 minutes 3 days a week to begin with with an ultimate goal of 5 days a week for at least 30 minutes     Try to lose 5-10 lb by your next visit    Next Visit in 1 year with results   If you need to see us earlier we can change the appointment for you      Casey Flores MD  Nephrology Attending                 There are no diagnoses linked to this encounter  Assessment/Plan:  73 y o  man with PMH of COPD, ARACELIS on CPAP, obesity, asthma ,thrombocytopenia, CKD G3bA1 (bl creat 1 6-1 7 eGFR 42-44 since 2016)   Patient is referred for initial consultation for CKD     PLAN:     #CKD G3bA1  · Baseline creatinine:1 6-1 7mg/dL eGFR 40-44 since 2016  · Current creatinine: 1 6mg/dL at baseline   · Etiology: likely secondary to NSAIDs, patient has OA as he used to take Ibuprofen daily, now off Ibuprofen ~4 years   · UA: no hematuria, no proteinuria   · Image: Ultrasound 06/2021 Symmetric Kidneys, normal echogenicity, no hydronephrosis  · Avoid nephrotoxic agents such as NSAIDs   · Stable kidney function      #Volume/hypertension:  · Volume:hypervolemic   · Blood pressure: 130/80mmhg (goal <140/90)  · LE edema 2+  · Diuretics declines diuretics, he prefers to f/u low sodium diet   · Recommend low sodium diet   · Advised to good control of BP to prevent progression of CKD     #Acid base disorder  · Serum bicarb 28mmol/L  at goal >19  · metabolic alkalosis possibly secondary to compensation in the settings of ARACELIS and COPD  · Normal AG     #CKD-MBD  · Calcium 9 6mg/dL  · Phosphorus 2 8mg/DL at goal  (goal <5 5)  · 25-OH vitamin D 31, at goal  (goal >30ng/ml) by KDIGO guidelines 2017  · iPTH 43 3 guidelines levels KDIGO 2017     #Anemia/thrombocytopenia  · Current hemoglobin:16 above goal  · Platelets 341 f/u by hem possible ITP  · Hepato/splenomegaly     #hyperuricemia  · Uric shaniqua 9 3>>7 8 , improving   · On Allopurinol and colchicine  · Know to avodi NSAIDs  · Multiple episodes of gout     #Obesity  · Recommend daily exercise 20-30 min a day  · Healthy diet   · Weight loss    Patient prefers to follow up with hid PCP      SUBJECTIVE / INTERVAL HISTORY:  73 y o  male presents in follow up of CKD  Patient feels well, no recent hospitalization, no SOB, no CP  No events since las visit    Obi Clinecher denies any recent illness/hospitalizations/medication changes since last office visit  Review of Systems   Constitutional: Negative for activity change  HENT: Negative for congestion  Eyes: Negative for photophobia  Respiratory: Negative for apnea  Cardiovascular: Positive for leg swelling  Gastrointestinal: Negative for abdominal distention and abdominal pain  Endocrine: Negative for polydipsia  Genitourinary: Negative for difficulty urinating and dysuria  Musculoskeletal: Negative for arthralgias  Skin: Negative for rash  Neurological: Negative for dizziness  Psychiatric/Behavioral: Negative for agitation         OBJECTIVE:  /80 (BP Location: Right arm)   Ht 6' 3" (1 905 m) Wt (!) 186 kg (409 lb)   BMI 51 12 kg/m²  Body mass index is 51 12 kg/m²      Physical exam:  Physical Exam    General:  Obese, no acute distress at this time  Skin:  No acute rash  Eyes:  No scleral icterus and noninjectedt  Neck:  no jugular venous distention, no carotid bruits  Chest:  Clear to auscultation percussion, good respiratory effort, no use of accessory respiratory muscles  CVS:  Regular rate and rhythm without a murmur rub    Abdomen:   soft and nontender   Extremities:  LE edema 1+  Neuro:  No gross focality  Psych:  Alert ,cooperative       Medications:    Current Outpatient Medications:     albuterol (2 5 mg/3 mL) 0 083 % nebulizer solution, Take 1 vial (2 5 mg total) by nebulization every 6 (six) hours as needed for wheezing, Disp: 50 vial, Rfl: 3    albuterol (PROVENTIL HFA,VENTOLIN HFA) 90 mcg/act inhaler, INHALE 2 PUFFS EVERY 4 (FOUR) HOURS AS NEEDED FOR SHORTNESS OF BREATH, Disp: 8 5 g, Rfl: 5    allopurinol (ZYLOPRIM) 100 mg tablet, Take 1 tablet (100 mg total) by mouth 2 (two) times a day, Disp: 60 tablet, Rfl: 5    betamethasone, augmented, (DIPROLENE-AF) 0 05 % cream, APPLY TO AFFECTED AREA TWICE A DAY, Disp: 50 g, Rfl: 2    Cholecalciferol (VITAMIN D3) 1000 units CAPS, Take by mouth, Disp: , Rfl:     colchicine (COLCRYS) 0 6 mg tablet, Take  One  Po  Every f  4-6  Hours  Prn  Gout , Disp: 30 tablet, Rfl: 5    guaiFENesin (MUCINEX) 600 mg 12 hr tablet, Take 1,200 mg by mouth daily , Disp: , Rfl:     imiquimod (ALDARA) 5 % cream, APPLY 1 PACKET TOPICALLY 3 (THREE) TIMES A WEEK, Disp: 12 packet, Rfl: 0    Multiple Vitamins tablet, Take 1 tablet by mouth daily, Disp: , Rfl:     Multiple Vitamins-Minerals (ZINC PO), Take by mouth daily, Disp: , Rfl:     tamsulosin (Flomax) 0 4 mg, Take 1 capsule (0 4 mg total) by mouth daily with dinner, Disp: 90 capsule, Rfl: 3    Trelegy Ellipta 100-62 5-25 MCG/INH inhaler, INHALE 1 PUFF DAILY RINSE MOUTH AFTER USE , Disp: 60 each, Rfl: 5    Allergies: Allergies as of 01/26/2022 - Reviewed 01/26/2022   Allergen Reaction Noted    Darvon [propoxyphene] Other (See Comments) 02/08/2017       The following portions of the patient's history were reviewed and updated as appropriate: past family history, past surgical history and problem list     Laboratory Results:  Lab Results   Component Value Date    SODIUM 141 01/05/2022    K 4 1 01/05/2022     01/05/2022    CO2 28 01/05/2022    BUN 21 01/05/2022    CREATININE 1 63 (H) 01/05/2022    GLUC 99 09/05/2019    CALCIUM 9 6 01/05/2022        Lab Results   Component Value Date    PTH 43 3 01/05/2022    CALCIUM 9 6 01/05/2022    PHOS 2 8 01/05/2022       Portions of the record may have been created with voice recognition software  Occasional wrong word or "sound a like" substitutions may have occurred due to the inherent limitations of voice recognition software  Read the chart carefully and recognize, using context, where substitutions have occurred

## 2022-01-26 NOTE — PATIENT INSTRUCTIONS
Thank you for coming to your visit today  As we discussed you kidney function is abnormal but stable, your creatinine is 1 6mg/dL Your electrolytes are normal  Please follow the recommendations below        Recommend low sodium (salt) food     Avoid nonsteroidal anti-inflammatory drugs such as Naprosyn, ibuprofen, Aleve, Advil, Celebrex, Meloxicam (Mobic) etc   You can use Tylenol as needed if you do not have any liver condition to be concerned about     Try to avoid medications such as pantoprazole or  Protonix/Nexium or Esomeprazole)/Prilosec or omeprazole/Prevacid or lansoprazole/AcipHex or Rabeprazole  If you are able to, use Pepcid as this is safer for your kidneys       Try to exercise at least 30 minutes 3 days a week to begin with with an ultimate goal of 5 days a week for at least 30 minutes     Try to lose 5-10 lb by your next visit    Next Visit in 1 year with results   If you need to see us earlier we can change the appointment for you      Elvira Vaqsuez MD  Nephrology Attending

## 2022-02-01 ENCOUNTER — PREP FOR PROCEDURE (OUTPATIENT)
Dept: GASTROENTEROLOGY | Facility: CLINIC | Age: 67
End: 2022-02-01

## 2022-02-01 ENCOUNTER — TELEPHONE (OUTPATIENT)
Dept: GASTROENTEROLOGY | Facility: CLINIC | Age: 67
End: 2022-02-01

## 2022-02-01 DIAGNOSIS — Z86.010 HISTORY OF COLON POLYPS: Primary | ICD-10-CM

## 2022-02-01 NOTE — TELEPHONE ENCOUNTER
Scheduled date of colonoscopy (as of today):03/22/22  Physician performing colonoscopy:Dr Catracho Youngblood  Location of colonoscopy:[facility]  Bowel prep reviewed with patient:2 days clear liq, miralax, dul  Instructions reviewed with patient by:rudolph  Clearances: n/a

## 2022-02-11 DIAGNOSIS — E79.0 HYPERURICEMIA: ICD-10-CM

## 2022-02-11 RX ORDER — ALLOPURINOL 100 MG/1
TABLET ORAL
Qty: 180 TABLET | Refills: 1 | Status: SHIPPED | OUTPATIENT
Start: 2022-02-11

## 2022-02-25 ENCOUNTER — RA CDI HCC (OUTPATIENT)
Dept: OTHER | Facility: HOSPITAL | Age: 67
End: 2022-02-25

## 2022-02-25 NOTE — PROGRESS NOTES
[facility]  coding opportunities             Chart Reviewed * (Number of) Inbasket suggestions sent to Provider: 1      I70 0 Atherosclerosis of aorta noted in 5/9/21 imaging study            Patients insurance company: [employer] (Medicare Advantage and Commercial)

## 2022-03-03 ENCOUNTER — OFFICE VISIT (OUTPATIENT)
Dept: FAMILY MEDICINE CLINIC | Facility: CLINIC | Age: 67
End: 2022-03-03
Payer: COMMERCIAL

## 2022-03-03 VITALS
TEMPERATURE: 98.6 F | HEART RATE: 122 BPM | DIASTOLIC BLOOD PRESSURE: 74 MMHG | WEIGHT: 315 LBS | BODY MASS INDEX: 42.66 KG/M2 | OXYGEN SATURATION: 93 % | SYSTOLIC BLOOD PRESSURE: 128 MMHG | HEIGHT: 72 IN

## 2022-03-03 DIAGNOSIS — D69.3 CHRONIC ITP (IDIOPATHIC THROMBOCYTOPENIA) (HCC): ICD-10-CM

## 2022-03-03 DIAGNOSIS — E66.01 MORBID OBESITY WITH BMI OF 50.0-59.9, ADULT (HCC): ICD-10-CM

## 2022-03-03 DIAGNOSIS — R60.0 EDEMA OF BOTH LOWER LEGS: ICD-10-CM

## 2022-03-03 DIAGNOSIS — E78.6 HYPERLIPIDEMIA WITH LOW HDL: ICD-10-CM

## 2022-03-03 DIAGNOSIS — N18.30 STAGE 3 CHRONIC KIDNEY DISEASE, UNSPECIFIED WHETHER STAGE 3A OR 3B CKD (HCC): ICD-10-CM

## 2022-03-03 DIAGNOSIS — Z99.89 OSA ON CPAP: ICD-10-CM

## 2022-03-03 DIAGNOSIS — Z12.5 SCREENING FOR PROSTATE CANCER: ICD-10-CM

## 2022-03-03 DIAGNOSIS — G47.33 OSA ON CPAP: ICD-10-CM

## 2022-03-03 DIAGNOSIS — E78.5 HYPERLIPIDEMIA WITH LOW HDL: ICD-10-CM

## 2022-03-03 DIAGNOSIS — E79.0 HYPERURICEMIA: ICD-10-CM

## 2022-03-03 DIAGNOSIS — J44.9 COPD, SEVERE (HCC): Primary | ICD-10-CM

## 2022-03-03 PROCEDURE — 3008F BODY MASS INDEX DOCD: CPT | Performed by: PHYSICIAN ASSISTANT

## 2022-03-03 PROCEDURE — 99214 OFFICE O/P EST MOD 30 MIN: CPT | Performed by: PHYSICIAN ASSISTANT

## 2022-03-03 PROCEDURE — 3725F SCREEN DEPRESSION PERFORMED: CPT | Performed by: PHYSICIAN ASSISTANT

## 2022-03-03 PROCEDURE — 1160F RVW MEDS BY RX/DR IN RCRD: CPT | Performed by: PHYSICIAN ASSISTANT

## 2022-03-03 PROCEDURE — 3288F FALL RISK ASSESSMENT DOCD: CPT | Performed by: PHYSICIAN ASSISTANT

## 2022-03-03 PROCEDURE — 1036F TOBACCO NON-USER: CPT | Performed by: PHYSICIAN ASSISTANT

## 2022-03-03 PROCEDURE — 1101F PT FALLS ASSESS-DOCD LE1/YR: CPT | Performed by: PHYSICIAN ASSISTANT

## 2022-03-03 NOTE — PROGRESS NOTES
Assessment/Plan:     Diagnoses and all orders for this visit:    COPD, severe ([facility] 75 )  Comments:  S chronic but stable    ARACELIS on CPAP    Chronic ITP (idiopathic thrombocytopenia) ([facility])  Comments:  Platelets are stable  Orders:  -     CBC and differential; Future    Stage 3 chronic kidney disease, unspecified whether stage 3a or 3b CKD ([facility] 75 )  Comments:  Renal functions are stable  No over-the-counter NSAIDs    Morbid obesity with BMI of 50 0-59 9, adult ([facility] 75 )    Hyperlipidemia with low HDL  Comments:  Continue low-fat diet  Orders:  -     Comprehensive metabolic panel; Future  -     Lipid panel; Future    Edema of both lower legs  Comments: This has improved with the restriction of salt    Hyperuricemia  Comments: This is stable continue allopurinol 100 mg  Orders:  -     Uric acid; Future    Screening for prostate cancer  -     PSA, Total Screen; Future          Subjective:      Patient ID: Rosario Puente is a 73 y o  male  Patient presents in the office for follow up chronic conditions  Patient has advanced COPD  He does follow with pulmonology  He is due for his lung screening CT scan in May  He uses Trelegy daily and Proventil if needed  He also has a nebulizer  Patient also has CPAP machine for his obstructive sleep apnea  Patient has hereditary thrombocytopenia  His platelet level is stable  He has chronic kidney disease stage 3  Renal functions are stable  Lipids are diet controlled  History of gout on allopurinol 100 mg  Recent uric acid level was normal   No recent flare-ups of gout symptoms  Patient also has BPH on Flomax 0 4  Patient also has severe osteoarthritis of his knees        The following portions of the patient's history were reviewed and updated as appropriate:   He   Patient Active Problem List    Diagnosis Date Noted    Colon polyps 12/03/2021    Skin lesion of right arm 12/03/2021    Internal hemorrhoids 12/01/2021    Gallstone 12/01/2021    Morbid obesity with BMI of 50 0-59 9, adult ([place] [address] ) 09/14/2021    Chronic ITP (idiopathic thrombocytopenia) ([facility]) 07/07/2021    Hyperuricemia 06/17/2021    Cigarette nicotine dependence in remission 06/15/2021    Edema of both lower legs 06/14/2021    Acute gout due to renal impairment 06/14/2021    History of 2019 novel coronavirus disease (COVID-19) 02/17/2021    BPH associated with nocturia 11/17/2020    GARNETT (dyspnea on exertion) 06/03/2020    Splenomegaly 08/27/2019    Screening for prostate cancer 01/22/2019    History of genital warts 06/06/2018    Skin lesion 05/24/2017    CKD (chronic kidney disease) stage 3, GFR 30-59 ml/min ([facility]) 02/20/2017    ARACELIS on CPAP 07/22/2016    Nocturnal hypoxemia 04/01/2016    Hyperlipidemia with low HDL 11/19/2015    Arthritis 08/20/2015    Moderate persistent asthma 08/20/2015    Thrombocytopenia ([place] [address] ) 12/11/2014    Sleep disorder 08/24/2012    Vitamin D deficiency 08/24/2012    COPD, severe ([place] [address] ) 07/12/2012     Current Outpatient Medications   Medication Sig Dispense Refill    albuterol (2 5 mg/3 mL) 0 083 % nebulizer solution Take 1 vial (2 5 mg total) by nebulization every 6 (six) hours as needed for wheezing 50 vial 3    albuterol (PROVENTIL HFA,VENTOLIN HFA) 90 mcg/act inhaler INHALE 2 PUFFS EVERY 4 (FOUR) HOURS AS NEEDED FOR SHORTNESS OF BREATH 8 5 g 5    allopurinol (ZYLOPRIM) 100 mg tablet TAKE 1 TABLET BY MOUTH TWICE A  tablet 1    betamethasone, augmented, (DIPROLENE-AF) 0 05 % cream APPLY TO AFFECTED AREA TWICE A DAY 50 g 2    Cholecalciferol (VITAMIN D3) 1000 units CAPS Take by mouth      colchicine (COLCRYS) 0 6 mg tablet Take  One  Po  Every f  4-6  Hours  Prn  Gout   30 tablet 5    guaiFENesin (MUCINEX) 600 mg 12 hr tablet Take 1,200 mg by mouth daily       imiquimod (ALDARA) 5 % cream APPLY 1 PACKET TOPICALLY 3 (THREE) TIMES A WEEK 12 packet 0    Multiple Vitamins tablet Take 1 tablet by mouth daily      Multiple Vitamins-Minerals (ZINC PO) Take by mouth daily      tamsulosin (Flomax) 0 4 mg Take 1 capsule (0 4 mg total) by mouth daily with dinner 90 capsule 3    Trelegy Ellipta 100-62 5-25 MCG/INH inhaler INHALE 1 PUFF DAILY RINSE MOUTH AFTER USE  60 each 5     No current facility-administered medications for this visit  He is allergic to darvon [propoxyphene]       Review of Systems   Constitutional: Negative for activity change, appetite change, chills, fatigue and fever  HENT: Negative for ear pain and sore throat  Eyes: Negative for visual disturbance  Respiratory: Positive for shortness of breath  Negative for cough  Cardiovascular: Negative for chest pain and palpitations  Gastrointestinal: Positive for constipation  Negative for abdominal pain, blood in stool, diarrhea and nausea  Genitourinary: Negative for difficulty urinating  Nocturia     Musculoskeletal: Positive for arthralgias and gait problem  Negative for back pain and myalgias  Skin: Negative for rash  Neurological: Negative for dizziness, syncope and headaches  Psychiatric/Behavioral: Negative for sleep disturbance  Objective:        Physical Exam  Vitals and nursing note reviewed  Constitutional:       General: He is not in acute distress  Appearance: Normal appearance  He is obese  He is not ill-appearing  Comments: Gait   Abnormal     HENT:      Head: Normocephalic and atraumatic  Right Ear: Tympanic membrane, ear canal and external ear normal       Left Ear: Tympanic membrane, ear canal and external ear normal    Eyes:      Conjunctiva/sclera: Conjunctivae normal    Neck:      Vascular: No carotid bruit  Cardiovascular:      Rate and Rhythm: Normal rate and regular rhythm  Heart sounds: Normal heart sounds  Pulmonary:      Effort: Pulmonary effort is normal       Breath sounds: Normal breath sounds  Musculoskeletal:      Right lower leg: Edema present  Left lower leg: Edema present        Comments: nonpitting Lymphadenopathy:      Cervical: No cervical adenopathy  Skin:     General: Skin is warm and dry  Coloration: Skin is not pale  Findings: No erythema  Neurological:      General: No focal deficit present  Mental Status: He is alert and oriented to person, place, and time  Psychiatric:         Mood and Affect: Mood normal          Behavior: Behavior normal          Thought Content:  Thought content normal          Judgment: Judgment normal

## 2022-03-14 DIAGNOSIS — L20.9 ATOPIC DERMATITIS, UNSPECIFIED TYPE: ICD-10-CM

## 2022-03-14 RX ORDER — BETAMETHASONE DIPROPIONATE 0.5 MG/G
1 CREAM TOPICAL 2 TIMES DAILY
Qty: 50 G | Refills: 2 | Status: SHIPPED | OUTPATIENT
Start: 2022-03-14

## 2022-03-22 ENCOUNTER — ANESTHESIA (OUTPATIENT)
Dept: GASTROENTEROLOGY | Facility: HOSPITAL | Age: 67
End: 2022-03-22

## 2022-03-22 ENCOUNTER — HOSPITAL ENCOUNTER (OUTPATIENT)
Dept: GASTROENTEROLOGY | Facility: HOSPITAL | Age: 67
Setting detail: OUTPATIENT SURGERY
Discharge: HOME | End: 2022-03-22
Attending: INTERNAL MEDICINE
Payer: COMMERCIAL

## 2022-03-22 ENCOUNTER — ANESTHESIA EVENT (OUTPATIENT)
Dept: GASTROENTEROLOGY | Facility: HOSPITAL | Age: 67
End: 2022-03-22

## 2022-03-22 VITALS
RESPIRATION RATE: 18 BRPM | DIASTOLIC BLOOD PRESSURE: 57 MMHG | OXYGEN SATURATION: 98 % | TEMPERATURE: 97.6 F | HEIGHT: 74 IN | WEIGHT: 315 LBS | SYSTOLIC BLOOD PRESSURE: 98 MMHG | BODY MASS INDEX: 40.43 KG/M2 | HEART RATE: 85 BPM

## 2022-03-22 DIAGNOSIS — Z86.010 HISTORY OF COLON POLYPS: ICD-10-CM

## 2022-03-22 PROCEDURE — 45385 COLONOSCOPY W/LESION REMOVAL: CPT | Performed by: INTERNAL MEDICINE

## 2022-03-22 PROCEDURE — 88305 TISSUE EXAM BY PATHOLOGIST: CPT | Performed by: PATHOLOGY

## 2022-03-22 RX ORDER — SODIUM CHLORIDE, SODIUM LACTATE, POTASSIUM CHLORIDE, CALCIUM CHLORIDE 600; 310; 30; 20 MG/100ML; MG/100ML; MG/100ML; MG/100ML
INJECTION, SOLUTION INTRAVENOUS CONTINUOUS PRN
Status: DISCONTINUED | OUTPATIENT
Start: 2022-03-22 | End: 2022-03-22

## 2022-03-22 RX ORDER — KETAMINE HYDROCHLORIDE 50 MG/ML
INJECTION, SOLUTION, CONCENTRATE INTRAMUSCULAR; INTRAVENOUS AS NEEDED
Status: DISCONTINUED | OUTPATIENT
Start: 2022-03-22 | End: 2022-03-22

## 2022-03-22 RX ORDER — PROPOFOL 10 MG/ML
INJECTION, EMULSION INTRAVENOUS AS NEEDED
Status: DISCONTINUED | OUTPATIENT
Start: 2022-03-22 | End: 2022-03-22

## 2022-03-22 RX ORDER — IPRATROPIUM BROMIDE AND ALBUTEROL SULFATE 2.5; .5 MG/3ML; MG/3ML
3 SOLUTION RESPIRATORY (INHALATION) ONCE
Status: COMPLETED | OUTPATIENT
Start: 2022-03-22 | End: 2022-03-22

## 2022-03-22 RX ORDER — SODIUM CHLORIDE, SODIUM LACTATE, POTASSIUM CHLORIDE, CALCIUM CHLORIDE 600; 310; 30; 20 MG/100ML; MG/100ML; MG/100ML; MG/100ML
75 INJECTION, SOLUTION INTRAVENOUS CONTINUOUS
Status: DISCONTINUED | OUTPATIENT
Start: 2022-03-22 | End: 2022-03-26 | Stop reason: HOSPADM

## 2022-03-22 RX ORDER — LIDOCAINE HYDROCHLORIDE 10 MG/ML
INJECTION, SOLUTION EPIDURAL; INFILTRATION; INTRACAUDAL; PERINEURAL AS NEEDED
Status: DISCONTINUED | OUTPATIENT
Start: 2022-03-22 | End: 2022-03-22

## 2022-03-22 RX ADMIN — PROPOFOL 40 MG: 10 INJECTION, EMULSION INTRAVENOUS at 12:12

## 2022-03-22 RX ADMIN — PROPOFOL 30 MG: 10 INJECTION, EMULSION INTRAVENOUS at 12:22

## 2022-03-22 RX ADMIN — PROPOFOL 20 MG: 10 INJECTION, EMULSION INTRAVENOUS at 13:01

## 2022-03-22 RX ADMIN — PROPOFOL 20 MG: 10 INJECTION, EMULSION INTRAVENOUS at 12:33

## 2022-03-22 RX ADMIN — PROPOFOL 20 MG: 10 INJECTION, EMULSION INTRAVENOUS at 13:04

## 2022-03-22 RX ADMIN — PROPOFOL 20 MG: 10 INJECTION, EMULSION INTRAVENOUS at 12:46

## 2022-03-22 RX ADMIN — PROPOFOL 20 MG: 10 INJECTION, EMULSION INTRAVENOUS at 12:50

## 2022-03-22 RX ADMIN — PROPOFOL 20 MG: 10 INJECTION, EMULSION INTRAVENOUS at 12:30

## 2022-03-22 RX ADMIN — PROPOFOL 20 MG: 10 INJECTION, EMULSION INTRAVENOUS at 12:53

## 2022-03-22 RX ADMIN — PROPOFOL 20 MG: 10 INJECTION, EMULSION INTRAVENOUS at 12:58

## 2022-03-22 RX ADMIN — PROPOFOL 30 MG: 10 INJECTION, EMULSION INTRAVENOUS at 12:48

## 2022-03-22 RX ADMIN — PROPOFOL 60 MG: 10 INJECTION, EMULSION INTRAVENOUS at 12:09

## 2022-03-22 RX ADMIN — PROPOFOL 20 MG: 10 INJECTION, EMULSION INTRAVENOUS at 12:44

## 2022-03-22 RX ADMIN — KETAMINE HYDROCHLORIDE 10 MG: 50 INJECTION INTRAMUSCULAR; INTRAVENOUS at 12:14

## 2022-03-22 RX ADMIN — KETAMINE HYDROCHLORIDE 10 MG: 50 INJECTION INTRAMUSCULAR; INTRAVENOUS at 12:09

## 2022-03-22 RX ADMIN — IPRATROPIUM BROMIDE AND ALBUTEROL SULFATE 3 ML: 2.5; .5 SOLUTION RESPIRATORY (INHALATION) at 11:37

## 2022-03-22 RX ADMIN — PROPOFOL 50 MG: 10 INJECTION, EMULSION INTRAVENOUS at 12:19

## 2022-03-22 RX ADMIN — PROPOFOL 20 MG: 10 INJECTION, EMULSION INTRAVENOUS at 12:37

## 2022-03-22 RX ADMIN — PROPOFOL 20 MG: 10 INJECTION, EMULSION INTRAVENOUS at 12:41

## 2022-03-22 RX ADMIN — PROPOFOL 20 MG: 10 INJECTION, EMULSION INTRAVENOUS at 13:07

## 2022-03-22 RX ADMIN — LIDOCAINE HYDROCHLORIDE 50 MG: 10 INJECTION, SOLUTION EPIDURAL; INFILTRATION; INTRACAUDAL; PERINEURAL at 12:09

## 2022-03-22 RX ADMIN — PROPOFOL 20 MG: 10 INJECTION, EMULSION INTRAVENOUS at 12:52

## 2022-03-22 RX ADMIN — PROPOFOL 20 MG: 10 INJECTION, EMULSION INTRAVENOUS at 12:24

## 2022-03-22 RX ADMIN — PROPOFOL 40 MG: 10 INJECTION, EMULSION INTRAVENOUS at 13:02

## 2022-03-22 RX ADMIN — SODIUM CHLORIDE, POTASSIUM CHLORIDE, SODIUM LACTATE AND CALCIUM CHLORIDE 75 ML/HR: 600; 310; 30; 20 INJECTION, SOLUTION INTRAVENOUS at 11:11

## 2022-03-22 RX ADMIN — KETAMINE HYDROCHLORIDE 10 MG: 50 INJECTION INTRAMUSCULAR; INTRAVENOUS at 12:55

## 2022-03-22 RX ADMIN — PROPOFOL 40 MG: 10 INJECTION, EMULSION INTRAVENOUS at 13:00

## 2022-03-22 RX ADMIN — KETAMINE HYDROCHLORIDE 10 MG: 50 INJECTION INTRAMUSCULAR; INTRAVENOUS at 12:41

## 2022-03-22 RX ADMIN — PROPOFOL 50 MG: 10 INJECTION, EMULSION INTRAVENOUS at 12:55

## 2022-03-22 RX ADMIN — SODIUM CHLORIDE, SODIUM LACTATE, POTASSIUM CHLORIDE, AND CALCIUM CHLORIDE: .6; .31; .03; .02 INJECTION, SOLUTION INTRAVENOUS at 12:05

## 2022-03-22 RX ADMIN — PROPOFOL 30 MG: 10 INJECTION, EMULSION INTRAVENOUS at 12:26

## 2022-03-22 RX ADMIN — PROPOFOL 50 MG: 10 INJECTION, EMULSION INTRAVENOUS at 12:16

## 2022-03-22 NOTE — ANESTHESIA PREPROCEDURE EVALUATION
Procedure:  COLONOSCOPY    Hx of severe COPD and mod persistent Asthma    ARACELIS on CPAP at home    Relevant Problems   CARDIO   (+) GARNETT (dyspnea on exertion)   (+) Hyperlipidemia with low HDL      /RENAL   (+) CKD (chronic kidney disease) stage 3, GFR 30-59 ml/min ([facility])      HEMATOLOGY   (+) Chronic ITP (idiopathic thrombocytopenia) ([facility])   (+) Thrombocytopenia (HCC)      MUSCULOSKELETAL   (+) Acute gout due to renal impairment   (+) Arthritis      NEURO/PSYCH   (+) History of 2019 novel coronavirus disease (COVID-19)   (+) History of genital warts      PULMONARY   (+) COPD, severe (HCC)   (+) GARNETT (dyspnea on exertion)   (+) Moderate persistent asthma   (+) ARACELIS on CPAP      Other   (+) Chronic ITP (idiopathic thrombocytopenia) ([facility])   (+) Splenomegaly        Physical Exam    Airway    Mallampati score: II  TM Distance: >3 FB  Neck ROM: full     Dental   No notable dental hx     Cardiovascular  Rhythm: regular, Rate: normal, Cardiovascular exam normal    Pulmonary  Pulmonary exam normal Decreased breath sounds, Wheezes,     Other Findings        Anesthesia Plan  ASA Score- 4     Anesthesia Type- IV sedation with anesthesia with ASA Monitors  Additional Monitors:   Airway Plan:     Comment: Discussed risks/benefits, including medication reactions, awareness, aspiration, and serious/life threatening complications  Plan to maintain native airway with IVGA, monitored with EtCO2  Plan Factors-Exercise tolerance (METS): <4 METS  Patient summary reviewed  Patient instructed to abstain from smoking on day of procedure  Patient did not smoke on day of surgery  Induction- intravenous  Postoperative Plan-     Informed Consent- Anesthetic plan and risks discussed with patient  I personally reviewed this patient with the CRNA  Discussed and agreed on the Anesthesia Plan with the CRNA  Karrie Nissen

## 2022-03-22 NOTE — ANESTHESIA POSTPROCEDURE EVALUATION
Post-Op Assessment Note    CV Status:  Stable    Pain management: adequate     Mental Status:  Alert and awake   Hydration Status:  Euvolemic   PONV Controlled:  Controlled   Airway Patency:  Patent      Post Op Vitals Reviewed: Yes      Staff: Anesthesiologist, CRNA         No complications documented      BP   105/57   Temp 98 2   Pulse 67   Resp 14   SpO2 98% on RA

## 2022-03-22 NOTE — H&P
History and Physical - [facility]  Fran Bautista 73 y o  male MRN: [id number]                  HPI: Fran Bautista is a 73y o  year old male who presents for colonoscopy  Ten polyps and poor prep in October of last year  REVIEW OF SYSTEMS: Per the HPI, and otherwise unremarkable      Historical Information   Past Medical History:   Diagnosis Date    Arthritis     Asthma     Chest pain     atypical    Chronic kidney disease     Colon polyp     COPD (chronic obstructive pulmonary disease) (HCC)     CPAP (continuous positive airway pressure) dependence     Decreased glomerular filtration rate     Hamstring strain     Herniated lumbar intervertebral disc     History of alcohol use     uncomplicated    History of back pain     lower    History of colon polyps     sigmoid    History of genital warts     History of lipoma     History of muscle spasm     lumbar paraspinous muscle    History of umbilical hernia     Lateral pain of left hip     Low HDL (under 40)     Lumbar radiculopathy     Obesity     Respiratory distress     acute    Shortness of breath     Sleep apnea     CPAP PT WILL BRING     Past Surgical History:   Procedure Laterality Date    ADENOIDECTOMY      APPENDECTOMY      COLONOSCOPY      HAND SURGERY Left     CYST REMOVAL    HIP SURGERY Bilateral     LIPECTOMY      thigh    LIPOMA RESECTION Left     HIP    NM REPAIR UMBILICAL UELH,6+T/N,DBXNE N/A 2/23/2017    Procedure: REPAIR HERNIA UMBILICAL;  Surgeon: Ashley Bustillos MD;  Location: [facility];  Service: General    TONSILLECTOMY      WRIST GANGLION EXCISION       Social History   Social History     Substance and Sexual Activity   Alcohol Use No    Comment: recovering alcoholic     Social History     Substance and Sexual Activity   Drug Use No     Social History     Tobacco Use   Smoking Status Former Smoker    Packs/day: 2 00    Years: 25 00    Pack years: 50 00    Types: Cigarettes  Start date: 0    Quit date: 10/2014    Years since quittin 4   Smokeless Tobacco Never Used     Family History   Problem Relation Age of Onset    Heart disease Mother         cardiac disorder    Hypertension Mother     COPD Father     Heart disease Family         cardiac disorder    Cancer Family         lung    Emphysema Family         pulmonary unspecified emphysema    Diabetes Family     Glaucoma Family     Cancer Family         lung       Meds/Allergies       Current Outpatient Medications:     albuterol (PROVENTIL HFA,VENTOLIN HFA) 90 mcg/act inhaler    allopurinol (ZYLOPRIM) 100 mg tablet    betamethasone, augmented, (DIPROLENE-AF) 0 05 % cream    Cholecalciferol (VITAMIN D3) 1000 units CAPS    guaiFENesin (MUCINEX) 600 mg 12 hr tablet    imiquimod (ALDARA) 5 % cream    Multiple Vitamins tablet    tamsulosin (Flomax) 0 4 mg    Trelegy Ellipta 100-62 5-25 MCG/INH inhaler    albuterol (2 5 mg/3 mL) 0 083 % nebulizer solution    colchicine (COLCRYS) 0 6 mg tablet    Multiple Vitamins-Minerals (ZINC PO)    Current Facility-Administered Medications:     ipratropium-albuterol (DUO-NEB) 0 5-2 5 mg/3 mL inhalation solution 3 mL, 3 mL, Nebulization, Once    lactated ringers infusion, 75 mL/hr, Intravenous, Continuous, 75 mL/hr at 22 1111    Allergies   Allergen Reactions    Darvon [Propoxyphene] Other (See Comments)     hallucinations       Objective     /63   Pulse 94   Temp 98 5 °F (36 9 °C) (Temporal)   Resp 17   Ht 6' 2" (1 88 m)   Wt (!) 179 kg (394 lb 9 6 oz)   SpO2 94%   BMI 50 66 kg/m²       PHYSICAL EXAM    Gen: NAD  Head: NCAT  CV: RRR  CHEST: Clear  ABD: soft, NT/ND  EXT: no edema      ASSESSMENT/PLAN:  This is a 73y o  year old male here for colonoscopy with extended time slot  , and he is stable and optimized for his procedure

## 2022-04-04 ENCOUNTER — TELEPHONE (OUTPATIENT)
Dept: GASTROENTEROLOGY | Facility: CLINIC | Age: 67
End: 2022-04-04

## 2022-04-26 ENCOUNTER — TELEPHONE (OUTPATIENT)
Dept: HEMATOLOGY ONCOLOGY | Facility: CLINIC | Age: 67
End: 2022-04-26

## 2022-07-05 DIAGNOSIS — J44.9 COPD, SEVERE (HCC): ICD-10-CM

## 2022-07-15 ENCOUNTER — TELEPHONE (OUTPATIENT)
Dept: NEPHROLOGY | Facility: CLINIC | Age: 67
End: 2022-07-15

## 2022-07-15 NOTE — TELEPHONE ENCOUNTER
Called pt to schedule osmani f/u with Dr Valdemar Berger  Pt answered and stated he has too many Dr appointments and does not want to reschedule this appointment and would like to discontinue coming

## 2022-07-21 ENCOUNTER — APPOINTMENT (OUTPATIENT)
Dept: LAB | Facility: MEDICAL CENTER | Age: 67
End: 2022-07-21
Payer: COMMERCIAL

## 2022-07-21 DIAGNOSIS — E78.6 HYPERLIPIDEMIA WITH LOW HDL: ICD-10-CM

## 2022-07-21 DIAGNOSIS — E78.5 HYPERLIPIDEMIA WITH LOW HDL: ICD-10-CM

## 2022-07-21 DIAGNOSIS — D69.3 CHRONIC ITP (IDIOPATHIC THROMBOCYTOPENIA) (HCC): ICD-10-CM

## 2022-07-21 DIAGNOSIS — Z12.5 SCREENING FOR PROSTATE CANCER: ICD-10-CM

## 2022-07-21 DIAGNOSIS — E79.0 HYPERURICEMIA: ICD-10-CM

## 2022-07-21 LAB
ALBUMIN SERPL BCP-MCNC: 3.8 G/DL (ref 3.5–5)
ALP SERPL-CCNC: 96 U/L (ref 46–116)
ALT SERPL W P-5'-P-CCNC: 40 U/L (ref 12–78)
ANION GAP SERPL CALCULATED.3IONS-SCNC: 7 MMOL/L (ref 4–13)
AST SERPL W P-5'-P-CCNC: 30 U/L (ref 5–45)
BASOPHILS # BLD AUTO: 0.05 THOUSANDS/ΜL (ref 0–0.1)
BASOPHILS NFR BLD AUTO: 1 % (ref 0–1)
BILIRUB SERPL-MCNC: 0.69 MG/DL (ref 0.2–1)
BUN SERPL-MCNC: 22 MG/DL (ref 5–25)
CALCIUM SERPL-MCNC: 9.1 MG/DL (ref 8.3–10.1)
CHLORIDE SERPL-SCNC: 110 MMOL/L (ref 96–108)
CHOLEST SERPL-MCNC: 138 MG/DL
CO2 SERPL-SCNC: 25 MMOL/L (ref 21–32)
CREAT SERPL-MCNC: 1.57 MG/DL (ref 0.6–1.3)
EOSINOPHIL # BLD AUTO: 0.28 THOUSAND/ΜL (ref 0–0.61)
EOSINOPHIL NFR BLD AUTO: 5 % (ref 0–6)
ERYTHROCYTE [DISTWIDTH] IN BLOOD BY AUTOMATED COUNT: 14.2 % (ref 11.6–15.1)
GFR SERPL CREATININE-BSD FRML MDRD: 45 ML/MIN/1.73SQ M
GLUCOSE P FAST SERPL-MCNC: 97 MG/DL (ref 65–99)
HCT VFR BLD AUTO: 47.2 % (ref 36.5–49.3)
HDLC SERPL-MCNC: 27 MG/DL
HGB BLD-MCNC: 15.2 G/DL (ref 12–17)
IMM GRANULOCYTES # BLD AUTO: 0.02 THOUSAND/UL (ref 0–0.2)
IMM GRANULOCYTES NFR BLD AUTO: 0 % (ref 0–2)
LDLC SERPL CALC-MCNC: 82 MG/DL (ref 0–100)
LYMPHOCYTES # BLD AUTO: 1.34 THOUSANDS/ΜL (ref 0.6–4.47)
LYMPHOCYTES NFR BLD AUTO: 22 % (ref 14–44)
MCH RBC QN AUTO: 32.5 PG (ref 26.8–34.3)
MCHC RBC AUTO-ENTMCNC: 32.2 G/DL (ref 31.4–37.4)
MCV RBC AUTO: 101 FL (ref 82–98)
MONOCYTES # BLD AUTO: 0.64 THOUSAND/ΜL (ref 0.17–1.22)
MONOCYTES NFR BLD AUTO: 11 % (ref 4–12)
NEUTROPHILS # BLD AUTO: 3.65 THOUSANDS/ΜL (ref 1.85–7.62)
NEUTS SEG NFR BLD AUTO: 61 % (ref 43–75)
NONHDLC SERPL-MCNC: 111 MG/DL
NRBC BLD AUTO-RTO: 0 /100 WBCS
PLATELET # BLD AUTO: 97 THOUSANDS/UL (ref 149–390)
PMV BLD AUTO: 12 FL (ref 8.9–12.7)
POTASSIUM SERPL-SCNC: 4.2 MMOL/L (ref 3.5–5.3)
PROT SERPL-MCNC: 6.8 G/DL (ref 6.4–8.4)
PSA SERPL-MCNC: 1.8 NG/ML (ref 0–4)
RBC # BLD AUTO: 4.68 MILLION/UL (ref 3.88–5.62)
SODIUM SERPL-SCNC: 142 MMOL/L (ref 135–147)
TRIGL SERPL-MCNC: 147 MG/DL
URATE SERPL-MCNC: 7.4 MG/DL (ref 3.5–8.5)
WBC # BLD AUTO: 5.98 THOUSAND/UL (ref 4.31–10.16)

## 2022-07-21 PROCEDURE — G0103 PSA SCREENING: HCPCS

## 2022-07-21 PROCEDURE — 84550 ASSAY OF BLOOD/URIC ACID: CPT

## 2022-07-21 PROCEDURE — 80061 LIPID PANEL: CPT

## 2022-07-21 PROCEDURE — 36415 COLL VENOUS BLD VENIPUNCTURE: CPT

## 2022-07-21 PROCEDURE — 80053 COMPREHEN METABOLIC PANEL: CPT

## 2022-07-21 PROCEDURE — 85025 COMPLETE CBC W/AUTO DIFF WBC: CPT

## 2022-08-04 ENCOUNTER — RA CDI HCC (OUTPATIENT)
Dept: OTHER | Facility: HOSPITAL | Age: 67
End: 2022-08-04

## 2022-08-04 NOTE — PROGRESS NOTES
I70 0     Chart Reviewed number of suggestions sent to Provider: 1     Patients Insurance     Medicare Insurance: [address] [place] [address]

## 2022-08-09 ENCOUNTER — OFFICE VISIT (OUTPATIENT)
Dept: FAMILY MEDICINE CLINIC | Facility: CLINIC | Age: 67
End: 2022-08-09
Payer: COMMERCIAL

## 2022-08-09 VITALS
SYSTOLIC BLOOD PRESSURE: 150 MMHG | BODY MASS INDEX: 40.43 KG/M2 | HEART RATE: 98 BPM | OXYGEN SATURATION: 96 % | HEIGHT: 74 IN | DIASTOLIC BLOOD PRESSURE: 72 MMHG | WEIGHT: 315 LBS | TEMPERATURE: 97.7 F

## 2022-08-09 DIAGNOSIS — Z87.19 HISTORY OF GALLSTONES: ICD-10-CM

## 2022-08-09 DIAGNOSIS — R35.1 BPH ASSOCIATED WITH NOCTURIA: ICD-10-CM

## 2022-08-09 DIAGNOSIS — J44.9 COPD, SEVERE (HCC): Primary | ICD-10-CM

## 2022-08-09 DIAGNOSIS — E78.6 HYPERLIPIDEMIA WITH LOW HDL: ICD-10-CM

## 2022-08-09 DIAGNOSIS — R10.11 RIGHT UPPER QUADRANT ABDOMINAL PAIN: ICD-10-CM

## 2022-08-09 DIAGNOSIS — Z99.89 OSA ON CPAP: ICD-10-CM

## 2022-08-09 DIAGNOSIS — E78.5 HYPERLIPIDEMIA WITH LOW HDL: ICD-10-CM

## 2022-08-09 DIAGNOSIS — N18.30 STAGE 3 CHRONIC KIDNEY DISEASE, UNSPECIFIED WHETHER STAGE 3A OR 3B CKD (HCC): ICD-10-CM

## 2022-08-09 DIAGNOSIS — D69.3 CHRONIC ITP (IDIOPATHIC THROMBOCYTOPENIA) (HCC): ICD-10-CM

## 2022-08-09 DIAGNOSIS — N40.1 BPH ASSOCIATED WITH NOCTURIA: ICD-10-CM

## 2022-08-09 DIAGNOSIS — E66.01 MORBID OBESITY WITH BMI OF 50.0-59.9, ADULT (HCC): ICD-10-CM

## 2022-08-09 DIAGNOSIS — F17.211 CIGARETTE NICOTINE DEPENDENCE IN REMISSION: ICD-10-CM

## 2022-08-09 DIAGNOSIS — G47.33 OSA ON CPAP: ICD-10-CM

## 2022-08-09 PROCEDURE — 1160F RVW MEDS BY RX/DR IN RCRD: CPT | Performed by: PHYSICIAN ASSISTANT

## 2022-08-09 PROCEDURE — 3725F SCREEN DEPRESSION PERFORMED: CPT | Performed by: PHYSICIAN ASSISTANT

## 2022-08-09 PROCEDURE — 3288F FALL RISK ASSESSMENT DOCD: CPT | Performed by: PHYSICIAN ASSISTANT

## 2022-08-09 PROCEDURE — 99214 OFFICE O/P EST MOD 30 MIN: CPT | Performed by: PHYSICIAN ASSISTANT

## 2022-08-09 PROCEDURE — 1101F PT FALLS ASSESS-DOCD LE1/YR: CPT | Performed by: PHYSICIAN ASSISTANT

## 2022-08-09 RX ORDER — ICOSAPENT ETHYL 1000 MG/1
2 CAPSULE ORAL 2 TIMES DAILY
Qty: 120 CAPSULE | Refills: 1 | Status: SHIPPED | OUTPATIENT
Start: 2022-08-09

## 2022-08-09 NOTE — PROGRESS NOTES
Assessment/Plan:     Diagnoses and all orders for this visit:    COPD, severe ([facility] [address] )  Comments:  Breathing is stable continue current regimen  Follow-up with Pulmonary  Orders:  -     CT lung screening program; Future    ARACELIS on CPAP    Chronic ITP (idiopathic thrombocytopenia) (HCC)  Comments:  Continue to monitor platelet levels    Stage 3 chronic kidney disease, unspecified whether stage 3a or 3b CKD (HCC)  Comments:  No over-the-counter NSAIDs  Renal functions are stable    Morbid obesity with BMI of 50 0-59 9, adult (HCC)  Comments:  Weight loss encouraged    Hyperlipidemia with low HDL  Comments:  Low HDL  Low-fat diet  Orders:  -     Icosapent Ethyl (Vascepa) 1 g CAPS; Take 2 capsules (2 g total) by mouth 2 (two) times a day    BPH associated with nocturia  Comments:  True old with Flomax    Cigarette nicotine dependence in remission  Comments:  CT scan of lungs  Orders:  -     CT lung screening program; Future    Right upper quadrant abdominal pain  Comments:  Ultrasound abdomen may need referral to general surgeon  Orders:  -     US abdomen complete; Future    History of gallstones  -     US abdomen complete; Future          Subjective:      Patient ID: Sue Jaramillo is a 78 y o  male  Patient presents in the office for follow up chronic conditions  Patient has advanced COPD and asthma  He is also on CPAP  He follows with Pulmonary  He is due for his yearly CT scan of his lungs  He is on Trelegy Proventil inhaler and has a nebulizer  He is not on supplemental oxygen  Patient also has chronic kidney disease stage 3  His renal functions are stable  He knows not to take any over-the-counter NSAIDs  He also has hepatomegaly splenomegaly a and decreased platelets  He has been Hematology and this is being observed  Lipids are diet controlled  His total cholesterol is good his LDL is good however his HDL continues to be low at 27  Patient has BPH he is on Flomax 0 4 mg   Patient also has gout he is on allopurinol 100 mg for prevention  Does have culture sent to take if needed  Patient been having right flank pain right below the ribs  He does have a history of incidental finding of a gallstone  if he eats a fatty meal         The following portions of the patient's history were reviewed and updated as appropriate:   He   Patient Active Problem List    Diagnosis Date Noted    Colon polyps 12/03/2021    Skin lesion of right arm 12/03/2021    Internal hemorrhoids 12/01/2021    Gallstone 12/01/2021    Morbid obesity with BMI of 50 0-59 9, adult ([facility] ) 09/14/2021    Chronic ITP (idiopathic thrombocytopenia) ([facility]) 07/07/2021    Hyperuricemia 06/17/2021    Cigarette nicotine dependence in remission 06/15/2021    Edema of both lower legs 06/14/2021    Acute gout due to renal impairment 06/14/2021    History of 2019 novel coronavirus disease (COVID-19) 02/17/2021    BPH associated with nocturia 11/17/2020    GARNETT (dyspnea on exertion) 06/03/2020    Splenomegaly 08/27/2019    Screening for prostate cancer 01/22/2019    History of genital warts 06/06/2018    Skin lesion 05/24/2017    CKD (chronic kidney disease) stage 3, GFR 30-59 ml/min ([facility]) 02/20/2017    ARACELIS on CPAP 07/22/2016    Nocturnal hypoxemia 04/01/2016    Hyperlipidemia with low HDL 11/19/2015    Arthritis 08/20/2015    Moderate persistent asthma 08/20/2015    Thrombocytopenia ([facility]) 12/11/2014    Sleep disorder 08/24/2012    Vitamin D deficiency 08/24/2012    COPD, severe ([facility] ) 07/12/2012     Current Outpatient Medications   Medication Sig Dispense Refill    albuterol (2 5 mg/3 mL) 0 083 % nebulizer solution Take 1 vial (2 5 mg total) by nebulization every 6 (six) hours as needed for wheezing 50 vial 3    albuterol (PROVENTIL HFA,VENTOLIN HFA) 90 mcg/act inhaler INHALE 2 PUFFS EVERY 4 (FOUR) HOURS AS NEEDED FOR SHORTNESS OF BREATH 8 5 g 5    allopurinol (ZYLOPRIM) 100 mg tablet TAKE 1 TABLET BY MOUTH TWICE A  tablet 1  betamethasone, augmented, (DIPROLENE-AF) 0 05 % cream Apply 1 application topically 2 (two) times a day To affected area 50 g 2    Cholecalciferol (VITAMIN D3) 1000 units CAPS Take by mouth      colchicine (COLCRYS) 0 6 mg tablet Take  One  Po  Every f  4-6  Hours  Prn  Gout  30 tablet 5    guaiFENesin (MUCINEX) 600 mg 12 hr tablet Take 1,200 mg by mouth daily Once at hs      Icosapent Ethyl (Vascepa) 1 g CAPS Take 2 capsules (2 g total) by mouth 2 (two) times a day 120 capsule 1    imiquimod (ALDARA) 5 % cream APPLY 1 PACKET TOPICALLY 3 (THREE) TIMES A WEEK 12 packet 0    Multiple Vitamins tablet Take 1 tablet by mouth daily      Multiple Vitamins-Minerals (ZINC PO) Take by mouth daily      tamsulosin (Flomax) 0 4 mg Take 1 capsule (0 4 mg total) by mouth daily with dinner 90 capsule 3    Trelegy Ellipta 100-62 5-25 MCG/INH inhaler INHALE 1 PUFF DAILY RINSE MOUTH AFTER USE  60 each 5     No current facility-administered medications for this visit  He is allergic to darvon [propoxyphene]       Review of Systems   Constitutional: Negative for activity change, appetite change, chills, fatigue and fever  HENT: Negative for congestion, ear pain and sore throat  Eyes: Negative for visual disturbance  Respiratory: Positive for shortness of breath  Negative for cough  Cardiovascular: Positive for leg swelling  Negative for chest pain and palpitations  Gastrointestinal: Negative for abdominal pain, blood in stool, constipation, diarrhea and nausea  Genitourinary: Positive for flank pain  Negative for difficulty urinating  Right   Sided  Flank  Pain  Under  Ribs  Worse  With  Fatty  Meals  H/o  Of  gallstones   Musculoskeletal: Positive for arthralgias and gait problem  Negative for back pain and myalgias  Skin: Negative for rash  Neurological: Negative for dizziness, syncope and headaches  Psychiatric/Behavioral: Negative for sleep disturbance           Objective:      Physical Exam  Vitals and nursing note reviewed  Constitutional:       General: He is not in acute distress  Appearance: He is well-developed  He is obese  He is not ill-appearing  HENT:      Head: Normocephalic and atraumatic  Right Ear: External ear normal       Left Ear: External ear normal    Eyes:      Pupils: Pupils are equal, round, and reactive to light  Cardiovascular:      Rate and Rhythm: Normal rate and regular rhythm  Heart sounds: No murmur heard  Pulmonary:      Effort: Pulmonary effort is normal       Breath sounds: Decreased air movement present  Decreased breath sounds present  Abdominal:      General: Abdomen is protuberant  Bowel sounds are normal       Tenderness: There is abdominal tenderness in the right upper quadrant  Positive signs include Treviño's sign  Musculoskeletal:      Right lower leg: Edema present  Left lower leg: Edema present  Comments: Mild   Nonpitting  edema   Skin:     General: Skin is warm and dry  Neurological:      General: No focal deficit present  Mental Status: He is alert and oriented to person, place, and time  Psychiatric:         Mood and Affect: Mood normal          Behavior: Behavior normal          Thought Content:  Thought content normal          Judgment: Judgment normal

## 2022-08-10 DIAGNOSIS — J44.9 COPD, SEVERE (HCC): ICD-10-CM

## 2022-08-10 RX ORDER — ALBUTEROL SULFATE 90 UG/1
2 AEROSOL, METERED RESPIRATORY (INHALATION) EVERY 4 HOURS PRN
Qty: 8.5 G | Refills: 5 | Status: SHIPPED | OUTPATIENT
Start: 2022-08-10

## 2022-09-08 ENCOUNTER — OFFICE VISIT (OUTPATIENT)
Dept: PULMONOLOGY | Facility: CLINIC | Age: 67
End: 2022-09-08
Payer: COMMERCIAL

## 2022-09-08 VITALS
HEIGHT: 74 IN | TEMPERATURE: 97.5 F | OXYGEN SATURATION: 96 % | RESPIRATION RATE: 22 BRPM | DIASTOLIC BLOOD PRESSURE: 70 MMHG | SYSTOLIC BLOOD PRESSURE: 130 MMHG | WEIGHT: 315 LBS | BODY MASS INDEX: 40.43 KG/M2 | HEART RATE: 98 BPM

## 2022-09-08 DIAGNOSIS — F17.211 CIGARETTE NICOTINE DEPENDENCE IN REMISSION: ICD-10-CM

## 2022-09-08 DIAGNOSIS — J44.9 COPD, SEVERE (HCC): ICD-10-CM

## 2022-09-08 DIAGNOSIS — G47.33 OSA ON CPAP: Primary | ICD-10-CM

## 2022-09-08 DIAGNOSIS — Z99.89 OSA ON CPAP: Primary | ICD-10-CM

## 2022-09-08 PROCEDURE — 1160F RVW MEDS BY RX/DR IN RCRD: CPT | Performed by: INTERNAL MEDICINE

## 2022-09-08 PROCEDURE — 99214 OFFICE O/P EST MOD 30 MIN: CPT | Performed by: INTERNAL MEDICINE

## 2022-09-08 NOTE — ASSESSMENT & PLAN NOTE
He will continue with Trelegy Ellipta once daily and albuterol as needed    No evidence of exacerbation at this time

## 2022-09-08 NOTE — ASSESSMENT & PLAN NOTE
Patient has obstructive sleep apnea and has demonstrated compliance with CPAP during hours of sleep  I reviewed compliance data for 8/8/22 through 9/6/22  He has the device for 10 hours and 44 minutes per night  AHI is 1  Patient believes he received a letter in the mail stating that his device is on the recall list, however had not initiated the process of getting a new 1  We will have the office reach out to his home care company to see if he would be eligible for a new device with a new prescription or needs to initiate the process through [place]

## 2022-09-08 NOTE — ASSESSMENT & PLAN NOTE
Patient has a 50 pack-year smoking history and quit in 2014  He is eligible for lung cancer screening CT  This was ordered by his primary care provider  We will assist in scheduling it for him

## 2022-09-08 NOTE — PROGRESS NOTES
Pulmonary Follow Up Note   Maribel Murray 78 y o  male MRN: [id number]  9/8/2022      Assessment/Plan:     COPD, severe [facility])  He will continue with Trelegy Ellipta once daily and albuterol as needed  No evidence of exacerbation at this time    ARACELIS on CPAP  Patient has obstructive sleep apnea and has demonstrated compliance with CPAP during hours of sleep  I reviewed compliance data for 8/8/22 through 9/6/22  He has the device for 10 hours and 44 minutes per night  AHI is 1  Patient believes he received a letter in the mail stating that his device is on the recall list, however had not initiated the process of getting a new 1  We will have the office reach out to his home care company to see if he would be eligible for a new device with a new prescription or needs to initiate the process through [facility]  Cigarette nicotine dependence in remission  Patient has a 50 pack-year smoking history and quit in 2014  He is eligible for lung cancer screening CT  This was ordered by his primary care provider  We will assist in scheduling it for him  Visit orders:    Diagnoses and all orders for this visit:    ARACELIS on CPAP    COPD, severe (HCC)    Cigarette nicotine dependence in remission        No follow-ups on file  History of Present Illness   HPI:  Maribel Murray is a 78 y o  male who is here today for follow-up regarding severe COPD and obstructive sleep apnea  He is overall stable from pulmonary perspective  He is compliant with Trelegy Ellipta once daily in typically uses albuterol twice per day for symptoms of cough, wheeze and shortness of breath  He has not had any recent need for systemic steroids  He is compliant with CPAP during hours of sleep  He does recall having gotten a letter stating that his machine was on the recall list, but had not reached out to the company for replacement    Having said that, he believes his device is greater than 11years old steroids also some conversation about getting him a new machine with prescription  Patient does not see any overt signs of deterioration of his current device  Review of Systems   Constitutional: Negative for chills, fever and unexpected weight change  Respiratory:        As noted in HPI   Cardiovascular: Negative for chest pain  Gastrointestinal: Positive for abdominal pain (Being worked up for gallstones)  Negative for diarrhea and vomiting  Musculoskeletal: Negative for arthralgias  Skin: Negative for rash  Psychiatric/Behavioral: Negative  All other systems reviewed and are negative          Medical, Family and Social history reviewed and updated as appropriate    Historical Information   Past Medical History:   Diagnosis Date    Arthritis     Asthma     Chest pain     atypical    Chronic kidney disease     Colon polyp     COPD (chronic obstructive pulmonary disease) (HCC)     CPAP (continuous positive airway pressure) dependence     Decreased glomerular filtration rate     Hamstring strain     Herniated lumbar intervertebral disc     History of alcohol use     uncomplicated    History of back pain     lower    History of colon polyps     sigmoid    History of genital warts     History of lipoma     History of muscle spasm     lumbar paraspinous muscle    History of umbilical hernia     Lateral pain of left hip     Low HDL (under 40)     Lumbar radiculopathy     Obesity     Respiratory distress     acute    Shortness of breath     Sleep apnea     CPAP PT WILL BRING     Past Surgical History:   Procedure Laterality Date    ADENOIDECTOMY      APPENDECTOMY      COLONOSCOPY      HAND SURGERY Left     CYST REMOVAL    HIP SURGERY Bilateral     LIPECTOMY      thigh    LIPOMA RESECTION Left     HIP    PA REPAIR UMBILICAL QGSG,6+D/V,EYWYL N/A 2/23/2017    Procedure: REPAIR HERNIA UMBILICAL;  Surgeon: Madonna Mccollum MD;  Location: [facility];  Service: General    TONSILLECTOMY      WRIST GANGLION EXCISION       Family History   Problem Relation Age of Onset    Heart disease Mother         cardiac disorder    Hypertension Mother     COPD Father     Heart disease Family         cardiac disorder    Cancer Family         lung    Emphysema Family         pulmonary unspecified emphysema    Diabetes Family     Glaucoma Family     Cancer Family         lung       Social History     Tobacco Use   Smoking Status Former Smoker    Packs/day: 2 00    Years: 25 00    Pack years: 50 00    Types: Cigarettes    Start date: 0    Quit date: 10/2014    Years since quittin 9   Smokeless Tobacco Never Used         Meds/Allergies     Current Outpatient Medications:     albuterol (2 5 mg/3 mL) 0 083 % nebulizer solution, Take 1 vial (2 5 mg total) by nebulization every 6 (six) hours as needed for wheezing, Disp: 50 vial, Rfl: 3    albuterol (PROVENTIL HFA,VENTOLIN HFA) 90 mcg/act inhaler, INHALE 2 PUFFS EVERY 4 (FOUR) HOURS AS NEEDED FOR SHORTNESS OF BREATH, Disp: 8 5 g, Rfl: 5    allopurinol (ZYLOPRIM) 100 mg tablet, TAKE 1 TABLET BY MOUTH TWICE A DAY, Disp: 180 tablet, Rfl: 1    betamethasone, augmented, (DIPROLENE-AF) 0 05 % cream, Apply 1 application topically 2 (two) times a day To affected area, Disp: 50 g, Rfl: 2    Cholecalciferol (VITAMIN D3) 1000 units CAPS, Take by mouth, Disp: , Rfl:     colchicine (COLCRYS) 0 6 mg tablet, Take  One  Po  Every f  4-6  Hours  Prn  Gout , Disp: 30 tablet, Rfl: 5    guaiFENesin (MUCINEX) 600 mg 12 hr tablet, Take 1,200 mg by mouth daily Once at hs, Disp: , Rfl:     Icosapent Ethyl (Vascepa) 1 g CAPS, Take 2 capsules (2 g total) by mouth 2 (two) times a day, Disp: 120 capsule, Rfl: 1    imiquimod (ALDARA) 5 % cream, APPLY 1 PACKET TOPICALLY 3 (THREE) TIMES A WEEK, Disp: 12 packet, Rfl: 0    Multiple Vitamins tablet, Take 1 tablet by mouth daily, Disp: , Rfl:     Multiple Vitamins-Minerals (ZINC PO), Take by mouth daily, Disp: , Rfl:     tamsulosin (Flomax) 0 4 mg, Take 1 capsule (0 4 mg total) by mouth daily with dinner, Disp: 90 capsule, Rfl: 3    Trelegy Ellipta 100-62 5-25 MCG/INH inhaler, INHALE 1 PUFF DAILY RINSE MOUTH AFTER USE , Disp: 60 each, Rfl: 5  Allergies   Allergen Reactions    Darvon [Propoxyphene] Other (See Comments)     hallucinations       Vitals: Blood pressure 130/70, pulse 98, temperature 97 5 °F (36 4 °C), temperature source Tympanic, resp  rate 22, height 6' 2" (1 88 m), weight (!) 181 kg (400 lb), SpO2 96 %  Body mass index is 51 36 kg/m²  Oxygen Therapy  SpO2: 96 %  Oxygen Therapy: None (Room air)    Physical Exam   Physical Exam  Constitutional:       General: He is not in acute distress  HENT:      Head: Normocephalic  Eyes:      General: No scleral icterus  Neck:      Vascular: No JVD  Cardiovascular:      Rate and Rhythm: Normal rate and regular rhythm  Pulmonary:      Breath sounds: No wheezing, rhonchi or rales  Abdominal:      Palpations: Abdomen is soft  Tenderness: There is no abdominal tenderness  Musculoskeletal:      Cervical back: Neck supple  Skin:     General: Skin is warm and dry  Neurological:      Mental Status: He is alert and oriented to person, place, and time  Psychiatric:         Mood and Affect: Mood normal          Labs: I have personally reviewed pertinent lab results    Lab Results   Component Value Date    WBC 5 98 07/21/2022    HGB 15 2 07/21/2022    HCT 47 2 07/21/2022     (H) 07/21/2022    PLT 97 (L) 07/21/2022     Lab Results   Component Value Date    CALCIUM 9 1 07/21/2022     06/14/2017    K 4 2 07/21/2022    CO2 25 07/21/2022     (H) 07/21/2022    BUN 22 07/21/2022    CREATININE 1 57 (H) 07/21/2022     No results found for: IGE  Lab Results   Component Value Date    ALT 40 07/21/2022    AST 30 07/21/2022    ALKPHOS 96 07/21/2022    BILITOT 0 9 06/14/2017     Pulmonary function testing:  Performed   3/12/19 and personally interpreted  FEV1/FVC ratio 46%   FEV1 36% predicted  FVC 60% predicted     spirometry with severe obstruction and moderate reduction in vital capacity

## 2022-09-13 ENCOUNTER — HOSPITAL ENCOUNTER (OUTPATIENT)
Dept: RADIOLOGY | Facility: MEDICAL CENTER | Age: 67
Discharge: HOME | End: 2022-09-13
Payer: COMMERCIAL

## 2022-09-13 DIAGNOSIS — Z87.19 HISTORY OF GALLSTONES: ICD-10-CM

## 2022-09-13 DIAGNOSIS — R10.11 RIGHT UPPER QUADRANT ABDOMINAL PAIN: ICD-10-CM

## 2022-09-13 PROCEDURE — 76705 ECHO EXAM OF ABDOMEN: CPT

## 2022-09-16 ENCOUNTER — TELEPHONE (OUTPATIENT)
Dept: FAMILY MEDICINE CLINIC | Facility: CLINIC | Age: 67
End: 2022-09-16

## 2022-09-21 ENCOUNTER — TELEPHONE (OUTPATIENT)
Dept: FAMILY MEDICINE CLINIC | Facility: CLINIC | Age: 67
End: 2022-09-21

## 2022-09-21 ENCOUNTER — NEW PATIENT COMPREHENSIVE (OUTPATIENT)
Dept: URBAN - METROPOLITAN AREA CLINIC 6 | Facility: CLINIC | Age: 67
End: 2022-09-21

## 2022-09-21 DIAGNOSIS — H04.123: ICD-10-CM

## 2022-09-21 DIAGNOSIS — H25.813: ICD-10-CM

## 2022-09-21 PROCEDURE — 92004 COMPRE OPH EXAM NEW PT 1/>: CPT

## 2022-09-21 PROCEDURE — 92015 DETERMINE REFRACTIVE STATE: CPT

## 2022-09-21 ASSESSMENT — TONOMETRY
OS_IOP_MMHG: 19
OD_IOP_MMHG: 18

## 2022-09-21 ASSESSMENT — VISUAL ACUITY
OD_GLARE: 20/100
OS_SC: 20/60
OU_CC: J1
OS_PH: 20/40
OD_SC: 20/50
OS_GLARE: 20/100

## 2022-09-27 ENCOUNTER — TELEPHONE (OUTPATIENT)
Dept: FAMILY MEDICINE CLINIC | Facility: CLINIC | Age: 67
End: 2022-09-27

## 2022-09-27 NOTE — TELEPHONE ENCOUNTER
Spoke   With  Patient  His  U/s  Of the  abdomen  Show  His   Gallstones again  Pt  Declines  Surgical  Consult  Today    Pt  States  His a abdominal  Pain  Has  subsided

## 2022-11-10 ENCOUNTER — OFFICE VISIT (OUTPATIENT)
Dept: FAMILY MEDICINE CLINIC | Facility: CLINIC | Age: 67
End: 2022-11-10

## 2022-11-10 VITALS
SYSTOLIC BLOOD PRESSURE: 156 MMHG | OXYGEN SATURATION: 97 % | HEART RATE: 115 BPM | DIASTOLIC BLOOD PRESSURE: 78 MMHG | BODY MASS INDEX: 40.43 KG/M2 | TEMPERATURE: 97.8 F | HEIGHT: 74 IN | WEIGHT: 315 LBS

## 2022-11-10 DIAGNOSIS — E79.0 HYPERURICEMIA: ICD-10-CM

## 2022-11-10 DIAGNOSIS — J44.9 COPD, SEVERE (HCC): ICD-10-CM

## 2022-11-10 DIAGNOSIS — N18.30 STAGE 3 CHRONIC KIDNEY DISEASE, UNSPECIFIED WHETHER STAGE 3A OR 3B CKD (HCC): ICD-10-CM

## 2022-11-10 DIAGNOSIS — Z99.89 OSA ON CPAP: ICD-10-CM

## 2022-11-10 DIAGNOSIS — J45.40 MODERATE PERSISTENT ASTHMA, UNSPECIFIED WHETHER COMPLICATED: Primary | ICD-10-CM

## 2022-11-10 DIAGNOSIS — E78.5 HYPERLIPIDEMIA WITH LOW HDL: ICD-10-CM

## 2022-11-10 DIAGNOSIS — E66.01 MORBID OBESITY WITH BMI OF 50.0-59.9, ADULT (HCC): ICD-10-CM

## 2022-11-10 DIAGNOSIS — G47.33 OSA ON CPAP: ICD-10-CM

## 2022-11-10 DIAGNOSIS — R60.0 EDEMA OF BOTH LOWER LEGS: ICD-10-CM

## 2022-11-10 DIAGNOSIS — E78.6 HYPERLIPIDEMIA WITH LOW HDL: ICD-10-CM

## 2022-11-10 DIAGNOSIS — D69.3 CHRONIC ITP (IDIOPATHIC THROMBOCYTOPENIA) (HCC): ICD-10-CM

## 2022-11-10 PROBLEM — Z86.16 HISTORY OF 2019 NOVEL CORONAVIRUS DISEASE (COVID-19): Status: RESOLVED | Noted: 2021-02-17 | Resolved: 2022-11-10

## 2022-11-10 NOTE — PROGRESS NOTES
Assessment/Plan:     Diagnoses and all orders for this visit:    Moderate persistent asthma, unspecified whether complicated  Comments:  Breathing is currently stable  Continue current regimen    COPD, severe ([facility] )  Comments:  Prevnar 20 given today    Chronic ITP (idiopathic thrombocytopenia) ([facility])  Comments:  Continue to monitor CBC  Orders:  -     CBC and differential; Future    Stage 3 chronic kidney disease, unspecified whether stage 3a or 3b CKD (HCC)  Comments:  No over-the-counter NSAIDs  Tylenol only    Morbid obesity with BMI of 50 0-59 9, adult ([facility] )    Hyperlipidemia with low HDL  Comments: This is currently diet controlled  Labs ordered  Orders:  -     Comprehensive metabolic panel; Future  -     Lipid panel; Future    Hyperuricemia  -     Uric acid; Future    ARACELIS on CPAP    Edema of both lower legs  Comments:  Currently stable  Only minimal edema  Subjective:      Patient ID: William Robert is a 78 y o  male  Patient presents in the office for follow up chronic conditions  Patient has severe COPD asthma overlap  He is on CPAP as well for sleep apnea  He does follow with Pulmonary  He is on Trelegy inhaler daily  He is a Proventil inhaler to use as needed and nebulizer at home as well  We will date patient's pneumonia shot today  Patient had his flu shot already  Patient has a history of thrombocytopenia which we are observing  He has seen Hematology in past   His chronic kidney disease stage 3  No over-the-counter NSAIDs lab out  We tried Vascepa for hyperlipidemia  Patient states cost lot of money and did seem to do anything for his lipids  History of gout and elevated uric acid  No recent flare-ups will continue to observe  Patient is on Flomax 0 4 mg for BPH  He has noticed an improvement in nocturia    He is able to go 4 hours at night stayed to however seems to have exacerbated his dry eye syndrome        The following portions of the patient's history were reviewed and updated as appropriate:   He   Patient Active Problem List    Diagnosis Date Noted   • Colon polyps 12/03/2021   • Skin lesion of right arm 12/03/2021   • Internal hemorrhoids 12/01/2021   • Gallstone 12/01/2021   • Morbid obesity with BMI of 50 0-59 9, adult ([facility] ) 09/14/2021   • Chronic ITP (idiopathic thrombocytopenia) ([facility]) 07/07/2021   • Hyperuricemia 06/17/2021   • Cigarette nicotine dependence in remission 06/15/2021   • Edema of both lower legs 06/14/2021   • Acute gout due to renal impairment 06/14/2021   • BPH associated with nocturia 11/17/2020   • GARNETT (dyspnea on exertion) 06/03/2020   • Splenomegaly 08/27/2019   • Screening for prostate cancer 01/22/2019   • History of genital warts 06/06/2018   • Skin lesion 05/24/2017   • CKD (chronic kidney disease) stage 3, GFR 30-59 ml/min ([facility]) 02/20/2017   • ARACELIS on CPAP 07/22/2016   • Nocturnal hypoxemia 04/01/2016   • Hyperlipidemia with low HDL 11/19/2015   • Arthritis 08/20/2015   • Moderate persistent asthma 08/20/2015   • Thrombocytopenia ([facility]) 12/11/2014   • Sleep disorder 08/24/2012   • Vitamin D deficiency 08/24/2012   • COPD, severe ([place] [id number] ) 07/12/2012     Current Outpatient Medications   Medication Sig Dispense Refill   • albuterol (2 5 mg/3 mL) 0 083 % nebulizer solution Take 1 vial (2 5 mg total) by nebulization every 6 (six) hours as needed for wheezing 50 vial 3   • albuterol (PROVENTIL HFA,VENTOLIN HFA) 90 mcg/act inhaler INHALE 2 PUFFS EVERY 4 (FOUR) HOURS AS NEEDED FOR SHORTNESS OF BREATH 8 5 g 5   • allopurinol (ZYLOPRIM) 100 mg tablet TAKE 1 TABLET BY MOUTH TWICE A  tablet 1   • betamethasone, augmented, (DIPROLENE-AF) 0 05 % cream Apply 1 application topically 2 (two) times a day To affected area 50 g 2   • Cholecalciferol (VITAMIN D3) 1000 units CAPS Take by mouth     • guaiFENesin (MUCINEX) 600 mg 12 hr tablet Take 1,200 mg by mouth daily Once at      • imiquimod (ALDARA) 5 % cream APPLY 1 PACKET TOPICALLY 3 (THREE) TIMES A WEEK 12 packet 0   • Multiple Vitamins tablet Take 1 tablet by mouth daily     • tamsulosin (Flomax) 0 4 mg Take 1 capsule (0 4 mg total) by mouth daily with dinner 90 capsule 3   • Trelegy Ellipta 100-62 5-25 MCG/INH inhaler INHALE 1 PUFF DAILY RINSE MOUTH AFTER USE  60 each 5     No current facility-administered medications for this visit  He is allergic to darvon [propoxyphene]       Review of Systems   Constitutional: Negative for activity change, appetite change, chills, fatigue and fever  HENT: Negative for ear pain and sore throat  Eyes: Negative for visual disturbance  Respiratory: Positive for cough  Negative for shortness of breath  Cardiovascular: Positive for leg swelling  Negative for chest pain and palpitations  Gastrointestinal: Negative for abdominal pain, blood in stool, constipation, diarrhea and nausea  Genitourinary: Negative for difficulty urinating  Musculoskeletal: Positive for arthralgias  Negative for back pain and myalgias  Skin: Negative for rash  Neurological: Negative for dizziness, syncope and headaches  Psychiatric/Behavioral: Negative for sleep disturbance  Objective:        Physical Exam  Constitutional:       Appearance: Normal appearance  He is obese  He is not ill-appearing  HENT:      Head: Normocephalic and atraumatic  Right Ear: External ear normal       Left Ear: External ear normal    Eyes:      Conjunctiva/sclera: Conjunctivae normal    Neck:      Vascular: No carotid bruit  Cardiovascular:      Rate and Rhythm: Normal rate and regular rhythm  Heart sounds: Normal heart sounds  No murmur heard  Musculoskeletal:      Right lower leg: No edema  Left lower leg: No edema  Lymphadenopathy:      Cervical: No cervical adenopathy  Skin:     General: Skin is warm and dry  Neurological:      General: No focal deficit present  Mental Status: He is alert and oriented to person, place, and time     Psychiatric:         Mood and Affect: Mood normal          Behavior: Behavior normal          Thought Content:  Thought content normal          Judgment: Judgment normal

## 2022-11-28 ENCOUNTER — TELEPHONE (OUTPATIENT)
Dept: DERMATOLOGY | Facility: CLINIC | Age: 67
End: 2022-11-28

## 2022-11-28 NOTE — TELEPHONE ENCOUNTER
Patient scheduled for 2:10 pm 11/29/22 for consult  Dr Herber Sepulveda is out sick and will not be in the office   Patient has been re scheduled to 1/4/23 @ 2:10 pm

## 2023-01-02 DIAGNOSIS — R35.1 BPH ASSOCIATED WITH NOCTURIA: ICD-10-CM

## 2023-01-02 DIAGNOSIS — N40.1 BPH ASSOCIATED WITH NOCTURIA: ICD-10-CM

## 2023-01-02 RX ORDER — TAMSULOSIN HYDROCHLORIDE 0.4 MG/1
CAPSULE ORAL
Qty: 90 CAPSULE | Refills: 3 | Status: SHIPPED | OUTPATIENT
Start: 2023-01-02

## 2023-01-04 ENCOUNTER — CONSULT (OUTPATIENT)
Dept: DERMATOLOGY | Facility: CLINIC | Age: 68
End: 2023-01-04

## 2023-01-04 VITALS — WEIGHT: 315 LBS | BODY MASS INDEX: 40.43 KG/M2 | HEIGHT: 74 IN | TEMPERATURE: 97.4 F

## 2023-01-04 DIAGNOSIS — L91.8 ACROCHORDON: ICD-10-CM

## 2023-01-04 DIAGNOSIS — D48.9 NEOPLASM OF UNCERTAIN BEHAVIOR: ICD-10-CM

## 2023-01-04 DIAGNOSIS — D18.01 CHERRY ANGIOMA: Primary | ICD-10-CM

## 2023-01-04 NOTE — PATIENT INSTRUCTIONS
Assessment and Plan:  Based on a thorough discussion of this condition and the management approach to it (including a comprehensive discussion of the known risks, side effects and potential benefits of treatment), the patient (family) agrees to implement the following specific plan:  Monitor for changes  Benign, reassured      Assessment and Plan:    Cherry angioma, also known as [employer] spots, are benign vascular skin lesions  A "cherry angioma" is a firm red, blue or purple papule, 0 1-1 cm in diameter  When thrombosed, they can appear black in colour until evaluated with a dermatoscope when the red or purple colour is more easily seen  Cherry angioma may develop on any part of the body but most often appear on the scalp, face, lips and trunk  An angioma is due to proliferating endothelial cells; these are the cells that line the inside of a blood vessel  Angiomas can arise in early life or later in life; the most common type of angioma is a cherry angioma  Cherry angiomas are very common in males and females of any age or race  They are more noticeable in white skin than in skin of colour  They markedly increase in number from about the age of 36  There may be a family history of similar lesions  Eruptive cherry angiomas have been rarely reported to be associated with internal malignancy  The cause of angiomas is unknown  Genetic analysis of cherry angiomas has shown that they frequently carry specific somatic missense mutations in the GNAQ and GNA11 (Q209H) genes, which are involved in other vascular and melanocytic proliferations    Assessment and Plan:  Based on a thorough discussion of this condition and the management approach to it (including a comprehensive discussion of the known risks, side effects and potential benefits of treatment), the patient (family) agrees to implement the following specific plan:  Cryotherapy discussed     Skin tags are common, soft, harmless skin lesions that are also called, in the appropriate settings, papillomas, fibroepithelial polyps, and soft fibromas  They are made up of loosely arranged collagen fibers and blood vessels surrounded by a thickened or thinned-out epidermis  Skin tags tend to develop in both men and women as we grow older  They are usually found on the skin folds (neck, armpits, groin)  It is not known what specifically causes skin tags  Certain factors, though, do appear to play a role:  Chaffing and irritation from skin rubbing together  High levels of growth factors (as seen, for example, in pregnancy or in acromegaly/gigantism)  Insulin resistance  Human papillomavirus (wart virus)    We discussed that most skin tags do not need to be treated unless they are specifically causing the patient physical distress or limitation or pose a risk for a larger problem such as an infection that forms secondary to excoriation or chronic irritation  We had a thorough discussion of treatment options and specific risks (including that any procedural treatment may not be covered by insurance and would then be the patient's responsibility) and benefits/alternatives including but not limited to the following:  Cryotherapy (freezing)  Shave removal  Surgical excision (snip excision with scissors)  Electrosurgery  Ligation (we do not do this procedure and counseled against it due to risk of tissue necrosis and infection)    Assessment and Plan:  I have discussed with the patient that a sample of skin via a "skin biopsy” would be potentially helpful to further make a specific diagnosis under the microscope  Based on a thorough discussion of this condition and the management approach to it (including a comprehensive discussion of the known risks, side effects and potential benefits of treatment), the patient (family) agrees to implement the following specific plan:    Procedure:  Skin Biopsy    After a thorough discussion of treatment options and risk/benefits/alternatives (including but not limited to local pain, scarring, dyspigmentation, blistering, possible superinfection, and inability to confirm a diagnosis via histopathology), verbal and written consent were obtained and portion of the rash was biopsied for tissue sample  See below for consent that was obtained from patient and subsequent Procedure Note  I   RATIONALE FOR PROCEDURE  I understand that a skin biopsy allows the Dermatologist to test a lesion or rash under the microscope to obtain a diagnosis  It usually involves numbing the area with numbing medication and removing a small piece of skin; sometimes the area will be closed with sutures  In this specific procedure, sutures are not usually needed  If any sutures are placed, then they are usually need to be removed in 2 weeks or less  I understand that my Dermatologist recommends that a skin "shave" biopsy be performed today  A local anesthetic, similar to the kind that a dentist uses when filling a cavity, will be injected with a very small needle into the skin area to be sampled  The injected skin and tissue underneath "will go to sleep” and become numb so no pain should be felt afterwards  An instrument shaped like a tiny "razor blade" (shave biopsy instrument) will be used to cut a small piece of tissue and skin from the area so that a sample of tissue can be taken and examined more closely under the microscope  A slight amount of bleeding will occur, but it will be stopped with direct pressure and a pressure bandage and any other appropriate methods  I understands that a scar will form where the wound was created  Surgical ointment will be applied to help protect the wound  Sutures are not usually needed      II   RISKS AND POTENTIAL COMPLICATIONS   I understand the risks and potential complications of a skin biopsy include but are not limited to the following:  Bleeding  Infection  Pain  Scar/keloid  Skin discoloration  Incomplete Removal  Recurrence  Nerve Damage/Numbness/Loss of Function  Allergic Reaction to Anesthesia  Biopsies are diagnostic procedures and based on findings additional treatment or evaluation may be required  Loss or destruction of specimen resulting in no additional findings    My Dermatologist has explained to me the nature of the condition, the nature of the procedure, and the benefits to be reasonably expected compared with alternative approaches  My Dermatologist has discussed the likelihood of major risks or complications of this procedure including the specific risks listed above, such as bleeding, infection, and scarring/keloid  I understand that a scar is expected after this procedure  I understand that my physician cannot predict if the scar will form a "keloid," which extends beyond the borders of the wound that is created  A keloid is a thick, painful, and bumpy scar  A keloid can be difficult to treat, as it does not always respond well to therapy, which includes injecting cortisone directly into the keloid every few weeks  While this usually reduces the pain and size of the scar, it does not eliminate it  I understand that photographs may be taken before and after the procedure  These will be maintained as part of the medical providers confidential records and may not be made available to me  I further authorize the medical provider to use the photographs for teaching purposes or to illustrate scientific papers, books, or lectures if in his/her judgment, medical research, education, or science may benefit from its use  I have had an opportunity to fully inquire about the risks and benefits of this procedure and its alternatives  I have been given ample time and opportunity to ask questions and to seek a second opinion if I wished to do so  I acknowledge that there have specifically been no guarantees as to the cosmetic results from the procedure    I am aware that with any procedure there is always the possibility of an unexpected complication  III  POST-PROCEDURAL CARE (WHAT YOU WILL NEED TO DO "AFTER THE BIOPSY" TO OPTIMIZE HEALING)    Keep the area clean and dry  Try NOT to remove the bandage or get it wet for the first 24 hours  Gently clean the area and apply surgical ointment (such as Vaseline petrolatum ointment, which is available "over the counter" and not a prescription) to the biopsy site for up to 2 weeks straight  This acts to protect the wound from the outside world  Sutures are not usually placed in this procedure  If any sutures were placed, return for suture removal as instructed (generally 1 week for the face, 2 weeks for the body)  Take Acetaminophen (Tylenol) for discomfort, if no contraindications  Ibuprofen or aspirin could make bleeding worse  Call our office immediately for signs of infection: fever, chills, increased redness, warmth, tenderness, discomfort/pain, or pus or foul smell coming from the wound  WHAT TO DO IF THERE IS ANY BLEEDING? If a small amount of bleeding is noticed, place a clean cloth over the area and apply firm pressure for ten minutes  Check the wound after 10 minutes of direct pressure  If bleeding persists, try one more time for an additional 10 minutes of direct pressure on the area  If the bleeding becomes heavier or does not stop after the second attempt, or if you have any other questions about this procedure, then please call your [facility] by calling [phone] ([phone])  I hereby acknowledge that I have reviewed and verified the site with my Dermatologist and have requested and authorized my Dermatologist to proceed with the procedure

## 2023-01-04 NOTE — PROGRESS NOTES
[facility] Note     Patient Name: Sarai Castillo  Encounter Date: 01/04/2023    Have you been cared for by a [place] [address] Dermatologist in the last 3 years and, if so, which description applies to you? NO  I am considered a "new" patient and must complete all patient intake questions  I am MALE/not capable of bearing children  REVIEW OF SYSTEMS:  Have you recently had or currently have any of the following? · Recent fever or chills? No  · Any non-healing wound? YES,    PAST MEDICAL HISTORY:  Have you personally ever had or currently have any of the following? If "YES," then please provide more detail  · Skin cancer (such as Melanoma, Basal Cell Carcinoma, Squamous Cell Carcinoma? No  · Tuberculosis, HIV/AIDS, Hepatitis B or C: No  · Systemic Immunosuppression such as Diabetes, Biologic or Immunotherapy, Chemotherapy, Organ Transplantation, Bone Marrow Transplantation No  · Radiation Treatment No   FAMILY HISTORY:  Any "first degree relatives" (parent, brother, sister, or child) with the following? • Skin Cancer, Pancreatic or Other Cancer? No   PATIENT EXPERIENCE:    • Do you want the Dermatologist to perform a COMPLETE skin exam today including a clinical examination under the "bra and underwear" areas? NO  • If necessary, do we have your permission to call and leave a detailed message on your Preferred Phone number that includes your specific medical information?   Yes      Allergies   Allergen Reactions   • Darvon [Propoxyphene] Other (See Comments)     hallucinations      Current Outpatient Medications:   •  albuterol (2 5 mg/3 mL) 0 083 % nebulizer solution, Take 1 vial (2 5 mg total) by nebulization every 6 (six) hours as needed for wheezing, Disp: 50 vial, Rfl: 3  •  albuterol (PROVENTIL HFA,VENTOLIN HFA) 90 mcg/act inhaler, INHALE 2 PUFFS EVERY 4 (FOUR) HOURS AS NEEDED FOR SHORTNESS OF BREATH, Disp: 8 5 g, Rfl: 5  •  allopurinol (ZYLOPRIM) 100 mg tablet, TAKE 1 TABLET BY MOUTH TWICE A DAY, Disp: 180 tablet, Rfl: 1  •  betamethasone, augmented, (DIPROLENE-AF) 0 05 % cream, Apply 1 application topically 2 (two) times a day To affected area, Disp: 50 g, Rfl: 2  •  Cholecalciferol (VITAMIN D3) 1000 units CAPS, Take by mouth, Disp: , Rfl:   •  guaiFENesin (MUCINEX) 600 mg 12 hr tablet, Take 1,200 mg by mouth daily Once at hs, Disp: , Rfl:   •  imiquimod (ALDARA) 5 % cream, APPLY 1 PACKET TOPICALLY 3 (THREE) TIMES A WEEK, Disp: 12 packet, Rfl: 0  •  Multiple Vitamins tablet, Take 1 tablet by mouth daily, Disp: , Rfl:   •  tamsulosin (FLOMAX) 0 4 mg, TAKE 1 CAPSULE BY MOUTH EVERY DAY WITH DINNER, Disp: 90 capsule, Rfl: 3  •  Trelegy Ellipta 100-62 5-25 MCG/INH inhaler, INHALE 1 PUFF DAILY RINSE MOUTH AFTER USE , Disp: 60 each, Rfl: 5          • Whom besides the patient is providing clinical information about today's encounter?   o NO ADDITIONAL HISTORIAN (patient alone provided history)    Physical Exam and Assessment/Plan by Diagnosis:      CHERRY ANGIOMAS    Physical Exam:  • Anatomic Location Affected:  left upper arm   • Morphological Description:  Scattered cherry red, 1-4 mm papules  • Pertinent Positives:  • Pertinent Negatives: Additional History of Present Condition:      Assessment and Plan:  Based on a thorough discussion of this condition and the management approach to it (including a comprehensive discussion of the known risks, side effects and potential benefits of treatment), the patient (family) agrees to implement the following specific plan:  • Monitor for changes  • Benign, reassured  •     Assessment and Plan:    Cherry angioma, also known as Allen Killian spots, are benign vascular skin lesions  A "cherry angioma" is a firm red, blue or purple papule, 0 1-1 cm in diameter  When thrombosed, they can appear black in colour until evaluated with a dermatoscope when the red or purple colour is more easily seen   Cherry angioma may develop on any part of the body but most often appear on the scalp, face, lips and trunk  An angioma is due to proliferating endothelial cells; these are the cells that line the inside of a blood vessel  Angiomas can arise in early life or later in life; the most common type of angioma is a cherry angioma  Cherry angiomas are very common in males and females of any age or race  They are more noticeable in white skin than in skin of colour  They markedly increase in number from about the age of 36  There may be a family history of similar lesions  Eruptive cherry angiomas have been rarely reported to be associated with internal malignancy  The cause of angiomas is unknown  Genetic analysis of cherry angiomas has shown that they frequently carry specific somatic missense mutations in the GNAQ and GNA11 (Q209H) genes, which are involved in other vascular and melanocytic proliferations  ACROCHORDON ("SKIN TAG")    Physical Exam:  • Anatomic Location Affected:  Eyelids   • Morphological Description:  Skin colored and brown pedunculated papules  • Pertinent Positives:  • Pertinent Negatives: Additional History of Present Condition:  Patient would like to have skin tags removed    Assessment and Plan:  Based on a thorough discussion of this condition and the management approach to it (including a comprehensive discussion of the known risks, side effects and potential benefits of treatment), the patient (family) agrees to implement the following specific plan:  • Cryotherapy discussed     Skin tags are common, soft, harmless skin lesions that are also called, in the appropriate settings, papillomas, fibroepithelial polyps, and soft fibromas  They are made up of loosely arranged collagen fibers and blood vessels surrounded by a thickened or thinned-out epidermis  Skin tags tend to develop in both men and women as we grow older  They are usually found on the skin folds (neck, armpits, groin)    It is not known what specifically causes skin tags   Certain factors, though, do appear to play a role:  • Chaffing and irritation from skin rubbing together  • High levels of growth factors (as seen, for example, in pregnancy or in acromegaly/gigantism)  • Insulin resistance  • Human papillomavirus (wart virus)    We discussed that most skin tags do not need to be treated unless they are specifically causing the patient physical distress or limitation or pose a risk for a larger problem such as an infection that forms secondary to excoriation or chronic irritation  We had a thorough discussion of treatment options and specific risks (including that any procedural treatment may not be covered by insurance and would then be the patient's responsibility) and benefits/alternatives including but not limited to the following:  • Cryotherapy (freezing)  • Shave removal  • Surgical excision (snip excision with scissors)  • Electrosurgery  • Ligation (we do not do this procedure and counseled against it due to risk of tissue necrosis and infection)    NEOPLASM OF UNCERTAIN BEHAVIOR OF SKIN    Physical Exam:  • (Anatomic Location); (Size and Morphological Description); (Differential Diagnosis):  o Right forearm; 1 cm pink firm nodule; diffdx; pickers nodule versus SCC   • Pertinent Positives:  • Pertinent Negatives: Additional History of Present Condition:  Patient here for spot of concern on right forearm, states he has had for 25 years, has had two other dermatologist freeze in the past but they returned  Assessment and Plan:  • I have discussed with the patient that a sample of skin via a "skin biopsy” would be potentially helpful to further make a specific diagnosis under the microscope    • Based on a thorough discussion of this condition and the management approach to it (including a comprehensive discussion of the known risks, side effects and potential benefits of treatment), the patient (family) agrees to implement the following specific plan:    o Procedure:  Skin Biopsy  After a thorough discussion of treatment options and risk/benefits/alternatives (including but not limited to local pain, scarring, dyspigmentation, blistering, possible superinfection, and inability to confirm a diagnosis via histopathology), verbal and written consent were obtained and portion of the rash was biopsied for tissue sample  See below for consent that was obtained from patient and subsequent Procedure Note  PROCEDURE TANGENTIAL (SHAVE) BIOPSY NOTE:    • Performing Physician: Oralia Norton   • Anatomic Location; Clinical Description with size (cm); Pre-Op Diagnosis:   Right forearm; 1 cm pink firm nodule; diffdx; pickers nodule versus SCC   • Post-op diagnosis: Same     • Local anesthesia: 1% Lidocaine HCL     • Topical anesthesia: None    • Hemostasis: Electrocautery       After obtaining informed consent  at which time there was a discussion about the purpose of biopsy  and low risks of infection and bleeding  The area was prepped and draped in the usual fashion  Anesthesia was obtained with 1% lidocaine with epinephrine  A shave biopsy to an appropriate sampling depth was obtained by Shave (Dermablade or 15 blade) The resulting wound was covered with surgical ointment and bandaged appropriately  The patient tolerated the procedure well without complications and was without signs of functional compromise  Specimen has been sent for review by Dermatopathology  Standard post-procedure care has been explained and has been included in written form within the patient's copy of Informed Consent  INFORMED CONSENT DISCUSSION AND POST-OPERATIVE INSTRUCTIONS FOR PATIENT    I   RATIONALE FOR PROCEDURE  I understand that a skin biopsy allows the Dermatologist to test a lesion or rash under the microscope to obtain a diagnosis    It usually involves numbing the area with numbing medication and removing a small piece of skin; sometimes the area will be closed with sutures  In this specific procedure, sutures are not usually needed  If any sutures are placed, then they are usually need to be removed in 2 weeks or less  I understand that my Dermatologist recommends that a skin "shave" biopsy be performed today  A local anesthetic, similar to the kind that a dentist uses when filling a cavity, will be injected with a very small needle into the skin area to be sampled  The injected skin and tissue underneath "will go to sleep” and become numb so no pain should be felt afterwards  An instrument shaped like a tiny "razor blade" (shave biopsy instrument) will be used to cut a small piece of tissue and skin from the area so that a sample of tissue can be taken and examined more closely under the microscope  A slight amount of bleeding will occur, but it will be stopped with direct pressure and a pressure bandage and any other appropriate methods  I understands that a scar will form where the wound was created  Surgical ointment will be applied to help protect the wound  Sutures are not usually needed  II   RISKS AND POTENTIAL COMPLICATIONS   I understand the risks and potential complications of a skin biopsy include but are not limited to the following:  • Bleeding  • Infection  • Pain  • Scar/keloid  • Skin discoloration  • Incomplete Removal  • Recurrence  • Nerve Damage/Numbness/Loss of Function  • Allergic Reaction to Anesthesia  • Biopsies are diagnostic procedures and based on findings additional treatment or evaluation may be required  • Loss or destruction of specimen resulting in no additional findings    My Dermatologist has explained to me the nature of the condition, the nature of the procedure, and the benefits to be reasonably expected compared with alternative approaches    My Dermatologist has discussed the likelihood of major risks or complications of this procedure including the specific risks listed above, such as bleeding, infection, and scarring/keloid  I understand that a scar is expected after this procedure  I understand that my physician cannot predict if the scar will form a "keloid," which extends beyond the borders of the wound that is created  A keloid is a thick, painful, and bumpy scar  A keloid can be difficult to treat, as it does not always respond well to therapy, which includes injecting cortisone directly into the keloid every few weeks  While this usually reduces the pain and size of the scar, it does not eliminate it  I understand that photographs may be taken before and after the procedure  These will be maintained as part of the medical providers confidential records and may not be made available to me  I further authorize the medical provider to use the photographs for teaching purposes or to illustrate scientific papers, books, or lectures if in his/her judgment, medical research, education, or science may benefit from its use  I have had an opportunity to fully inquire about the risks and benefits of this procedure and its alternatives  I have been given ample time and opportunity to ask questions and to seek a second opinion if I wished to do so  I acknowledge that there have specifically been no guarantees as to the cosmetic results from the procedure  I am aware that with any procedure there is always the possibility of an unexpected complication  III  POST-PROCEDURAL CARE (WHAT YOU WILL NEED TO DO "AFTER THE BIOPSY" TO OPTIMIZE HEALING)    • Keep the area clean and dry  Try NOT to remove the bandage or get it wet for the first 24 hours  • Gently clean the area and apply surgical ointment (such as Vaseline petrolatum ointment, which is available "over the counter" and not a prescription) to the biopsy site for up to 2 weeks straight  This acts to protect the wound from the outside world  • Sutures are not usually placed in this procedure    If any sutures were placed, return for suture removal as instructed (generally 1 week for the face, 2 weeks for the body)  • Take Acetaminophen (Tylenol) for discomfort, if no contraindications  Ibuprofen or aspirin could make bleeding worse  • Call our office immediately for signs of infection: fever, chills, increased redness, warmth, tenderness, discomfort/pain, or pus or foul smell coming from the wound  WHAT TO DO IF THERE IS ANY BLEEDING? If a small amount of bleeding is noticed, place a clean cloth over the area and apply firm pressure for ten minutes  Check the wound after 10 minutes of direct pressure  If bleeding persists, try one more time for an additional 10 minutes of direct pressure on the area  If the bleeding becomes heavier or does not stop after the second attempt, or if you have any other questions about this procedure, then please call your [facility] by calling [phone] (SKIN)  I hereby acknowledge that I have reviewed and verified the site with my Dermatologist and have requested and authorized my Dermatologist to proceed with the procedure        Scribe Attestation    I,:  Julius Mittal MA am acting as a scribe while in the presence of the attending physician :       I,:  Sarah Veloz MD personally performed the services described in this documentation    as scribed in my presence :

## 2023-01-11 PROCEDURE — 88305 TISSUE EXAM BY PATHOLOGIST: CPT | Performed by: STUDENT IN AN ORGANIZED HEALTH CARE EDUCATION/TRAINING PROGRAM

## 2023-01-11 PROCEDURE — 88313 SPECIAL STAINS GROUP 2: CPT | Performed by: STUDENT IN AN ORGANIZED HEALTH CARE EDUCATION/TRAINING PROGRAM

## 2023-01-12 NOTE — RESULT ENCOUNTER NOTE
DERMATOPATHOLOGY RESULT NOTE    Results reviewed by ordering physician  Reviewed  Sent [employer]        Instructions for Clinical Derm Team:   (remember to route Result Note to appropriate staff):    None    Result & Plan by Specimen:    Specimen A: benign  Plan: monitor and reassured, benign

## 2023-02-06 DIAGNOSIS — J44.9 COPD, SEVERE (HCC): ICD-10-CM

## 2023-02-07 ENCOUNTER — RA CDI HCC (OUTPATIENT)
Dept: OTHER | Facility: HOSPITAL | Age: 68
End: 2023-02-07

## 2023-02-07 NOTE — PROGRESS NOTES
[facility]  coding opportunities       Chart reviewed, no opportunity found:   [address]        Patients Insurance     Medicare Insurance: [employer]vantage

## 2023-02-08 ENCOUNTER — HOSPITAL ENCOUNTER (OUTPATIENT)
Dept: RADIOLOGY | Facility: MEDICAL CENTER | Age: 68
Discharge: HOME | End: 2023-02-08

## 2023-02-08 DIAGNOSIS — J44.9 COPD, SEVERE (HCC): ICD-10-CM

## 2023-02-08 DIAGNOSIS — F17.211 CIGARETTE NICOTINE DEPENDENCE IN REMISSION: ICD-10-CM

## 2023-02-08 RX ORDER — FLUTICASONE FUROATE, UMECLIDINIUM BROMIDE AND VILANTEROL TRIFENATATE 100; 62.5; 25 UG/1; UG/1; UG/1
POWDER RESPIRATORY (INHALATION)
Qty: 60 EACH | Refills: 5 | Status: SHIPPED | OUTPATIENT
Start: 2023-02-08

## 2023-02-09 ENCOUNTER — APPOINTMENT (OUTPATIENT)
Dept: LAB | Facility: MEDICAL CENTER | Age: 68
End: 2023-02-09

## 2023-02-09 DIAGNOSIS — D69.3 CHRONIC ITP (IDIOPATHIC THROMBOCYTOPENIA) (HCC): ICD-10-CM

## 2023-02-09 DIAGNOSIS — E79.0 HYPERURICEMIA: ICD-10-CM

## 2023-02-09 DIAGNOSIS — E78.6 HYPERLIPIDEMIA WITH LOW HDL: ICD-10-CM

## 2023-02-09 DIAGNOSIS — E78.5 HYPERLIPIDEMIA WITH LOW HDL: ICD-10-CM

## 2023-02-09 LAB
ALBUMIN SERPL BCP-MCNC: 3.7 G/DL (ref 3.5–5)
ALP SERPL-CCNC: 100 U/L (ref 46–116)
ALT SERPL W P-5'-P-CCNC: 42 U/L (ref 12–78)
ANION GAP SERPL CALCULATED.3IONS-SCNC: 4 MMOL/L (ref 4–13)
AST SERPL W P-5'-P-CCNC: 38 U/L (ref 5–45)
BASOPHILS # BLD AUTO: 0.05 THOUSANDS/ÂΜL (ref 0–0.1)
BASOPHILS NFR BLD AUTO: 1 % (ref 0–1)
BILIRUB SERPL-MCNC: 1.06 MG/DL (ref 0.2–1)
BUN SERPL-MCNC: 23 MG/DL (ref 5–25)
CALCIUM SERPL-MCNC: 9.4 MG/DL (ref 8.3–10.1)
CHLORIDE SERPL-SCNC: 110 MMOL/L (ref 96–108)
CHOLEST SERPL-MCNC: 139 MG/DL
CO2 SERPL-SCNC: 26 MMOL/L (ref 21–32)
CREAT SERPL-MCNC: 1.42 MG/DL (ref 0.6–1.3)
EOSINOPHIL # BLD AUTO: 0.24 THOUSAND/ÂΜL (ref 0–0.61)
EOSINOPHIL NFR BLD AUTO: 4 % (ref 0–6)
ERYTHROCYTE [DISTWIDTH] IN BLOOD BY AUTOMATED COUNT: 14.5 % (ref 11.6–15.1)
GFR SERPL CREATININE-BSD FRML MDRD: 51 ML/MIN/1.73SQ M
GLUCOSE P FAST SERPL-MCNC: 112 MG/DL (ref 65–99)
HCT VFR BLD AUTO: 46.7 % (ref 36.5–49.3)
HDLC SERPL-MCNC: 32 MG/DL
HGB BLD-MCNC: 15.2 G/DL (ref 12–17)
IMM GRANULOCYTES # BLD AUTO: 0.01 THOUSAND/UL (ref 0–0.2)
IMM GRANULOCYTES NFR BLD AUTO: 0 % (ref 0–2)
LDLC SERPL CALC-MCNC: 79 MG/DL (ref 0–100)
LYMPHOCYTES # BLD AUTO: 1.1 THOUSANDS/ÂΜL (ref 0.6–4.47)
LYMPHOCYTES NFR BLD AUTO: 19 % (ref 14–44)
MCH RBC QN AUTO: 33 PG (ref 26.8–34.3)
MCHC RBC AUTO-ENTMCNC: 32.5 G/DL (ref 31.4–37.4)
MCV RBC AUTO: 102 FL (ref 82–98)
MONOCYTES # BLD AUTO: 0.71 THOUSAND/ÂΜL (ref 0.17–1.22)
MONOCYTES NFR BLD AUTO: 12 % (ref 4–12)
NEUTROPHILS # BLD AUTO: 3.74 THOUSANDS/ÂΜL (ref 1.85–7.62)
NEUTS SEG NFR BLD AUTO: 64 % (ref 43–75)
NONHDLC SERPL-MCNC: 107 MG/DL
NRBC BLD AUTO-RTO: 0 /100 WBCS
PLATELET # BLD AUTO: 87 THOUSANDS/UL (ref 149–390)
PMV BLD AUTO: 11.5 FL (ref 8.9–12.7)
POTASSIUM SERPL-SCNC: 4.2 MMOL/L (ref 3.5–5.3)
PROT SERPL-MCNC: 6.7 G/DL (ref 6.4–8.4)
RBC # BLD AUTO: 4.6 MILLION/UL (ref 3.88–5.62)
SODIUM SERPL-SCNC: 140 MMOL/L (ref 135–147)
TRIGL SERPL-MCNC: 139 MG/DL
URATE SERPL-MCNC: 7 MG/DL (ref 3.5–8.5)
WBC # BLD AUTO: 5.85 THOUSAND/UL (ref 4.31–10.16)

## 2023-02-13 ENCOUNTER — OFFICE VISIT (OUTPATIENT)
Dept: FAMILY MEDICINE CLINIC | Facility: CLINIC | Age: 68
End: 2023-02-13

## 2023-02-13 VITALS
DIASTOLIC BLOOD PRESSURE: 68 MMHG | WEIGHT: 315 LBS | HEART RATE: 96 BPM | TEMPERATURE: 98.1 F | SYSTOLIC BLOOD PRESSURE: 140 MMHG | BODY MASS INDEX: 40.43 KG/M2 | OXYGEN SATURATION: 97 % | HEIGHT: 74 IN

## 2023-02-13 DIAGNOSIS — Z99.89 OSA ON CPAP: ICD-10-CM

## 2023-02-13 DIAGNOSIS — J45.40 MODERATE PERSISTENT ASTHMA, UNSPECIFIED WHETHER COMPLICATED: ICD-10-CM

## 2023-02-13 DIAGNOSIS — E78.6 HYPERLIPIDEMIA WITH LOW HDL: ICD-10-CM

## 2023-02-13 DIAGNOSIS — N18.30 STAGE 3 CHRONIC KIDNEY DISEASE, UNSPECIFIED WHETHER STAGE 3A OR 3B CKD (HCC): ICD-10-CM

## 2023-02-13 DIAGNOSIS — E66.01 MORBID OBESITY WITH BMI OF 50.0-59.9, ADULT (HCC): ICD-10-CM

## 2023-02-13 DIAGNOSIS — Z00.00 MEDICARE ANNUAL WELLNESS VISIT, SUBSEQUENT: Primary | ICD-10-CM

## 2023-02-13 DIAGNOSIS — N52.1 ERECTILE DYSFUNCTION DUE TO DISEASES CLASSIFIED ELSEWHERE: ICD-10-CM

## 2023-02-13 DIAGNOSIS — D69.3 CHRONIC ITP (IDIOPATHIC THROMBOCYTOPENIA) (HCC): ICD-10-CM

## 2023-02-13 DIAGNOSIS — E78.5 HYPERLIPIDEMIA WITH LOW HDL: ICD-10-CM

## 2023-02-13 DIAGNOSIS — J44.9 COPD, SEVERE (HCC): ICD-10-CM

## 2023-02-13 DIAGNOSIS — G47.33 OSA ON CPAP: ICD-10-CM

## 2023-02-13 RX ORDER — SILDENAFIL 100 MG/1
100 TABLET, FILM COATED ORAL DAILY PRN
Qty: 6 TABLET | Refills: 5 | Status: SHIPPED | OUTPATIENT
Start: 2023-02-13

## 2023-02-13 NOTE — PATIENT INSTRUCTIONS
Medicare Preventive Visit Patient Instructions  Thank you for completing your Welcome to Medicare Visit or Medicare Annual Wellness Visit today  Your next wellness visit will be due in one year (2/14/2024)  The screening/preventive services that you may require over the next 5-10 years are detailed below  Some tests may not apply to you based off risk factors and/or age  Screening tests ordered at today's visit but not completed yet may show as past due  Also, please note that scanned in results may not display below  Preventive Screenings:  Service Recommendations Previous Testing/Comments   Colorectal Cancer Screening  · Colonoscopy    · Fecal Occult Blood Test (FOBT)/Fecal Immunochemical Test (FIT)  · Fecal DNA/Cologuard Test  · Flexible Sigmoidoscopy Age: 39-70 years old   Colonoscopy: every 10 years (May be performed more frequently if at higher risk)  OR  FOBT/FIT: every 1 year  OR  Cologuard: every 3 years  OR  Sigmoidoscopy: every 5 years  Screening may be recommended earlier than age 39 if at higher risk for colorectal cancer  Also, an individualized decision between you and your healthcare provider will decide whether screening between the ages of 74-80 would be appropriate   Colonoscopy: 03/22/2022  FOBT/FIT: Not on file  Cologuard: Not on file  Sigmoidoscopy: Not on file    Screening Current     Prostate Cancer Screening Individualized decision between patient and health care provider in men between ages of 53-78   Medicare will cover every 12 months beginning on the day after your 50th birthday PSA: 1 8 ng/mL     Screening Current     Hepatitis C Screening Once for adults born between 1945 and 1965  More frequently in patients at high risk for Hepatitis C Hep C Antibody: 09/05/2019    Screening Current   Diabetes Screening 1-2 times per year if you're at risk for diabetes or have pre-diabetes Fasting glucose: 112 mg/dL (2/9/2023)  A1C: No results in last 5 years (No results in last 5 years)  Screening Current   Cholesterol Screening Once every 5 years if you don't have a lipid disorder  May order more often based on risk factors  Lipid panel: 02/09/2023  Screening Not Indicated  History Lipid Disorder      Other Preventive Screenings Covered by Medicare:  1  Abdominal Aortic Aneurysm (AAA) Screening: covered once if your at risk  You're considered to be at risk if you have a family history of AAA or a male between the age of 73-68 who smoking at least 100 cigarettes in your lifetime  2  Lung Cancer Screening: covers low dose CT scan once per year if you meet all of the following conditions: (1) Age 50-69; (2) No signs or symptoms of lung cancer; (3) Current smoker or have quit smoking within the last 15 years; (4) You have a tobacco smoking history of at least 20 pack years (packs per day x number of years you smoked); (5) You get a written order from a healthcare provider  3  Glaucoma Screening: covered annually if you're considered high risk: (1) You have diabetes OR (2) Family history of glaucoma OR (3)  aged 48 and older OR (3)  American aged 72 and older  3  Osteoporosis Screening: covered every 2 years if you meet one of the following conditions: (1) Have a vertebral abnormality; (2) On glucocorticoid therapy for more than 3 months; (3) Have primary hyperparathyroidism; (4) On osteoporosis medications and need to assess response to drug therapy  5  HIV Screening: covered annually if you're between the age of 12-76  Also covered annually if you are younger than 13 and older than 72 with risk factors for HIV infection  For pregnant patients, it is covered up to 3 times per pregnancy      Immunizations:  Immunization Recommendations   Influenza Vaccine Annual influenza vaccination during flu season is recommended for all persons aged >= 6 months who do not have contraindications   Pneumococcal Vaccine   * Pneumococcal conjugate vaccine = PCV13 (Prevnar 13), PCV15 (Vaxneuvance), PCV20 (Prevnar 20)  * Pneumococcal polysaccharide vaccine = PPSV23 (Pneumovax) Adults 2364 years old: 1-3 doses may be recommended based on certain risk factors  Adults 72 years old: 1-2 doses may be recommended based off what pneumonia vaccine you previously received   Hepatitis B Vaccine 3 dose series if at intermediate or high risk (ex: diabetes, end stage renal disease, liver disease)   Tetanus (Td) Vaccine - COST NOT COVERED BY MEDICARE PART B Following completion of primary series, a booster dose should be given every 10 years to maintain immunity against tetanus  Td may also be given as tetanus wound prophylaxis  Tdap Vaccine - COST NOT COVERED BY MEDICARE PART B Recommended at least once for all adults  For pregnant patients, recommended with each pregnancy  Shingles Vaccine (Shingrix) - COST NOT COVERED BY MEDICARE PART B  2 shot series recommended in those aged 48 and above     Health Maintenance Due:      Topic Date Due   • Lung Cancer Screening  05/19/2022   • Colorectal Cancer Screening  03/22/2023   • Hepatitis C Screening  Completed     Immunizations Due:      Topic Date Due   • Hepatitis A Vaccine (1 of 2 - Risk 2-dose series) Never done   • Hepatitis B Vaccine (1 of 3 - Risk 3-dose series) Never done   • COVID-19 Vaccine (3 - Booster for Moderna series) 06/26/2021     Advance Directives   What are advance directives? Advance directives are legal documents that state your wishes and plans for medical care  These plans are made ahead of time in case you lose your ability to make decisions for yourself  Advance directives can apply to any medical decision, such as the treatments you want, and if you want to donate organs  What are the types of advance directives? There are many types of advance directives, and each state has rules about how to use them  You may choose a combination of any of the following:  · Living will: This is a written record of the treatment you want   You can also choose which treatments you do not want, which to limit, and which to stop at a certain time  This includes surgery, medicine, IV fluid, and tube feedings  · Durable power of  for healthcare [facility]): This is a written record that states who you want to make healthcare choices for you when you are unable to make them for yourself  This person, called a proxy, is usually a family member or a friend  You may choose more than 1 proxy  · Do not resuscitate (DNR) order:  A DNR order is used in case your heart stops beating or you stop breathing  It is a request not to have certain forms of treatment, such as CPR  A DNR order may be included in other types of advance directives  · Medical directive: This covers the care that you want if you are in a coma, near death, or unable to make decisions for yourself  You can list the treatments you want for each condition  Treatment may include pain medicine, surgery, blood transfusions, dialysis, IV or tube feedings, and a ventilator (breathing machine)  · Values history: This document has questions about your views, beliefs, and how you feel and think about life  This information can help others choose the care that you would choose  Why are advance directives important? An advance directive helps you control your care  Although spoken wishes may be used, it is better to have your wishes written down  Spoken wishes can be misunderstood, or not followed  Treatments may be given even if you do not want them  An advance directive may make it easier for your family to make difficult choices about your care  Weight Management   Why it is important to manage your weight:  Being overweight increases your risk of health conditions such as heart disease, high blood pressure, type 2 diabetes, and certain types of cancer  It can also increase your risk for osteoarthritis, sleep apnea, and other respiratory problems  Aim for a slow, steady weight loss   Even a small amount of weight loss can lower your risk of health problems  How to lose weight safely:  A safe and healthy way to lose weight is to eat fewer calories and get regular exercise  You can lose up about 1 pound a week by decreasing the number of calories you eat by 500 calories each day  Healthy meal plan for weight management:  A healthy meal plan includes a variety of foods, contains fewer calories, and helps you stay healthy  A healthy meal plan includes the following:  · Eat whole-grain foods more often  A healthy meal plan should contain fiber  Fiber is the part of grains, fruits, and vegetables that is not broken down by your body  Whole-grain foods are healthy and provide extra fiber in your diet  Some examples of whole-grain foods are whole-wheat breads and pastas, oatmeal, brown rice, and bulgur  · Eat a variety of vegetables every day  Include dark, leafy greens such as spinach, kale, amelia greens, and mustard greens  Eat yellow and orange vegetables such as carrots, sweet potatoes, and winter squash  · Eat a variety of fruits every day  Choose fresh or canned fruit (canned in its own juice or light syrup) instead of juice  Fruit juice has very little or no fiber  · Eat low-fat dairy foods  Drink fat-free (skim) milk or 1% milk  Eat fat-free yogurt and low-fat cottage cheese  Try low-fat cheeses such as mozzarella and other reduced-fat cheeses  · Choose meat and other protein foods that are low in fat  Choose beans or other legumes such as split peas or lentils  Choose fish, skinless poultry (chicken or turkey), or lean cuts of red meat (beef or pork)  Before you cook meat or poultry, cut off any visible fat  · Use less fat and oil  Try baking foods instead of frying them  Add less fat, such as margarine, sour cream, regular salad dressing and mayonnaise to foods  Eat fewer high-fat foods  Some examples of high-fat foods include french fries, doughnuts, ice cream, and cakes    · Eat fewer sweets  Limit foods and drinks that are high in sugar  This includes candy, cookies, regular soda, and sweetened drinks  Exercise:  Exercise at least 30 minutes per day on most days of the week  Some examples of exercise include walking, biking, dancing, and swimming  You can also fit in more physical activity by taking the stairs instead of the elevator or parking farther away from stores  Ask your healthcare provider about the best exercise plan for you  © Copyright [employer] 2018 Information is for End User's use only and may not be sold, redistributed or otherwise used for commercial purposes   All illustrations and images included in CareNotes® are the copyrighted property of [employer]  or [address] [place] [address]

## 2023-02-13 NOTE — PROGRESS NOTES
Assessment and Plan:     Problem List Items Addressed This Visit        Respiratory    Moderate persistent asthma    COPD, severe ([facility] )    Relevant Orders    CBC and differential    ARACELIS on CPAP       Musculoskeletal and Integument    Chronic ITP (idiopathic thrombocytopenia) ([facility])    Relevant Orders    Ambulatory Referral to Hematology / Oncology    CBC and differential       Genitourinary    CKD (chronic kidney disease) stage 3, GFR 30-59 ml/min ([facility])    Relevant Orders    Comprehensive metabolic panel       Other    Hyperlipidemia with low HDL    Relevant Orders    Lipid panel    Morbid obesity with BMI of 50 0-59 9, adult ([facility] )   Other Visit Diagnoses     Medicare annual wellness visit, subsequent    -  Primary    Erectile dysfunction due to diseases classified elsewhere        trail  of  viagra    Relevant Medications    sildenafil (VIAGRA) 100 mg tablet        BMI Counseling: Body mass index is 52 31 kg/m²  The BMI is above normal  Nutrition recommendations include decreasing portion sizes and moderation in carbohydrate intake  Rationale for BMI follow-up plan is due to patient being overweight or obese  BMI Counseling: Body mass index is 52 31 kg/m²  Follow-up plan was not completed due to elderly patient (73 years old) where weight reduction/weight gain would complicate underlying health condition such as: illness or physical disability  Depression Screening and Follow-up Plan: Patient was screened for depression during today's encounter  They screened negative with a PHQ-2 score of 1  Preventive health issues were discussed with patient, and age appropriate screening tests were ordered as noted in patient's After Visit Summary  Personalized health advice and appropriate referrals for health education or preventive services given if needed, as noted in patient's After Visit Summary       History of Present Illness:     Patient presents for a Medicare Wellness Visit    Patient presents in the office for follow-up chronic condition  Patient has severe COPD and asthma overlap  Has obstructive sleep apnea on CPAP  Does follow with pulmonary  He is currently on Trelegy and Proventil inhaler as needed  Is have a nebulizer if needed  He does have short of breath with duration minimal   Patient also has ITP  His current platelets are at 20,288  Need to follow-up with his hematologist   Also has chronic kidney disease stage III  No functions are stable  Has BPH he is on Flomax 0 4 mg with good control of nocturia  We have gout with elevated uric acid  This is controlled with allopurinol 100 mg  Patient Care Team:  Long Carreno PA-C as PCP - General (Family Medicine)  MD Long Savage PA-C Elinda Gails, DO Colie Pellet, MD (Urology)     Review of Systems:     Review of Systems   Constitutional: Negative for activity change, appetite change, chills, fatigue and fever  HENT: Negative for ear pain and sore throat  Eyes: Negative for visual disturbance  Respiratory: Positive for shortness of breath  Negative for cough  Cardiovascular: Negative for chest pain, palpitations and leg swelling  Gastrointestinal: Negative for abdominal pain, blood in stool, constipation, diarrhea and nausea  Genitourinary: Negative for difficulty urinating  Musculoskeletal: Positive for arthralgias, back pain and gait problem  Negative for myalgias  Skin: Negative for rash  Neurological: Negative for dizziness, syncope and headaches  Psychiatric/Behavioral: Negative for sleep disturbance          Problem List:     Patient Active Problem List   Diagnosis   • Arthritis   • COPD, severe ([facility] [address] )   • CKD (chronic kidney disease) stage 3, GFR 30-59 ml/min ([facility])   • Hyperlipidemia with low HDL   • Moderate persistent asthma   • Nocturnal hypoxemia   • ARACELIS on CPAP   • Skin lesion   • Sleep disorder   • Thrombocytopenia ([facility])   • Vitamin D deficiency   • History of genital warts   • Screening for prostate cancer   • Splenomegaly   • GARNETT (dyspnea on exertion)   • BPH associated with nocturia   • Edema of both lower legs   • Acute gout due to renal impairment   • Cigarette nicotine dependence in remission   • Hyperuricemia   • Chronic ITP (idiopathic thrombocytopenia) (HCC)   • Morbid obesity with BMI of 50 0-59 9, adult ([facility] [address] )   • Internal hemorrhoids   • Gallstone   • Colon polyps   • Skin lesion of right arm      Past Medical and Surgical History:     Past Medical History:   Diagnosis Date   • Arthritis    • Asthma    • Chest pain     atypical   • Chronic kidney disease    • Colon polyp    • COPD (chronic obstructive pulmonary disease) (HCC)    • CPAP (continuous positive airway pressure) dependence    • Decreased glomerular filtration rate    • Hamstring strain    • Herniated lumbar intervertebral disc    • History of alcohol use     uncomplicated   • History of back pain     lower   • History of colon polyps     sigmoid   • History of genital warts    • History of lipoma    • History of muscle spasm     lumbar paraspinous muscle   • History of umbilical hernia    • Lateral pain of left hip    • Low HDL (under 40)    • Lumbar radiculopathy    • Obesity    • Respiratory distress     acute   • Shortness of breath    • Sleep apnea     CPAP PT WILL BRING     Past Surgical History:   Procedure Laterality Date   • ADENOIDECTOMY     • APPENDECTOMY     • COLONOSCOPY     • HAND SURGERY Left     CYST REMOVAL   • HIP SURGERY Bilateral    • LIPECTOMY      thigh   • LIPOMA RESECTION Left     HIP   • MI RPR UMBILICAL HRNA 5 YRS/> REDUCIBLE N/A 2/23/2017    Procedure: REPAIR HERNIA UMBILICAL;  Surgeon: Jaycee Sinha MD;  Location: [facility];  Service: General   • TONSILLECTOMY     • WRIST GANGLION EXCISION        Family History:     Family History   Problem Relation Age of Onset   • Heart disease Mother         cardiac disorder   • Hypertension Mother    • COPD Father    • Heart disease Family cardiac disorder   • Cancer Family         lung   • Emphysema Family         pulmonary unspecified emphysema   • Diabetes Family    • Glaucoma Family    • Cancer Family         lung      Social History:     Social History     Socioeconomic History   • Marital status: /Civil Union     Spouse name: None   • Number of children: None   • Years of education: None   • Highest education level: None   Occupational History   • Occupation: Fulltime employment   Tobacco Use   • Smoking status: Former     Packs/day: 2 00     Years: 25 00     Pack years: 50 00     Types: Cigarettes     Start date: 0     Quit date: 10/2014     Years since quittin 3   • Smokeless tobacco: Never   Vaping Use   • Vaping Use: Former   Substance and Sexual Activity   • Alcohol use: No     Comment: recovering alcoholic   • Drug use: No   • Sexual activity: None   Other Topics Concern   • None   Social History Narrative    Always uses seatbelt     Social Determinants of Health     Financial Resource Strain: Low Risk    • Difficulty of Paying Living Expenses: Not very hard   Food Insecurity: Not on file   Transportation Needs: No Transportation Needs   • Lack of Transportation (Medical): No   • Lack of Transportation (Non-Medical):  No   Physical Activity: Not on file   Stress: Not on file   Social Connections: Not on file   Intimate Partner Violence: Not on file   Housing Stability: Not on file      Medications and Allergies:     Current Outpatient Medications   Medication Sig Dispense Refill   • albuterol (2 5 mg/3 mL) 0 083 % nebulizer solution Take 1 vial (2 5 mg total) by nebulization every 6 (six) hours as needed for wheezing 50 vial 3   • albuterol (PROVENTIL HFA,VENTOLIN HFA) 90 mcg/act inhaler INHALE 2 PUFFS EVERY 4 (FOUR) HOURS AS NEEDED FOR SHORTNESS OF BREATH 8 5 g 5   • allopurinol (ZYLOPRIM) 100 mg tablet TAKE 1 TABLET BY MOUTH TWICE A  tablet 1   • betamethasone, augmented, (DIPROLENE-AF) 0 05 % cream Apply 1 application topically 2 (two) times a day To affected area 50 g 2   • Cholecalciferol (VITAMIN D3) 1000 units CAPS Take by mouth     • guaiFENesin (MUCINEX) 600 mg 12 hr tablet Take 1,200 mg by mouth daily Once at hs     • Multiple Vitamins tablet Take 1 tablet by mouth daily     • sildenafil (VIAGRA) 100 mg tablet Take 1 tablet (100 mg total) by mouth daily as needed for erectile dysfunction 6 tablet 5   • tamsulosin (FLOMAX) 0 4 mg TAKE 1 CAPSULE BY MOUTH EVERY DAY WITH DINNER 90 capsule 3   • Trelegy Ellipta 100-62 5-25 MCG/ACT inhaler INHALE 1 PUFF DAILY RINSE MOUTH AFTER USE  60 each 5   • imiquimod (ALDARA) 5 % cream APPLY 1 PACKET TOPICALLY 3 (THREE) TIMES A WEEK (Patient not taking: Reported on 2/13/2023) 12 packet 0     No current facility-administered medications for this visit       Allergies   Allergen Reactions   • Darvon [Propoxyphene] Other (See Comments)     hallucinations      Immunizations:     Immunization History   Administered Date(s) Administered   • COVID-19 MODERNA VACC 0 5 ML IM 03/31/2021, 05/01/2021   • INFLUENZA 09/20/2014, 11/19/2015, 10/07/2016, 03/05/2018, 09/07/2018, 09/30/2019, 10/18/2020, 09/17/2021, 10/25/2022   • Influenza Quadrivalent Preservative Free 3 years and older IM 09/20/2014, 11/19/2015, 09/20/2017   • Influenza, injectable, quadrivalent, preservative free 0 5 mL 09/07/2018, 10/14/2019   • Influenza, seasonal, injectable 09/20/2013   • Influenza, seasonal, injectable, preservative free 10/07/2016   • Pneumococcal 20-valent Conjugate (Historical) 11/30/2022   • Pneumococcal Conjugate 13-Valent 11/18/2016   • Pneumococcal Polysaccharide PPV23 12/11/2014      Health Maintenance:         Topic Date Due   • Lung Cancer Screening  05/19/2022   • Colorectal Cancer Screening  03/22/2023   • Hepatitis C Screening  Completed         Topic Date Due   • Hepatitis A Vaccine (1 of 2 - Risk 2-dose series) Never done   • Hepatitis B Vaccine (1 of 3 - Risk 3-dose series) Never done   • COVID-19 Vaccine (3 - Booster for Moderna series) 06/26/2021      Medicare Screening Tests and Risk Assessments:     Addy Steele is here for his Subsequent Wellness visit  Health Risk Assessment:   Patient rates overall health as poor  Patient feels that their physical health rating is slightly worse  Patient is dissatisfied with their life  Eyesight was rated as slightly worse  Hearing was rated as slightly worse  Patient feels that their emotional and mental health rating is same  Patients states they are never, rarely angry  Patient states they are often unusually tired/fatigued  Pain experienced in the last 7 days has been some  Patient's pain rating has been 4/10  Patient states that he has experienced weight loss or gain in last 6 months  Depression Screening:   PHQ-2 Score: 1      Fall Risk Screening: In the past year, patient has experienced: no history of falling in past year      Home Safety:  Patient does not have trouble with stairs inside or outside of their home  Patient has working smoke alarms and has working carbon monoxide detector  Home safety hazards include: none  Nutrition:   Current diet is Regular and Frequent junk food  Medications:   Patient is currently taking over-the-counter supplements  OTC medications include: Vitamin D, Mucinex 12 hour pill once in the evening, Multi vitamin  Patient is able to manage medications  Activities of Daily Living (ADLs)/Instrumental Activities of Daily Living (IADLs):   Walk and transfer into and out of bed and chair?: Yes  Dress and groom yourself?: Yes    Bathe or shower yourself?: Yes    Feed yourself? Yes  Do your laundry/housekeeping?: No  Manage your money, pay your bills and track your expenses?: Yes  Make your own meals?: Yes    Do your own shopping?: Yes    Previous Hospitalizations:   Any hospitalizations or ED visits within the last 12 months?: No      Advance Care Planning:   Living will: No    Durable POA for healthcare:  No Advanced directive: No      Cognitive Screening:   Provider or family/friend/caregiver concerned regarding cognition?: No    PREVENTIVE SCREENINGS      Cardiovascular Screening:    General: History Lipid Disorder and Screening Current      Diabetes Screening:     General: Screening Current      Colorectal Cancer Screening:     General: Screening Current      Prostate Cancer Screening:    General: Screening Current      Abdominal Aortic Aneurysm (AAA) Screening:    Risk factors include: age between 73-69 yo and tobacco use        Lung Cancer Screening:     General: Screening Current      Hepatitis C Screening:    General: Screening Current    Screening, Brief Intervention, and Referral to Treatment (SBIRT)    Screening  Typical number of drinks in a day: 0  Typical number of drinks in a week: 0  Interpretation: Low risk drinking behavior  AUDIT-C Screenin) How often did you have a drink containing alcohol in the past year? never  2) How many drinks did you have on a typical day when you were drinking in the past year? 0  3) How often did you have 6 or more drinks on one occasion in the past year? never    AUDIT-C Score: 0  Interpretation: Score 0-3 (male): Negative screen for alcohol misuse    Single Item Drug Screening:  How often have you used an illegal drug (including marijuana) or a prescription medication for non-medical reasons in the past year? never    Single Item Drug Screen Score: 0  Interpretation: Negative screen for possible drug use disorder    Brief Intervention  Alcohol & drug use screenings were reviewed  No concerns regarding substance use disorder identified  No results found  Physical Exam:     /68 (BP Location: Right arm, Patient Position: Sitting, Cuff Size: Large)   Pulse 96   Temp 98 1 °F (36 7 °C) (Temporal)   Ht 6' 2" (1 88 m)   Wt (!) 185 kg (407 lb 6 4 oz)   SpO2 97%   BMI 52 31 kg/m²     Physical Exam  Vitals and nursing note reviewed     Constitutional: Appearance: Normal appearance  He is obese  He is not ill-appearing  HENT:      Head: Normocephalic and atraumatic  Right Ear: Tympanic membrane, ear canal and external ear normal       Left Ear: Tympanic membrane, ear canal and external ear normal    Eyes:      Pupils: Pupils are equal, round, and reactive to light  Neck:      Thyroid: No thyromegaly  Vascular: No carotid bruit  Cardiovascular:      Rate and Rhythm: Normal rate and regular rhythm  Pulses: Normal pulses  Heart sounds: Normal heart sounds  No murmur heard  Pulmonary:      Effort: Pulmonary effort is normal        Abdominal:      General: Bowel sounds are normal       Palpations: Abdomen is soft  There is no mass  Tenderness: There is no abdominal tenderness  Musculoskeletal:      Right lower leg: Edema present  Left lower leg: Edema present  Comments: Dependant  edema   Lymphadenopathy:      Cervical: No cervical adenopathy  Skin:     General: Skin is warm and dry  Coloration: Skin is not pale  Findings: No erythema  Neurological:      General: No focal deficit present  Mental Status: He is alert and oriented to person, place, and time  Psychiatric:         Mood and Affect: Mood normal          Behavior: Behavior normal          Thought Content:  Thought content normal          Judgment: Judgment normal           Noel Fischer PA-C

## 2023-02-16 ENCOUNTER — TELEPHONE (OUTPATIENT)
Dept: HEMATOLOGY ONCOLOGY | Facility: CLINIC | Age: 68
End: 2023-02-16

## 2023-02-16 NOTE — TELEPHONE ENCOUNTER
Made attempt to schedule a new patient appointment with Hematology oncology, Patient is a established patient for dx   Contact the patient to schedule a f/u no answer  Left a voicemail  With the hop[facility] number

## 2023-03-09 ENCOUNTER — CONSULT (OUTPATIENT)
Dept: HEMATOLOGY ONCOLOGY | Facility: CLINIC | Age: 68
End: 2023-03-09

## 2023-03-09 VITALS
WEIGHT: 315 LBS | DIASTOLIC BLOOD PRESSURE: 78 MMHG | BODY MASS INDEX: 40.43 KG/M2 | SYSTOLIC BLOOD PRESSURE: 140 MMHG | TEMPERATURE: 98.4 F | HEART RATE: 112 BPM | RESPIRATION RATE: 20 BRPM | HEIGHT: 74 IN

## 2023-03-09 DIAGNOSIS — D69.6 THROMBOCYTOPENIA (HCC): Primary | ICD-10-CM

## 2023-03-09 DIAGNOSIS — R16.2 HEPATOSPLENOMEGALY: ICD-10-CM

## 2023-03-09 DIAGNOSIS — D69.3 CHRONIC ITP (IDIOPATHIC THROMBOCYTOPENIA) (HCC): ICD-10-CM

## 2023-03-10 ENCOUNTER — TELEPHONE (OUTPATIENT)
Dept: HEMATOLOGY ONCOLOGY | Facility: CLINIC | Age: 68
End: 2023-03-10

## 2023-03-12 NOTE — PROGRESS NOTES
HPI:   Wanda Moritz is a 78 y o  male   Follow-up visit for thrombocytopenia and he carries a diagnosis of ITP but he may not have ITP  He has thrombocytopenia secondary to splenomegaly  He has hepatosplenomegaly and cause to be determined for hepatosplenomegaly  Patient has severe COPD  He gives history of chronic renal disease from taking too many ibuprofen for his back pain secondary to arthritis and falls  He has history of arthritis in his knees and hips and has chronic low back pain  He ambulates with a cane because of arthritis and balance problem  Has tiredness  He has history of gout  He has polyps in his colon and he had 5th colonoscopy last year in 2022  History of appendectomy and navel hernia surgery  He has 40-pack-year history of smoking  He quit alcohol in 1996  He used to drink alcohol daily before that          Current Outpatient Medications:   •  albuterol (2 5 mg/3 mL) 0 083 % nebulizer solution, Take 1 vial (2 5 mg total) by nebulization every 6 (six) hours as needed for wheezing, Disp: 50 vial, Rfl: 3  •  albuterol (PROVENTIL HFA,VENTOLIN HFA) 90 mcg/act inhaler, INHALE 2 PUFFS EVERY 4 (FOUR) HOURS AS NEEDED FOR SHORTNESS OF BREATH, Disp: 8 5 g, Rfl: 5  •  allopurinol (ZYLOPRIM) 100 mg tablet, TAKE 1 TABLET BY MOUTH TWICE A DAY, Disp: 180 tablet, Rfl: 1  •  betamethasone, augmented, (DIPROLENE-AF) 0 05 % cream, Apply 1 application topically 2 (two) times a day To affected area, Disp: 50 g, Rfl: 2  •  Cholecalciferol (VITAMIN D3) 1000 units CAPS, Take by mouth, Disp: , Rfl:   •  guaiFENesin (MUCINEX) 600 mg 12 hr tablet, Take 1,200 mg by mouth daily Once at hs, Disp: , Rfl:   •  imiquimod (ALDARA) 5 % cream, APPLY 1 PACKET TOPICALLY 3 (THREE) TIMES A WEEK, Disp: 12 packet, Rfl: 0  •  Multiple Vitamins tablet, Take 1 tablet by mouth daily, Disp: , Rfl:   •  sildenafil (VIAGRA) 100 mg tablet, Take 1 tablet (100 mg total) by mouth daily as needed for erectile dysfunction, Disp: 6 tablet, Rfl: 5  •  tamsulosin (FLOMAX) 0 4 mg, TAKE 1 CAPSULE BY MOUTH EVERY DAY WITH DINNER, Disp: 90 capsule, Rfl: 3  •  Trelegy Ellipta 100-62 5-25 MCG/ACT inhaler, INHALE 1 PUFF DAILY RINSE MOUTH AFTER USE , Disp: 60 each, Rfl: 5    Allergies   Allergen Reactions   • Darvon [Propoxyphene] Other (See Comments)     hallucinations       Oncology History    No history exists  ROS:   Reviewed 12 systems: See symptoms in HPI  Presently no other neurological, cardiac, pulmonary, GI and  symptoms other than mentioned in HPI  Other symptoms are in HPI  No fever, chills, bleeding, bone pains, skin rash, weight loss , night sweats,   weakness, numbness,  claudication   No frequent infections  Not unusually sensitive to heat or cold  No swelling of the ankles  No swollen glands  Patient is anxious  /78 (BP Location: Left arm, Patient Position: Sitting, Cuff Size: Adult)   Pulse (!) 112   Temp 98 4 °F (36 9 °C) (Temporal)   Resp 20   Ht 6' 2" (1 88 m)   Wt (!) 185 kg (407 lb)   BMI 52 26 kg/m²     Physical Exam:  Alert, oriented, not in distress, vitals are above, no icterus, no oral thrush, no palpable neck mass, clear lung fields, regular heart rate, abdomen is large, soft and non tender, no palpable abdominal mass, no ascites but difficult to accurately assess abdominal mass and ascites because of large size, no edema of ankles, no calf tenderness, no focal neurological deficit, no skin rash, no palpable lymphadenopathy in the neck and axillary areas, no clubbing  Patient is anxious  Performance status 2   IMAGING:  LIVER:  Size:  Moderately enlarged  The liver measures 20 7 cm in the midclavicular line  Contour:  Surface contour is smooth  Parenchyma: There is moderate diffuse increased echogenicity with smooth echotexture and acoustic beam attenuation  Most consistent with moderate hepatic steatosis  No liver mass identified  Dilated main portal vein measuring up to 2 cm  BILIARY:  The gallbladder is normal in caliber  No wall thickening or pericholecystic fluid  Shadowing gallstone(s) identified  No sonographic Treviño's sign  No intrahepatic biliary dilatation  CBD measures 3 0 mm  No choledocholithiasis      KIDNEY:   Right kidney measures 11 6 x 5 0 x 5 0 cm  Volume 152 5 mL  Kidney within normal limits      ASCITES:   None      IMPRESSION:     Cholelithiasis without sonographic evidence of acute cholecystitis      Hepatomegaly with hepatic steatosis      Dilated main portal vein suggestive of portal vein hypertension     Workstation performed: [id number]        Imaging    US right upper quadrant (Order: [id number]) - 9/13/2022    FINDINGS:     SPLEEN:    21 6 x 21 1 x 6 6 cm   Splenic volume: 1585 0 mL  Normal contour  No mass  No perisplenic collections  1 2 cm splenule     LEFT KIDNEY:    12 6 x 5 3 cm  Normal caliber and echogenicity  No hydronephrosis  No nephrolithiasis  No solid mass lesions  1 1 cm upper pole cyst     ABDOMINAL CAVITY:  No left upper quadrant ascites      IMPRESSION:  Splenomegaly        Workstation performed: [id number]        Imaging    US left upper quadrant (Order: [id number]) - 1/5/2022    FINDINGS:     Examination, as on prior ultrasound, demonstrates hepatosplenomegaly  Liver size of approximately 21 cm noted, splenic size of approximately 18 cm      Otherwise there is a normal distribution of activity without evidence of cold defect    There is no evidence of colloid shift      IMPRESSION:     Hepatosplenomegaly noted, without evidence of colloid shift         Workstation performed: [id number]        Imaging    NM liver spleen imaging static only (Order: [id number]) - 9/14/2021      LABS:  Results for orders placed or performed in visit on 02/09/23   CBC and differential   Result Value Ref Range    WBC 5 85 4 31 - 10 16 Thousand/uL    RBC 4 60 3 88 - 5 62 Million/uL    Hemoglobin 15 2 12 0 - 17 0 g/dL    Hematocrit 46 7 36 5 - 49 3 %     (H) 82 - 98 fL    MCH 33 0 26 8 - 34 3 pg    MCHC 32 5 31 4 - 37 4 g/dL    RDW 14 5 11 6 - 15 1 %    MPV 11 5 8 9 - 12 7 fL    Platelets 87 (L) 609 - 390 Thousands/uL    nRBC 0 /100 WBCs    Neutrophils Relative 64 43 - 75 %    Immat GRANS % 0 0 - 2 %    Lymphocytes Relative 19 14 - 44 %    Monocytes Relative 12 4 - 12 %    Eosinophils Relative 4 0 - 6 %    Basophils Relative 1 0 - 1 %    Neutrophils Absolute 3 74 1 85 - 7 62 Thousands/µL    Immature Grans Absolute 0 01 0 00 - 0 20 Thousand/uL    Lymphocytes Absolute 1 10 0 60 - 4 47 Thousands/µL    Monocytes Absolute 0 71 0 17 - 1 22 Thousand/µL    Eosinophils Absolute 0 24 0 00 - 0 61 Thousand/µL    Basophils Absolute 0 05 0 00 - 0 10 Thousands/µL   Comprehensive metabolic panel   Result Value Ref Range    Sodium 140 135 - 147 mmol/L    Potassium 4 2 3 5 - 5 3 mmol/L    Chloride 110 (H) 96 - 108 mmol/L    CO2 26 21 - 32 mmol/L    ANION GAP 4 4 - 13 mmol/L    BUN 23 5 - 25 mg/dL    Creatinine 1 42 (H) 0 60 - 1 30 mg/dL    Glucose, Fasting 112 (H) 65 - 99 mg/dL    Calcium 9 4 8 3 - 10 1 mg/dL    AST 38 5 - 45 U/L    ALT 42 12 - 78 U/L    Alkaline Phosphatase 100 46 - 116 U/L    Total Protein 6 7 6 4 - 8 4 g/dL    Albumin 3 7 3 5 - 5 0 g/dL    Total Bilirubin 1 06 (H) 0 20 - 1 00 mg/dL    eGFR 51 ml/min/1 73sq m   Lipid panel   Result Value Ref Range    Cholesterol 139 See Comment mg/dL    Triglycerides 139 See Comment mg/dL    HDL, Direct 32 (L) >=40 mg/dL    LDL Calculated 79 0 - 100 mg/dL    Non-HDL-Chol (CHOL-HDL) 107 mg/dl   Uric acid   Result Value Ref Range    Uric Acid 7 0 3 5 - 8 5 mg/dL     Labs, Imaging, & Other studies:   All pertinent labs and imaging studies were personally reviewed      Reviewed and discussed with patient  Assessment and plan: See diagnoses, orders and instructions below  Follow-up visit for thrombocytopenia and he carries a diagnosis of ITP but he may not have ITP  He has thrombocytopenia secondary to splenomegaly  He has hepatosplenomegaly and cause to be determined for hepatosplenomegaly  Patient has severe COPD  He gives history of chronic renal disease from taking too many ibuprofen for his back pain secondary to arthritis and falls  He has history of arthritis in his knees and hips and has chronic low back pain  He ambulates with a cane because of arthritis and balance problem  Has tiredness  He has history of gout  He has polyps in his colon and he had 5th colonoscopy last year in 2022  History of appendectomy and navel hernia surgery  He has 40-pack-year history of smoking  He quit alcohol in 1996  He used to drink alcohol daily before that     Physical examination and test results are as recorded and discussed in detail  Discussed various causes of ITP  Discussed hepatosplenomegaly  Discussed various causes of hepatosplenomegaly  Suggested bone marrow test and that might help to find out because of hepatosplenomegaly  Also to get help from GI service and patient follows with GI specialist   I will send a message to GI specialist to see if he can help finding out reason for hepatosplenomegaly  Discussed all in detail  Questions answered  Diet and activities per patient's primary physician and other consultants  Patient to avoid falls and trauma  Health screening tests  Goal is to find out more about hepatosplenomegaly  Bone marrow test as above  Discussed precautions against COVID and other infections  Patient to report to emergency room in case of bleeding and other medical emergencies  All discussed in detail  Questions answered  Patient is capable of self-care at this time  1  Chronic ITP (idiopathic thrombocytopenia) (HCC)    - Ambulatory Referral to Hematology / Oncology    2  Thrombocytopenia ([facility] [address] )    - Ambulatory Referral to Interventional Radiology; Future    3  Hepatosplenomegaly    - Ambulatory Referral to Interventional Radiology; Future    Bone marrow test within a month  Follow-up in 2 months     Patient will continue to follow with  primary physician and other consultants  Patient  voiced understanding and agrees      Disclaimer: This document was prepared using a dictation device  If a word or phrase is confusing, or does not make sense, this is likely due to recognition error which was not discovered during the providers review  If you believe an error has occurred, please Contact me through [employer] service for chayito? cation  Counseling / Coordination of Care       Provided counseling and support

## 2023-03-21 DIAGNOSIS — L20.9 ATOPIC DERMATITIS, UNSPECIFIED TYPE: ICD-10-CM

## 2023-03-21 RX ORDER — BETAMETHASONE DIPROPIONATE 0.5 MG/G
1 CREAM TOPICAL 2 TIMES DAILY
Qty: 50 G | Refills: 2 | Status: SHIPPED | OUTPATIENT
Start: 2023-03-21

## 2023-03-22 ENCOUNTER — TELEPHONE (OUTPATIENT)
Dept: GASTROENTEROLOGY | Facility: CLINIC | Age: 68
End: 2023-03-22

## 2023-03-22 NOTE — TELEPHONE ENCOUNTER
Called and spoke to pt whom informed he is having a bone marrow extraction soon and has enough money going out  He does not wish to schedule an appt at this time  He said he would maybe call back after that was done

## 2023-03-22 NOTE — TELEPHONE ENCOUNTER
----- Message from Yaneth Scherer MD sent at 3/21/2023  1:09 PM EDT -----  Priscila Day could you please schedule this patient for follow-up office visit regarding his hepatosplenomegaly  ----- Message -----  From: Nicole Dempsey MD  Sent: 3/11/2023  10:48 PM EDT  To: Yaneth Scherer MD    Hi Dr Joe Smith,  Any idea why this patient has hepatosplenomegaly other than fatty liver?   Thanks  Proothi

## 2023-03-27 ENCOUNTER — OFFICE VISIT (OUTPATIENT)
Dept: PULMONOLOGY | Facility: CLINIC | Age: 68
End: 2023-03-27

## 2023-03-27 VITALS
OXYGEN SATURATION: 96 % | DIASTOLIC BLOOD PRESSURE: 80 MMHG | TEMPERATURE: 97.2 F | HEIGHT: 74 IN | BODY MASS INDEX: 40.43 KG/M2 | HEART RATE: 110 BPM | SYSTOLIC BLOOD PRESSURE: 130 MMHG | RESPIRATION RATE: 20 BRPM | WEIGHT: 315 LBS

## 2023-03-27 DIAGNOSIS — G47.33 OSA ON CPAP: Primary | ICD-10-CM

## 2023-03-27 DIAGNOSIS — Z99.89 OSA ON CPAP: Primary | ICD-10-CM

## 2023-03-27 DIAGNOSIS — J44.9 COPD, SEVERE (HCC): ICD-10-CM

## 2023-03-27 NOTE — H&P (VIEW-ONLY)
Pulmonary Follow Up Note   John Abraham 78 y o  male MRN: [id number]  3/27/2023      Assessment/Plan:     COPD, severe [facility]  He will continue with Trelegy Ellipta once daily and albuterol as needed  Unfortunately, he lost some ground after the most recent respiratory illness, which is common in patients with severe COPD  I instructed him to please call my office if this would happen again and I would consider systemic steroids in an effort to help his symptoms resolve more quickly  He verbalizes understanding  ARACELIS on CPAP  He is demonstrating compliance with nocturnal CPAP and is benefiting from a medical perspective  Ongoing use is medically necessary  Visit orders:    Diagnoses and all orders for this visit:    ARACELIS on CPAP    COPD, severe ([facility] )      No follow-ups on file  History of Present Illness   HPI:  John Abraham is a 78 y o  male who is here today for follow-up regarding severe COPD and obstructive sleep apnea  In December of this year, he developed a respiratory illness which lingered into January  He self treated with over-the-counter remedies and the symptoms eventually subsided  Unfortunately, he does not feel that he returned to his prior baseline in terms of shortness of breath  He has shortness of breath with exertion  He has no significant cough or sputum production  He is compliant with Trelegy Ellipta and uses albuterol anywhere from 1-3 times per day, depending on his activity level and symptoms  He uses CPAP on a nightly basis and wakes up feeling somewhat refreshed  He does have to wake up several times per night to urinate  Patient is scheduled for a bone marrow biopsy secondary to unexplained thrombocytopenia  Review of Systems   Constitutional: Negative for appetite change and fever  HENT: Positive for postnasal drip and sneezing  Negative for ear pain, rhinorrhea, sore throat and trouble swallowing      Respiratory: Positive for shortness of breath and wheezing  Cardiovascular: Negative for chest pain  Musculoskeletal: Negative for myalgias  Neurological: Negative for headaches  Hematological: Bruises/bleeds easily  All other systems reviewed and are negative        Medical, Family and Social history reviewed and updated as appropriate    Historical Information   Past Medical History:   Diagnosis Date   • Arthritis    • Asthma    • Chest pain     atypical   • Chronic kidney disease    • Colon polyp    • COPD (chronic obstructive pulmonary disease) ([address] )    • CPAP (continuous positive airway pressure) dependence    • Decreased glomerular filtration rate    • Hamstring strain    • Herniated lumbar intervertebral disc    • History of alcohol use     uncomplicated   • History of back pain     lower   • History of colon polyps     sigmoid   • History of genital warts    • History of lipoma    • History of muscle spasm     lumbar paraspinous muscle   • History of umbilical hernia    • Lateral pain of left hip    • Low HDL (under 40)    • Lumbar radiculopathy    • Obesity    • Respiratory distress     acute   • Shortness of breath    • Sleep apnea     CPAP PT WILL BRING     Past Surgical History:   Procedure Laterality Date   • ADENOIDECTOMY     • APPENDECTOMY     • COLONOSCOPY     • HAND SURGERY Left     CYST REMOVAL   • HIP SURGERY Bilateral    • LIPECTOMY      thigh   • LIPOMA RESECTION Left     HIP   • RI RPR UMBILICAL HRNA 5 YRS/> REDUCIBLE N/A 2/23/2017    Procedure: REPAIR HERNIA UMBILICAL;  Surgeon: Pablito Briones MD;  Location: [facility];  Service: General   • TONSILLECTOMY     • WRIST GANGLION EXCISION       Family History   Problem Relation Age of Onset   • Heart disease Mother         cardiac disorder   • Hypertension Mother    • COPD Father    • Heart disease Family         cardiac disorder   • Cancer Family         lung   • Emphysema Family         pulmonary unspecified emphysema   • Diabetes Family    • Glaucoma Family    • Cancer Family "  lung     Social History     Tobacco Use   Smoking Status Former   • Packs/day: 2 00   • Years: 25 00   • Pack years: 50 00   • Types: Cigarettes   • Start date: 0   • Quit date: 10/2014   • Years since quittin 4   Smokeless Tobacco Never   Tobacco Comments    Quit for a 16 year period in between start and final quit date  Meds/Allergies     Current Outpatient Medications:   •  albuterol (2 5 mg/3 mL) 0 083 % nebulizer solution, Take 1 vial (2 5 mg total) by nebulization every 6 (six) hours as needed for wheezing, Disp: 50 vial, Rfl: 3  •  albuterol (PROVENTIL HFA,VENTOLIN HFA) 90 mcg/act inhaler, INHALE 2 PUFFS EVERY 4 (FOUR) HOURS AS NEEDED FOR SHORTNESS OF BREATH, Disp: 8 5 g, Rfl: 5  •  allopurinol (ZYLOPRIM) 100 mg tablet, TAKE 1 TABLET BY MOUTH TWICE A DAY, Disp: 180 tablet, Rfl: 1  •  betamethasone, augmented, (DIPROLENE-AF) 0 05 % cream, APPLY 1 APPLICATION TOPICALLY 2 (TWO) TIMES A DAY TO AFFECTED AREA, Disp: 50 g, Rfl: 2  •  Cholecalciferol (VITAMIN D3) 1000 units CAPS, Take by mouth, Disp: , Rfl:   •  guaiFENesin (MUCINEX) 600 mg 12 hr tablet, Take 1,200 mg by mouth daily Once at hs, Disp: , Rfl:   •  imiquimod (ALDARA) 5 % cream, APPLY 1 PACKET TOPICALLY 3 (THREE) TIMES A WEEK, Disp: 12 packet, Rfl: 0  •  Multiple Vitamins tablet, Take 1 tablet by mouth daily, Disp: , Rfl:   •  sildenafil (VIAGRA) 100 mg tablet, Take 1 tablet (100 mg total) by mouth daily as needed for erectile dysfunction, Disp: 6 tablet, Rfl: 5  •  tamsulosin (FLOMAX) 0 4 mg, TAKE 1 CAPSULE BY MOUTH EVERY DAY WITH DINNER, Disp: 90 capsule, Rfl: 3  •  Trelegy Ellipta 100-62 5-25 MCG/ACT inhaler, INHALE 1 PUFF DAILY RINSE MOUTH AFTER USE , Disp: 60 each, Rfl: 5  Allergies   Allergen Reactions   • Darvon [Propoxyphene] Other (See Comments)     hallucinations     Vitals: Blood pressure 130/80, pulse (!) 110, temperature (!) 97 2 °F (36 2 °C), temperature source Tympanic, resp   rate 20, height 6' 2\" (1 88 m), weight (!) 183 kg " (404 lb), SpO2 96 %  Body mass index is 51 87 kg/m²  Oxygen Therapy  SpO2: 96 %  Oxygen Therapy: None (Room air)    Physical Exam   Physical Exam  Constitutional:       General: He is not in acute distress  Appearance: He is obese  HENT:      Head: Normocephalic  Mouth/Throat:      Pharynx: No oropharyngeal exudate  Eyes:      General: No scleral icterus  Pupils: Pupils are equal, round, and reactive to light  Neck:      Vascular: No JVD  Cardiovascular:      Rate and Rhythm: Normal rate and regular rhythm  Abdominal:      Palpations: Abdomen is soft  Tenderness: There is no abdominal tenderness  Musculoskeletal:      Cervical back: Neck supple  Lymphadenopathy:      Cervical: No cervical adenopathy  Skin:     General: Skin is warm and dry  Neurological:      Mental Status: He is alert and oriented to person, place, and time  Psychiatric:         Mood and Affect: Mood normal          Labs: I have personally reviewed pertinent lab results  Lab Results   Component Value Date    WBC 5 85 02/09/2023    HGB 15 2 02/09/2023    HCT 46 7 02/09/2023     (H) 02/09/2023    PLT 87 (L) 02/09/2023     Lab Results   Component Value Date    CALCIUM 9 4 02/09/2023     06/14/2017    K 4 2 02/09/2023    CO2 26 02/09/2023     (H) 02/09/2023    BUN 23 02/09/2023    CREATININE 1 42 (H) 02/09/2023     No results found for: IGE  Lab Results   Component Value Date    ALT 42 02/09/2023    AST 38 02/09/2023    ALKPHOS 100 02/09/2023    BILITOT 0 9 06/14/2017     Imaging and other studies: I have personally reviewed pertinent reports  and I have personally reviewed pertinent films in PACS CT of the chest from 2/8/2023 shows overall stable nodule in the left lower lobe going back to 2017      Answers for HPI/ROS submitted by the patient on 3/24/2023  Do you experience frequent throat clearing?: Yes  Chronicity: chronic  When did you first notice your symptoms?: more than 1 year ago  How often do your symptoms occur?: 2 to 4 times per day  Since you first noticed this problem, how has it changed?: gradually worsening  Do you have shortness of breath that occurs with effort or exertion?: Yes  Do you have ear congestion?: No  Do you have heartburn?: No  Do you have fatigue?: Yes  Do you have nasal congestion?: No  Do you have shortness of breath when lying flat?: No  Do you have shortness of breath when you wake up?: No  Do you have sweats?: No  Have you experienced weight loss?: No  Which of the following makes your symptoms worse?: any activity, change in weather, climbing stairs, emotional stress, exercise, exposure to fumes, exposure to smoke, strenuous activity  Which of the following makes your symptoms better?: cold air, rest

## 2023-03-27 NOTE — ASSESSMENT & PLAN NOTE
He is demonstrating compliance with nocturnal CPAP and is benefiting from a medical perspective  Ongoing use is medically necessary

## 2023-03-27 NOTE — PROGRESS NOTES
Pulmonary Follow Up Note   Chalmer Landau 78 y o  male MRN: [id number]  3/27/2023      Assessment/Plan:     COPD, severe [facility]  He will continue with Trelegy Ellipta once daily and albuterol as needed  Unfortunately, he lost some ground after the most recent respiratory illness, which is common in patients with severe COPD  I instructed him to please call my office if this would happen again and I would consider systemic steroids in an effort to help his symptoms resolve more quickly  He verbalizes understanding  ARACELIS on CPAP  He is demonstrating compliance with nocturnal CPAP and is benefiting from a medical perspective  Ongoing use is medically necessary  Visit orders:    Diagnoses and all orders for this visit:    ARACELIS on CPAP    COPD, severe ([facility] )      No follow-ups on file  History of Present Illness   HPI:  Chalmer Landau is a 78 y o  male who is here today for follow-up regarding severe COPD and obstructive sleep apnea  In December of this year, he developed a respiratory illness which lingered into January  He self treated with over-the-counter remedies and the symptoms eventually subsided  Unfortunately, he does not feel that he returned to his prior baseline in terms of shortness of breath  He has shortness of breath with exertion  He has no significant cough or sputum production  He is compliant with Trelegy Ellipta and uses albuterol anywhere from 1-3 times per day, depending on his activity level and symptoms  He uses CPAP on a nightly basis and wakes up feeling somewhat refreshed  He does have to wake up several times per night to urinate  Patient is scheduled for a bone marrow biopsy secondary to unexplained thrombocytopenia  Review of Systems   Constitutional: Negative for appetite change and fever  HENT: Positive for postnasal drip and sneezing  Negative for ear pain, rhinorrhea, sore throat and trouble swallowing      Respiratory: Positive for shortness of breath and wheezing  Cardiovascular: Negative for chest pain  Musculoskeletal: Negative for myalgias  Neurological: Negative for headaches  Hematological: Bruises/bleeds easily  All other systems reviewed and are negative        Medical, Family and Social history reviewed and updated as appropriate    Historical Information   Past Medical History:   Diagnosis Date   • Arthritis    • Asthma    • Chest pain     atypical   • Chronic kidney disease    • Colon polyp    • COPD (chronic obstructive pulmonary disease) ([address] )    • CPAP (continuous positive airway pressure) dependence    • Decreased glomerular filtration rate    • Hamstring strain    • Herniated lumbar intervertebral disc    • History of alcohol use     uncomplicated   • History of back pain     lower   • History of colon polyps     sigmoid   • History of genital warts    • History of lipoma    • History of muscle spasm     lumbar paraspinous muscle   • History of umbilical hernia    • Lateral pain of left hip    • Low HDL (under 40)    • Lumbar radiculopathy    • Obesity    • Respiratory distress     acute   • Shortness of breath    • Sleep apnea     CPAP PT WILL BRING     Past Surgical History:   Procedure Laterality Date   • ADENOIDECTOMY     • APPENDECTOMY     • COLONOSCOPY     • HAND SURGERY Left     CYST REMOVAL   • HIP SURGERY Bilateral    • LIPECTOMY      thigh   • LIPOMA RESECTION Left     HIP   • MS RPR UMBILICAL HRNA 5 YRS/> REDUCIBLE N/A 2/23/2017    Procedure: REPAIR HERNIA UMBILICAL;  Surgeon: Manoj Queen MD;  [facility];  Service: General   • TONSILLECTOMY     • WRIST GANGLION EXCISION       Family History   Problem Relation Age of Onset   • Heart disease Mother         cardiac disorder   • Hypertension Mother    • COPD Father    • Heart disease Family         cardiac disorder   • Cancer Family         lung   • Emphysema Family         pulmonary unspecified emphysema   • Diabetes Family    • Glaucoma Family    • Cancer Family "  lung     Social History     Tobacco Use   Smoking Status Former   • Packs/day: 2 00   • Years: 25 00   • Pack years: 50 00   • Types: Cigarettes   • Start date: 0   • Quit date: 10/2014   • Years since quittin 4   Smokeless Tobacco Never   Tobacco Comments    Quit for a 16 year period in between start and final quit date  Meds/Allergies     Current Outpatient Medications:   •  albuterol (2 5 mg/3 mL) 0 083 % nebulizer solution, Take 1 vial (2 5 mg total) by nebulization every 6 (six) hours as needed for wheezing, Disp: 50 vial, Rfl: 3  •  albuterol (PROVENTIL HFA,VENTOLIN HFA) 90 mcg/act inhaler, INHALE 2 PUFFS EVERY 4 (FOUR) HOURS AS NEEDED FOR SHORTNESS OF BREATH, Disp: 8 5 g, Rfl: 5  •  allopurinol (ZYLOPRIM) 100 mg tablet, TAKE 1 TABLET BY MOUTH TWICE A DAY, Disp: 180 tablet, Rfl: 1  •  betamethasone, augmented, (DIPROLENE-AF) 0 05 % cream, APPLY 1 APPLICATION TOPICALLY 2 (TWO) TIMES A DAY TO AFFECTED AREA, Disp: 50 g, Rfl: 2  •  Cholecalciferol (VITAMIN D3) 1000 units CAPS, Take by mouth, Disp: , Rfl:   •  guaiFENesin (MUCINEX) 600 mg 12 hr tablet, Take 1,200 mg by mouth daily Once at hs, Disp: , Rfl:   •  imiquimod (ALDARA) 5 % cream, APPLY 1 PACKET TOPICALLY 3 (THREE) TIMES A WEEK, Disp: 12 packet, Rfl: 0  •  Multiple Vitamins tablet, Take 1 tablet by mouth daily, Disp: , Rfl:   •  sildenafil (VIAGRA) 100 mg tablet, Take 1 tablet (100 mg total) by mouth daily as needed for erectile dysfunction, Disp: 6 tablet, Rfl: 5  •  tamsulosin (FLOMAX) 0 4 mg, TAKE 1 CAPSULE BY MOUTH EVERY DAY WITH DINNER, Disp: 90 capsule, Rfl: 3  •  Trelegy Ellipta 100-62 5-25 MCG/ACT inhaler, INHALE 1 PUFF DAILY RINSE MOUTH AFTER USE , Disp: 60 each, Rfl: 5  Allergies   Allergen Reactions   • Darvon [Propoxyphene] Other (See Comments)     hallucinations     Vitals: Blood pressure 130/80, pulse (!) 110, temperature (!) 97 2 °F (36 2 °C), temperature source Tympanic, resp   rate 20, height 6' 2\" (1 88 m), weight (!) 183 kg " (404 lb), SpO2 96 %  Body mass index is 51 87 kg/m²  Oxygen Therapy  SpO2: 96 %  Oxygen Therapy: None (Room air)    Physical Exam   Physical Exam  Constitutional:       General: He is not in acute distress  Appearance: He is obese  HENT:      Head: Normocephalic  Mouth/Throat:      Pharynx: No oropharyngeal exudate  Eyes:      General: No scleral icterus  Pupils: Pupils are equal, round, and reactive to light  Neck:      Vascular: No JVD  Cardiovascular:      Rate and Rhythm: Normal rate and regular rhythm  Abdominal:      Palpations: Abdomen is soft  Tenderness: There is no abdominal tenderness  Musculoskeletal:      Cervical back: Neck supple  Lymphadenopathy:      Cervical: No cervical adenopathy  Skin:     General: Skin is warm and dry  Neurological:      Mental Status: He is alert and oriented to person, place, and time  Psychiatric:         Mood and Affect: Mood normal          Labs: I have personally reviewed pertinent lab results  Lab Results   Component Value Date    WBC 5 85 02/09/2023    HGB 15 2 02/09/2023    HCT 46 7 02/09/2023     (H) 02/09/2023    PLT 87 (L) 02/09/2023     Lab Results   Component Value Date    CALCIUM 9 4 02/09/2023     06/14/2017    K 4 2 02/09/2023    CO2 26 02/09/2023     (H) 02/09/2023    BUN 23 02/09/2023    CREATININE 1 42 (H) 02/09/2023     No results found for: IGE  Lab Results   Component Value Date    ALT 42 02/09/2023    AST 38 02/09/2023    ALKPHOS 100 02/09/2023    BILITOT 0 9 06/14/2017     Imaging and other studies: I have personally reviewed pertinent reports  and I have personally reviewed pertinent films in PACS CT of the chest from 2/8/2023 shows overall stable nodule in the left lower lobe going back to 2017      Answers for HPI/ROS submitted by the patient on 3/24/2023  Do you experience frequent throat clearing?: Yes  Chronicity: chronic  When did you first notice your symptoms?: more than 1 year ago  How often do your symptoms occur?: 2 to 4 times per day  Since you first noticed this problem, how has it changed?: gradually worsening  Do you have shortness of breath that occurs with effort or exertion?: Yes  Do you have ear congestion?: No  Do you have heartburn?: No  Do you have fatigue?: Yes  Do you have nasal congestion?: No  Do you have shortness of breath when lying flat?: No  Do you have shortness of breath when you wake up?: No  Do you have sweats?: No  Have you experienced weight loss?: No  Which of the following makes your symptoms worse?: any activity, change in weather, climbing stairs, emotional stress, exercise, exposure to fumes, exposure to smoke, strenuous activity  Which of the following makes your symptoms better?: cold air, rest

## 2023-03-27 NOTE — ASSESSMENT & PLAN NOTE
He will continue with Trelegy Ellipta once daily and albuterol as needed  Unfortunately, he lost some ground after the most recent respiratory illness, which is common in patients with severe COPD  I instructed him to please call my office if this would happen again and I would consider systemic steroids in an effort to help his symptoms resolve more quickly  He verbalizes understanding

## 2023-04-07 ENCOUNTER — HOSPITAL ENCOUNTER (OUTPATIENT)
Dept: CT IMAGING | Facility: HOSPITAL | Age: 68
Discharge: HOME | End: 2023-04-07
Attending: INTERNAL MEDICINE

## 2023-04-07 VITALS
TEMPERATURE: 96.9 F | OXYGEN SATURATION: 96 % | HEART RATE: 75 BPM | HEIGHT: 74 IN | DIASTOLIC BLOOD PRESSURE: 52 MMHG | SYSTOLIC BLOOD PRESSURE: 111 MMHG | BODY MASS INDEX: 40.43 KG/M2 | WEIGHT: 315 LBS | RESPIRATION RATE: 18 BRPM

## 2023-04-07 DIAGNOSIS — R16.2 HEPATOSPLENOMEGALY: ICD-10-CM

## 2023-04-07 DIAGNOSIS — D69.6 THROMBOCYTOPENIA (HCC): ICD-10-CM

## 2023-04-07 LAB
BASOPHILS # BLD AUTO: 0.05 THOUSANDS/ÂΜL (ref 0–0.1)
BASOPHILS NFR BLD AUTO: 1 % (ref 0–1)
EOSINOPHIL # BLD AUTO: 0.25 THOUSAND/ÂΜL (ref 0–0.61)
EOSINOPHIL NFR BLD AUTO: 5 % (ref 0–6)
ERYTHROCYTE [DISTWIDTH] IN BLOOD BY AUTOMATED COUNT: 14.2 % (ref 11.6–15.1)
HCT VFR BLD AUTO: 45.1 % (ref 36.5–49.3)
HGB BLD-MCNC: 14.9 G/DL (ref 12–17)
IMM GRANULOCYTES # BLD AUTO: 0.01 THOUSAND/UL (ref 0–0.2)
IMM GRANULOCYTES NFR BLD AUTO: 0 % (ref 0–2)
LYMPHOCYTES # BLD AUTO: 0.99 THOUSANDS/ÂΜL (ref 0.6–4.47)
LYMPHOCYTES NFR BLD AUTO: 20 % (ref 14–44)
MCH RBC QN AUTO: 33.4 PG (ref 26.8–34.3)
MCHC RBC AUTO-ENTMCNC: 33 G/DL (ref 31.4–37.4)
MCV RBC AUTO: 101 FL (ref 82–98)
MONOCYTES # BLD AUTO: 0.57 THOUSAND/ÂΜL (ref 0.17–1.22)
MONOCYTES NFR BLD AUTO: 11 % (ref 4–12)
NEUTROPHILS # BLD AUTO: 3.13 THOUSANDS/ÂΜL (ref 1.85–7.62)
NEUTS SEG NFR BLD AUTO: 63 % (ref 43–75)
NRBC BLD AUTO-RTO: 0 /100 WBCS
PLATELET # BLD AUTO: 86 THOUSANDS/UL (ref 149–390)
PMV BLD AUTO: 11.1 FL (ref 8.9–12.7)
RBC # BLD AUTO: 4.46 MILLION/UL (ref 3.88–5.62)
WBC # BLD AUTO: 5 THOUSAND/UL (ref 4.31–10.16)

## 2023-04-07 RX ORDER — LIDOCAINE HYDROCHLORIDE 10 MG/ML
INJECTION, SOLUTION EPIDURAL; INFILTRATION; INTRACAUDAL; PERINEURAL AS NEEDED
Status: COMPLETED | OUTPATIENT
Start: 2023-04-07 | End: 2023-04-07

## 2023-04-07 RX ORDER — FENTANYL CITRATE 50 UG/ML
INJECTION, SOLUTION INTRAMUSCULAR; INTRAVENOUS AS NEEDED
Status: COMPLETED | OUTPATIENT
Start: 2023-04-07 | End: 2023-04-07

## 2023-04-07 RX ADMIN — FENTANYL CITRATE 50 MCG: 50 INJECTION, SOLUTION INTRAMUSCULAR; INTRAVENOUS at 08:58

## 2023-04-07 RX ADMIN — FENTANYL CITRATE 50 MCG: 50 INJECTION, SOLUTION INTRAMUSCULAR; INTRAVENOUS at 09:05

## 2023-04-07 RX ADMIN — LIDOCAINE HYDROCHLORIDE 10 ML: 10 INJECTION, SOLUTION EPIDURAL; INFILTRATION; INTRACAUDAL at 09:05

## 2023-04-07 NOTE — DISCHARGE INSTRUCTIONS
Bone Marrow Biopsy     WHAT YOU NEED TO KNOW:   A bone marrow biopsy is a procedure to remove a small amount of bone marrow from your bone  Bone marrow is the soft tissue inside your bone that helps to make blood cells  The sample is tested for disease or infection  DISCHARGE INSTRUCTIONS:     1  Limit your activities day of biopsy as directed by your doctor  2  Use medication as ordered  3  Return to your normal diet  Small sips of flat soda will help with nausea  4  Remove band-aid or dressing 24 hours after procedure  Contact Interventional Radiology at [phone] [place] PATIENTS: Contact Interventional Radiology at [phone]) Pratik Tabares PATIENTS: Contact Interventional Radiology at [phone]) if:    1  Difficulty breathing, nausea or vomiting  2  Chills or fever above 101 F     3  Pain at biopsy site not relieved by medication  4  Develop any redness, swelling, heat, unusual drainage, heavy bruising or bleeding from biopsy site

## 2023-04-07 NOTE — BRIEF OP NOTE (RAD/CATH)
INTERVENTIONAL RADIOLOGY PROCEDURE NOTE    Date: 4/7/2023    Procedure:   Procedure Summary     Date: 04/07/23 Room / Location: [place] [id number] CAT Scan    Anesthesia Start:  Anesthesia Stop:     Procedure: IR BIOPSY BONE MARROW Diagnosis:       Hepatosplenomegaly      Thrombocytopenia (HCC)      (Hepatosplenomegaly)    Scheduled Providers:  Responsible Provider:     Anesthesia Type: Not recorded ASA Status: Not recorded          Preoperative diagnosis:   1  Hepatosplenomegaly    2  Thrombocytopenia (HCC)         Postoperative diagnosis: Same  Surgeon: Everett Willson MD     Assistant: None  No qualified resident was available  Blood loss: Minimal    Specimens: 5 cc bone marrow, 1 bone core     Findings: BM biopsy     Complications: None immediate      Anesthesia: local and IV Fentanyl

## 2023-04-24 ENCOUNTER — TELEPHONE (OUTPATIENT)
Dept: GASTROENTEROLOGY | Facility: CLINIC | Age: 68
End: 2023-04-24

## 2023-04-24 NOTE — TELEPHONE ENCOUNTER
Recall ltr for colon with Dr Oralia Philippe in a 45 minute time slot due to hx of multiple ssa,ta, hyperplastic polyps due 5/4/2023

## 2023-04-26 NOTE — PROGRESS NOTES
Pulmonary Follow Up Note   Hermes Matos 62 y o  male MRN: [id number]  8/1/2019      Assessment/Plan:     COPD, severe [facility])   He is now on disability related to severe COPD  He is compliant with Symbicort twice daily  I encouraged him to use the Spiriva on a daily basis which I suspect will reduce his rescue inhaler needs  I provided him with additional samples today to help with the cost   He will be tested for alpha 1 antitrypsin deficiency today  ARACELIS on CPAP    He is demonstrating compliance with nocturnal CPAP  He is deriving significant benefit and will continue nightly use  Pulmonary nodule    Pulmonary nodule demonstrated stability on CT from March 2018  Patient has a 25 pack year smoking history, just under the cutoff for annual chest CT for lung cancer screening purposes  While I suspect that the lung nodule is benign, I would like to update chest x-ray to confirm gross stability  Visit orders:    Diagnoses and all orders for this visit:    COPD, severe ([address] )    ARACELIS on CPAP    Pulmonary nodule  -     XR chest pa & lateral; Future    Other orders  -     tiotropium (SPIRIVA RESPIMAT) 2 5 MCG/ACT AERS inhaler; Inhale 2 puffs daily        Return in about 4 months (around 12/1/2019)  History of Present Illness   HPI:  Hermes Matos is a 62 y o  male who  Is here today for follow-up regarding severe COPD and obstructive sleep apnea  He is  Stable from pulmonary perspective  He is using Symbicort twice daily but only using Spiriva every few days  In part, he is decreasing the Spiriva use to help with cost, but also finds that he has less symptomatology on days that he is less active  Despite this, he still uses his rescue inhaler or nebulizer 2-3 times per day  He has no significant cough, wheeze or sputum production  His most notable complaint is dyspnea on exertion  He finds that the humid days are more troublesome for him    He is extremely compliant with CPAP, using it every night for at least 9 hours per night  He wakes up feeling refreshed and is unable to go without it  He even uses it for naps during the day  Review of Systems   Constitutional: Negative for chills, fever and unexpected weight change  HENT: Negative for postnasal drip and sore throat  Eyes: Negative for visual disturbance  Respiratory:        As noted in HPI   Cardiovascular: Negative for chest pain  Gastrointestinal: Negative for abdominal pain, diarrhea and vomiting  Genitourinary: Negative for difficulty urinating  Skin: Negative for rash  Neurological: Negative for headaches  Hematological: Negative for adenopathy  Psychiatric/Behavioral: Negative  All other systems reviewed and are negative          Historical Information   Past Medical History:   Diagnosis Date    Arthritis     Asthma     Chest pain     atypical    Chronic kidney disease     [facility]     CPAP (continuous positive airway pressure) dependence     Decreased glomerular filtration rate     Hamstring strain     Herniated lumbar intervertebral disc     History of alcohol use     uncomplicated    History of back pain     lower    History of colon polyps     sigmoid    History of genital warts     History of lipoma     History of muscle spasm     lumbar paraspinous muscle    History of umbilical hernia     Lateral pain of left hip     Low HDL (under 40)     Lumbar radiculopathy     Obesity     Respiratory distress     acute    Shortness of breath     Sleep apnea     CPAP PT WILL BRING     Past Surgical History:   Procedure Laterality Date    ADENOIDECTOMY      APPENDECTOMY      COLONOSCOPY      HAND SURGERY Left     CYST REMOVAL    HIP SURGERY Bilateral     LIPECTOMY      thigh    LIPOMA RESECTION Left     HIP    TX REPAIR UMBILICAL GOBN,7+D/Q,AWVNU N/A 2/23/2017    Procedure: REPAIR HERNIA UMBILICAL;  Surgeon: Ryan Shukla MD;  Location: [facility]; Service: General    TONSILLECTOMY      WRIST GANGLION EXCISION       Family History   Problem Relation Age of Onset    Heart disease Mother         cardiac disorder    Hypertension Mother     COPD Father     Heart disease Family         cardiac disorder    Cancer Family         lung    Emphysema Family         pulmonary unspecified emphysema    Diabetes Family     Glaucoma Family     Cancer Family         lung       Social History     Tobacco Use   Smoking Status Former Smoker    Packs/day: 1     Years: 25 00    Pack years: 25 00    Types: Cigarettes    Start date: 0    Last attempt to quit: 10/2014    Years since quittin 8   Smokeless Tobacco Never Used         Meds/Allergies     Current Outpatient Medications:     albuterol (2 5 mg/3 mL) 0 083 % nebulizer solution, Take 1 vial (2 5 mg total) by nebulization every 6 (six) hours as needed for wheezing, Disp: 50 vial, Rfl: 3    albuterol (PROAIR HFA) 90 mcg/act inhaler, Inhale 2 puffs every 4 (four) hours as needed for shortness of breath, Disp: 1 Inhaler, Rfl: 5    Cholecalciferol (VITAMIN D3) 1000 units CAPS, Take by mouth, Disp: , Rfl:     Multiple Vitamins tablet, Take 1 tablet by mouth daily, Disp: , Rfl:     podofilox (CONDYLOX) 0 5 % external solution, Apply topically 2 (two) times a day, Disp: 3 5 mL, Rfl: 3    POTASSIUM AMINOBENZOATE PO, Take by mouth, Disp: , Rfl:     SYMBICORT 160-4 5 MCG/ACT inhaler, INHALE 2 PUFFS TWICE DAILY  RINSE MOUTH AFTER USE , Disp: 10 2 Inhaler, Rfl: 3    tiotropium (SPIRIVA RESPIMAT) 2 5 MCG/ACT AERS inhaler, Inhale 2 puffs daily, Disp: , Rfl:   Allergies   Allergen Reactions    Darvon [Propoxyphene] Other (See Comments)     hallucinations       Vitals: Blood pressure 140/82, pulse 97, temperature 97 7 °F (36 5 °C), temperature source Tympanic, height 6' 3" (1 905 m), weight (!) 175 kg (386 lb), SpO2 97 %  Body mass index is 48 25 kg/m²   Oxygen Therapy  SpO2: 97 %  Oxygen Therapy: None (Room air)    Physical Exam   Constitutional: He is oriented to person, place, and time  No distress  HENT:   Head: Normocephalic  Mouth/Throat: No oropharyngeal exudate  Eyes: Pupils are equal, round, and reactive to light  No scleral icterus  Neck: Neck supple  No JVD present  Cardiovascular: Normal rate and regular rhythm  Pulmonary/Chest: He has no wheezes  He has no rales  Abdominal: Soft  There is no tenderness  Musculoskeletal: He exhibits no edema  Lymphadenopathy:     He has no cervical adenopathy  Neurological: He is alert and oriented to person, place, and time  Skin: Skin is warm and dry  Psychiatric: He has a normal mood and affect  Labs: I have personally reviewed pertinent lab results  Lab Results   Component Value Date    WBC 7 10 01/05/2018    HGB 16 0 01/05/2018    HCT 47 6 01/05/2018    MCV 98 01/05/2018     (L) 01/05/2018     Lab Results   Component Value Date    CALCIUM 9 5 01/22/2019     06/14/2017    K 4 0 01/22/2019    CO2 28 01/22/2019     01/22/2019    BUN 22 01/22/2019    CREATININE 1 70 (H) 01/22/2019     No results found for: IGE  Lab Results   Component Value Date    ALT 37 01/22/2019    AST 28 01/22/2019    ALKPHOS 100 01/22/2019    BILITOT 0 9 06/14/2017       Imaging and other studies: I have personally reviewed pertinent reports  and I have personally reviewed pertinent films in PACS   Chest CT from 2/26/18 shows stable 5 mm pulmonary nodule  Pulmonary function testing:  Performed   3/12/19   FEV1/FVC ratio 46%   FEV1 36% predicted  FVC 68% predicted    (+) response to bronchodilators   post bronchodilator FEV1 is 47% predicted Xeljanz Counseling: I discussed with the patient the risks of Xeljanz therapy including increased risk of infection, liver issues, headache, diarrhea, or cold symptoms. Live vaccines should be avoided. They were instructed to call if they have any problems.

## 2023-05-18 ENCOUNTER — OFFICE VISIT (OUTPATIENT)
Dept: HEMATOLOGY ONCOLOGY | Facility: CLINIC | Age: 68
End: 2023-05-18

## 2023-05-18 VITALS
HEIGHT: 74 IN | OXYGEN SATURATION: 95 % | DIASTOLIC BLOOD PRESSURE: 82 MMHG | WEIGHT: 315 LBS | HEART RATE: 117 BPM | SYSTOLIC BLOOD PRESSURE: 140 MMHG | RESPIRATION RATE: 17 BRPM | BODY MASS INDEX: 40.43 KG/M2

## 2023-05-18 DIAGNOSIS — R79.0 LOW IRON STORES: ICD-10-CM

## 2023-05-18 DIAGNOSIS — R16.2 HEPATOSPLENOMEGALY: Primary | ICD-10-CM

## 2023-05-18 DIAGNOSIS — D69.6 THROMBOCYTOPENIA (HCC): ICD-10-CM

## 2023-05-18 NOTE — LETTER
May 22, 2023     Dorcas Darnell PA-C  [address]    Patient: Anny Jeronimo   YOB: 1955   Date of Visit: 5/18/2023       Dear Dr Torrey De Santiago:    Thank you for referring Tatyana Poole to me for evaluation  Below are my notes for this consultation  If you have questions, please do not hesitate to call me  I look forward to following your patient along with you  Sincerely,        Nohemi Grissom MD        CC: MD Nohemi Dean MD  5/22/2023  6:26 AM  Addendum  Chart to be completed  Completed  HPI: Follow-up visit for thrombocytopenia most likely secondary to splenomegaly  Patient has hepatosplenomegaly  Patient has history of alcohol abuse in the past   He states he quit drinking alcohol several years ago in [address] [place] [address] He follows with his gastroenterologist   He states he had colonoscopy last year for polyps  He gives history severe COPD, chronic renal disease, arthritis and chronic low back discomfort he ambulates with a cane  History of gout  Has tiredness            Current Outpatient Medications:   •  albuterol (2 5 mg/3 mL) 0 083 % nebulizer solution, Take 1 vial (2 5 mg total) by nebulization every 6 (six) hours as needed for wheezing, Disp: 50 vial, Rfl: 3  •  albuterol (PROVENTIL HFA,VENTOLIN HFA) 90 mcg/act inhaler, INHALE 2 PUFFS EVERY 4 (FOUR) HOURS AS NEEDED FOR SHORTNESS OF BREATH, Disp: 8 5 g, Rfl: 5  •  allopurinol (ZYLOPRIM) 100 mg tablet, TAKE 1 TABLET BY MOUTH TWICE A DAY, Disp: 180 tablet, Rfl: 1  •  betamethasone, augmented, (DIPROLENE-AF) 0 05 % cream, APPLY 1 APPLICATION TOPICALLY 2 (TWO) TIMES A DAY TO AFFECTED AREA, Disp: 50 g, Rfl: 2  •  Cholecalciferol (VITAMIN D3) 1000 units CAPS, Take by mouth, Disp: , Rfl:   •  guaiFENesin (MUCINEX) 600 mg 12 hr tablet, Take 1,200 mg by mouth daily Once at hs, Disp: , Rfl:   •  imiquimod (ALDARA) 5 % cream, APPLY 1 PACKET TOPICALLY 3 (THREE) TIMES A WEEK, "Disp: 12 packet, Rfl: 0  •  Multiple Vitamins tablet, Take 1 tablet by mouth daily, Disp: , Rfl:   •  sildenafil (VIAGRA) 100 mg tablet, Take 1 tablet (100 mg total) by mouth daily as needed for erectile dysfunction, Disp: 6 tablet, Rfl: 5  •  tamsulosin (FLOMAX) 0 4 mg, TAKE 1 CAPSULE BY MOUTH EVERY DAY WITH DINNER, Disp: 90 capsule, Rfl: 3  •  Trelegy Ellipta 100-62 5-25 MCG/ACT inhaler, INHALE 1 PUFF DAILY RINSE MOUTH AFTER USE , Disp: 60 each, Rfl: 5  •  fluticasone (FLONASE) 50 mcg/act nasal spray, 1 spray into each nostril daily, Disp: 18 2 mL, Rfl: 0    Allergies   Allergen Reactions   • Darvon [Propoxyphene] Other (See Comments)     hallucinations       Oncology History    No history exists  ROS:   Reviewed 12 systems: See symptoms in HPI  Presently no other neurological, cardiac, pulmonary, GI and  symptoms other than mentioned in HPI  Other symptoms are in HPI  No symptoms like fever, chills, bleeding, bone pains, skin rash, weight loss, night sweats, weakness, numbness and claudication  No recent or frequent infections  Not unusually sensitive to heat or cold  No swelling of the ankles  No swollen glands  Patient is anxious  /82 (BP Location: Left arm, Patient Position: Sitting, Cuff Size: Adult)   Pulse (!) 117   Resp 17   Ht 6' 2\" (1 88 m)   Wt (!) 182 kg (401 lb)   SpO2 95%   BMI 51 49 kg/m²     Physical Exam:  Patient is alert and oriented  Patient is not in distress  Vital signs are above  There is no icterus  There is no oral thrush  There is no palpable neck mass  Lung fields are clear to percussion and auscultation  Heart rate is regular  Abdomen is soft and nontender but large and difficult to palpate accurately abdominal mass or ascites  There is no edema of the ankles  No calf tenderness  There is no focal neurological deficit  There is no skin rash  There is no palpable lymphadenopathy in the neck and axillary areas  There is no clubbing    Patient " is anxious  Performance status 2 on ECOG scale  He ambulates with a cane  IMAGING:  LIVER:  Size:  Moderately enlarged  The liver measures 20 7 cm in the midclavicular line  Contour:  Surface contour is smooth  Parenchyma: There is moderate diffuse increased echogenicity with smooth echotexture and acoustic beam attenuation  Most consistent with moderate hepatic steatosis  No liver mass identified  Dilated main portal vein measuring up to 2 cm  BILIARY:  The gallbladder is normal in caliber  No wall thickening or pericholecystic fluid  Shadowing gallstone(s) identified  No sonographic Treviño's sign  No intrahepatic biliary dilatation  CBD measures 3 0 mm  No choledocholithiasis      KIDNEY:   Right kidney measures 11 6 x 5 0 x 5 0 cm  Volume 152 5 mL  Kidney within normal limits      ASCITES:   None      IMPRESSION:     Cholelithiasis without sonographic evidence of acute cholecystitis      Hepatomegaly with hepatic steatosis      Dilated main portal vein suggestive of portal vein hypertension     Workstation performed: [id number]        Imaging    US right upper quadrant (Order: [id number]) - 9/13/2022    FINDINGS:     SPLEEN:    21 6 x 21 1 x 6 6 cm   Splenic volume: 1585 0 mL  Normal contour  No mass  No perisplenic collections  1 2 cm splenule     LEFT KIDNEY:    12 6 x 5 3 cm  Normal caliber and echogenicity  No hydronephrosis  No nephrolithiasis  No solid mass lesions  1 1 cm upper pole cyst     ABDOMINAL CAVITY:  No left upper quadrant ascites      IMPRESSION:  Splenomegaly        Workstation performed: [id number]        Imaging    US left upper quadrant (Order: [id number]) - 1/5/2022    FINDINGS:     Examination, as on prior ultrasound, demonstrates hepatosplenomegaly  Liver size of approximately 21 cm noted, splenic size of approximately 18 cm      Otherwise there is a normal distribution of activity without evidence of cold defect    There is no evidence of colloid shift      IMPRESSION:     Hepatosplenomegaly noted, without evidence of colloid shift         Workstation performed: [id number]        Imaging    NM liver spleen imaging static only (Order: [id number]) - 9/14/2021      LABS:    Results for orders placed or performed during the hospital encounter of 04/07/23   CBC and differential   Result Value Ref Range    WBC 5 00 4 31 - 10 16 Thousand/uL    RBC 4 46 3 88 - 5 62 Million/uL    Hemoglobin 14 9 12 0 - 17 0 g/dL    Hematocrit 45 1 36 5 - 49 3 %     (H) 82 - 98 fL    MCH 33 4 26 8 - 34 3 pg    MCHC 33 0 31 4 - 37 4 g/dL    RDW 14 2 11 6 - 15 1 %    MPV 11 1 8 9 - 12 7 fL    Platelets 86 (L) 624 - 390 Thousands/uL    nRBC 0 /100 WBCs    Neutrophils Relative 63 43 - 75 %    Immat GRANS % 0 0 - 2 %    Lymphocytes Relative 20 14 - 44 %    Monocytes Relative 11 4 - 12 %    Eosinophils Relative 5 0 - 6 %    Basophils Relative 1 0 - 1 %    Neutrophils Absolute 3 13 1 85 - 7 62 Thousands/µL    Immature Grans Absolute 0 01 0 00 - 0 20 Thousand/uL    Lymphocytes Absolute 0 99 0 60 - 4 47 Thousands/µL    Monocytes Absolute 0 57 0 17 - 1 22 Thousand/µL    Eosinophils Absolute 0 25 0 00 - 0 61 Thousand/µL    Basophils Absolute 0 05 0 00 - 0 10 Thousands/µL   Leukemia/Lymphoma flow cytometry   Result Value Ref Range    Scan Result SEE WRITTEN REPORT    [facility] - Miscellaneous Test   Result Value Ref Range    Miscellaneous Lab Test Result [facility]    cytogenetics - Miscellaneous Test   Result Value Ref Range    Miscellaneous Lab Test Result see written report    Bone Marrow Biopsy/Aspirate Exam   Result Value Ref Range    Case Report       Surgical Pathology Report                         Case: [id number]                                   Authorizing Provider:  Meghan Mcallister MD        Collected:           04/07/2023 0915              Ordering Location:     [facility]        Received:            04/07/2023 0950                                     formerly [facility] CAT Scan                                                            Pathologist:           Aureliano Mooney MD                                                          Specimens:   A) - Iliac Crest, Left, core                                                                        B) - Iliac Crest, Left, clot                                                                        C) - Iliac Crest, Left, slide                                                              Final Diagnosis       A -C  Bone marrow,  left iliac crest,  biopsy, clot, peripheral blood, and aspirate:  -  Variably cellular bone marrow with trilineage maturing hematopoiesis, megakaryocytic hyperplasia, left-shifted erythroid maturation, and no increase in blasts, see note  -  Trace iron stores in a limited sample  -  Mild to focally moderate increase in reticulin fibrosis  Note: The patient's history of chronic idiopathic thrombocytopenic purpura, macrocytosis, and splenomegaly are noted  The current biopsy shows a variable cellular bone marrow with trilineage maturing hematopoiesis, left shifted erythroid maturation, and megakaryocytic hyperplasia  Blasts are not increased  Morphologic evaluation is limited due to hemodilute aspirate smears, however there is no morphologic evidence of dysplasia in the reviewed sample  Flow cytometry shows normal immunophenotypic myeloid maturation without an increase in blasts  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%), and karyotype analysis reveals a normal male karyotype  Review of the lymphoid and plasma cell lineages is within normal limits  Further supported by normal lymphoid immunophenotype by flow cytometry, and normal IHC findings  Overall, there is no morphologic evidence of myelodysplasia or increase in blasts   In the setting of a normal karyotype and low level mutation in TET2 (VAF 3%) the following differential is considered clonal hematopoiesis of indeterminate potential (CHIP) versus clonal cytopenia of undetermined significance (CCUS)  Given the patient's history of ITP and splenomegaly with normal WBC counts and hemoglobin 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  The presence of megakaryocytic hyperplasia also favors a secondary process as the cause of thrombocytopenia  The significance of the patient's macrocytosis is unclear, but correlation with serum folate and B12 studies is recommended  Clinical correlation is advised  Microscopic Description       Peripheral blood smear:  WBC: No morphologic evidence of dysplasia and no increase in circulating blasts  RBC: Mild macrocytosis  PLT: Moderate thrombocytopenia  Bone marrow aspirate smears: The aspirate smears are paucicellular, aspicular, and suboptimal for evaluation due to hemodilution  Cellularity is composed predominantly of peripheral blood elements  M:E ratio cannot be accurately calculated  Review the aspirate smear shows complete myeloid maturation without an increase in blasts  Rare maturing erythroid elements are seen  Megakaryocytes are not well represented in this sample  Complete evaluation for dysplasia is not possible  These findings may not be representative for the true bone marrow composition  Bone marrow core biopsy and aspirate clot:  Histologic sections of the aspirate clot and decalcified core biopsy are adequate for evaluation  Marrow space cellularity is variably cellular, ranging from 20 to 70% cellularity, overall mildly hypercellular for patient age (40-50%)  Myelopoiesis:  Exhibit progressive maturation (myeloblasts= <5%)    Erythropoiesis:  Exhibit left shifted maturation  Megakaryocytes:  Increased with a normal spectrum of maturation  Lymphocytes:  Scattered  Plasma cells:  Rare  Other: Eosinophils are mildly increased    Special studies:   * Iron stain performed on the aspirate smear, clot, and core biopsy highlights trace iron granules; there are no spicules present for complete iron evaluation  Rare erythroid precursors are present without clear evidence of ring sideroblasts  This finding may not be reflective of the true bone marrow composition  Correlation with serum iron studies is advised  * Reticulin stain performed on the core biopsy shows mild to focally moderate increase in reticulin fibers  1-2 of 3 by the  Consensus grading scheme  * PAS highlights myeloid and megakaryocytic elements confirming a normal M:E ratio  * Immunohistochemical stains were performed with appropriate controls and show the following results:  - CD34 shows no increase in blasts (<5% of total cellularity) and  shows no increase in immature cells (<5% of total cellularity)  Additionally,  highlights scattered mast cells  CD61 highlights scattered megakaryocytes in normal distribution and quantity  CD3 and CD20 highlight interstitially scattered T and B-cells (<5% of total cellularity)   highlights scattered plasma cells (<5% of total cellularity) with a polytypic kappa and lambda light chain expression by kappa and lambda in situ hybridization studies  AE1/3 is negative         Note       Component Ref Range & Units 4/7/23 0803 2/9/23 1020 7/21/22 1334 1/5/22 1118 6/23/21 1633 6/14/21 1207 7/23/20 1339   WBC 4 31 - 10 16 Thousand/uL 5 00  5 85  5 98  6 76  6 85  7 57  6 77    RBC 3 88 - 5 62 Million/uL 4 46  4 60  4 68  4 88  4 91  4 75  4 85    Hemoglobin 12 0 - 17 0 g/dL 14 9  15 2  15 2  16 0  16 0  15 8  16 1    Hematocrit 36 5 - 49 3 % 45 1  46 7  47 2  48 6  47 8  47 4  48 0    MCV 82 - 98 fL 101 High   102 High   101 High   100 High   97  100 High   99 High     MCH 26 8 - 34 3 pg 33 4  33 0  32 5  32 8  32 6  33 3  33 2    MCHC 31 4 - 37 4 g/dL 33 0  32 5  32 2  32 9  33 5  33 3  33 5    RDW 11 6 - 15 1 % 14 2  14 5  14 2  14 2  14 2  14 1  14 0    MPV 8 9 - 12 7 fL 11 1  11 5  12 0  11 2  11 3  11 2  11 7    Platelets 357 - 926 Thousands/uL 86 Low   87 Low   97 Low  CM  113 Low   123 Low   107 Low   131 Low       10-COLOR FLOW CYTOMETRY ANALYSIS FOR ACUTE LEUKEMIA AND MYELOID/LYMPHOID NEOPLASMS ([facility] # [id number] ; please see separate report for further details): Interpretation:  BONE MARROW ASPIRATE:   - No evidence of acute leukemia or increased blasts  - No evidence of an abnormal myeloid population  Abnormalities associated with myelodysplasia and myeloproliferative neoplasms are absent    - No evidence of B-cell lymphoma or atypical T-cell population    - No evidence of monocytosis  Immunophenotype analysis:  Viability 7AAD: 98 52% There is a mixed population of granulocytes/maturing myeloid precursors, blasts, monocytes and lymphocytes  +/HLA-DR+ myeloblasts comprise (0 61%) of total events  Myeloid-commited CD34+ population comprise (0 55%) of total events  Granulocytes/maturing myeloid precursors (79 92%) do not display an aberrant phenotype  The orthogonal side scatter is normal  No significant number of CD56+ or CD10-/CD16- granulocytes is noted  Monocytes (1 63%) appear mature (CD14+/CD64+) and do not display overt phenotypic atypia  B-cells (0 66%) are polytypic  T-cells (12 57%) show no deletion or abnormal dim expression of pan-T-cell antigens on significant subset of cells  [facility] ([facility] # [id number]; please see separate report for further details): RESULT SUMMARY: ABNORMAL   DETECTED GENOMIC ALTERATIONS:   Tier II: Variants of Potential Clinical Significance   TET2 p [id number] (VAF: 3%)    CYTOGENETICS KARYOTYPE ANALYSIS ([facility] # [id number]; please see separate report for further details):  Karyotype: 46,XY[21]   Interpretation: A normal male karyotype was observed in twenty-one metaphase cells analyzed  Additional Information       All reported additional testing was performed with appropriately reactive controls    These tests were developed and their performance characteristics "determined by [facility] or appropriate performing facility, though some tests may be performed on tissues which have not been validated for performance characteristics (such as staining performed on alcohol exposed cell blocks and decalcified tissues)  Results should be interpreted with caution and in the context of the patients’ clinical condition  These tests may not be cleared or approved by the [place]  Food and Drug Administration, though the FDA has determined that such clearance or approval is not necessary  These tests are used for clinical purposes and they should not be regarded as investigational or for research  This laboratory has been approved by [facility], designated as a high-complexity laboratory and is qualified to perform these tests   Interpretation performed at [facility], [address] [place] [address]        Gross Description       A  The specimen is received in formalin, labeled with the patient's name and hospital number, and is designated \" left iliac crest core\"  The specimen consists of a single red osseous tissue core measuring 0 3 cm in diameter and 2 1 cm in length  The specimen is drained into an embedding bag  Entirely submitted  One cassette  The specimen is placed into Immunocal after proper fixation time  B  The specimen is received in formalin, labeled with the patient's name and hospital number, and is designated \" left iliac crest clot\"  The specimen consists of a single red hemorrhagic and clotted tissue fragment measuring 2 7 x 2 4 x 0 4 cm  The specimen is drained into an embedding bag  Entirely submitted  One cassette  C  The specimen is received, labeled with the patient's name and hospital number, and is designated \"Left iliac crest slides\"  The specimen consists of microscopic slides submitted for histological review  No cassettes submitted      Note: The estimated total formalin fixation time based upon information provided by " the submitting clinician and the standard processing schedule is under 72 hours  Denny Douglass      Clinical Information       Thrombocytopenia, macrocytosis, hepatosplenomegaly  Carries a diagnosis of ITP but he may not have ITP  Labs, Imaging, & Other studies:   All pertinent labs and imaging studies were personally reviewed      Reviewed and discussed with patient  Assessment and plan: See diagnoses, orders and instructions below  Follow-up visit for thrombocytopenia most likely secondary to splenomegaly  Patient has hepatosplenomegaly  Patient has history of alcohol abuse in the past   He states he quit drinking alcohol several years ago in [address] [place] [address] He follows with his gastroenterologist   He states he had colonoscopy last year for polyps  He gives history severe COPD, chronic renal disease, arthritis and chronic low back discomfort he ambulates with a cane  History of gout  Has tiredness  Physical examination and test results are as recorded and discussed in detail  Bone marrow test is unremarkable except for decreased iron stores on limited specimen and he will have iron panel blood test   He already had a colonoscopy  He will discuss hepatosplenomegaly and because of decreased iron stores with his gastroenterologist Dr Amanda Mejia  Discussed all in detail  Questions answered  Diet and activities per patient's primary physician and other consultants  Patient to avoid falls and trauma  Health screening tests  Goal is to find out more about hepatosplenomegaly  Bone marrow test result as above  Discussed precautions against COVID and other infections  Patient to report to emergency room in case of bleeding and other medical emergencies  All discussed in detail  Questions answered  Patient is capable of self-care at this time  1  Hepatosplenomegaly    - CBC and differential; Future  - Comprehensive metabolic panel; Future  - Reticulocytes; Future  - US abdomen complete; Future    2  Thrombocytopenia ([facility] [address] )      3  Low iron stores  - Iron Panel (Includes Ferritin, Iron Sat%, Iron, and TIBC); Future    Blood work and ultrasound of abdomen prior to next visit in 4 months       Patient will continue to follow with  primary physician and other consultants  Patient  voiced understanding and agrees      Disclaimer: This document was prepared using a dictation device  If a word or phrase is confusing, or does not make sense, this is likely due to recognition error which was not discovered during the providers review  If you believe an error has occurred, please Contact me through [employer] service for chayito? cation  Counseling / Coordination of Care       Provided counseling and support

## 2023-05-18 NOTE — PROGRESS NOTES
Chart to be completed  Completed  HPI: Follow-up visit for thrombocytopenia most likely secondary to splenomegaly  Patient has hepatosplenomegaly  Patient has history of alcohol abuse in the past   He states he quit drinking alcohol several years ago in [address] [place] [address] He follows with his gastroenterologist   He states he had colonoscopy last year for polyps  He gives history severe COPD, chronic renal disease, arthritis and chronic low back discomfort he ambulates with a cane  History of gout  Has tiredness            Current Outpatient Medications:   •  albuterol (2 5 mg/3 mL) 0 083 % nebulizer solution, Take 1 vial (2 5 mg total) by nebulization every 6 (six) hours as needed for wheezing, Disp: 50 vial, Rfl: 3  •  albuterol (PROVENTIL HFA,VENTOLIN HFA) 90 mcg/act inhaler, INHALE 2 PUFFS EVERY 4 (FOUR) HOURS AS NEEDED FOR SHORTNESS OF BREATH, Disp: 8 5 g, Rfl: 5  •  allopurinol (ZYLOPRIM) 100 mg tablet, TAKE 1 TABLET BY MOUTH TWICE A DAY, Disp: 180 tablet, Rfl: 1  •  betamethasone, augmented, (DIPROLENE-AF) 0 05 % cream, APPLY 1 APPLICATION TOPICALLY 2 (TWO) TIMES A DAY TO AFFECTED AREA, Disp: 50 g, Rfl: 2  •  Cholecalciferol (VITAMIN D3) 1000 units CAPS, Take by mouth, Disp: , Rfl:   •  guaiFENesin (MUCINEX) 600 mg 12 hr tablet, Take 1,200 mg by mouth daily Once at hs, Disp: , Rfl:   •  imiquimod (ALDARA) 5 % cream, APPLY 1 PACKET TOPICALLY 3 (THREE) TIMES A WEEK, Disp: 12 packet, Rfl: 0  •  Multiple Vitamins tablet, Take 1 tablet by mouth daily, Disp: , Rfl:   •  sildenafil (VIAGRA) 100 mg tablet, Take 1 tablet (100 mg total) by mouth daily as needed for erectile dysfunction, Disp: 6 tablet, Rfl: 5  •  tamsulosin (FLOMAX) 0 4 mg, TAKE 1 CAPSULE BY MOUTH EVERY DAY WITH DINNER, Disp: 90 capsule, Rfl: 3  •  Trelegy Ellipta 100-62 5-25 MCG/ACT inhaler, INHALE 1 PUFF DAILY RINSE MOUTH AFTER USE , Disp: 60 each, Rfl: 5  •  fluticasone (FLONASE) 50 mcg/act nasal spray, 1 spray into each nostril daily, Disp: 18 2 mL, Rfl: "0    Allergies   Allergen Reactions   • Darvon [Propoxyphene] Other (See Comments)     hallucinations       Oncology History    No history exists  ROS:   Reviewed 12 systems: See symptoms in HPI  Presently no other neurological, cardiac, pulmonary, GI and  symptoms other than mentioned in HPI  Other symptoms are in HPI  No symptoms like fever, chills, bleeding, bone pains, skin rash, weight loss, night sweats, weakness, numbness and claudication  No recent or frequent infections  Not unusually sensitive to heat or cold  No swelling of the ankles  No swollen glands  Patient is anxious  /82 (BP Location: Left arm, Patient Position: Sitting, Cuff Size: Adult)   Pulse (!) 117   Resp 17   Ht 6' 2\" (1 88 m)   Wt (!) 182 kg (401 lb)   SpO2 95%   BMI 51 49 kg/m²     Physical Exam:  Patient is alert and oriented  Patient is not in distress  Vital signs are above  There is no icterus  There is no oral thrush  There is no palpable neck mass  Lung fields are clear to percussion and auscultation  Heart rate is regular  Abdomen is soft and nontender but large and difficult to palpate accurately abdominal mass or ascites  There is no edema of the ankles  No calf tenderness  There is no focal neurological deficit  There is no skin rash  There is no palpable lymphadenopathy in the neck and axillary areas  There is no clubbing  Patient is anxious  Performance status 2 on ECOG scale  He ambulates with a cane  IMAGING:  LIVER:  Size:  Moderately enlarged  The liver measures 20 7 cm in the midclavicular line  Contour:  Surface contour is smooth  Parenchyma: There is moderate diffuse increased echogenicity with smooth echotexture and acoustic beam attenuation  Most consistent with moderate hepatic steatosis  No liver mass identified  Dilated main portal vein measuring up to 2 cm  BILIARY:  The gallbladder is normal in caliber    No wall thickening or pericholecystic " fluid   Shadowing gallstone(s) identified  No sonographic Treviño's sign  No intrahepatic biliary dilatation  CBD measures 3 0 mm  No choledocholithiasis      KIDNEY:   Right kidney measures 11 6 x 5 0 x 5 0 cm  Volume 152 5 mL  Kidney within normal limits      ASCITES:   None      IMPRESSION:     Cholelithiasis without sonographic evidence of acute cholecystitis      Hepatomegaly with hepatic steatosis      Dilated main portal vein suggestive of portal vein hypertension     Workstation performed: [id number]        Imaging    US right upper quadrant (Order: [id number]) - 9/13/2022    FINDINGS:     SPLEEN:    21 6 x 21 1 x 6 6 cm   Splenic volume: 1585 0 mL  Normal contour  No mass  No perisplenic collections  1 2 cm splenule     LEFT KIDNEY:    12 6 x 5 3 cm  Normal caliber and echogenicity  No hydronephrosis  No nephrolithiasis  No solid mass lesions  1 1 cm upper pole cyst     ABDOMINAL CAVITY:  No left upper quadrant ascites      IMPRESSION:  Splenomegaly        Workstation performed: [id number]        Imaging    US left upper quadrant (Order: [id number]) - 1/5/2022    FINDINGS:     Examination, as on prior ultrasound, demonstrates hepatosplenomegaly  Liver size of approximately 21 cm noted, splenic size of approximately 18 cm      Otherwise there is a normal distribution of activity without evidence of cold defect    There is no evidence of colloid shift      IMPRESSION:     Hepatosplenomegaly noted, without evidence of colloid shift         Workstation performed: [id number]        Imaging    NM liver spleen imaging static only (Order: [id number]) - 9/14/2021      LABS:    Results for orders placed or performed during the hospital encounter of 04/07/23   CBC and differential   Result Value Ref Range    WBC 5 00 4 31 - 10 16 Thousand/uL    RBC 4 46 3 88 - 5 62 Million/uL    Hemoglobin 14 9 12 0 - 17 0 g/dL    Hematocrit 45 1 36 5 - 49 3 %     (H) 82 - 98 fL    MCH 33 4 26 8 - 34 3 pg    MCHC 33 0 31 4 - 37 4 g/dL    RDW 14 2 11 6 - 15 1 %    MPV 11 1 8 9 - 12 7 fL    Platelets 86 (L) 850 - 390 Thousands/uL    nRBC 0 /100 WBCs    Neutrophils Relative 63 43 - 75 %    Immat GRANS % 0 0 - 2 %    Lymphocytes Relative 20 14 - 44 %    Monocytes Relative 11 4 - 12 %    Eosinophils Relative 5 0 - 6 %    Basophils Relative 1 0 - 1 %    Neutrophils Absolute 3 13 1 85 - 7 62 Thousands/µL    Immature Grans Absolute 0 01 0 00 - 0 20 Thousand/uL    Lymphocytes Absolute 0 99 0 60 - 4 47 Thousands/µL    Monocytes Absolute 0 57 0 17 - 1 22 Thousand/µL    Eosinophils Absolute 0 25 0 00 - 0 61 Thousand/µL    Basophils Absolute 0 05 0 00 - 0 10 Thousands/µL   Leukemia/Lymphoma flow cytometry   Result Value Ref Range    Scan Result SEE WRITTEN REPORT    [facility] - Miscellaneous Test   Result Value Ref Range    Miscellaneous Lab Test Result [facility]    cytogenetics - Miscellaneous Test   Result Value Ref Range    Miscellaneous Lab Test Result see written report    Bone Marrow Biopsy/Aspirate Exam   Result Value Ref Range    Case Report       Surgical Pathology Report                         Case: [id number]                                   Authorizing Provider:  Nohemi Grissom MD        Collected:           04/07/2023 0915              Ordering Location:     [facility]        Received:            04/07/2023 [address] CAT Scan                                                            Pathologist:           Yessenia Peters MD                                                          Specimens:   A) - Iliac Crest, Left, core                                                                        B) - Iliac Crest, Left, clot                                                                        C) - Iliac Crest, Left, slide                                                              Final Diagnosis       A -C    Bone marrow,  left iliac crest,  biopsy, clot, peripheral blood, and aspirate:  -  Variably cellular bone marrow with trilineage maturing hematopoiesis, megakaryocytic hyperplasia, left-shifted erythroid maturation, and no increase in blasts, see note  -  Trace iron stores in a limited sample  -  Mild to focally moderate increase in reticulin fibrosis  Note: The patient's history of chronic idiopathic thrombocytopenic purpura, macrocytosis, and splenomegaly are noted  The current biopsy shows a variable cellular bone marrow with trilineage maturing hematopoiesis, left shifted erythroid maturation, and megakaryocytic hyperplasia  Blasts are not increased  Morphologic evaluation is limited due to hemodilute aspirate smears, however there is no morphologic evidence of dysplasia in the reviewed sample  Flow cytometry shows normal immunophenotypic myeloid maturation without an increase in blasts  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%), and karyotype analysis reveals a normal male karyotype  Review of the lymphoid and plasma cell lineages is within normal limits  Further supported by normal lymphoid immunophenotype by flow cytometry, and normal IHC findings  Overall, there is no morphologic evidence of myelodysplasia or increase in blasts  In the setting of a normal karyotype and low level mutation in TET2 (VAF 3%) the following differential is considered clonal hematopoiesis of indeterminate potential (CHIP) versus clonal cytopenia of undetermined significance (CCUS)  Given the patient's history of ITP and splenomegaly with normal WBC counts and hemoglobin 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  The presence of megakaryocytic hyperplasia also favors a secondary process as the cause of thrombocytopenia  The significance of the patient's macrocytosis is unclear, but correlation with serum folate and B12 studies is recommended  Clinical correlation is advised         Microscopic Description       Peripheral blood smear:  WBC: No morphologic evidence of dysplasia and no increase in circulating blasts  RBC: Mild macrocytosis  PLT: Moderate thrombocytopenia  Bone marrow aspirate smears: The aspirate smears are paucicellular, aspicular, and suboptimal for evaluation due to hemodilution  Cellularity is composed predominantly of peripheral blood elements  M:E ratio cannot be accurately calculated  Review the aspirate smear shows complete myeloid maturation without an increase in blasts  Rare maturing erythroid elements are seen  Megakaryocytes are not well represented in this sample  Complete evaluation for dysplasia is not possible  These findings may not be representative for the true bone marrow composition  Bone marrow core biopsy and aspirate clot:  Histologic sections of the aspirate clot and decalcified core biopsy are adequate for evaluation  Marrow space cellularity is variably cellular, ranging from 20 to 70% cellularity, overall mildly hypercellular for patient age (40-50%)  Myelopoiesis:  Exhibit progressive maturation (myeloblasts= <5%)  Erythropoiesis:  Exhibit left shifted maturation  Megakaryocytes:  Increased with a normal spectrum of maturation  Lymphocytes:  Scattered  Plasma cells:  Rare  Other: Eosinophils are mildly increased    Special studies:   * Iron stain performed on the aspirate smear, clot, and core biopsy highlights trace iron granules; there are no spicules present for complete iron evaluation  Rare erythroid precursors are present without clear evidence of ring sideroblasts  This finding may not be reflective of the true bone marrow composition  Correlation with serum iron studies is advised  * Reticulin stain performed on the core biopsy shows mild to focally moderate increase in reticulin fibers  1-2 of 3 by the  Consensus grading scheme  * PAS highlights myeloid and megakaryocytic elements confirming a normal M:E ratio    * Immunohistochemical stains were performed with appropriate controls and show the following results:  - CD34 shows no increase in blasts (<5% of total cellularity) and  shows no increase in immature cells (<5% of total cellularity)  Additionally,  highlights scattered mast cells  CD61 highlights scattered megakaryocytes in normal distribution and quantity  CD3 and CD20 highlight interstitially scattered T and B-cells (<5% of total cellularity)   highlights scattered plasma cells (<5% of total cellularity) with a polytypic kappa and lambda light chain expression by kappa and lambda in situ hybridization studies  AE1/3 is negative  Note       Component Ref Range & Units 4/7/23 0803 2/9/23 1020 7/21/22 1334 1/5/22 1118 6/23/21 1633 6/14/21 1207 7/23/20 1339   WBC 4 31 - 10 16 Thousand/uL 5 00  5 85  5 98  6 76  6 85  7 57  6 77    RBC 3 88 - 5 62 Million/uL 4 46  4 60  4 68  4 88  4 91  4 75  4 85    Hemoglobin 12 0 - 17 0 g/dL 14 9  15 2  15 2  16 0  16 0  15 8  16 1    Hematocrit 36 5 - 49 3 % 45 1  46 7  47 2  48 6  47 8  47 4  48 0    MCV 82 - 98 fL 101 High   102 High   101 High   100 High   97  100 High   99 High     MCH 26 8 - 34 3 pg 33 4  33 0  32 5  32 8  32 6  33 3  33 2    MCHC 31 4 - 37 4 g/dL 33 0  32 5  32 2  32 9  33 5  33 3  33 5    RDW 11 6 - 15 1 % 14 2  14 5  14 2  14 2  14 2  14 1  14 0    MPV 8 9 - 12 7 fL 11 1  11 5  12 0  11 2  11 3  11 2  11 7    Platelets 705 - 288 Thousands/uL 86 Low   87 Low   97 Low  CM  113 Low   123 Low   107 Low   131 Low       10-COLOR FLOW CYTOMETRY ANALYSIS FOR ACUTE LEUKEMIA AND MYELOID/LYMPHOID NEOPLASMS ([facility] # [id number] ; please see separate report for further details): Interpretation:  BONE MARROW ASPIRATE:   - No evidence of acute leukemia or increased blasts  - No evidence of an abnormal myeloid population  Abnormalities associated with myelodysplasia and myeloproliferative neoplasms are absent    - No evidence of B-cell lymphoma or atypical T-cell population    - No evidence of monocytosis  Immunophenotype analysis:  Viability 7AAD: 98 52% There is a mixed population of granulocytes/maturing myeloid precursors, blasts, monocytes and lymphocytes  +/HLA-DR+ myeloblasts comprise (0 61%) of total events  Myeloid-commited CD34+ population comprise (0 55%) of total events  Granulocytes/maturing myeloid precursors (79 92%) do not display an aberrant phenotype  The orthogonal side scatter is normal  No significant number of CD56+ or CD10-/CD16- granulocytes is noted  Monocytes (1 63%) appear mature (CD14+/CD64+) and do not display overt phenotypic atypia  B-cells (0 66%) are polytypic  T-cells (12 57%) show no deletion or abnormal dim expression of pan-T-cell antigens on significant subset of cells  [facility] NGS Myeloid Report ([facility] # [id number]; please see separate report for further details): RESULT SUMMARY: ABNORMAL   DETECTED GENOMIC ALTERATIONS:   Tier II: Variants of Potential Clinical Significance   TET2 p [id number] (VAF: 3%)    CYTOGENETICS KARYOTYPE ANALYSIS ([facility] # [id number]; please see separate report for further details):  Karyotype: 46,XY[21]   Interpretation: A normal male karyotype was observed in twenty-one metaphase cells analyzed  Additional Information       All reported additional testing was performed with appropriately reactive controls  These tests were developed and their performance characteristics determined by [facility] or appropriate performing facility, though some tests may be performed on tissues which have not been validated for performance characteristics (such as staining performed on alcohol exposed cell blocks and decalcified tissues)  Results should be interpreted with caution and in the context of the patients’ clinical condition  These tests may not be cleared or approved by the [place]  Food and Drug Administration, though the FDA has determined that such clearance or approval is not necessary   These tests "are used for clinical purposes and they should not be regarded as investigational or for research  This laboratory has been approved by [place] [address], designated as a high-complexity laboratory and is qualified to perform these tests   Interpretation performed at [facility]        Gross Description       A  The specimen is received in formalin, labeled with the patient's name and hospital number, and is designated \" left iliac crest core\"  The specimen consists of a single red osseous tissue core measuring 0 3 cm in diameter and 2 1 cm in length  The specimen is drained into an embedding bag  Entirely submitted  One cassette  The specimen is placed into Immunocal after proper fixation time  B  The specimen is received in formalin, labeled with the patient's name and hospital number, and is designated \" left iliac crest clot\"  The specimen consists of a single red hemorrhagic and clotted tissue fragment measuring 2 7 x 2 4 x 0 4 cm  The specimen is drained into an embedding bag  Entirely submitted  One cassette  C  The specimen is received, labeled with the patient's name and hospital number, and is designated \"Left iliac crest slides\"  The specimen consists of microscopic slides submitted for histological review  No cassettes submitted  Note: The estimated total formalin fixation time based upon information provided by the submitting clinician and the standard processing schedule is under 72 hours  Denny Douglass      Clinical Information       Thrombocytopenia, macrocytosis, hepatosplenomegaly  Carries a diagnosis of ITP but he may not have ITP  Labs, Imaging, & Other studies:   All pertinent labs and imaging studies were personally reviewed      Reviewed and discussed with patient  Assessment and plan: See diagnoses, orders and instructions below  Follow-up visit for thrombocytopenia most likely secondary to splenomegaly    Patient has " hepatosplenomegaly  Patient has history of alcohol abuse in the past   He states he quit drinking alcohol several years ago in [address] [place] [address] He follows with his gastroenterologist   He states he had colonoscopy last year for polyps  He gives history severe COPD, chronic renal disease, arthritis and chronic low back discomfort he ambulates with a cane  History of gout  Has tiredness  Physical examination and test results are as recorded and discussed in detail  Bone marrow test is unremarkable except for decreased iron stores on limited specimen and he will have iron panel blood test   He already had a colonoscopy  He will discuss hepatosplenomegaly and because of decreased iron stores with his gastroenterologist Dr Marie Martínez  Discussed all in detail  Questions answered  Diet and activities per patient's primary physician and other consultants  Patient to avoid falls and trauma  Health screening tests  Goal is to find out more about hepatosplenomegaly  Bone marrow test result as above  Discussed precautions against COVID and other infections  Patient to report to emergency room in case of bleeding and other medical emergencies  All discussed in detail  Questions answered  Patient is capable of self-care at this time  1  Hepatosplenomegaly    - CBC and differential; Future  - Comprehensive metabolic panel; Future  - Reticulocytes; Future  - US abdomen complete; Future    2  Thrombocytopenia ([address] )      3  Low iron stores  - Iron Panel (Includes Ferritin, Iron Sat%, Iron, and TIBC); Future    Blood work and ultrasound of abdomen prior to next visit in 4 months       Patient will continue to follow with  primary physician and other consultants  Patient  voiced understanding and agrees      Disclaimer: This document was prepared using a dictation device  If a word or phrase is confusing, or does not make sense, this is likely due to recognition error which was not discovered during the providers review   If you believe an error has occurred, please Contact me through [employer] service for chayito? cation  Counseling / Coordination of Care       Provided counseling and support

## 2023-06-02 DIAGNOSIS — J00 ACUTE RHINITIS: ICD-10-CM

## 2023-06-02 RX ORDER — FLUTICASONE PROPIONATE 50 MCG
SPRAY, SUSPENSION (ML) NASAL
Qty: 24 ML | Refills: 1 | Status: SHIPPED | OUTPATIENT
Start: 2023-06-02

## 2023-08-11 ENCOUNTER — APPOINTMENT (OUTPATIENT)
Dept: LAB | Facility: MEDICAL CENTER | Age: 68
End: 2023-08-11
Payer: COMMERCIAL

## 2023-08-11 ENCOUNTER — HOSPITAL ENCOUNTER (OUTPATIENT)
Dept: RADIOLOGY | Facility: MEDICAL CENTER | Age: 68
Discharge: HOME | End: 2023-08-11
Payer: COMMERCIAL

## 2023-08-11 DIAGNOSIS — R79.0 LOW IRON STORES: ICD-10-CM

## 2023-08-11 DIAGNOSIS — R16.2 HEPATOSPLENOMEGALY: ICD-10-CM

## 2023-08-11 DIAGNOSIS — J44.9 COPD, SEVERE (HCC): ICD-10-CM

## 2023-08-11 DIAGNOSIS — D69.3 CHRONIC ITP (IDIOPATHIC THROMBOCYTOPENIA) (HCC): ICD-10-CM

## 2023-08-11 DIAGNOSIS — E78.5 HYPERLIPIDEMIA WITH LOW HDL: ICD-10-CM

## 2023-08-11 DIAGNOSIS — N18.30 STAGE 3 CHRONIC KIDNEY DISEASE, UNSPECIFIED WHETHER STAGE 3A OR 3B CKD (HCC): ICD-10-CM

## 2023-08-11 DIAGNOSIS — E78.6 HYPERLIPIDEMIA WITH LOW HDL: ICD-10-CM

## 2023-08-11 LAB
ALBUMIN SERPL BCP-MCNC: 3.8 G/DL (ref 3.5–5)
ALP SERPL-CCNC: 94 U/L (ref 46–116)
ALT SERPL W P-5'-P-CCNC: 37 U/L (ref 12–78)
ANION GAP SERPL CALCULATED.3IONS-SCNC: 7 MMOL/L
AST SERPL W P-5'-P-CCNC: 36 U/L (ref 5–45)
BASOPHILS # BLD AUTO: 0.05 THOUSANDS/ÂΜL (ref 0–0.1)
BASOPHILS NFR BLD AUTO: 1 % (ref 0–1)
BILIRUB SERPL-MCNC: 0.86 MG/DL (ref 0.2–1)
BUN SERPL-MCNC: 18 MG/DL (ref 5–25)
CALCIUM SERPL-MCNC: 9.8 MG/DL (ref 8.3–10.1)
CHLORIDE SERPL-SCNC: 111 MMOL/L (ref 96–108)
CHOLEST SERPL-MCNC: 125 MG/DL
CO2 SERPL-SCNC: 25 MMOL/L (ref 21–32)
CREAT SERPL-MCNC: 1.63 MG/DL (ref 0.6–1.3)
EOSINOPHIL # BLD AUTO: 0.18 THOUSAND/ÂΜL (ref 0–0.61)
EOSINOPHIL NFR BLD AUTO: 4 % (ref 0–6)
ERYTHROCYTE [DISTWIDTH] IN BLOOD BY AUTOMATED COUNT: 14.6 % (ref 11.6–15.1)
FERRITIN SERPL-MCNC: 150 NG/ML (ref 24–336)
GFR SERPL CREATININE-BSD FRML MDRD: 42 ML/MIN/1.73SQ M
GLUCOSE P FAST SERPL-MCNC: 108 MG/DL (ref 65–99)
HCT VFR BLD AUTO: 45.9 % (ref 36.5–49.3)
HDLC SERPL-MCNC: 30 MG/DL
HGB BLD-MCNC: 15.4 G/DL (ref 12–17)
IMM GRANULOCYTES # BLD AUTO: 0 THOUSAND/UL (ref 0–0.2)
IMM GRANULOCYTES NFR BLD AUTO: 0 % (ref 0–2)
IRON SATN MFR SERPL: 30 % (ref 20–50)
IRON SERPL-MCNC: 105 UG/DL (ref 65–175)
LDLC SERPL CALC-MCNC: 75 MG/DL (ref 0–100)
LYMPHOCYTES # BLD AUTO: 0.91 THOUSANDS/ÂΜL (ref 0.6–4.47)
LYMPHOCYTES NFR BLD AUTO: 18 % (ref 14–44)
MCH RBC QN AUTO: 34.2 PG (ref 26.8–34.3)
MCHC RBC AUTO-ENTMCNC: 33.6 G/DL (ref 31.4–37.4)
MCV RBC AUTO: 102 FL (ref 82–98)
MONOCYTES # BLD AUTO: 0.65 THOUSAND/ÂΜL (ref 0.17–1.22)
MONOCYTES NFR BLD AUTO: 13 % (ref 4–12)
NEUTROPHILS # BLD AUTO: 3.3 THOUSANDS/ÂΜL (ref 1.85–7.62)
NEUTS SEG NFR BLD AUTO: 64 % (ref 43–75)
NONHDLC SERPL-MCNC: 95 MG/DL
NRBC BLD AUTO-RTO: 0 /100 WBCS
PLATELET # BLD AUTO: 85 THOUSANDS/UL (ref 149–390)
PMV BLD AUTO: 11.6 FL (ref 8.9–12.7)
POTASSIUM SERPL-SCNC: 4.4 MMOL/L (ref 3.5–5.3)
PROT SERPL-MCNC: 6.7 G/DL (ref 6.4–8.4)
RBC # BLD AUTO: 4.5 MILLION/UL (ref 3.88–5.62)
RETICS # AUTO: ABNORMAL 10*3/UL (ref 14356–105094)
RETICS # CALC: 2.14 % (ref 0.37–1.87)
SODIUM SERPL-SCNC: 143 MMOL/L (ref 135–147)
TIBC SERPL-MCNC: 352 UG/DL (ref 250–450)
TRIGL SERPL-MCNC: 102 MG/DL
WBC # BLD AUTO: 5.09 THOUSAND/UL (ref 4.31–10.16)

## 2023-08-11 PROCEDURE — 85045 AUTOMATED RETICULOCYTE COUNT: CPT

## 2023-08-11 PROCEDURE — 80061 LIPID PANEL: CPT

## 2023-08-11 PROCEDURE — 83540 ASSAY OF IRON: CPT

## 2023-08-11 PROCEDURE — 36415 COLL VENOUS BLD VENIPUNCTURE: CPT

## 2023-08-11 PROCEDURE — 82728 ASSAY OF FERRITIN: CPT

## 2023-08-11 PROCEDURE — 85025 COMPLETE CBC W/AUTO DIFF WBC: CPT

## 2023-08-11 PROCEDURE — 76700 US EXAM ABDOM COMPLETE: CPT

## 2023-08-11 PROCEDURE — 80053 COMPREHEN METABOLIC PANEL: CPT

## 2023-08-11 PROCEDURE — 83550 IRON BINDING TEST: CPT

## 2023-08-14 ENCOUNTER — OFFICE VISIT (OUTPATIENT)
Dept: FAMILY MEDICINE CLINIC | Facility: CLINIC | Age: 68
End: 2023-08-14
Payer: COMMERCIAL

## 2023-08-14 VITALS
SYSTOLIC BLOOD PRESSURE: 144 MMHG | WEIGHT: 315 LBS | TEMPERATURE: 97.7 F | HEIGHT: 74 IN | HEART RATE: 96 BPM | OXYGEN SATURATION: 97 % | DIASTOLIC BLOOD PRESSURE: 70 MMHG | BODY MASS INDEX: 40.43 KG/M2

## 2023-08-14 DIAGNOSIS — J44.9 COPD, SEVERE (HCC): Primary | ICD-10-CM

## 2023-08-14 DIAGNOSIS — G25.0 ESSENTIAL TREMOR: ICD-10-CM

## 2023-08-14 DIAGNOSIS — N18.30 STAGE 3 CHRONIC KIDNEY DISEASE, UNSPECIFIED WHETHER STAGE 3A OR 3B CKD (HCC): ICD-10-CM

## 2023-08-14 DIAGNOSIS — G47.33 OSA ON CPAP: ICD-10-CM

## 2023-08-14 DIAGNOSIS — Z12.5 SCREENING FOR PROSTATE CANCER: ICD-10-CM

## 2023-08-14 DIAGNOSIS — E79.0 HYPERURICEMIA: ICD-10-CM

## 2023-08-14 DIAGNOSIS — E66.01 MORBID OBESITY WITH BMI OF 50.0-59.9, ADULT (HCC): ICD-10-CM

## 2023-08-14 DIAGNOSIS — J45.40 MODERATE PERSISTENT ASTHMA, UNSPECIFIED WHETHER COMPLICATED: ICD-10-CM

## 2023-08-14 DIAGNOSIS — N40.1 BPH ASSOCIATED WITH NOCTURIA: ICD-10-CM

## 2023-08-14 DIAGNOSIS — D69.3 CHRONIC ITP (IDIOPATHIC THROMBOCYTOPENIA) (HCC): ICD-10-CM

## 2023-08-14 DIAGNOSIS — E78.6 HYPERLIPIDEMIA WITH LOW HDL: ICD-10-CM

## 2023-08-14 DIAGNOSIS — R35.1 BPH ASSOCIATED WITH NOCTURIA: ICD-10-CM

## 2023-08-14 DIAGNOSIS — Z99.89 OSA ON CPAP: ICD-10-CM

## 2023-08-14 DIAGNOSIS — E78.5 HYPERLIPIDEMIA WITH LOW HDL: ICD-10-CM

## 2023-08-14 PROCEDURE — 99214 OFFICE O/P EST MOD 30 MIN: CPT | Performed by: PHYSICIAN ASSISTANT

## 2023-08-14 NOTE — PROGRESS NOTES
Depression Screening and Follow-up Plan: Patient was screened for depression during today's encounter. They screened negative with a PHQ-2 score of 0. Assessment/Plan:     Diagnoses and all orders for this visit:    COPD, severe ([address])  Comments:  Patient will follow-up with pulmonary. Moderate persistent asthma, unspecified whether complicated  Comments:  Stable follow-up with pulmonary    ARACELIS on CPAP  Comments:  Patient using CPAP ocularly    Chronic ITP (idiopathic thrombocytopenia) (HCC)  Comments:  Patient following with hematology    Stage 3 chronic kidney disease, unspecified whether stage 3a or 3b CKD ([address])  Comments:  Currently stable. Avoid NSAIDs    Morbid obesity with BMI of 50.0-59.9, adult ([address])  Comments:  Diet and exercise. Patient not a candidate for bariatric due to his COPD    Hyperlipidemia with low HDL  Comments: This is currently diet controlled. HDLs remain low  Orders:  -     Comprehensive metabolic panel  -     Lipid panel    Hyperuricemia  Comments:  Controlled with allopurinol 100 mg    BPH associated with nocturia  Comments:  Patient currently on Flomax 0.4. Patient states this offers little relief. He will be following up with with rheumatology    Essential tremor  Comments:  Declines medication at present time    Screening for prostate cancer  -     PSA, Total Screen          Subjective:      Patient ID: Kandice Aschoff is a 80 y.o. male. Patient being seen in the office for follow-up chronic condition. Patient has severe COPD asthma overlap. Also has obstructive sleep apnea on CPAP. States he uses his CPAP nightly. He is not on supplemental oxygen. States he is very short of breath with limited ambulation. Current regimen is Trelegy and Proventil inhaler as needed and he does have a nebulizer. We will follow-up with pulmonary. So contributing to patient is a breath is his weight. He is 400 pounds. His breathing he is not a candidate for bariatric surgery. And also has thrombocytopenia hepatosplenomegaly. He is following with oncology. Recent work-up was negative for leukemia. Also has chronic kidney disease stage III. Will to take NSAIDs. Has osteoarthritis of both his knees. He ambulates with a cane and limp. Pits are diet controlled. He has hereditary decreased HDLs. 3 of gout and hyperuricemia on allopurinol 100 mg a day. BPH on Flomax 0.4 mg daily. States it really is not working he is up 2-3 times at night. Advised he can increase his Flomax to 2 of them at night. Follow-up with urology. States he has worsening tremor. Patient states this started months ago. Very fine tremor in his hands also states at times he will notice a tremor in his head. Exam he has essential tremor. Cogwheel no pill-rolling.       The following portions of the patient's history were reviewed and updated as appropriate:   He   Patient Active Problem List    Diagnosis Date Noted   • Colon polyps 12/03/2021   • Skin lesion of right arm 12/03/2021   • Internal hemorrhoids 12/01/2021   • Gallstone 12/01/2021   • Morbid obesity with BMI of 50.0-59.9, adult ([address]) 09/14/2021   • Chronic ITP (idiopathic thrombocytopenia) ([address]) 07/07/2021   • Hyperuricemia 06/17/2021   • Cigarette nicotine dependence in remission 06/15/2021   • Edema of both lower legs 06/14/2021   • Acute gout due to renal impairment 06/14/2021   • BPH associated with nocturia 11/17/2020   • GARNETT (dyspnea on exertion) 06/03/2020   • Splenomegaly 08/27/2019   • Screening for prostate cancer 01/22/2019   • History of genital warts 06/06/2018   • Skin lesion 05/24/2017   • CKD (chronic kidney disease) stage 3, GFR 30-59 ml/min ([address]) 02/20/2017   • ARACELIS on CPAP 07/22/2016   • Nocturnal hypoxemia 04/01/2016   • Hyperlipidemia with low HDL 11/19/2015   • Arthritis 08/20/2015   • Moderate persistent asthma 08/20/2015   • Thrombocytopenia ([address]) 12/11/2014   • Sleep disorder 08/24/2012   • Vitamin D deficiency 08/24/2012 • COPD, severe ([address] [facility]) 07/12/2012     Current Outpatient Medications   Medication Sig Dispense Refill   • albuterol (2.5 mg/3 mL) 0.083 % nebulizer solution Take 1 vial (2.5 mg total) by nebulization every 6 (six) hours as needed for wheezing 50 vial 3   • albuterol (PROVENTIL HFA,VENTOLIN HFA) 90 mcg/act inhaler INHALE 2 PUFFS EVERY 4 (FOUR) HOURS AS NEEDED FOR SHORTNESS OF BREATH 8.5 g 5   • allopurinol (ZYLOPRIM) 100 mg tablet TAKE 1 TABLET BY MOUTH TWICE A  tablet 1   • betamethasone, augmented, (DIPROLENE-AF) 0.05 % cream APPLY 1 APPLICATION TOPICALLY 2 (TWO) TIMES A DAY TO AFFECTED AREA 50 g 2   • Cholecalciferol (VITAMIN D3) 1000 units CAPS Take by mouth     • guaiFENesin (MUCINEX) 600 mg 12 hr tablet Take 1,200 mg by mouth daily Once at hs     • Multiple Vitamins tablet Take 1 tablet by mouth daily     • sildenafil (VIAGRA) 100 mg tablet Take 1 tablet (100 mg total) by mouth daily as needed for erectile dysfunction 6 tablet 5   • tamsulosin (FLOMAX) 0.4 mg TAKE 1 CAPSULE BY MOUTH EVERY DAY WITH DINNER 90 capsule 3   • Trelegy Ellipta 100-62.5-25 MCG/ACT inhaler INHALE 1 PUFF DAILY RINSE MOUTH AFTER USE. 60 each 5     No current facility-administered medications for this visit. He is allergic to darvon [propoxyphene]. .    Review of Systems   Constitutional: Positive for activity change. Negative for appetite change, chills, fatigue and fever. HENT: Negative for ear pain and sore throat. Eyes: Negative for visual disturbance. Respiratory: Positive for shortness of breath. Negative for cough. Cardiovascular: Positive for leg swelling. Negative for chest pain and palpitations. Gastrointestinal: Positive for abdominal pain. Negative for blood in stool, constipation, diarrhea and nausea. Genitourinary: Negative for difficulty urinating. Nocturia   Musculoskeletal: Positive for arthralgias, back pain and gait problem. Negative for myalgias. Skin: Negative for rash.    Neurological: Positive for tremors. Negative for dizziness, syncope and headaches. Psychiatric/Behavioral: Negative for self-injury, sleep disturbance and suicidal ideas. The patient is not nervous/anxious. Objective:        Physical Exam  Vitals and nursing note reviewed. Constitutional:       General: He is not in acute distress. Appearance: He is well-developed. He is obese. He is not ill-appearing. HENT:      Head: Normocephalic and atraumatic. Right Ear: Tympanic membrane, ear canal and external ear normal.      Left Ear: Tympanic membrane, ear canal and external ear normal.   Eyes:      Conjunctiva/sclera: Conjunctivae normal.      Pupils: Pupils are equal, round, and reactive to light. Neck:      Thyroid: No thyromegaly. Vascular: No carotid bruit. Cardiovascular:      Rate and Rhythm: Regular rhythm. Bradycardia present. Heart sounds: Heart sounds are distant. No murmur heard. Pulmonary:      Effort: Pulmonary effort is normal.      Breath sounds: Normal breath sounds. No wheezing. Abdominal:      General: Bowel sounds are normal.      Palpations: Abdomen is soft. There is no mass. Tenderness: There is no abdominal tenderness. Musculoskeletal:      Left lower leg: No edema. Lymphadenopathy:      Cervical: No cervical adenopathy. Skin:     General: Skin is warm and dry. Neurological:      General: No focal deficit present. Mental Status: He is alert and oriented to person, place, and time. Motor: Tremor present. Comments: No tremor at rest.  He has a fine tremor in both hands there is no cogwheel rigidity and there is no pill-rolling   Psychiatric:         Behavior: Behavior normal.         Thought Content:  Thought content normal.         Judgment: Judgment normal.

## 2023-09-07 ENCOUNTER — ESTABLISHED COMPREHENSIVE EXAM (OUTPATIENT)
Dept: URBAN - METROPOLITAN AREA CLINIC 6 | Facility: CLINIC | Age: 68
End: 2023-09-07

## 2023-09-07 DIAGNOSIS — H04.123: ICD-10-CM

## 2023-09-07 DIAGNOSIS — H25.813: ICD-10-CM

## 2023-09-07 PROCEDURE — 92014 COMPRE OPH EXAM EST PT 1/>: CPT

## 2023-09-07 ASSESSMENT — TONOMETRY
OD_IOP_MMHG: 19
OS_IOP_MMHG: 20

## 2023-09-07 ASSESSMENT — VISUAL ACUITY
OS_SC: 20/30-2
OD_SC: 20/25

## 2023-09-12 ENCOUNTER — RA CDI HCC (OUTPATIENT)
Dept: OTHER | Facility: HOSPITAL | Age: 68
End: 2023-09-12

## 2023-09-12 NOTE — PROGRESS NOTES
[address] [facility] coding opportunities     I70.0     Chart Reviewed number of suggestions sent to Provider: 1     Patients Insurance     Medicare Insurance: [id number]

## 2023-09-14 ENCOUNTER — APPOINTMENT (OUTPATIENT)
Dept: RADIOLOGY | Facility: MEDICAL CENTER | Age: 68
End: 2023-09-14
Payer: COMMERCIAL

## 2023-09-14 ENCOUNTER — OFFICE VISIT (OUTPATIENT)
Dept: FAMILY MEDICINE CLINIC | Facility: CLINIC | Age: 68
End: 2023-09-14
Payer: COMMERCIAL

## 2023-09-14 VITALS
BODY MASS INDEX: 40.43 KG/M2 | SYSTOLIC BLOOD PRESSURE: 162 MMHG | HEIGHT: 74 IN | WEIGHT: 315 LBS | TEMPERATURE: 97.4 F | HEART RATE: 100 BPM | DIASTOLIC BLOOD PRESSURE: 72 MMHG | OXYGEN SATURATION: 96 %

## 2023-09-14 DIAGNOSIS — M25.512 ACUTE PAIN OF LEFT SHOULDER: Primary | ICD-10-CM

## 2023-09-14 DIAGNOSIS — M25.512 ACUTE PAIN OF LEFT SHOULDER: ICD-10-CM

## 2023-09-14 PROBLEM — I70.0 ATHEROSCLEROSIS OF AORTA (HCC): Status: ACTIVE | Noted: 2023-09-14

## 2023-09-14 PROCEDURE — 99213 OFFICE O/P EST LOW 20 MIN: CPT | Performed by: PHYSICIAN ASSISTANT

## 2023-09-14 PROCEDURE — 73030 X-RAY EXAM OF SHOULDER: CPT

## 2023-09-14 RX ORDER — PREDNISONE 10 MG/1
TABLET ORAL
Qty: 21 TABLET | Refills: 0 | Status: SHIPPED | OUTPATIENT
Start: 2023-09-14

## 2023-09-14 NOTE — PROGRESS NOTES
Assessment/Plan:     Diagnoses and all orders for this visit:    Acute pain of left shoulder  Comments:  Patient will take prednisone taper. X-ray ordered. He will probably need physical therapy  Orders:  -     XR shoulder 2+ vw left; Future  -     predniSONE 10 mg tablet; 6,5,4,3,2,1          Subjective:      Patient ID: Halley Milan is a 80 y.o. male. Patient presents in the office with left shoulder pain for approximately 2 weeks. No known injury. States the pain is severe. At times the pain will radiate up into the left side of his neck or at other times it radiates down his left upper arm. Has been taking 2 extra strength Tylenol and two 325 mg aspirin at night.       The following portions of the patient's history were reviewed and updated as appropriate:   He   Patient Active Problem List    Diagnosis Date Noted   • Atherosclerosis of aorta ([address]) 09/14/2023   • Colon polyps 12/03/2021   • Skin lesion of right arm 12/03/2021   • Internal hemorrhoids 12/01/2021   • Gallstone 12/01/2021   • Morbid obesity with BMI of 50.0-59.9, adult ([address]) 09/14/2021   • Chronic ITP (idiopathic thrombocytopenia) (HCC) 07/07/2021   • Hyperuricemia 06/17/2021   • Cigarette nicotine dependence in remission 06/15/2021   • Edema of both lower legs 06/14/2021   • Acute gout due to renal impairment 06/14/2021   • BPH associated with nocturia 11/17/2020   • GARNETT (dyspnea on exertion) 06/03/2020   • Splenomegaly 08/27/2019   • Screening for prostate cancer 01/22/2019   • History of genital warts 06/06/2018   • Skin lesion 05/24/2017   • CKD (chronic kidney disease) stage 3, GFR 30-59 ml/min ([address]) 02/20/2017   • ARACELIS on CPAP 07/22/2016   • Nocturnal hypoxemia 04/01/2016   • Hyperlipidemia with low HDL 11/19/2015   • Arthritis 08/20/2015   • Moderate persistent asthma 08/20/2015   • Thrombocytopenia ([address]) 12/11/2014   • Sleep disorder 08/24/2012   • Vitamin D deficiency 08/24/2012   • COPD, severe ([address]) 07/12/2012     Current Outpatient Medications   Medication Sig Dispense Refill   • albuterol (2.5 mg/3 mL) 0.083 % nebulizer solution Take 1 vial (2.5 mg total) by nebulization every 6 (six) hours as needed for wheezing 50 vial 3   • albuterol (PROVENTIL HFA,VENTOLIN HFA) 90 mcg/act inhaler INHALE 2 PUFFS EVERY 4 (FOUR) HOURS AS NEEDED FOR SHORTNESS OF BREATH 8.5 g 5   • allopurinol (ZYLOPRIM) 100 mg tablet TAKE 1 TABLET BY MOUTH TWICE A  tablet 1   • betamethasone, augmented, (DIPROLENE-AF) 0.05 % cream APPLY 1 APPLICATION TOPICALLY 2 (TWO) TIMES A DAY TO AFFECTED AREA 50 g 2   • Cholecalciferol (VITAMIN D3) 1000 units CAPS Take by mouth     • guaiFENesin (MUCINEX) 600 mg 12 hr tablet Take 1,200 mg by mouth daily Once at hs     • Multiple Vitamins tablet Take 1 tablet by mouth daily     • predniSONE 10 mg tablet 6,5,4,3,2,1 21 tablet 0   • sildenafil (VIAGRA) 100 mg tablet Take 1 tablet (100 mg total) by mouth daily as needed for erectile dysfunction 6 tablet 5   • tamsulosin (FLOMAX) 0.4 mg TAKE 1 CAPSULE BY MOUTH EVERY DAY WITH DINNER 90 capsule 3   • Trelegy Ellipta 100-62.5-25 MCG/ACT inhaler INHALE 1 PUFF DAILY RINSE MOUTH AFTER USE. 60 each 5     No current facility-administered medications for this visit. He is allergic to darvon [propoxyphene]. .    Review of Systems   Constitutional: Negative for activity change and appetite change. Respiratory: Negative for cough and shortness of breath. Cardiovascular: Negative for chest pain and leg swelling. Musculoskeletal: Positive for arthralgias and gait problem. Left   Shoulder  pain         Objective:        Physical Exam  Vitals and nursing note reviewed. Constitutional:       General: He is not in acute distress. Appearance: He is obese. He is not ill-appearing. Comments: Ambulates  With  cane   HENT:      Head: Normocephalic and atraumatic.       Right Ear: External ear normal.      Left Ear: External ear normal.   Eyes:      Conjunctiva/sclera: Conjunctivae normal.   Pulmonary:      Effort: Pulmonary effort is normal.   Musculoskeletal:      Right lower leg: Edema present. Left lower leg: Edema present. Comments: Patient has limited adduction. Good strength. Has tender in the [facility] ligament region. Also has tenderness across the left upper trapezius border. C-spine is nontender.  are equal bilaterally. Skin:     General: Skin is warm and dry. Neurological:      General: No focal deficit present. Mental Status: He is oriented to person, place, and time. Psychiatric:         Mood and Affect: Mood normal.         Behavior: Behavior normal.         Thought Content:  Thought content normal.         Judgment: Judgment normal.

## 2023-09-18 ENCOUNTER — OFFICE VISIT (OUTPATIENT)
Dept: HEMATOLOGY ONCOLOGY | Facility: CLINIC | Age: 68
End: 2023-09-18
Payer: COMMERCIAL

## 2023-09-18 VITALS
SYSTOLIC BLOOD PRESSURE: 160 MMHG | HEIGHT: 74 IN | HEART RATE: 109 BPM | WEIGHT: 315 LBS | OXYGEN SATURATION: 93 % | TEMPERATURE: 97.9 F | RESPIRATION RATE: 17 BRPM | BODY MASS INDEX: 40.43 KG/M2 | DIASTOLIC BLOOD PRESSURE: 80 MMHG

## 2023-09-18 DIAGNOSIS — G47.33 OSA ON CPAP: ICD-10-CM

## 2023-09-18 DIAGNOSIS — E66.01 MORBID OBESITY WITH BMI OF 50.0-59.9, ADULT (HCC): ICD-10-CM

## 2023-09-18 DIAGNOSIS — E78.5 HYPERLIPIDEMIA WITH LOW HDL: ICD-10-CM

## 2023-09-18 DIAGNOSIS — K74.69 OTHER CIRRHOSIS OF LIVER (HCC): ICD-10-CM

## 2023-09-18 DIAGNOSIS — R23.3 EASY BRUISABILITY: ICD-10-CM

## 2023-09-18 DIAGNOSIS — D69.6 THROMBOCYTOPENIA (HCC): Primary | ICD-10-CM

## 2023-09-18 DIAGNOSIS — E78.6 HYPERLIPIDEMIA WITH LOW HDL: ICD-10-CM

## 2023-09-18 DIAGNOSIS — J44.9 COPD, SEVERE (HCC): ICD-10-CM

## 2023-09-18 DIAGNOSIS — R16.1 SPLENOMEGALY: ICD-10-CM

## 2023-09-18 PROCEDURE — 99214 OFFICE O/P EST MOD 30 MIN: CPT | Performed by: INTERNAL MEDICINE

## 2023-09-18 NOTE — PROGRESS NOTES
Progress Note: Medical Oncology:  Carmen De Leon 80 y.o. male MRN: [id number]  Unit/Bed#:  Encounter: [id number]    Assessment and Plan:  1. 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 Splenomegaly (Presumed Secondary to Problem No. 3):  3. Imaging Findings Suggestive of Possible Cirrhosis:  Patient is a 46-year-old male, with an established history of thrombocytopenia secondary to splenic-sequestration in the setting of splenomegaly; who presents today for follow-up. Of particular importance, patient has a longstanding history of thrombocytopenia, dating back to at least 2018 (Platelet Count: [phone]). Initial evaluation was largely unremarkable, with the exception of splenomegaly noted on imaging: CBC-D normal. Vitamin B12 and Folate within normal limits. TSH normal. Hepatitis B and C negative. Ultrasound Abdomen in 2021 showed splenomegaly; as well as hepatomegaly with evidence of fatty liver. Due to progressive downtrend in platelet count, Bone Marrow Biopsy was performed on April 7th, 2023. Pathology showed a variably cellular bone marrow with trilineage maturing hematopoiesis, megakaryocytic hyperplasia, left-shifted erythroid maturation, and no increase in blasts. Trace iron stores in a limited sample, noted. Mild to focally moderate increase in reticulin fibrosis, also noted. Given the above-mentioned, patient's thrombocytopenia was attributed to splenomegaly. Repeat Ultrasound Abdomen (August 2023) shows hepatomegaly with heterogeneous hepatic echogenicity, and subtle nodular liver surface contour, suggesting possible cirrhosis. Marked splenomegaly with progression since prior ultrasound, noted. Suspect possible cirrhosis with associated portal-hypertension and splenomegaly, resulting in thrombocytopenia. Platelet Count has remained stable since July 2021. No excessive bleeding noted. Will plan for follow-up in 6-months. Recommend repeat laboratory-studies prior to follow-up.    1. Recommend outpatient follow-up in 6-months:    A. Obtain CBC-D, CMP, PT/INR, PTT, and Fibrinogen. 4. Chronic Medical Problems:   A. Hyperlipidemia   B. Severe Chronic Obstructive Pulmonary Disease   C. Obstructive Sleep Apnea   D. Benign Prostatic Hyperplasia   E. Stage 3A/B Chronic Kidney Disease  Patient is a 79-year-old male, with multiple significant comorbidities, including but not limited to hyperlipidemia, severe chronic obstructive pulmonary disease, obstructive sleep apnea, benign prostatic hyperplasia, and stage 3A/B chronic kidney disease; followed by Family Medicine, and Pulmonology. Chronic medical issues appear to be relatively stable, without evidence of acute exacerbation. Will recommend continued and close follow-up, as scheduled. 1. Follow-up with Family Medicine and Pulmonology. Subjective: Interval History: Garrett Alcantara. Natalee Pimentel is a 79-year-old male, with an established history of thrombocytopenia secondary to splenic-sequestration in the setting of splenomegaly; who presents today for follow-up. Of particular importance, patient was seen for initial consultation in June 2021 due to longstanding mild thrombocytopenia (Dating back to at least 2018 - Platelet Count: [phone]), of which was initially presumed secondary to immune thrombocytopenia. CBC-D otherwise normal. Vitamin B12 and Folate within normal limits. TSH normal. Hepatitis B and C negative. Ultrasound Abdomen was obtained and showed splenomegaly; as well as hepatomegaly with evidence of fatty liver. Due to progressive downtrend in platelet count, Bone Marrow Biopsy was performed on April 7th, 2023. Pathology showed a variably cellular bone marrow with trilineage maturing hematopoiesis, megakaryocytic hyperplasia, left-shifted erythroid maturation, and no increase in blasts. Trace iron stores in a limited sample, noted. Mild to focally moderate increase in reticulin fibrosis, also noted.  Given the above-mentioned, patient's thrombocytopenia was attributed to splenomegaly. Platelet count has remained stable over the last year. On evaluation this afternoon, patient states that he is presently at baseline. He has multiple complex comorbidities, including severe chronic obstructive pulmonary disease; of which significantly impair his functional status and mobility. Patient lives at home, with his spouse. He ambulates with an assisted device (e.g. Cane). Patient can perform his activities of daily living independently, without assistance; although endorses exertional dyspnea with this limited activity. No shortness of breath noted, at rest. Regarding thrombocytopenia, patient notes easy bruisability; but otherwise denies excessive bleeding, including epistaxis, gum bleeding, or other mucosal bleeding (e.g. Hematuria, or hematochezia). He denies fever, chills, nightsweats, or unintentional weight-loss. Note: Patient endorsed prior history of alcohol-use disorder. He has abstained from alcohol consumption since at least 1996. Review of Systems:  All systems reviewed and negative except otherwise listed in the History of Present Illness. Laboratory Studies:                Imaging Studies:  Ultrasound Abdomen (Complete): August 11th      Objective:  Vitals:    09/18/23 1420   BP: 160/80   Pulse: (!) 109   Resp: 17   Temp: 97.9 °F (36.6 °C)   SpO2: 93%     Physical Exam:  General: Alert, and oriented; Pleasant, and conversational; Chronically-Ill Appearing  Gait: Ambulates with Assistive Device (Cane - Held in Right Hand)  HEENT: Atraumatic, and normocephalic; PERRLA; EOMI; Moist mucosal membranes  Neck: Trachea midline; No neck masses, thyromegaly, or cervical lymphadenopathy   Cardiovascular: Regular rate and rhythm; No murmurs, rubs, or gallops appreciated  Respiratory: Clear to auscultation bilaterally; Adequate aeration; No oxygen requirement  Abdomen: Soft/Obese Abdomen; Distended; No Fluid-Wave on Palpation;  No Appreciable Organomegaly - Although Difficult to Appreciate Due to Body Habitus  Extremities: No obvious deformities; No peripheral edema; Moves all  Neurologic: Appropriately alert, and oriented to Person, Place, and Time; No focal neurologic deficits    Lab Results: I have reviewed all pertinent labs. Imaging: I have personally reviewed pertinent reports. EKG, Pathology, and Other Studies: I have personally reviewed pertinent reports. Patient was seen and discussed with attending physician, Ryne Vazquez M.D.    Pierre Marin D.O.   Hematology-Oncology Fellow (PGY-4)

## 2023-09-21 ENCOUNTER — PATIENT MESSAGE (OUTPATIENT)
Dept: FAMILY MEDICINE CLINIC | Facility: CLINIC | Age: 68
End: 2023-09-21

## 2023-09-21 DIAGNOSIS — M25.512 ACUTE PAIN OF LEFT SHOULDER: Primary | ICD-10-CM

## 2023-09-22 DIAGNOSIS — J44.9 COPD, SEVERE (HCC): ICD-10-CM

## 2023-09-22 RX ORDER — FLUTICASONE FUROATE, UMECLIDINIUM BROMIDE AND VILANTEROL TRIFENATATE 100; 62.5; 25 UG/1; UG/1; UG/1
POWDER RESPIRATORY (INHALATION)
Qty: 60 EACH | Refills: 5 | Status: SHIPPED | OUTPATIENT
Start: 2023-09-22

## 2023-09-25 ENCOUNTER — OFFICE VISIT (OUTPATIENT)
Dept: OBGYN CLINIC | Facility: MEDICAL CENTER | Age: 68
End: 2023-09-25
Payer: COMMERCIAL

## 2023-09-25 VITALS
BODY MASS INDEX: 40.43 KG/M2 | HEIGHT: 74 IN | SYSTOLIC BLOOD PRESSURE: 124 MMHG | HEART RATE: 99 BPM | WEIGHT: 315 LBS | DIASTOLIC BLOOD PRESSURE: 74 MMHG

## 2023-09-25 DIAGNOSIS — M25.512 ACUTE PAIN OF LEFT SHOULDER: ICD-10-CM

## 2023-09-25 PROCEDURE — 99204 OFFICE O/P NEW MOD 45 MIN: CPT | Performed by: ORTHOPAEDIC SURGERY

## 2023-09-25 NOTE — PROGRESS NOTES
[facility] [place]  [address]  [facility] [address]       Anabel Logan  [id number]  1955    ORTHOPAEDIC SURGERY OUTPATIENT NOTE  9/25/2023      HISTORY:  80 y.o. male presents today evaluation for left shoulder pain. He was referred by CARRIE Cavazos from his PCPs office. Patient states he has been having left shoulder pain for approximately 4 weeks, denies any injuries or trauma. He states he is having pain in the posterior aspect of the left shoulder radiating down to the lateral wrist.  He does state occasional pain in the left scapular region. He denies any numbness or tingling. Pain is worse at rest and with activity. Patient is taking aspirin and Tylenol as needed for pain relief. He is right-hand dominant.       Past Medical History:   Diagnosis Date   • Arthritis    • Asthma    • Chest pain     atypical   • Chronic kidney disease    • Colon polyp    • COPD (chronic obstructive pulmonary disease) (HCC)    • CPAP (continuous positive airway pressure) dependence    • Decreased glomerular filtration rate    • Hamstring strain    • Herniated lumbar intervertebral disc    • History of alcohol use     uncomplicated   • History of back pain     lower   • History of colon polyps     sigmoid   • History of genital warts    • History of lipoma    • History of muscle spasm     lumbar paraspinous muscle   • History of umbilical hernia    • Lateral pain of left hip    • Low HDL (under 40)    • Lumbar radiculopathy    • Obesity    • Respiratory distress     acute   • Shortness of breath    • Sleep apnea     CPAP PT WILL BRING       Past Surgical History:   Procedure Laterality Date   • ADENOIDECTOMY     • APPENDECTOMY     • COLONOSCOPY     • HAND SURGERY Left     CYST REMOVAL   • HIP SURGERY Bilateral    • IR BIOPSY BONE MARROW  4/7/2023   • LIPECTOMY      thigh   • LIPOMA RESECTION Left     HIP   • [place] RPR UMBILICAL HRNA 5 YRS/> REDUCIBLE N/A 2017    Procedure: REPAIR HERNIA UMBILICAL;  Surgeon: Nava Roberto MD;  Location: [facility];  Service: General   • TONSILLECTOMY     • WRIST GANGLION EXCISION         Social History     Socioeconomic History   • Marital status: /Civil Union     Spouse name: Not on file   • Number of children: Not on file   • Years of education: Not on file   • Highest education level: Not on file   Occupational History   • Occupation: Fulltime employment   Tobacco Use   • Smoking status: Former     Packs/day: 2.00     Years: 25.00     Total pack years: 50.00     Types: Cigarettes     Start date: 0     Quit date: 10/2014     Years since quittin.9     Passive exposure: Past   • Smokeless tobacco: Never   • Tobacco comments:     Quit for a 16 year period in between start and final quit date. Vaping Use   • Vaping Use: Never used   Substance and Sexual Activity   • Alcohol use: No     Comment: recovering alcoholic   • Drug use: No   • Sexual activity: Not on file   Other Topics Concern   • Not on file   Social History Narrative    Always uses seatbelt     Social Determinants of Health     Financial Resource Strain: Low Risk  (2023)    Overall Financial Resource Strain (CARDIA)    • Difficulty of Paying Living Expenses: Not very hard   Food Insecurity: Not on file   Transportation Needs: No Transportation Needs (2023)    PRAPARE - Transportation    • Lack of Transportation (Medical): No    • Lack of Transportation (Non-Medical):  No   Physical Activity: Not on file   Stress: Not on file   Social Connections: Not on file   Intimate Partner Violence: Not on file   Housing Stability: Not on file       Family History   Problem Relation Age of Onset   • Heart disease Mother         cardiac disorder   • Hypertension Mother    • COPD Father    • Heart disease Family         cardiac disorder   • Cancer Family         lung   • Emphysema Family         pulmonary unspecified emphysema   • Diabetes Family    • Glaucoma Family    • Cancer Family         lung        Patient's Medications   New Prescriptions    No medications on file   Previous Medications    ALBUTEROL (2.5 MG/3 ML) 0.083 % NEBULIZER SOLUTION    Take 1 vial (2.5 mg total) by nebulization every 6 (six) hours as needed for wheezing    ALBUTEROL (PROVENTIL HFA,VENTOLIN HFA) 90 MCG/ACT INHALER    INHALE 2 PUFFS EVERY 4 (FOUR) HOURS AS NEEDED FOR SHORTNESS OF BREATH    ALLOPURINOL (ZYLOPRIM) 100 MG TABLET    TAKE 1 TABLET BY MOUTH TWICE A DAY    BETAMETHASONE, AUGMENTED, (DIPROLENE-AF) 0.05 % CREAM    APPLY 1 APPLICATION TOPICALLY 2 (TWO) TIMES A DAY TO AFFECTED AREA    CHOLECALCIFEROL (VITAMIN D3) 1000 UNITS CAPS    Take by mouth    GUAIFENESIN (MUCINEX) 600 MG 12 HR TABLET    Take 1,200 mg by mouth daily Once at hs    MULTIPLE VITAMINS TABLET    Take 1 tablet by mouth daily    PREDNISONE 10 MG TABLET    6,5,4,3,2,1    SILDENAFIL (VIAGRA) 100 MG TABLET    Take 1 tablet (100 mg total) by mouth daily as needed for erectile dysfunction    TAMSULOSIN (FLOMAX) 0.4 MG    TAKE 1 CAPSULE BY MOUTH EVERY DAY WITH DINNER    TRELEGY ELLIPTA 100-62.5-25 MCG/ACT INHALER    INHALE 1 PUFF DAILY RINSE MOUTH AFTER USE. Modified Medications    No medications on file   Discontinued Medications    No medications on file       Allergies   Allergen Reactions   • Darvon [Propoxyphene] Other (See Comments)     hallucinations        /74 (BP Location: Right arm, Patient Position: Sitting, Cuff Size: Standard)   Pulse 99   Ht 6' 2" (1.88 m)   Wt (!) 184 kg (406 lb 3.2 oz)   BMI 52.15 kg/m²      REVIEW OF SYSTEMS:  Constitutional: Negative. HEENT: Negative. Respiratory: Negative. Skin: Negative. Neurological: Negative. Psychiatric/Behavioral: Negative. Musculoskeletal: Negative except for that mentioned in the HPI.     PHYSICAL EXAM:     [unfilled]    LEFT SHOULDER:     NVI axillary/medial/radial/ulnar and sensory intact     Forward flexion:   180 degrees   Abduction:  180 degrees   External rotation at 90 degrees abduction:   90 degrees   Internal rotation at 90 degrees abduction:  90 degrees   External rotation at 0 degrees:   70 degrees   Internal rotation: T7     STRENGTH:  Forward flexion:  5/5   Abduction:  5/5   External rotation:  5/5   Internal rotation:  5/5        Speed test: Negative  Yergason's: Negative   Tender to palpation ACJ (acromioclavicular joint): Negative   Tender to palpation LHB (long head of biceps): Negative  Pacheco test: Negative  Chowan test: Positive   Hornblower's: Negative  Lift off: Negative  Belly press: Negative  Bear hug: Negative  External lag sign: Negative  Cross-body adduction: Negative  Sulcus sign: Negative  Aubrey's test: Negative  Drop arm test: negative     RIGHT SHOULDER:     NVI axillary/medial/radial/ulnar and sensory intact     Forward flexion:   180 degrees   Abduction:  180 degrees   External rotation at 90 degrees abduction:  90 degrees   Internal rotation at 90 degrees abduction:   90 degrees   External rotation at 0 degrees:  70 degrees   Internal rotation: T7      STRENGTH:  Forward flexion:  5/5   Abduction:  5/5   External rotation:  5/5   Internal rotation:  5/5          Reflexes:  Brachioradialis:  Symmetric bilaterally  Triceps: Symmetric bilaterally  Biceps: Symmetric bilaterally  Patella tendon: Symmetric bilaterally  Achilles tendon: Symmetric bilaterally  Babinski's: Negative  Jackie sign: Negative     No scapular winging or dyskinesis     Capillary refill brisk   Full ROM of elbows bilaterally      Skin:  No ecchymosis/abrasion/erythema/warmth     Cervical spine:   Full rotation, side bending, flexion and extension without radiating pain  Spurling's: Negative    IMAGING: X-ray left shoulder demonstrates no acute fractures or dislocations, no acute osseous abnormalities    ASSESSMENT AND PLAN: 80 y.o. male patient with left shoulder pain and tenderness in the scapulothoracic region      Physical therapy order was given out today to strengthen the muscles around the scapular region. Continue taking Tylenol and aspirin as needed for pain relief. Patient cannot take NSAIDs due to having chronic kidney disease. Patient is aware he may follow-up after 6 weeks if he has no relief from the physical therapy for reevaluation.       Scribe Attestation    I,:  Cass Hannon am acting as a scribe while in the presence of the attending physician.:       I,:  Shiva Meade personally performed the services described in this documentation    as scribed in my presence.:

## 2023-10-06 DIAGNOSIS — E79.0 HYPERURICEMIA: ICD-10-CM

## 2023-10-06 RX ORDER — ALLOPURINOL 100 MG/1
TABLET ORAL
Qty: 180 TABLET | Refills: 1 | Status: SHIPPED | OUTPATIENT
Start: 2023-10-06

## 2023-10-13 ENCOUNTER — TELEPHONE (OUTPATIENT)
Dept: PULMONOLOGY | Facility: CLINIC | Age: 68
End: 2023-10-13

## 2023-10-13 NOTE — TELEPHONE ENCOUNTER
Called and spoke with patient in regards to CPAP machine. Patients CPAP last reading in care  was 01/2023, spoke with patient and let him know that he needs to call [facility] to get his machine linked and they will guide him through the process.

## 2023-10-17 ENCOUNTER — OFFICE VISIT (OUTPATIENT)
Dept: PULMONOLOGY | Facility: CLINIC | Age: 68
End: 2023-10-17
Payer: COMMERCIAL

## 2023-10-17 VITALS
DIASTOLIC BLOOD PRESSURE: 80 MMHG | RESPIRATION RATE: 19 BRPM | HEIGHT: 74 IN | TEMPERATURE: 97.6 F | HEART RATE: 104 BPM | BODY MASS INDEX: 40.43 KG/M2 | WEIGHT: 315 LBS | SYSTOLIC BLOOD PRESSURE: 142 MMHG | OXYGEN SATURATION: 97 %

## 2023-10-17 DIAGNOSIS — J44.9 COPD, SEVERE (HCC): Primary | ICD-10-CM

## 2023-10-17 DIAGNOSIS — J45.40 MODERATE PERSISTENT ASTHMA, UNSPECIFIED WHETHER COMPLICATED: ICD-10-CM

## 2023-10-17 DIAGNOSIS — G47.33 OSA ON CPAP: ICD-10-CM

## 2023-10-17 DIAGNOSIS — F17.211 CIGARETTE NICOTINE DEPENDENCE IN REMISSION: ICD-10-CM

## 2023-10-17 PROCEDURE — 99214 OFFICE O/P EST MOD 30 MIN: CPT | Performed by: INTERNAL MEDICINE

## 2023-10-17 NOTE — PATIENT INSTRUCTIONS
Lung screening CT in June 2024  Folow up after CT  Continue Trelegy  We will update PFTs before your next visit

## 2023-10-17 NOTE — ASSESSMENT & PLAN NOTE
He has been compliant with Trelegy Ellipta and albuterol once or twice per day. I provided him with a sample to help financially. PFTs, as he feels that his pulmonary status has declined slightly over the past few years.

## 2023-10-17 NOTE — ASSESSMENT & PLAN NOTE
Continue with CPAP during hours of sleep.   I have instructed him to speak with [facility] regarding the possibility of replacing his current device which is on the recall list

## 2023-10-17 NOTE — ASSESSMENT & PLAN NOTE
I discussed with him that he is a candidate for lung cancer CT screening. The following Shared Decision-Making points were covered:  Benefits of screening were discussed, including the rates of reduction in death from lung cancer and other causes. Harms of screening were reviewed, including false positive tests, radiation exposure levels, risks of invasive procedures, risks of complications of screening, and risk of overdiagnosis. I counseled on the importance of adherence to annual lung cancer LDCT screening, impact of co-morbidities, and ability or willingness to undergo diagnosis and treatment. I counseled on the importance of maintaining abstinence as a former smoker or was counseled on the importance of smoking cessation if a current smoker    Review of Eligibility Criteria: He meets all of the criteria for Lung Cancer Screening. He is 80 y.o. He has 20 pack year tobacco history and is a current smoker or has quit within the past 15 years  He presents no signs or symptoms of lung cancer    After discussion, the patient decided to elect lung cancer screening.   He would like to push his scan back to June

## 2023-10-19 ENCOUNTER — OFFICE VISIT (OUTPATIENT)
Dept: URGENT CARE | Facility: CLINIC | Age: 68
End: 2023-10-19
Payer: COMMERCIAL

## 2023-10-19 VITALS
HEART RATE: 85 BPM | RESPIRATION RATE: 20 BRPM | OXYGEN SATURATION: 93 % | TEMPERATURE: 98.2 F | SYSTOLIC BLOOD PRESSURE: 154 MMHG | DIASTOLIC BLOOD PRESSURE: 72 MMHG

## 2023-10-19 DIAGNOSIS — M25.512 ACUTE PAIN OF LEFT SHOULDER: Primary | ICD-10-CM

## 2023-10-19 PROCEDURE — 99213 OFFICE O/P EST LOW 20 MIN: CPT

## 2023-10-19 PROCEDURE — S9083 URGENT CARE CENTER GLOBAL: HCPCS

## 2023-10-19 RX ORDER — ASPIRIN 325 MG
650 TABLET, DELAYED RELEASE (ENTERIC COATED) ORAL EVERY 6 HOURS PRN
COMMUNITY

## 2023-10-19 NOTE — PROGRESS NOTES
[facility]        NAME: Rad Starks is a 80 y.o. male  : 1955    MRN: [id number]  DATE: 2023  TIME: 3:22 PM    Assessment and Plan   Acute pain of left shoulder [M25.512]  1. Acute pain of left shoulder          Advised patient we are unable to offer steroid injections in the clinic. Offered patient steroid and advised he should to go physical therapy as previously referred. Patient declined steroid as he relates "it didn't do much last time." Advises he is going to check in with ortho again. Patient Instructions     Take tylenol every 4-6 hours and apply lidocaine patches (12 hours on, 12 hours off) as needed for pain. May also apply heat/ice every 3-4 hours for 20 minutes at a time. Follow-up with physical therapy/orthopedics for further management. Report to the ER sooner if symptoms worsen. Chief Complaint     Chief Complaint   Patient presents with    Shoulder Pain     Patient with left shoulder pain x1 month. Radiates down left arm and into pinky finger. No chest painn. Was seen by PCP in September and xrays were negative. History of Present Illness       80year old male presents for evaluation of left shoulder pain ongoing for the past month. He denies any known injury but relates he uses that arm to support himself while walking with a cane. He was seen by his PCP a month ago who did an x-ray that was negative, gave him a steroid, and referred him to orthopedics. He was seen by ortho on 23 who advised him his pain was muscular and referred to him to PT. He has not yet started PT as he reports "he doesn't believe he will be able to do it as it hurts to move his arm." He has been taking aspirin for pain but relates he is not able to take any other NSAIDs due to poor kidney function secondary to long-term NSAID use. He reports his pain is constant and rated 8/10. He is requesting a cortisone shot.     Shoulder Pain   The pain is present in the left shoulder. This is a recurrent problem. The current episode started more than 1 month ago. There has been no history of extremity trauma. The problem occurs intermittently. The problem has been unchanged. The quality of the pain is described as aching and sharp. The pain is at a severity of 8/10. The pain is moderate. Associated symptoms include numbness and stiffness. Pertinent negatives include no fever, inability to bear weight, itching, joint locking, joint swelling, limited range of motion or tingling. The symptoms are aggravated by activity. He has tried NSAIDS for the symptoms. The treatment provided no relief. Family history does not include gout or rheumatoid arthritis. There is no history of diabetes, gout, osteoarthritis or rheumatoid arthritis. Review of Systems   Review of Systems   Constitutional:  Negative for activity change, appetite change, chills, fatigue and fever. Respiratory:  Negative for chest tightness and shortness of breath. Cardiovascular:  Negative for chest pain and palpitations. Gastrointestinal:  Negative for abdominal pain. Musculoskeletal:  Positive for stiffness. Negative for gout. Skin:  Negative for color change, itching and pallor. Neurological:  Positive for numbness. Negative for dizziness, tingling, light-headedness and headaches.          Current Medications       Current Outpatient Medications:     albuterol (2.5 mg/3 mL) 0.083 % nebulizer solution, Take 1 vial (2.5 mg total) by nebulization every 6 (six) hours as needed for wheezing, Disp: 50 vial, Rfl: 3    albuterol (PROVENTIL HFA,VENTOLIN HFA) 90 mcg/act inhaler, INHALE 2 PUFFS EVERY 4 (FOUR) HOURS AS NEEDED FOR SHORTNESS OF BREATH, Disp: 8.5 g, Rfl: 5    allopurinol (ZYLOPRIM) 100 mg tablet, TAKE 1 TABLET BY MOUTH TWICE A DAY, Disp: 180 tablet, Rfl: 1    aspirin (ECOTRIN) 325 mg EC tablet, Take 650 mg by mouth every 6 (six) hours as needed for mild pain, Disp: , Rfl:     betamethasone, augmented, (DIPROLENE-AF) 0.05 % cream, APPLY 1 APPLICATION TOPICALLY 2 (TWO) TIMES A DAY TO AFFECTED AREA, Disp: 50 g, Rfl: 2    Cholecalciferol (VITAMIN D3) 1000 units CAPS, Take by mouth, Disp: , Rfl:     guaiFENesin (MUCINEX) 600 mg 12 hr tablet, Take 1,200 mg by mouth daily Once at hs, Disp: , Rfl:     Multiple Vitamins tablet, Take 1 tablet by mouth daily, Disp: , Rfl:     sildenafil (VIAGRA) 100 mg tablet, Take 1 tablet (100 mg total) by mouth daily as needed for erectile dysfunction, Disp: 6 tablet, Rfl: 5    tamsulosin (FLOMAX) 0.4 mg, TAKE 1 CAPSULE BY MOUTH EVERY DAY WITH DINNER, Disp: 90 capsule, Rfl: 3    Trelegy Ellipta 100-62.5-25 MCG/ACT inhaler, INHALE 1 PUFF DAILY RINSE MOUTH AFTER USE., Disp: 60 each, Rfl: 5    Current Allergies     Allergies as of 10/19/2023 - Reviewed 10/19/2023   Allergen Reaction Noted    Darvon [propoxyphene] Other (See Comments) 02/08/2017            The following portions of the patient's history were reviewed and updated as appropriate: allergies, current medications, past family history, past medical history, past social history, past surgical history and problem list.     Past Medical History:   Diagnosis Date    Arthritis     Asthma     Chest pain     atypical    Chronic kidney disease     Colon polyp     COPD (chronic obstructive pulmonary disease) (HCC)     CPAP (continuous positive airway pressure) dependence     Decreased glomerular filtration rate     Hamstring strain     Herniated lumbar intervertebral disc     History of alcohol use     uncomplicated    History of back pain     lower    History of colon polyps     sigmoid    History of genital warts     History of lipoma     History of muscle spasm     lumbar paraspinous muscle    History of umbilical hernia     Lateral pain of left hip     Low HDL (under 40)     Lumbar radiculopathy     Obesity     Respiratory distress     acute    Shortness of breath     Sleep apnea     CPAP PT WILL BRING       Past Surgical History: Procedure Laterality Date    ADENOIDECTOMY      APPENDECTOMY      COLONOSCOPY      HAND SURGERY Left     CYST REMOVAL    HIP SURGERY Bilateral     IR BIOPSY BONE MARROW  4/7/2023    LIPECTOMY      thigh    LIPOMA RESECTION Left     HIP    [place] RPR UMBILICAL HRNA 5 YRS/> REDUCIBLE N/A 2/23/2017    Procedure: REPAIR HERNIA UMBILICAL;  Surgeon: Daniela Aviles MD;  Location: [facility];  Service: General    TONSILLECTOMY      WRIST GANGLION EXCISION         Family History   Problem Relation Age of Onset    Heart disease Mother         cardiac disorder    Hypertension Mother     COPD Father     Heart disease Family         cardiac disorder    Cancer Family         lung    Emphysema Family         pulmonary unspecified emphysema    Diabetes Family     Glaucoma Family     Cancer Family         lung         Medications have been verified. Objective   /72   Pulse 85   Temp 98.2 °F (36.8 °C)   Resp 20   SpO2 93%        Physical Exam     Physical Exam  Vitals and nursing note reviewed. Constitutional:       Appearance: Normal appearance. He is obese. HENT:      Head: Normocephalic and atraumatic. Mouth/Throat:      Mouth: Mucous membranes are moist.      Pharynx: No oropharyngeal exudate. Cardiovascular:      Rate and Rhythm: Normal rate and regular rhythm. Pulses: Normal pulses. Radial pulses are 2+ on the right side and 2+ on the left side. Pulmonary:      Effort: Pulmonary effort is normal.   Musculoskeletal:         General: Normal range of motion. Right shoulder: No swelling, tenderness or bony tenderness. Normal range of motion. Normal strength. Normal pulse. Left shoulder: Tenderness present. No swelling or bony tenderness. Normal range of motion. Normal strength. Normal pulse. Arms:       Cervical back: Normal range of motion and neck supple. Skin:     General: Skin is warm and dry. Neurological:      General: No focal deficit present.       Mental Status: He is alert and oriented to person, place, and time. Psychiatric:         Mood and Affect: Mood normal.         Behavior: Behavior normal.         Thought Content:  Thought content normal.         Judgment: Judgment normal.

## 2023-10-19 NOTE — PATIENT INSTRUCTIONS
Take tylenol every 4-6 hours and apply lidocaine patches (12 hours on, 12 hours off) as needed for pain. May also apply heat/ice every 3-4 hours for 20 minutes at a time. Follow-up with physical therapy/orthopedics for further management. Report to the ER sooner if symptoms worsen.

## 2023-10-24 ENCOUNTER — HOSPITAL ENCOUNTER (OUTPATIENT)
Dept: PULMONOLOGY | Facility: HOSPITAL | Age: 68
Discharge: HOME | End: 2023-10-24
Attending: INTERNAL MEDICINE
Payer: COMMERCIAL

## 2023-10-24 DIAGNOSIS — J44.9 COPD, SEVERE (HCC): ICD-10-CM

## 2023-10-24 PROCEDURE — 94729 DIFFUSING CAPACITY: CPT

## 2023-10-24 PROCEDURE — 94729 DIFFUSING CAPACITY: CPT | Performed by: STUDENT IN AN ORGANIZED HEALTH CARE EDUCATION/TRAINING PROGRAM

## 2023-10-24 PROCEDURE — 94726 PLETHYSMOGRAPHY LUNG VOLUMES: CPT | Performed by: STUDENT IN AN ORGANIZED HEALTH CARE EDUCATION/TRAINING PROGRAM

## 2023-10-24 PROCEDURE — 94060 EVALUATION OF WHEEZING: CPT | Performed by: STUDENT IN AN ORGANIZED HEALTH CARE EDUCATION/TRAINING PROGRAM

## 2023-10-24 PROCEDURE — 94726 PLETHYSMOGRAPHY LUNG VOLUMES: CPT

## 2023-10-24 PROCEDURE — 94760 N-INVAS EAR/PLS OXIMETRY 1: CPT

## 2023-10-24 PROCEDURE — 94060 EVALUATION OF WHEEZING: CPT

## 2023-10-24 RX ORDER — ALBUTEROL SULFATE 2.5 MG/3ML
2.5 SOLUTION RESPIRATORY (INHALATION) ONCE
Status: COMPLETED | OUTPATIENT
Start: 2023-10-24 | End: 2023-10-24

## 2023-10-24 RX ADMIN — ALBUTEROL SULFATE 2.5 MG: 2.5 SOLUTION RESPIRATORY (INHALATION) at 15:08

## 2023-10-25 DIAGNOSIS — G47.33 OSA ON CPAP: Primary | ICD-10-CM

## 2023-11-06 ENCOUNTER — NURSE TRIAGE (OUTPATIENT)
Age: 68
End: 2023-11-06

## 2023-11-06 ENCOUNTER — OFFICE VISIT (OUTPATIENT)
Dept: FAMILY MEDICINE CLINIC | Facility: CLINIC | Age: 68
End: 2023-11-06
Payer: COMMERCIAL

## 2023-11-06 VITALS
HEART RATE: 110 BPM | OXYGEN SATURATION: 93 % | SYSTOLIC BLOOD PRESSURE: 164 MMHG | DIASTOLIC BLOOD PRESSURE: 80 MMHG | TEMPERATURE: 97.5 F | BODY MASS INDEX: 40.43 KG/M2 | HEIGHT: 74 IN | WEIGHT: 315 LBS

## 2023-11-06 DIAGNOSIS — M25.511 ACUTE PAIN OF RIGHT SHOULDER: Primary | ICD-10-CM

## 2023-11-06 DIAGNOSIS — M19.90 ARTHRITIS: ICD-10-CM

## 2023-11-06 PROCEDURE — 99214 OFFICE O/P EST MOD 30 MIN: CPT | Performed by: FAMILY MEDICINE

## 2023-11-06 RX ORDER — PREDNISONE 20 MG/1
40 TABLET ORAL DAILY
Qty: 10 TABLET | Refills: 0 | Status: SHIPPED | OUTPATIENT
Start: 2023-11-06 | End: 2023-11-11

## 2023-11-06 NOTE — TELEPHONE ENCOUNTER
Reason for Disposition  • SEVERE pain (e.g., excruciating, unable to do any normal activities)    Answer Assessment - Initial Assessment Questions  1. ONSET: "When did the pain start?"        Shoulder pain under neath shoulder pain travels down back side of tricp and down forearm even touching it it hurts     Severe COPD always COPD     2. LOCATION: "Where is the pain located?"       Left shoulder pain denies injuries accidents or falls       3. PAIN: "How bad is the pain?" (Scale 1-10; or mild, moderate, severe)    - MILD (1-3): doesn't interfere with normal activities    - MODERATE (4-7): interferes with normal activities (e.g., work or school) or awakens from sleep    - SEVERE (8-10): excruciating pain, unable to do any normal activities, unable to move arm at all due to pain         Rates pain a 7       4. WORK OR EXERCISE: "Has there been any recent work or exercise that involved this part of the body?"           5. CAUSE: "What do you think is causing the shoulder pain?"       Unsure     6.  OTHER SYMPTOMS: "Do you have any other symptoms?" (e.g., neck pain, swelling, rash, fever, numbness, weakness)        Sometimes pinky  gets numb    Protocols used: Shoulder Pain-ADULT-[place]

## 2023-11-06 NOTE — TELEPHONE ENCOUNTER
Patient called in stating he has had left shoulder pain since September. Patient was seen by orthopedics in September. Patient does not want to go back to the ortho . He is requesting to see his PCP. Denies SOB , chest pain, neck or jaw pain. Patient states the pain is constant and rates it a 7.   Appointment scheduled for today at 415pm.

## 2023-11-06 NOTE — PROGRESS NOTES
Name: Byron Fung      : 1955      MRN: [id number]  Encounter Provider: Tre Thrasher MD  Encounter Date: 2023   Encounter department: [facility]     1. Acute pain of right shoulder  -     XR spine cervical complete 4 or 5 vw non injury; Future; Expected date: 2023  -     [address] [facility] limited; Future; Expected date: 2023  -     predniSONE 20 mg tablet; Take 2 tablets (40 mg total) by mouth daily for 5 days  -     Ambulatory Referral to Physical Therapy; Future    2. Arthritis  -     XR spine cervical complete 4 or 5 vw non injury; Future; Expected date: 2023  -     predniSONE 20 mg tablet; Take 2 tablets (40 mg total) by mouth daily for 5 days      2-week history of acute pain on the left shoulder, likely secondary to tendinitis/partial rotator cuff tear  Also the radiation of pain down his arm may suggest possible cervical impingement  We will obtain x-ray of the cervical spine for evaluation, obtain ultrasound of the right shoulder for evaluation    Patient does have baseline chronic kidney disease, and this is limited    We will provide patient with small dose of muscle relaxant for symptomatic relief, Flexeril 5 mg, prednisone 40 mg for total of 5 days  Patient may use Tylenol 500 mg 2 tablets every 12 hours    We will have patient report to physical therapy for evaluation of shoulder impingement/tendinitis    May consider MRI if there is no significant abnormal findings on the ultrasound       Subjective     HPI    73-year-old male patient presents for evaluation regarding left-sided shoulder pain. Patient reports this is relatively new, denies any history of left-sided pain in the past.  Pain started all of a sudden when he woke up in the morning and felt like he stepped the wrong way. Patient does use a cane with his left hand.   Report the pain started from the posterior aspect of the left shoulder radiates to the anterior aspect and down his arm into his fourth and fifth digit. He does have some numbness and tingling in his fourth and fifth digit. Patient has tried short course of steroid on 9/25/2023 without significant improvement in symptoms other than the first day  X-ray of the left shoulder completed on 9/14/2023 shows no significant osseous abnormality. Review of Systems   Constitutional:  Negative for chills and fever. HENT:  Negative for congestion and rhinorrhea. Respiratory:  Negative for shortness of breath. Cardiovascular:  Negative for chest pain. Gastrointestinal:  Negative for abdominal pain. Musculoskeletal:  Positive for arthralgias. Neurological:  Negative for dizziness, light-headedness and headaches. Psychiatric/Behavioral:  Negative for sleep disturbance.         Past Medical History:   Diagnosis Date    Arthritis     Asthma     Chest pain     atypical    Chronic kidney disease     Colon polyp     COPD (chronic obstructive pulmonary disease) ([facility])     CPAP (continuous positive airway pressure) dependence     Decreased glomerular filtration rate     Hamstring strain     Herniated lumbar intervertebral disc     History of alcohol use     uncomplicated    History of back pain     lower    History of colon polyps     sigmoid    History of genital warts     History of lipoma     History of muscle spasm     lumbar paraspinous muscle    History of umbilical hernia     Lateral pain of left hip     Low HDL (under 40)     Lumbar radiculopathy     Obesity     Respiratory distress     acute    Shortness of breath     Sleep apnea     CPAP PT WILL BRING     Past Surgical History:   Procedure Laterality Date    ADENOIDECTOMY      APPENDECTOMY      COLONOSCOPY      HAND SURGERY Left     CYST REMOVAL    HIP SURGERY Bilateral     IR BIOPSY BONE MARROW  4/7/2023    LIPECTOMY      thigh    LIPOMA RESECTION Left     HIP    IA RPR UMBILICAL HRNA 5 YRS/> REDUCIBLE N/A 2/23/2017    Procedure: REPAIR HERNIA UMBILICAL;  Surgeon: Jeannene Bloch, MD;  Location: [facility];  Service: General    TONSILLECTOMY      WRIST GANGLION EXCISION       Family History   Problem Relation Age of Onset    Heart disease Mother         cardiac disorder    Hypertension Mother     COPD Father     Heart disease Family         cardiac disorder    Cancer Family         lung    Emphysema Family         pulmonary unspecified emphysema    Diabetes Family     Glaucoma Family     Cancer Family         lung     Social History     Socioeconomic History    Marital status: /Civil Union     Spouse name: None    Number of children: None    Years of education: None    Highest education level: None   Occupational History    Occupation: Fulltime employment   Tobacco Use    Smoking status: Former     Packs/day: 2.00     Years: 25.00     Total pack years: 50.00     Types: Cigarettes     Start date: 0     Quit date: 10/2014     Years since quittin.1     Passive exposure: Past    Smokeless tobacco: Never    Tobacco comments:     Quit for a 16 year period in between start and final quit date. Vaping Use    Vaping Use: Never used   Substance and Sexual Activity    Alcohol use: No     Comment: recovering alcoholic    Drug use: No    Sexual activity: None   Other Topics Concern    None   Social History Narrative    Always uses seatbelt     Social Determinants of Health     Financial Resource Strain: Low Risk  (2023)    Overall Financial Resource Strain (CARDIA)     Difficulty of Paying Living Expenses: Not very hard   Food Insecurity: Not on file   Transportation Needs: No Transportation Needs (2023)    PRAPARE - Transportation     Lack of Transportation (Medical): No     Lack of Transportation (Non-Medical):  No   Physical Activity: Not on file   Stress: Not on file   Social Connections: Not on file   Intimate Partner Violence: Not on file   Housing Stability: Not on file     Current Outpatient Medications on File Prior to Visit   Medication Sig    albuterol (2.5 mg/3 mL) 0.083 % nebulizer solution Take 1 vial (2.5 mg total) by nebulization every 6 (six) hours as needed for wheezing    albuterol (PROVENTIL HFA,VENTOLIN HFA) 90 mcg/act inhaler INHALE 2 PUFFS EVERY 4 (FOUR) HOURS AS NEEDED FOR SHORTNESS OF BREATH    allopurinol (ZYLOPRIM) 100 mg tablet TAKE 1 TABLET BY MOUTH TWICE A DAY    aspirin (ECOTRIN) 325 mg EC tablet Take 650 mg by mouth every 6 (six) hours as needed for mild pain    betamethasone, augmented, (DIPROLENE-AF) 0.05 % cream APPLY 1 APPLICATION TOPICALLY 2 (TWO) TIMES A DAY TO AFFECTED AREA    Cholecalciferol (VITAMIN D3) 1000 units CAPS Take by mouth    guaiFENesin (MUCINEX) 600 mg 12 hr tablet Take 1,200 mg by mouth daily Once at hs    Multiple Vitamins tablet Take 1 tablet by mouth daily    sildenafil (VIAGRA) 100 mg tablet Take 1 tablet (100 mg total) by mouth daily as needed for erectile dysfunction    tamsulosin (FLOMAX) 0.4 mg TAKE 1 CAPSULE BY MOUTH EVERY DAY WITH DINNER    Trelegy Ellipta 100-62.5-25 MCG/ACT inhaler INHALE 1 PUFF DAILY RINSE MOUTH AFTER USE.      Allergies   Allergen Reactions    Darvon [Propoxyphene] Other (See Comments)     hallucinations     Immunization History   Administered Date(s) Administered    COVID-19 MODERNA VACC 0.5 ML IM 03/31/2021, 05/01/2021    INFLUENZA 09/20/2014, 11/19/2015, 10/07/2016, 03/05/2018, 09/07/2018, 09/30/2019, 10/18/2020, 09/17/2021, 10/25/2022    Influenza Quadrivalent Preservative Free 3 years and older IM 09/20/2014, 11/19/2015, 09/20/2017    Influenza, injectable, quadrivalent, preservative free 0.5 mL 09/07/2018, 10/14/2019    Influenza, seasonal, injectable 09/20/2013    Influenza, seasonal, injectable, preservative free 10/07/2016    Pneumococcal 20-valent Conjugate (Historical) 11/30/2022    Pneumococcal Conjugate 13-Valent 11/18/2016    Pneumococcal Polysaccharide PPV23 12/11/2014       Objective     /80 (BP Location: Right arm, Patient Position: Sitting, Cuff Size: Standard) Pulse (!) 110   Temp 97.5 °F (36.4 °C)   Ht 6' 2" (1.88 m)   Wt (!) 189 kg (416 lb 12.8 oz)   SpO2 93%   BMI 53.51 kg/m²     Physical Exam  Vitals reviewed. Constitutional:       General: He is not in acute distress. Appearance: Normal appearance. He is obese. He is not ill-appearing, toxic-appearing or diaphoretic. Cardiovascular:      Rate and Rhythm: Normal rate and regular rhythm. Pulses: Normal pulses. Heart sounds: Normal heart sounds. No murmur heard. Pulmonary:      Effort: Pulmonary effort is normal. No respiratory distress. Breath sounds: Normal breath sounds. Abdominal:      General: Abdomen is flat. Bowel sounds are normal. There is no distension. Palpations: Abdomen is soft. Musculoskeletal:      Comments: Patient full range of motion with the left shoulder however does have pain with abduction of the left shoulder  Positive empty can test, pain with external rotation and internal rotation of the left shoulder. Positive Neer test   Skin:     General: Skin is warm and dry. Capillary Refill: Capillary refill takes less than 2 seconds. Coloration: Skin is not jaundiced. Neurological:      General: No focal deficit present. Mental Status: He is alert and oriented to person, place, and time.    Psychiatric:         Mood and Affect: Mood normal.             Terrance Chacon MD

## 2023-11-06 NOTE — TELEPHONE ENCOUNTER
Regarding: left shoulder pain not improving  ----- Message from Paulino Toledo sent at 11/6/2023  2:18 PM EST -----  Patient called in stating he has had left shoulder pain for about 2 months now that has not gotten any better and can barely lift his arm.

## 2023-11-07 ENCOUNTER — APPOINTMENT (OUTPATIENT)
Dept: RADIOLOGY | Facility: MEDICAL CENTER | Age: 68
End: 2023-11-07
Payer: COMMERCIAL

## 2023-11-07 DIAGNOSIS — M25.511 ACUTE PAIN OF RIGHT SHOULDER: ICD-10-CM

## 2023-11-07 DIAGNOSIS — M19.90 ARTHRITIS: ICD-10-CM

## 2023-11-07 PROCEDURE — 72050 X-RAY EXAM NECK SPINE 4/5VWS: CPT

## 2023-11-10 LAB

## 2023-11-13 ENCOUNTER — EVALUATION (OUTPATIENT)
Dept: PHYSICAL THERAPY | Facility: MEDICAL CENTER | Age: 68
End: 2023-11-13
Payer: COMMERCIAL

## 2023-11-13 DIAGNOSIS — M25.512 ACUTE PAIN OF LEFT SHOULDER: Primary | ICD-10-CM

## 2023-11-13 DIAGNOSIS — M54.2 NECK PAIN: ICD-10-CM

## 2023-11-13 PROCEDURE — 97162 PT EVAL MOD COMPLEX 30 MIN: CPT

## 2023-11-16 ENCOUNTER — PATIENT MESSAGE (OUTPATIENT)
Dept: FAMILY MEDICINE CLINIC | Facility: CLINIC | Age: 68
End: 2023-11-16

## 2023-12-06 ENCOUNTER — OFFICE VISIT (OUTPATIENT)
Dept: FAMILY MEDICINE CLINIC | Facility: CLINIC | Age: 68
End: 2023-12-06
Payer: COMMERCIAL

## 2023-12-06 VITALS
SYSTOLIC BLOOD PRESSURE: 154 MMHG | WEIGHT: 315 LBS | TEMPERATURE: 97.8 F | DIASTOLIC BLOOD PRESSURE: 74 MMHG | HEIGHT: 74 IN | OXYGEN SATURATION: 96 % | BODY MASS INDEX: 40.43 KG/M2 | HEART RATE: 102 BPM

## 2023-12-06 DIAGNOSIS — M67.912 TENDINOPATHY OF LEFT SHOULDER: ICD-10-CM

## 2023-12-06 DIAGNOSIS — M48.02 NEURAL FORAMINAL STENOSIS OF CERVICAL SPINE: Primary | ICD-10-CM

## 2023-12-06 DIAGNOSIS — M19.90 ARTHRITIS: ICD-10-CM

## 2023-12-06 PROCEDURE — 99214 OFFICE O/P EST MOD 30 MIN: CPT | Performed by: FAMILY MEDICINE

## 2023-12-06 RX ORDER — MELOXICAM 15 MG/1
15 TABLET ORAL DAILY
Qty: 30 TABLET | Refills: 0 | Status: SHIPPED | OUTPATIENT
Start: 2023-12-06

## 2023-12-06 RX ORDER — CYCLOBENZAPRINE HCL 5 MG
5 TABLET ORAL 3 TIMES DAILY PRN
Qty: 60 TABLET | Refills: 0 | Status: SHIPPED | OUTPATIENT
Start: 2023-12-06

## 2023-12-06 NOTE — PROGRESS NOTES
Name: Jennifer Pardo      : 1955      MRN: [id number]  Encounter Provider: Maria G Rodriguez MD  Encounter Date: 2023   Encounter department: [facility]     1. Neural foraminal stenosis of cervical spine  -     Ambulatory Referral to [facility] Program; Future  -     cyclobenzaprine (FLEXERIL) 5 mg tablet; Take 1 tablet (5 mg total) by mouth 3 (three) times a day as needed for muscle spasms  -     meloxicam (Mobic) 15 mg tablet; Take 1 tablet (15 mg total) by mouth daily    2. Arthritis  -     cyclobenzaprine (FLEXERIL) 5 mg tablet; Take 1 tablet (5 mg total) by mouth 3 (three) times a day as needed for muscle spasms  -     meloxicam (Mobic) 15 mg tablet; Take 1 tablet (15 mg total) by mouth daily    3. Tendinopathy of left shoulder      Patient's left upper extremity pain likely secondary to multifactorial etiology. There is a component of foraminal stenosis involving the cervical spine  There is also component of rotator cuff tendinopathy  Patient denies any significant improvement with steroid, will have patient try nonsteroidal anti-inflammatory medication  Mobic once a day with meal for 14 day and Flexeril up to 3 times a day as needed  Patient understand the Flexeril is a sedating medication, do not take while driving, do not take with alcohol         Subjective     HPI    71-year-old male patient presents for follow-up regarding his most recent visit. Patient was seen on 2023 for acute left-sided shoulder pain. Patient was prescribed course of steroid, prednisone 40 mg for total 5 days as well as refer to physical therapy. Patient has seen physical therapy however reports "I loss of function of my left arm for 2 days" after physical therapy. Denies any significant improvement in symptoms, reports the pain used to be in the center of his left shoulder not traveling down to the left upper arm.   Symptoms localized to the left upper arm, very rare occasion of radiation down to the left lower arm. Denies any significant numbness and tingling. Continues a weakness with the left hand . At a neutral sitting position patient reports about 4 out of 10 pain involving the left shoulder and arm, movements such as reaching, using his left arm because the pain to be worse, up to 7+ out of 10    Review of Systems   Constitutional:  Negative for chills and fever. HENT:  Negative for congestion, rhinorrhea and sore throat. Respiratory:  Negative for chest tightness and shortness of breath. Cardiovascular:  Negative for chest pain. Gastrointestinal:  Positive for abdominal pain. Musculoskeletal:  Positive for arthralgias, back pain, gait problem, neck pain and neck stiffness. Left shoulder pain radiating down to the left arm, chronic lumbar back pain  Uses a cane for ambulation  Limited range of motion in the neck, will be turning to the left   Neurological:  Negative for headaches.        Past Medical History:   Diagnosis Date    Arthritis     Asthma     Chest pain     atypical    Chronic kidney disease     Colon polyp     COPD (chronic obstructive pulmonary disease) ([facility])     CPAP (continuous positive airway pressure) dependence     Decreased glomerular filtration rate     Hamstring strain     Herniated lumbar intervertebral disc     History of alcohol use     uncomplicated    History of back pain     lower    History of colon polyps     sigmoid    History of genital warts     History of lipoma     History of muscle spasm     lumbar paraspinous muscle    History of umbilical hernia     Lateral pain of left hip     Low HDL (under 40)     Lumbar radiculopathy     Obesity     Respiratory distress     acute    Shortness of breath     Sleep apnea     CPAP PT WILL BRING     Past Surgical History:   Procedure Laterality Date    ADENOIDECTOMY      APPENDECTOMY      COLONOSCOPY      HAND SURGERY Left     CYST REMOVAL    HIP SURGERY Bilateral IR BIOPSY BONE MARROW  2023    LIPECTOMY      thigh    LIPOMA RESECTION Left     HIP    IA RPR UMBILICAL HRNA 5 YRS/> REDUCIBLE N/A 2017    Procedure: REPAIR HERNIA UMBILICAL;  Surgeon: Mohsen Ponce MD;  Location: [facility];  Service: General    TONSILLECTOMY      WRIST GANGLION EXCISION       Family History   Problem Relation Age of Onset    Heart disease Mother         cardiac disorder    Hypertension Mother     COPD Father     Heart disease Family         cardiac disorder    Cancer Family         lung    Emphysema Family         pulmonary unspecified emphysema    Diabetes Family     Glaucoma Family     Cancer Family         lung     Social History     Socioeconomic History    Marital status: /Civil Union     Spouse name: None    Number of children: None    Years of education: None    Highest education level: None   Occupational History    Occupation: Fulltime employment   Tobacco Use    Smoking status: Former     Packs/day: 2.00     Years: 25.00     Total pack years: 50.00     Types: Cigarettes     Start date: 0     Quit date: 10/2014     Years since quittin.1     Passive exposure: Past    Smokeless tobacco: Never    Tobacco comments:     Quit for a 16 year period in between start and final quit date. Vaping Use    Vaping Use: Never used   Substance and Sexual Activity    Alcohol use: No     Comment: recovering alcoholic    Drug use: No    Sexual activity: None   Other Topics Concern    None   Social History Narrative    Always uses seatbelt     Social Determinants of Health     Financial Resource Strain: Low Risk  (2023)    Overall Financial Resource Strain (CARDIA)     Difficulty of Paying Living Expenses: Not very hard   Food Insecurity: Not on file   Transportation Needs: No Transportation Needs (2023)    PRAPARE - Transportation     Lack of Transportation (Medical): No     Lack of Transportation (Non-Medical):  No   Physical Activity: Not on file   Stress: Not on file Social Connections: Not on file   Intimate Partner Violence: Not on file   Housing Stability: Not on file     Current Outpatient Medications on File Prior to Visit   Medication Sig    albuterol (2.5 mg/3 mL) 0.083 % nebulizer solution Take 1 vial (2.5 mg total) by nebulization every 6 (six) hours as needed for wheezing    albuterol (PROVENTIL HFA,VENTOLIN HFA) 90 mcg/act inhaler INHALE 2 PUFFS EVERY 4 (FOUR) HOURS AS NEEDED FOR SHORTNESS OF BREATH    allopurinol (ZYLOPRIM) 100 mg tablet TAKE 1 TABLET BY MOUTH TWICE A DAY    Cholecalciferol (VITAMIN D3) 1000 units CAPS Take by mouth    guaiFENesin (MUCINEX) 600 mg 12 hr tablet Take 1,200 mg by mouth daily Once at hs    Multiple Vitamins tablet Take 1 tablet by mouth daily    sildenafil (VIAGRA) 100 mg tablet Take 1 tablet (100 mg total) by mouth daily as needed for erectile dysfunction    tamsulosin (FLOMAX) 0.4 mg TAKE 1 CAPSULE BY MOUTH EVERY DAY WITH DINNER    Trelegy Ellipta 100-62.5-25 MCG/ACT inhaler INHALE 1 PUFF DAILY RINSE MOUTH AFTER USE.    aspirin (ECOTRIN) 325 mg EC tablet Take 650 mg by mouth every 6 (six) hours as needed for mild pain    betamethasone, augmented, (DIPROLENE-AF) 0.05 % cream APPLY 1 APPLICATION TOPICALLY 2 (TWO) TIMES A DAY TO AFFECTED AREA     Allergies   Allergen Reactions    Darvon [Propoxyphene] Other (See Comments)     hallucinations     Immunization History   Administered Date(s) Administered    COVID-19 MODERNA VACC 0.5 ML IM 03/31/2021, 05/01/2021    INFLUENZA 09/20/2014, 11/19/2015, 10/07/2016, 03/05/2018, 09/07/2018, 09/30/2019, 10/18/2020, 09/17/2021, 10/25/2022    Influenza Quadrivalent Preservative Free 3 years and older IM 09/20/2014, 11/19/2015, 09/20/2017    Influenza, injectable, quadrivalent, preservative free 0.5 mL 09/07/2018, 10/14/2019    Influenza, seasonal, injectable 09/20/2013    Influenza, seasonal, injectable, preservative free 10/07/2016    Pneumococcal 20-valent Conjugate (Historical) 11/30/2022 Pneumococcal Conjugate 13-Valent 11/18/2016    Pneumococcal Polysaccharide PPV23 12/11/2014       Objective     /74 (BP Location: Right arm, Patient Position: Sitting, Cuff Size: Standard)   Pulse 102   Temp 97.8 °F (36.6 °C)   Ht 6' 2" (1.88 m)   Wt (!) 186 kg (409 lb)   SpO2 96%   BMI 52.51 kg/m²     Physical Exam  Vitals reviewed. Constitutional:       General: He is not in acute distress. Appearance: Normal appearance. He is obese. He is not toxic-appearing. Cardiovascular:      Rate and Rhythm: Normal rate and regular rhythm. Pulses: Normal pulses. Pulmonary:      Effort: Pulmonary effort is normal. No respiratory distress. Breath sounds: Normal breath sounds. Abdominal:      General: Abdomen is flat. Bowel sounds are normal. There is no distension. Palpations: Abdomen is soft. Musculoskeletal:         General: Tenderness present. No swelling. Comments: Limited active range of motion involving the left shoulder  No specific areas of tenderness however there is pain with pressure to the lateral epicondyles  Exam limited due to pain  Pain with empty can test  No significant pain with internal or external rotation of the shoulder on the left, negative belly press test   Neurological:      Mental Status: He is alert.              Abdi Grubbs MD

## 2023-12-07 ENCOUNTER — NURSE TRIAGE (OUTPATIENT)
Dept: PHYSICAL THERAPY | Facility: OTHER | Age: 68
End: 2023-12-07

## 2023-12-07 DIAGNOSIS — M54.2 ACUTE NECK PAIN: Primary | ICD-10-CM

## 2023-12-07 NOTE — TELEPHONE ENCOUNTER
Additional Information   Negative: Is this related to a work injury? Negative: Is this related to an MVA? Negative: Are you currently recieving homecare services? Background - Initial Assessment  Clinical complaint: Left-sided Neck Pain. Hx of neck pain after patient fell down the stairs back in 2012 or 2013. New flare up started in September this year. It radiates to his left shoulder, arm and down to his fingers. No numbness or tingling. Seen by [facility] yesterday 12/06 and ordered Xray last month. Pain is constant when he is lying in bed and intermittent with certain position. Patient described pain as throbbing. NKI (not recently, no auto or w/c). Date of onset: September 2023 ( new flare up)  Frequency of pain: constant and intermittent with certain position.   Quality of pain: throbbing    Protocols used: [facility] Protocol

## 2023-12-07 NOTE — TELEPHONE ENCOUNTER
Additional Information   Negative: Has the patient had unexplained weight loss? Negative: Does the patient have a fever? Negative: Is the patient experiencing blood in sputum? Negative: Is the patient experiencing urine retention? Negative: Is the patient experiencing acute drop foot or paralysis? Negative: Has the patient experienced major trauma? (fall from height, high speed collision, direct blow to spine) and is also experiencing nausea, light-headedness, or loss of consciousness? Negative: Is this a chronic condition? Protocols used: [address] [place] [address] Protocol    This RN did review in detail the [facility] and what we can provide for their neck pain. Patient is agreeable to being triaged by this RN and would like to proceed with Physical Therapy. Referral was placed for Physical Therapy at the [place] site. Patients information was sent to the  to make evaluation appointment. Patient made aware that the PT office  will be calling to schedule the appointment. Patient was provided with the phone number to the PT office. No further questions and/or concerns were voiced by the patient at this time. Patient states understanding of the referral that was placed. Referral Closed.

## 2023-12-18 ENCOUNTER — EVALUATION (OUTPATIENT)
Dept: PHYSICAL THERAPY | Facility: MEDICAL CENTER | Age: 68
End: 2023-12-18
Payer: COMMERCIAL

## 2023-12-18 DIAGNOSIS — M54.2 ACUTE NECK PAIN: ICD-10-CM

## 2023-12-18 PROCEDURE — 97162 PT EVAL MOD COMPLEX 30 MIN: CPT | Performed by: PHYSICAL THERAPIST

## 2023-12-18 NOTE — PROGRESS NOTES
PT Evaluation     Today's date: 2023  Patient name: Malachi Greenwood  : 1955  MRN: [id number]  Referring provider: Oral Lema PT  Dx:   Encounter Diagnosis     ICD-10-CM    1. Acute neck pain  M54.2 Ambulatory referral to PT spine                     Assessment  Assessment details: Malachi Greenwood is a 68 y.o. male who presents with Acute neck pain.  Patient presents alert and oriented with the above impairments. . Malachi will benefit from PT to addres deficits in order to maximize and return to prior level of function.  No further referral appears necessary at this time based upon examination results.    Spent time educating patient on exam findings and planned treatment approach to include postural strengthening and reduction of muscle tightness in effort to progress toward symptom centralization.  He states he does no wish to begin PT until he has ultrasound test performed in January.  Please contact me if you have any questions or recommendations.     Impairments: abnormal muscle tone, abnormal or restricted ROM, abnormal movement, activity intolerance, lacks appropriate home exercise program and pain with function  Understanding of Dx/Px/POC: fair   Prognosis: fair    Goals  Short Term Goals:   1. Pain decreased 2 ratings in 4 weeks  2.  ROM increased 10* in 4 weeks  3.  Strength increased 1/2 grade in 4 weeks    Long Term Goals:   1.  ADLS/IADLS in related activities improved to maximal level in 8 weeks  2.  Recreational activities are improved to maximal level in 8 weeks.  3.  Dawson with HEP in 8 weeks.     Plan  Patient would benefit from: skilled physical therapy  Planned therapy interventions: IASTM, manual therapy, neuromuscular re-education, patient education, strengthening, stretching, therapeutic exercise, functional ROM exercises, flexibility and home exercise program  Frequency: 2x week  Duration in weeks: 8  Treatment plan discussed with: patient        Subjective  Evaluation    History of Present Illness  Mechanism of injury: Pt reports onset of L shoulder blade pain in the middle of September.  Pain radiates into LUE into his 4th and 5th digits.  Pain also is present on L side of upper cervical musculature.  He did see PCP at that time and was prescribed steroids which provided some relief for 1 day.  He was referred to ortho for evaluation of shoulder pain.  He was referred to PT, but did not attend at that time.   He then returned to PCP and underwent xrays of cervical spine that revealed spondylitic changes.  He attended 1 PT visit in November, but wanted to pursue further testing at that time and deferred further PT treatment. He returned to PCP and  He was prescribed Meloxicam and muscle relaxer which is providing some relief.  He was again referred back to PT.  He presents today w/ reports of intermittent pain that is most severe w/ movement and activity.  Also reports increased pain w/ driving, lifting or carrying, and cranking down car window.    He has no pain while lying in bed.  He is very limited w/ daily activity not only due to neck pain; but due to back pain, knee pain and COPD.  He is on disability.  Patient Goals  Patient goals for therapy: decreased pain, increased motion, increased strength and independence with ADLs/IADLs    Pain  At best pain ratin  At worst pain ratin          Objective     Palpation     Additional Palpation Details  Sig tenderness to light palpation at L rhomboid;  moderate tenderness at L UT, levator.    Active Range of Motion   Cervical/Thoracic Spine       Cervical    Flexion: 25 degrees   Extension: 45 degrees      Left lateral flexion: 10 degrees      Right lateral flexion: 18 degrees      Left rotation: 58 degrees  Right rotation: 62 degrees     Left Shoulder   Flexion: 175 degrees   Abduction: 175 degrees     Strength/Myotome Testing     Left Shoulder     Planes of Motion   Flexion: 5   Abduction: 5   External rotation  at 90°: 5   Internal rotation at 90°: 5     Right Shoulder     Planes of Motion   Flexion: 5   Abduction: 5   External rotation at 90°: 5   Internal rotation at 90°: 5              Precautions: COPD      Manuals 12/18            TPR L rhomboid, UT nv                                                   Neuro Re-Ed 12/18            TB rows nv            TB ext nv                                                                             Ther Ex 12/18            L UT stretch nv            L levator stretch nv                                                                                          Ther Activity                                       Gait Training                                       Modalities                                          Eval/Re-Eval POC Expires Auth #/ Referral # Total Visits Start Date Expiration Date Extension Info Visits Limitation   12/18 2/18 [employer] [facility]         [facility]                                                         1 2 3 4 5 6   12/18 F        7 8 9 10 11 12           13 14 15 16 17 18           19 20 21 22 23 24           25 26 27 28 29 30

## 2024-01-02 DIAGNOSIS — J44.9 COPD, SEVERE (HCC): ICD-10-CM

## 2024-01-02 DIAGNOSIS — N40.1 BPH ASSOCIATED WITH NOCTURIA: ICD-10-CM

## 2024-01-02 DIAGNOSIS — R35.1 BPH ASSOCIATED WITH NOCTURIA: ICD-10-CM

## 2024-01-02 RX ORDER — TAMSULOSIN HYDROCHLORIDE 0.4 MG/1
CAPSULE ORAL
Qty: 90 CAPSULE | Refills: 3 | Status: SHIPPED | OUTPATIENT
Start: 2024-01-02

## 2024-01-03 RX ORDER — ALBUTEROL SULFATE 90 UG/1
AEROSOL, METERED RESPIRATORY (INHALATION)
Qty: 8.5 G | Refills: 5 | Status: SHIPPED | OUTPATIENT
Start: 2024-01-03

## 2024-01-04 DIAGNOSIS — M48.02 NEURAL FORAMINAL STENOSIS OF CERVICAL SPINE: ICD-10-CM

## 2024-01-04 DIAGNOSIS — M19.90 ARTHRITIS: ICD-10-CM

## 2024-01-04 RX ORDER — MELOXICAM 15 MG/1
15 TABLET ORAL DAILY
Qty: 90 TABLET | Refills: 1 | Status: SHIPPED | OUTPATIENT
Start: 2024-01-04

## 2024-01-07 ENCOUNTER — RA CDI HCC (OUTPATIENT)
Dept: OTHER | Facility: HOSPITAL | Age: 69
End: 2024-01-07

## 2024-01-07 NOTE — PROGRESS NOTES
F10.11  HCC coding opportunities          Chart Reviewed number of suggestions sent to Provider: 1     Patients Insurance     Medicare Insurance: [employer]

## 2024-01-12 ENCOUNTER — HOSPITAL ENCOUNTER (OUTPATIENT)
Dept: ULTRASOUND IMAGING | Facility: HOSPITAL | Age: 69
Discharge: HOME | End: 2024-01-12
Payer: COMMERCIAL

## 2024-01-12 DIAGNOSIS — M25.511 ACUTE PAIN OF RIGHT SHOULDER: ICD-10-CM

## 2024-01-12 LAB

## 2024-01-12 PROCEDURE — 76882 US LMTD JT/FCL EVL NVASC XTR: CPT

## 2024-01-16 ENCOUNTER — OFFICE VISIT (OUTPATIENT)
Dept: FAMILY MEDICINE CLINIC | Facility: CLINIC | Age: 69
End: 2024-01-16
Payer: COMMERCIAL

## 2024-01-16 VITALS
TEMPERATURE: 97.9 F | DIASTOLIC BLOOD PRESSURE: 86 MMHG | HEIGHT: 74 IN | BODY MASS INDEX: 40.43 KG/M2 | HEART RATE: 133 BPM | OXYGEN SATURATION: 93 % | WEIGHT: 315 LBS | SYSTOLIC BLOOD PRESSURE: 146 MMHG

## 2024-01-16 DIAGNOSIS — E66.01 MORBID OBESITY WITH BMI OF 50.0-59.9, ADULT (HCC): ICD-10-CM

## 2024-01-16 DIAGNOSIS — M67.912 TENDINOPATHY OF LEFT SHOULDER: Primary | ICD-10-CM

## 2024-01-16 PROBLEM — M1A.30X0 CHRONIC GOUT DUE TO RENAL IMPAIRMENT: Status: ACTIVE | Noted: 2021-06-14

## 2024-01-16 PROCEDURE — 99213 OFFICE O/P EST LOW 20 MIN: CPT | Performed by: PHYSICIAN ASSISTANT

## 2024-01-16 NOTE — PROGRESS NOTES
=Assessment/Plan:     Diagnoses and all orders for this visit:    Tendinopathy of left shoulder  Comments:  Refer to orthopedic  Orders:  -     Ambulatory Referral to Orthopedic Surgery; Future    Morbid obesity with BMI of 50.0-59.9, adult ([facility]ly [facility])          Subjective:      Patient ID: Malachi Greenwood is a 68 y.o. male.    Patient presents in the office for short interval follow-up for left shoulder pain.  Patient had x-rays of his shoulder and his neck.  He does have some degenerative disc disease of his cervical spine.  Is having radicular symptoms in the last 2 digits of his hand.  Was evaluated by physical therapy.  States this made the matter worse.  He was here and saw another provider he had a show ultrasound of the left shoulder.  There seems to be no tear however there is some tendinitis.  Patient states he is about prison better he has been using meloxicam.  Does not want to go back to therapy due to the pain caused.  Is willing to go to orthopedic        The following portions of the patient's history were reviewed and updated as appropriate: He   Patient Active Problem List    Diagnosis Date Noted    Tendinopathy of left shoulder 12/06/2023    Atherosclerosis of aorta ([facility]) 09/14/2023    Colon polyps 12/03/2021    Skin lesion of right arm 12/03/2021    Internal hemorrhoids 12/01/2021    Gallstone 12/01/2021    Morbid obesity with BMI of 50.0-59.9, adult ([facility]) 09/14/2021    Chronic ITP (idiopathic thrombocytopenia) ([facility]) 07/07/2021    Hyperuricemia 06/17/2021    Cigarette nicotine dependence in remission 06/15/2021    Edema of both lower legs 06/14/2021    Chronic gout due to renal impairment 06/14/2021    BPH associated with nocturia 11/17/2020    GARNETT (dyspnea on exertion) 06/03/2020    Splenomegaly 08/27/2019    Screening for prostate cancer 01/22/2019    History of genital warts 06/06/2018    Skin lesion 05/24/2017    CKD (chronic kidney disease) stage 3, GFR 30-59 ml/min ([facility]) 02/20/2017    ARACELIS on  CPAP 07/22/2016    Nocturnal hypoxemia 04/01/2016    Hyperlipidemia with low HDL 11/19/2015    Arthritis 08/20/2015    Moderate persistent asthma 08/20/2015    Thrombocytopenia (HCC) 12/11/2014    Sleep disorder 08/24/2012    Vitamin D deficiency 08/24/2012    COPD, severe ([facility]) 07/12/2012     Current Outpatient Medications   Medication Sig Dispense Refill    albuterol (2.5 mg/3 mL) 0.083 % nebulizer solution Take 1 vial (2.5 mg total) by nebulization every 6 (six) hours as needed for wheezing 50 vial 3    albuterol (PROVENTIL HFA,VENTOLIN HFA) 90 mcg/act inhaler INHALE 2 PUFFS EVERY 4 HOURS AS NEEDED FOR SHORTNESS OF BREATH. 8.5 g 5    allopurinol (ZYLOPRIM) 100 mg tablet TAKE 1 TABLET BY MOUTH TWICE A  tablet 1    Cholecalciferol (VITAMIN D3) 1000 units CAPS Take by mouth      cyclobenzaprine (FLEXERIL) 5 mg tablet Take 1 tablet (5 mg total) by mouth 3 (three) times a day as needed for muscle spasms 60 tablet 0    guaiFENesin (MUCINEX) 600 mg 12 hr tablet Take 1,200 mg by mouth daily Once at hs      meloxicam (MOBIC) 15 mg tablet TAKE 1 TABLET (15 MG TOTAL) BY MOUTH DAILY. 90 tablet 1    Multiple Vitamins tablet Take 1 tablet by mouth daily      sildenafil (VIAGRA) 100 mg tablet Take 1 tablet (100 mg total) by mouth daily as needed for erectile dysfunction 6 tablet 5    tamsulosin (FLOMAX) 0.4 mg TAKE 1 CAPSULE BY MOUTH EVERY DAY WITH DINNER 90 capsule 3    Trelegy Ellipta 100-62.5-25 MCG/ACT inhaler INHALE 1 PUFF DAILY RINSE MOUTH AFTER USE. 60 each 5     No current facility-administered medications for this visit.     He is allergic to darvon [propoxyphene]..    Review of Systems   Constitutional:  Negative for activity change and appetite change.   Respiratory:  Positive for shortness of breath. Negative for cough.    Musculoskeletal:  Positive for arthralgias.        Left   shoulder   pain         Objective:        Physical Exam  Vitals and nursing note reviewed.   Constitutional:       Appearance: He  is obese. He is not ill-appearing.   HENT:      Head: Normocephalic and atraumatic.      Right Ear: External ear normal.      Left Ear: External ear normal.   Eyes:      Conjunctiva/sclera: Conjunctivae normal.   Cardiovascular:      Rate and Rhythm: Normal rate and regular rhythm.      Heart sounds: Normal heart sounds.   Pulmonary:      Effort: Pulmonary effort is normal.      Comments: Distant   breath  sounds  Musculoskeletal:      Comments: Left shoulder has tenderness in the posterior aspect.  He has good range of motion.  Range of motion causes pain and reproduces the paresthesias in the fourth and fifth fingers   Skin:     General: Skin is warm and dry.   Neurological:      Mental Status: He is alert.

## 2024-02-07 DIAGNOSIS — M48.02 NEURAL FORAMINAL STENOSIS OF CERVICAL SPINE: ICD-10-CM

## 2024-02-07 DIAGNOSIS — M19.90 ARTHRITIS: ICD-10-CM

## 2024-02-08 RX ORDER — CYCLOBENZAPRINE HCL 5 MG
5 TABLET ORAL 3 TIMES DAILY PRN
Qty: 60 TABLET | Refills: 0 | Status: SHIPPED | OUTPATIENT
Start: 2024-02-08

## 2024-02-09 DIAGNOSIS — Z00.6 ENCOUNTER FOR EXAMINATION FOR NORMAL COMPARISON OR CONTROL IN CLINICAL RESEARCH PROGRAM: ICD-10-CM

## 2024-02-15 ENCOUNTER — OFFICE VISIT (OUTPATIENT)
Dept: FAMILY MEDICINE CLINIC | Facility: CLINIC | Age: 69
End: 2024-02-15
Payer: COMMERCIAL

## 2024-02-15 VITALS
BODY MASS INDEX: 40.43 KG/M2 | SYSTOLIC BLOOD PRESSURE: 6 MMHG | WEIGHT: 315 LBS | DIASTOLIC BLOOD PRESSURE: 2 MMHG | HEART RATE: 95 BPM | OXYGEN SATURATION: 97 % | HEIGHT: 74 IN | TEMPERATURE: 97.6 F

## 2024-02-15 DIAGNOSIS — E78.5 HYPERLIPIDEMIA WITH LOW HDL: ICD-10-CM

## 2024-02-15 DIAGNOSIS — D69.3 CHRONIC ITP (IDIOPATHIC THROMBOCYTOPENIA) (HCC): ICD-10-CM

## 2024-02-15 DIAGNOSIS — J44.9 COPD, SEVERE (HCC): ICD-10-CM

## 2024-02-15 DIAGNOSIS — J45.40 MODERATE PERSISTENT ASTHMA, UNSPECIFIED WHETHER COMPLICATED: ICD-10-CM

## 2024-02-15 DIAGNOSIS — N40.1 BPH ASSOCIATED WITH NOCTURIA: ICD-10-CM

## 2024-02-15 DIAGNOSIS — E78.6 HYPERLIPIDEMIA WITH LOW HDL: ICD-10-CM

## 2024-02-15 DIAGNOSIS — R35.1 BPH ASSOCIATED WITH NOCTURIA: ICD-10-CM

## 2024-02-15 DIAGNOSIS — G47.34 NOCTURNAL HYPOXEMIA: ICD-10-CM

## 2024-02-15 DIAGNOSIS — I70.0 ATHEROSCLEROSIS OF AORTA (HCC): ICD-10-CM

## 2024-02-15 DIAGNOSIS — F10.21 ALCOHOL DEPENDENCE IN SUSTAINED FULL REMISSION (HCC): ICD-10-CM

## 2024-02-15 DIAGNOSIS — N18.32 STAGE 3B CHRONIC KIDNEY DISEASE (HCC): ICD-10-CM

## 2024-02-15 DIAGNOSIS — Z13.6 SCREENING FOR AAA (ABDOMINAL AORTIC ANEURYSM): ICD-10-CM

## 2024-02-15 DIAGNOSIS — G47.33 OSA ON CPAP: ICD-10-CM

## 2024-02-15 DIAGNOSIS — Z00.00 MEDICARE ANNUAL WELLNESS VISIT, SUBSEQUENT: Primary | ICD-10-CM

## 2024-02-15 DIAGNOSIS — N18.30 STAGE 3 CHRONIC KIDNEY DISEASE, UNSPECIFIED WHETHER STAGE 3A OR 3B CKD (HCC): ICD-10-CM

## 2024-02-15 DIAGNOSIS — M1A.3720 CHRONIC GOUT DUE TO RENAL IMPAIRMENT INVOLVING TOE OF LEFT FOOT WITHOUT TOPHUS: ICD-10-CM

## 2024-02-15 PROCEDURE — 99214 OFFICE O/P EST MOD 30 MIN: CPT | Performed by: PHYSICIAN ASSISTANT

## 2024-02-15 PROCEDURE — G0439 PPPS, SUBSEQ VISIT: HCPCS | Performed by: PHYSICIAN ASSISTANT

## 2024-02-15 NOTE — PROGRESS NOTES
Assessment and Plan:     Problem List Items Addressed This Visit          Respiratory    Moderate persistent asthma    Nocturnal hypoxemia    COPD, severe (HCC)    ARACELIS on CPAP       Cardiovascular and Mediastinum    Atherosclerosis of aorta (HCC)       Musculoskeletal and Integument    Chronic ITP (idiopathic thrombocytopenia) (HCC)    Chronic gout due to renal impairment       Genitourinary    Stage 3b chronic kidney disease (HCC)       Other    Hyperlipidemia with low HDL    BPH associated with nocturia    Alcohol dependence in sustained full remission (HCC)     Other Visit Diagnoses       Medicare annual wellness visit, subsequent    -  Primary    Screening for AAA (abdominal aortic aneurysm)        Relevant Orders    US abdominal aorta screening aaa            Depression Screening and Follow-up Plan: Patient was screened for depression during today's encounter. They screened negative with a PHQ-2 score of 2.      Preventive health issues were discussed with patient, and age appropriate screening tests were ordered as noted in patient's After Visit Summary.  Personalized health advice and appropriate referrals for health education or preventive services given if needed, as noted in patient's After Visit Summary.     History of Present Illness:     Patient presents for a Medicare Wellness Visit    Patient presents in the office for follow-up chronic condition.  Patient has asthma and COPD overlap.  COPD is extensive.  He does follow with pulmonary.  Currently on Trelegy and Proventil as needed.  Is have a nebulizer if the need arises.  So has obstructive sleep apnea on CPAP.  Gets short of breath with ambulation.  So has difficulty ambulation due to osteoarthritis of both his knees.  He does use a cane.  Patient has thrombocytopenia.  Splenomegaly.  HOLLAND.  He does follow with hematology.  Patient also has gout.  He is on allopurinol 100 mg per prevention.  No recent flareups.  Has chronic kidney disease.  He does  not have hypertension or pTis.  Currently stable.  BPH he is on Flomax 0.4 mg.  Lipids are diet controlled.  Patient due for routine labs.       Patient Care Team:  Saranya Eubanks PA-C as PCP - General (Family Medicine)  MD Saranya Gresham PA-C Deborah Stahlnecker, DO Daniel P Verges, MD (Urology)     Review of Systems:     Review of Systems   Constitutional:  Negative for activity change, appetite change, chills, fatigue and fever.   HENT:  Negative for ear pain and sore throat.    Eyes:  Negative for visual disturbance.   Respiratory:  Positive for shortness of breath. Negative for cough.    Cardiovascular:  Positive for leg swelling. Negative for chest pain and palpitations.   Gastrointestinal:  Negative for abdominal pain, blood in stool, constipation, diarrhea and nausea.   Genitourinary:  Negative for difficulty urinating.        Nocturia x 1   Musculoskeletal:  Positive for arthralgias, back pain and gait problem. Negative for myalgias.   Skin:  Negative for rash.   Neurological:  Negative for dizziness, syncope and headaches.   Psychiatric/Behavioral:  Negative for self-injury, sleep disturbance and suicidal ideas. The patient is not nervous/anxious.         Problem List:     Patient Active Problem List   Diagnosis    Arthritis    COPD, severe (HCC)    Stage 3b chronic kidney disease (HCC)    Hyperlipidemia with low HDL    Moderate persistent asthma    Nocturnal hypoxemia    ARACELIS on CPAP    Skin lesion    Sleep disorder    Thrombocytopenia (HCC)    Vitamin D deficiency    History of genital warts    Screening for prostate cancer    Splenomegaly    GARNETT (dyspnea on exertion)    BPH associated with nocturia    Edema of both lower legs    Chronic gout due to renal impairment    Cigarette nicotine dependence in remission    Hyperuricemia    Chronic ITP (idiopathic thrombocytopenia) (HCC)    Morbid obesity with BMI of 50.0-59.9, adult (HCC)    Internal hemorrhoids    Gallstone    Colon polyps     Skin lesion of right arm    Atherosclerosis of aorta (HCC)    Tendinopathy of left shoulder    Alcohol dependence in sustained full remission (HCC)      Past Medical and Surgical History:     Past Medical History:   Diagnosis Date    Arthritis     Asthma     Chest pain     atypical    Chronic kidney disease     Colon polyp     COPD (chronic obstructive pulmonary disease) (HCC)     CPAP (continuous positive airway pressure) dependence     Decreased glomerular filtration rate     Hamstring strain     Herniated lumbar intervertebral disc     History of alcohol use     uncomplicated    History of back pain     lower    History of colon polyps     sigmoid    History of genital warts     History of lipoma     History of muscle spasm     lumbar paraspinous muscle    History of umbilical hernia     Lateral pain of left hip     Low HDL (under 40)     Lumbar radiculopathy     Obesity     Respiratory distress     acute    Shortness of breath     Sleep apnea     CPAP PT WILL BRING     Past Surgical History:   Procedure Laterality Date    ADENOIDECTOMY      APPENDECTOMY      COLONOSCOPY      HAND SURGERY Left     CYST REMOVAL    HIP SURGERY Bilateral     IR BIOPSY BONE MARROW  4/7/2023    LIPECTOMY      thigh    LIPOMA RESECTION Left     HIP    [facility] RPR UMBILICAL HRNA 5 YRS/> REDUCIBLE N/A 2/23/2017    Procedure: REPAIR HERNIA UMBILICAL;  Surgeon: Sushant Buckner MD;  Location: [facility];  Service: General    TONSILLECTOMY      WRIST GANGLION EXCISION        Family History:     Family History   Problem Relation Age of Onset    Heart disease Mother         cardiac disorder    Hypertension Mother     COPD Father     Heart disease Family         cardiac disorder    Cancer Family         lung    Emphysema Family         pulmonary unspecified emphysema    Diabetes Family     Glaucoma Family     Cancer Family         lung      Social History:     Social History     Socioeconomic History    Marital status: /Civil Union      Spouse name: None    Number of children: None    Years of education: None    Highest education level: None   Occupational History    Occupation: Fulltime employment   Tobacco Use    Smoking status: Former     Current packs/day: 0.00     Average packs/day: 2.0 packs/day for 42.7 years (85.5 ttl pk-yrs)     Types: Cigarettes     Start date:      Quit date: 10/2014     Years since quittin.3     Passive exposure: Past    Smokeless tobacco: Never    Tobacco comments:     Quit for a 16 year period in between start and final quit date.   Vaping Use    Vaping status: Never Used   Substance and Sexual Activity    Alcohol use: No     Comment: recovering alcoholic    Drug use: No    Sexual activity: None   Other Topics Concern    None   Social History Narrative    Always uses seatbelt     Social Determinants of Health     Financial Resource Strain: Low Risk  (2/15/2024)    Overall Financial Resource Strain (CARDIA)     Difficulty of Paying Living Expenses: Not very hard   Food Insecurity: Not on file   Transportation Needs: No Transportation Needs (2/15/2024)    PRAPARE - Transportation     Lack of Transportation (Medical): No     Lack of Transportation (Non-Medical): No   Physical Activity: Not on file   Stress: Not on file   Social Connections: Not on file   Intimate Partner Violence: Not on file   Housing Stability: Not on file      Medications and Allergies:     Current Outpatient Medications   Medication Sig Dispense Refill    albuterol (2.5 mg/3 mL) 0.083 % nebulizer solution Take 1 vial (2.5 mg total) by nebulization every 6 (six) hours as needed for wheezing 50 vial 3    albuterol (PROVENTIL HFA,VENTOLIN HFA) 90 mcg/act inhaler INHALE 2 PUFFS EVERY 4 HOURS AS NEEDED FOR SHORTNESS OF BREATH. 8.5 g 5    allopurinol (ZYLOPRIM) 100 mg tablet TAKE 1 TABLET BY MOUTH TWICE A  tablet 1    Cholecalciferol (VITAMIN D3) 1000 units CAPS Take by mouth      cyclobenzaprine (FLEXERIL) 5 mg tablet TAKE 1 TABLET BY MOUTH  THREE TIMES A DAY AS NEEDED FOR MUSCLE SPASMS 60 tablet 0    guaiFENesin (MUCINEX) 600 mg 12 hr tablet Take 1,200 mg by mouth daily Once at hs      meloxicam (MOBIC) 15 mg tablet TAKE 1 TABLET (15 MG TOTAL) BY MOUTH DAILY. 90 tablet 1    Multiple Vitamins tablet Take 1 tablet by mouth daily      sildenafil (VIAGRA) 100 mg tablet Take 1 tablet (100 mg total) by mouth daily as needed for erectile dysfunction 6 tablet 5    tamsulosin (FLOMAX) 0.4 mg TAKE 1 CAPSULE BY MOUTH EVERY DAY WITH DINNER 90 capsule 3    Trelegy Ellipta 100-62.5-25 MCG/ACT inhaler INHALE 1 PUFF DAILY RINSE MOUTH AFTER USE. 60 each 5     No current facility-administered medications for this visit.     Allergies   Allergen Reactions    Darvon [Propoxyphene] Other (See Comments)     hallucinations      Immunizations:     Immunization History   Administered Date(s) Administered    COVID-19 MODERNA VACC 0.5 ML IM 03/31/2021, 05/01/2021    INFLUENZA 09/20/2014, 11/19/2015, 10/07/2016, 03/05/2018, 09/07/2018, 09/30/2019, 10/18/2020, 09/17/2021, 10/25/2022    Influenza Quadrivalent Preservative Free 3 years and older IM 09/20/2014, 11/19/2015, 09/20/2017    Influenza, injectable, quadrivalent, preservative free 0.5 mL 09/07/2018, 10/14/2019    Influenza, seasonal, injectable 09/20/2013    Influenza, seasonal, injectable, preservative free 10/07/2016    Pneumococcal 20-valent Conjugate (Historical) 11/30/2022    Pneumococcal Conjugate 13-Valent 11/18/2016    Pneumococcal Polysaccharide PPV23 12/11/2014      Health Maintenance:         Topic Date Due    Colorectal Cancer Screening  03/22/2023    Lung Cancer Screening  02/08/2024    Hepatitis C Screening  Completed         Topic Date Due    Hepatitis A Vaccine (1 of 2 - Risk 2-dose series) Never done    Hepatitis B Vaccine (1 of 3 - Risk 3-dose series) Never done    COVID-19 Vaccine (4 - 2023-24 season) 09/01/2023    Pneumococcal Vaccine: 65+ Years (3 of 3 - PPSV23 or PCV20) 11/30/2023      Medicare  Screening Tests and Risk Assessments:     Malachi is here for his Subsequent Wellness visit.     Health Risk Assessment:   Patient rates overall health as poor. Patient feels that their physical health rating is slightly worse. Patient is satisfied with their life. Eyesight was rated as same. Hearing was rated as same. Patient feels that their emotional and mental health rating is same. Patients states they are never, rarely angry. Patient states they are often unusually tired/fatigued. Pain experienced in the last 7 days has been some. Patient's pain rating has been 7/10. Patient states that he has experienced weight loss or gain in last 6 months. Oa knees    Depression Screening:   PHQ-2 Score: 2      Fall Risk Screening:   In the past year, patient has experienced: history of falling in past year    Injured during fall?: No    Feels unsteady when standing or walking?: Yes    Worried about falling?: Yes      Home Safety:  Patient has trouble with stairs inside or outside of their home. Patient has working smoke alarms and has working carbon monoxide detector. Home safety hazards include: household clutter.     Nutrition:   Current diet is Regular and Frequent junk food.     Medications:   Patient is currently taking over-the-counter supplements. OTC medications include: see medication list. Patient is able to manage medications.     Activities of Daily Living (ADLs)/Instrumental Activities of Daily Living (IADLs):   Walk and transfer into and out of bed and chair?: Yes  Dress and groom yourself?: Yes    Bathe or shower yourself?: Yes    Feed yourself? Yes  Do your laundry/housekeeping?: Yes  Manage your money, pay your bills and track your expenses?: Yes  Make your own meals?: Yes    Do your own shopping?: Yes    Durable Medical Equipment Suppliers  unfortunately using [employer]    Previous Hospitalizations:   Any hospitalizations or ED visits within the last 12 months?: No      Advance Care Planning:  "  Living will: No    Durable POA for healthcare: No    Advanced directive: No      Cognitive Screening:   Provider or family/friend/caregiver concerned regarding cognition?: No    PREVENTIVE SCREENINGS      Cardiovascular Screening:    General: History Lipid Disorder    Due for: Lipid Panel      Diabetes Screening:     General: Screening Current    Due for: Blood Glucose      Colorectal Cancer Screening:     General: Screening Current      Prostate Cancer Screening:      Due for: PSA      Abdominal Aortic Aneurysm (AAA) Screening:    Risk factors include: age between 65-74 yo and tobacco use      Due for: Screening AAA Ultrasound      Lung Cancer Screening:       Due for: Low Dose CT (LDCT)      Hepatitis C Screening:    General: Screening Current    Screening, Brief Intervention, and Referral to Treatment (SBIRT)    Screening  Typical number of drinks in a day: 0  Typical number of drinks in a week: 0  Interpretation: Low risk drinking behavior.    AUDIT-C Screenin) How often did you have a drink containing alcohol in the past year? never  2) How many drinks did you have on a typical day when you were drinking in the past year? 0  3) How often did you have 6 or more drinks on one occasion in the past year? never    AUDIT-C Score: 0  Interpretation: Score 0-3 (male): Negative screen for alcohol misuse    Single Item Drug Screening:  How often have you used an illegal drug (including marijuana) or a prescription medication for non-medical reasons in the past year? never    Single Item Drug Screen Score: 0  Interpretation: Negative screen for possible drug use disorder    Brief Intervention  Alcohol & drug use screenings were reviewed. No concerns regarding substance use disorder identified.     No results found.     Physical Exam:     BP (!) 6/2 (BP Location: Right arm, Patient Position: Sitting, Cuff Size: Extra-Large)   Pulse 95   Temp 97.6 °F (36.4 °C) (Temporal)   Ht 6' 2\" (1.88 m)   Wt (!) 189 kg (417 " lb)   SpO2 97%   BMI 53.54 kg/m²     Physical Exam  Vitals and nursing note reviewed.   Constitutional:       Appearance: He is obese. He is not ill-appearing.   HENT:      Head: Normocephalic and atraumatic.      Right Ear: Tympanic membrane, ear canal and external ear normal.      Left Ear: Tympanic membrane, ear canal and external ear normal.   Eyes:      Extraocular Movements: Extraocular movements intact.      Pupils: Pupils are equal, round, and reactive to light.   Neck:      Thyroid: No thyromegaly.      Vascular: No carotid bruit.   Cardiovascular:      Rate and Rhythm: Normal rate and regular rhythm.      Pulses: Normal pulses.      Heart sounds: Normal heart sounds. No murmur heard.  Pulmonary:      Effort: Pulmonary effort is normal.      Breath sounds: Normal breath sounds.   Abdominal:      General: Bowel sounds are normal.      Palpations: Abdomen is soft. There is no mass.      Tenderness: There is no abdominal tenderness.   Musculoskeletal:      Right lower leg: No edema.      Left lower leg: No edema.   Skin:     General: Skin is warm and dry.      Coloration: Skin is not pale.      Findings: No erythema.   Neurological:      General: No focal deficit present.      Mental Status: He is alert and oriented to person, place, and time.   Psychiatric:         Mood and Affect: Mood normal.         Behavior: Behavior normal.         Thought Content: Thought content normal.         Judgment: Judgment normal.          Saranya Eubanks PA-C

## 2024-02-15 NOTE — PATIENT INSTRUCTIONS
Medicare Preventive Visit Patient Instructions  Thank you for completing your Welcome to Medicare Visit or Medicare Annual Wellness Visit today. Your next wellness visit will be due in one year (2/15/2025).  The screening/preventive services that you may require over the next 5-10 years are detailed below. Some tests may not apply to you based off risk factors and/or age. Screening tests ordered at today's visit but not completed yet may show as past due. Also, please note that scanned in results may not display below.  Preventive Screenings:  Service Recommendations Previous Testing/Comments   Colorectal Cancer Screening  Colonoscopy    Fecal Occult Blood Test (FOBT)/Fecal Immunochemical Test (FIT)  Fecal DNA/Cologuard Test  Flexible Sigmoidoscopy Age: 45-75 years old   Colonoscopy: every 10 years (May be performed more frequently if at higher risk)  OR  FOBT/FIT: every 1 year  OR  Cologuard: every 3 years  OR  Sigmoidoscopy: every 5 years  Screening may be recommended earlier than age 45 if at higher risk for colorectal cancer. Also, an individualized decision between you and your healthcare provider will decide whether screening between the ages of 76-85 would be appropriate. Colonoscopy: 03/22/2022  FOBT/FIT: Not on file  Cologuard: Not on file  Sigmoidoscopy: Not on file          Prostate Cancer Screening Individualized decision between patient and health care provider in men between ages of 55-69   Medicare will cover every 12 months beginning on the day after your 50th birthday PSA: 1.8 ng/mL           Hepatitis C Screening Once for adults born between 1945 and 1965  More frequently in patients at high risk for Hepatitis C Hep C Antibody: 09/05/2019    Screening Current   Diabetes Screening 1-2 times per year if you're at risk for diabetes or have pre-diabetes Fasting glucose: 108 mg/dL (8/11/2023)  A1C: No results in last 5 years (No results in last 5 years)  Screening Current   Cholesterol Screening Once  every 5 years if you don't have a lipid disorder. May order more often based on risk factors. Lipid panel: 08/11/2023  Screening Not Indicated  History Lipid Disorder      Other Preventive Screenings Covered by Medicare:  Abdominal Aortic Aneurysm (AAA) Screening: covered once if your at risk. You're considered to be at risk if you have a family history of AAA or a male between the age of 65-75 who smoking at least 100 cigarettes in your lifetime.  Lung Cancer Screening: covers low dose CT scan once per year if you meet all of the following conditions: (1) Age 55-77; (2) No signs or symptoms of lung cancer; (3) Current smoker or have quit smoking within the last 15 years; (4) You have a tobacco smoking history of at least 20 pack years (packs per day x number of years you smoked); (5) You get a written order from a healthcare provider.  Glaucoma Screening: covered annually if you're considered high risk: (1) You have diabetes OR (2) Family history of glaucoma OR (3)  aged 50 and older OR (4)  American aged 65 and older  Osteoporosis Screening: covered every 2 years if you meet one of the following conditions: (1) Have a vertebral abnormality; (2) On glucocorticoid therapy for more than 3 months; (3) Have primary hyperparathyroidism; (4) On osteoporosis medications and need to assess response to drug therapy.  HIV Screening: covered annually if you're between the age of 15-65. Also covered annually if you are younger than 15 and older than 65 with risk factors for HIV infection. For pregnant patients, it is covered up to 3 times per pregnancy.    Immunizations:  Immunization Recommendations   Influenza Vaccine Annual influenza vaccination during flu season is recommended for all persons aged >= 6 months who do not have contraindications   Pneumococcal Vaccine   * Pneumococcal conjugate vaccine = PCV13 (Prevnar 13), PCV15 (Vaxneuvance), PCV20 (Prevnar 20)  * Pneumococcal polysaccharide vaccine  = PPSV23 (Pneumovax) Adults 19-63 yo with certain risk factors or if 65+ yo  If never received any pneumonia vaccine: recommend Prevnar 20 (PCV20)  Give PCV20 if previously received 1 dose of PCV13 or PPSV23   Hepatitis B Vaccine 3 dose series if at intermediate or high risk (ex: diabetes, end stage renal disease, liver disease)   Respiratory syncytial virus (RSV) Vaccine - COVERED BY MEDICARE PART D  * RSVPreF3 (Arexvy) CDC recommends that adults 60 years of age and older may receive a single dose of RSV vaccine using shared clinical decision-making (SCDM)   Tetanus (Td) Vaccine - COST NOT COVERED BY MEDICARE PART B Following completion of primary series, a booster dose should be given every 10 years to maintain immunity against tetanus. Td may also be given as tetanus wound prophylaxis.   Tdap Vaccine - COST NOT COVERED BY MEDICARE PART B Recommended at least once for all adults. For pregnant patients, recommended with each pregnancy.   Shingles Vaccine (Shingrix) - COST NOT COVERED BY MEDICARE PART B  2 shot series recommended in those 19 years and older who have or will have weakened immune systems or those 50 years and older     Health Maintenance Due:      Topic Date Due   • Colorectal Cancer Screening  03/22/2023   • Lung Cancer Screening  02/08/2024   • Hepatitis C Screening  Completed     Immunizations Due:      Topic Date Due   • Hepatitis A Vaccine (1 of 2 - Risk 2-dose series) Never done   • Hepatitis B Vaccine (1 of 3 - Risk 3-dose series) Never done   • COVID-19 Vaccine (4 - 2023-24 season) 09/01/2023   • Pneumococcal Vaccine: 65+ Years (3 of 3 - PPSV23 or PCV20) 11/30/2023     Advance Directives   What are advance directives?  Advance directives are legal documents that state your wishes and plans for medical care. These plans are made ahead of time in case you lose your ability to make decisions for yourself. Advance directives can apply to any medical decision, such as the treatments you want, and  if you want to donate organs.   What are the types of advance directives?  There are many types of advance directives, and each state has rules about how to use them. You may choose a combination of any of the following:  Living will:  This is a written record of the treatment you want. You can also choose which treatments you do not want, which to limit, and which to stop at a certain time. This includes surgery, medicine, IV fluid, and tube feedings.   Durable power of  for healthcare (DPAHC):  This is a written record that states who you want to make healthcare choices for you when you are unable to make them for yourself. This person, called a proxy, is usually a family member or a friend. You may choose more than 1 proxy.  Do not resuscitate (DNR) order:  A DNR order is used in case your heart stops beating or you stop breathing. It is a request not to have certain forms of treatment, such as CPR. A DNR order may be included in other types of advance directives.  Medical directive:  This covers the care that you want if you are in a coma, near death, or unable to make decisions for yourself. You can list the treatments you want for each condition. Treatment may include pain medicine, surgery, blood transfusions, dialysis, IV or tube feedings, and a ventilator (breathing machine).  Values history:  This document has questions about your views, beliefs, and how you feel and think about life. This information can help others choose the care that you would choose.  Why are advance directives important?  An advance directive helps you control your care. Although spoken wishes may be used, it is better to have your wishes written down. Spoken wishes can be misunderstood, or not followed. Treatments may be given even if you do not want them. An advance directive may make it easier for your family to make difficult choices about your care.   Fall Prevention    Fall prevention  includes ways to make your home  and other areas safer. It also includes ways you can move more carefully to prevent a fall. Health conditions that cause changes in your blood pressure, vision, or muscle strength and coordination may increase your risk for falls. Medicines may also increase your risk for falls if they make you dizzy, weak, or sleepy.   Fall prevention tips:   Stand or sit up slowly.    Use assistive devices as directed.    Wear shoes that fit well and have soles that .    Wear a personal alarm.    Stay active.    Manage your medical conditions.    Home Safety Tips:  Add items to prevent falls in the bathroom.    Keep paths clear.    Install bright lights in your home.    Keep items you use often on shelves within reach.    Paint or place reflective tape on the edges of your stairs.    Weight Management   Why it is important to manage your weight:  Being overweight increases your risk of health conditions such as heart disease, high blood pressure, type 2 diabetes, and certain types of cancer. It can also increase your risk for osteoarthritis, sleep apnea, and other respiratory problems. Aim for a slow, steady weight loss. Even a small amount of weight loss can lower your risk of health problems.  How to lose weight safely:  A safe and healthy way to lose weight is to eat fewer calories and get regular exercise. You can lose up about 1 pound a week by decreasing the number of calories you eat by 500 calories each day.   Healthy meal plan for weight management:  A healthy meal plan includes a variety of foods, contains fewer calories, and helps you stay healthy. A healthy meal plan includes the following:  Eat whole-grain foods more often.  A healthy meal plan should contain fiber. Fiber is the part of grains, fruits, and vegetables that is not broken down by your body. Whole-grain foods are healthy and provide extra fiber in your diet. Some examples of whole-grain foods are whole-wheat breads and pastas, oatmeal, brown rice, and  bulgur.  Eat a variety of vegetables every day.  Include dark, leafy greens such as spinach, kale, amelia greens, and mustard greens. Eat yellow and orange vegetables such as carrots, sweet potatoes, and winter squash.   Eat a variety of fruits every day.  Choose fresh or canned fruit (canned in its own juice or light syrup) instead of juice. Fruit juice has very little or no fiber.  Eat low-fat dairy foods.  Drink fat-free (skim) milk or 1% milk. Eat fat-free yogurt and low-fat cottage cheese. Try low-fat cheeses such as mozzarella and other reduced-fat cheeses.  Choose meat and other protein foods that are low in fat.  Choose beans or other legumes such as split peas or lentils. Choose fish, skinless poultry (chicken or turkey), or lean cuts of red meat (beef or pork). Before you cook meat or poultry, cut off any visible fat.   Use less fat and oil.  Try baking foods instead of frying them. Add less fat, such as margarine, sour cream, regular salad dressing and mayonnaise to foods. Eat fewer high-fat foods. Some examples of high-fat foods include french fries, doughnuts, ice cream, and cakes.  Eat fewer sweets.  Limit foods and drinks that are high in sugar. This includes candy, cookies, regular soda, and sweetened drinks.  Exercise:  Exercise at least 30 minutes per day on most days of the week. Some examples of exercise include walking, biking, dancing, and swimming. You can also fit in more physical activity by taking the stairs instead of the elevator or parking farther away from stores. Ask your healthcare provider about the best exercise plan for you.      © Copyright [employer] 2018 Information is for End User's use only and may not be sold, redistributed or otherwise used for commercial purposes. All illustrations and images included in CareNotes® are the copyrighted property of [employer].A[employer]M., Inc. or [employer]

## 2024-02-19 ENCOUNTER — OFFICE VISIT (OUTPATIENT)
Dept: OBGYN CLINIC | Facility: MEDICAL CENTER | Age: 69
End: 2024-02-19
Payer: COMMERCIAL

## 2024-02-19 ENCOUNTER — APPOINTMENT (OUTPATIENT)
Dept: LAB | Facility: MEDICAL CENTER | Age: 69
End: 2024-02-19
Payer: COMMERCIAL

## 2024-02-19 VITALS
DIASTOLIC BLOOD PRESSURE: 89 MMHG | BODY MASS INDEX: 40.43 KG/M2 | SYSTOLIC BLOOD PRESSURE: 156 MMHG | HEIGHT: 74 IN | WEIGHT: 315 LBS | HEART RATE: 88 BPM

## 2024-02-19 DIAGNOSIS — K74.69 OTHER CIRRHOSIS OF LIVER (HCC): ICD-10-CM

## 2024-02-19 DIAGNOSIS — Z00.6 ENCOUNTER FOR EXAMINATION FOR NORMAL COMPARISON OR CONTROL IN CLINICAL RESEARCH PROGRAM: ICD-10-CM

## 2024-02-19 DIAGNOSIS — D69.6 THROMBOCYTOPENIA (HCC): ICD-10-CM

## 2024-02-19 DIAGNOSIS — R16.1 SPLENOMEGALY: ICD-10-CM

## 2024-02-19 DIAGNOSIS — M67.912 TENDINOPATHY OF LEFT SHOULDER: ICD-10-CM

## 2024-02-19 DIAGNOSIS — M50.122 CERVICAL DISC DISORDER AT C5-C6 LEVEL WITH RADICULOPATHY: Primary | ICD-10-CM

## 2024-02-19 DIAGNOSIS — R23.3 EASY BRUISABILITY: ICD-10-CM

## 2024-02-19 LAB
ALBUMIN SERPL BCP-MCNC: 4 G/DL (ref 3.5–5)
ALP SERPL-CCNC: 95 U/L (ref 34–104)
ALT SERPL W P-5'-P-CCNC: 29 U/L (ref 7–52)
ANION GAP SERPL CALCULATED.3IONS-SCNC: 8 MMOL/L
APTT PPP: 32 SECONDS (ref 23–37)
AST SERPL W P-5'-P-CCNC: 33 U/L (ref 13–39)
BASOPHILS # BLD AUTO: 0.04 THOUSANDS/ΜL (ref 0–0.1)
BASOPHILS NFR BLD AUTO: 1 % (ref 0–1)
BILIRUB SERPL-MCNC: 1.14 MG/DL (ref 0.2–1)
BUN SERPL-MCNC: 21 MG/DL (ref 5–25)
CALCIUM SERPL-MCNC: 9.3 MG/DL (ref 8.4–10.2)
CHLORIDE SERPL-SCNC: 107 MMOL/L (ref 96–108)
CHOLEST SERPL-MCNC: 129 MG/DL
CO2 SERPL-SCNC: 27 MMOL/L (ref 21–32)
CREAT SERPL-MCNC: 1.38 MG/DL (ref 0.6–1.3)
EOSINOPHIL # BLD AUTO: 0.22 THOUSAND/ΜL (ref 0–0.61)
EOSINOPHIL NFR BLD AUTO: 4 % (ref 0–6)
ERYTHROCYTE [DISTWIDTH] IN BLOOD BY AUTOMATED COUNT: 14.8 % (ref 11.6–15.1)
FIBRINOGEN PPP-MCNC: 247 MG/DL (ref 207–520)
GFR SERPL CREATININE-BSD FRML MDRD: 52 ML/MIN/1.73SQ M
GLUCOSE P FAST SERPL-MCNC: 102 MG/DL (ref 65–99)
HCT VFR BLD AUTO: 43.6 % (ref 36.5–49.3)
HDLC SERPL-MCNC: 33 MG/DL
HGB BLD-MCNC: 14.6 G/DL (ref 12–17)
IMM GRANULOCYTES # BLD AUTO: 0.01 THOUSAND/UL (ref 0–0.2)
IMM GRANULOCYTES NFR BLD AUTO: 0 % (ref 0–2)
INR PPP: 1.16 (ref 0.84–1.19)
LDLC SERPL CALC-MCNC: 70 MG/DL (ref 0–100)
LYMPHOCYTES # BLD AUTO: 0.98 THOUSANDS/ΜL (ref 0.6–4.47)
LYMPHOCYTES NFR BLD AUTO: 18 % (ref 14–44)
MCH RBC QN AUTO: 33.5 PG (ref 26.8–34.3)
MCHC RBC AUTO-ENTMCNC: 33.5 G/DL (ref 31.4–37.4)
MCV RBC AUTO: 100 FL (ref 82–98)
MONOCYTES # BLD AUTO: 0.7 THOUSAND/ΜL (ref 0.17–1.22)
MONOCYTES NFR BLD AUTO: 13 % (ref 4–12)
NEUTROPHILS # BLD AUTO: 3.55 THOUSANDS/ΜL (ref 1.85–7.62)
NEUTS SEG NFR BLD AUTO: 64 % (ref 43–75)
NONHDLC SERPL-MCNC: 96 MG/DL
NRBC BLD AUTO-RTO: 0 /100 WBCS
PLATELET # BLD AUTO: 83 THOUSANDS/UL (ref 149–390)
PMV BLD AUTO: 11.3 FL (ref 8.9–12.7)
POTASSIUM SERPL-SCNC: 4.5 MMOL/L (ref 3.5–5.3)
PROT SERPL-MCNC: 6.2 G/DL (ref 6.4–8.4)
PROTHROMBIN TIME: 14.7 SECONDS (ref 11.6–14.5)
PSA SERPL-MCNC: 2.13 NG/ML (ref 0–4)
RBC # BLD AUTO: 4.36 MILLION/UL (ref 3.88–5.62)
SODIUM SERPL-SCNC: 142 MMOL/L (ref 135–147)
TRIGL SERPL-MCNC: 130 MG/DL
WBC # BLD AUTO: 5.5 THOUSAND/UL (ref 4.31–10.16)

## 2024-02-19 PROCEDURE — 85384 FIBRINOGEN ACTIVITY: CPT

## 2024-02-19 PROCEDURE — 85730 THROMBOPLASTIN TIME PARTIAL: CPT

## 2024-02-19 PROCEDURE — 85025 COMPLETE CBC W/AUTO DIFF WBC: CPT

## 2024-02-19 PROCEDURE — 85610 PROTHROMBIN TIME: CPT

## 2024-02-19 PROCEDURE — 36415 COLL VENOUS BLD VENIPUNCTURE: CPT

## 2024-02-19 PROCEDURE — 99213 OFFICE O/P EST LOW 20 MIN: CPT | Performed by: ORTHOPAEDIC SURGERY

## 2024-02-19 NOTE — PROGRESS NOTES
[facility] [place]  [address]  [facility] [address]       Malachi Greenwood  [id number]  1955    ORTHOPAEDIC SURGERY OUTPATIENT NOTE  2/19/2024      HISTORY:  68 y.o. male presents for follow-up evaluation left shoulder pain.  Patient has had been having left shoulder pain since September 2023 with out injury or trauma. Today the pain is localized to the posterior shoulder scapular/thoracic region, it radiates into his LUE and neck. Pain is described as sharp and severe. Patient has started physical therapy for his neck and shoulder but discontinued due to increases in pain.       Past Medical History:   Diagnosis Date    Arthritis     Asthma     Chest pain     atypical    Chronic kidney disease     Colon polyp     COPD (chronic obstructive pulmonary disease) ([facility])     CPAP (continuous positive airway pressure) dependence     Decreased glomerular filtration rate     Hamstring strain     Herniated lumbar intervertebral disc     History of alcohol use     uncomplicated    History of back pain     lower    History of colon polyps     sigmoid    History of genital warts     History of lipoma     History of muscle spasm     lumbar paraspinous muscle    History of umbilical hernia     Lateral pain of left hip     Low HDL (under 40)     Lumbar radiculopathy     Obesity     Respiratory distress     acute    Shortness of breath     Sleep apnea     CPAP PT WILL BRING       Past Surgical History:   Procedure Laterality Date    ADENOIDECTOMY      APPENDECTOMY      COLONOSCOPY      HAND SURGERY Left     CYST REMOVAL    HIP SURGERY Bilateral     IR BIOPSY BONE MARROW  4/7/2023    LIPECTOMY      thigh    LIPOMA RESECTION Left     HIP    FL RPR UMBILICAL HRNA 5 YRS/> REDUCIBLE N/A 2/23/2017    Procedure: REPAIR HERNIA UMBILICAL;  Surgeon: Sushant Buckner MD;  Location: [facility];  Service: General    TONSILLECTOMY      WRIST GANGLION EXCISION         Social  History     Socioeconomic History    Marital status: /Civil Union     Spouse name: Not on file    Number of children: Not on file    Years of education: Not on file    Highest education level: Not on file   Occupational History    Occupation: Fulltime employment   Tobacco Use    Smoking status: Former     Current packs/day: 0.00     Average packs/day: 2.0 packs/day for 42.7 years (85.5 ttl pk-yrs)     Types: Cigarettes     Start date:      Quit date: 10/2014     Years since quittin.3     Passive exposure: Past    Smokeless tobacco: Never    Tobacco comments:     Quit for a 16 year period in between start and final quit date.   Vaping Use    Vaping status: Never Used   Substance and Sexual Activity    Alcohol use: No     Comment: recovering alcoholic    Drug use: No    Sexual activity: Not on file   Other Topics Concern    Not on file   Social History Narrative    Always uses seatbelt     Social Determinants of Health     Financial Resource Strain: Low Risk  (2/15/2024)    Overall Financial Resource Strain (CARDIA)     Difficulty of Paying Living Expenses: Not very hard   Food Insecurity: Not on file   Transportation Needs: No Transportation Needs (2/15/2024)    PRAPARE - Transportation     Lack of Transportation (Medical): No     Lack of Transportation (Non-Medical): No   Physical Activity: Not on file   Stress: Not on file   Social Connections: Not on file   Intimate Partner Violence: Not on file   Housing Stability: Not on file       Family History   Problem Relation Age of Onset    Heart disease Mother         cardiac disorder    Hypertension Mother     COPD Father     Heart disease Family         cardiac disorder    Cancer Family         lung    Emphysema Family         pulmonary unspecified emphysema    Diabetes Family     Glaucoma Family     Cancer Family         lung        Patient's Medications   New Prescriptions    No medications on file   Previous Medications    ALBUTEROL (2.5 MG/3 ML)  "0.083 % NEBULIZER SOLUTION    Take 1 vial (2.5 mg total) by nebulization every 6 (six) hours as needed for wheezing    ALBUTEROL (PROVENTIL HFA,VENTOLIN HFA) 90 MCG/ACT INHALER    INHALE 2 PUFFS EVERY 4 HOURS AS NEEDED FOR SHORTNESS OF BREATH.    ALLOPURINOL (ZYLOPRIM) 100 MG TABLET    TAKE 1 TABLET BY MOUTH TWICE A DAY    CHOLECALCIFEROL (VITAMIN D3) 1000 UNITS CAPS    Take by mouth    CYCLOBENZAPRINE (FLEXERIL) 5 MG TABLET    TAKE 1 TABLET BY MOUTH THREE TIMES A DAY AS NEEDED FOR MUSCLE SPASMS    GUAIFENESIN (MUCINEX) 600 MG 12 HR TABLET    Take 1,200 mg by mouth daily Once at hs    MELOXICAM (MOBIC) 15 MG TABLET    TAKE 1 TABLET (15 MG TOTAL) BY MOUTH DAILY.    MULTIPLE VITAMINS TABLET    Take 1 tablet by mouth daily    SILDENAFIL (VIAGRA) 100 MG TABLET    Take 1 tablet (100 mg total) by mouth daily as needed for erectile dysfunction    TAMSULOSIN (FLOMAX) 0.4 MG    TAKE 1 CAPSULE BY MOUTH EVERY DAY WITH DINNER    TRELEGY ELLIPTA 100-62.5-25 MCG/ACT INHALER    INHALE 1 PUFF DAILY RINSE MOUTH AFTER USE.   Modified Medications    No medications on file   Discontinued Medications    No medications on file       Allergies   Allergen Reactions    Darvon [Propoxyphene] Other (See Comments)     hallucinations        /89   Pulse 88   Ht 6' 2\" (1.88 m)   Wt (!) 189 kg (417 lb)   BMI 53.54 kg/m²      REVIEW OF SYSTEMS:  Constitutional: Negative.    HEENT: Negative.    Respiratory: Negative.    Skin: Negative.    Neurological: Negative.    Psychiatric/Behavioral: Negative.  Musculoskeletal: Negative except for that mentioned in the HPI.    PHYSICAL EXAM:     LEFT SHOULDER:     NVI axillary/medial/radial/ulnar and sensory intact     Forward flexion:   180 degrees   Abduction:  180 degrees   External rotation at 90 degrees abduction:   90 degrees   Internal rotation at 90 degrees abduction:  90 degrees   External rotation at 0 degrees:   70 degrees   Internal rotation: T7     STRENGTH:  Forward flexion:  5/5 "   Abduction:  5/5   External rotation:  5/5   Internal rotation:  5/5      Skin:  No ecchymosis/abrasion/erythema/warmth     Cervical spine:   Limited cervical range of motion due to pain   Radiating pain with extension  Spurling's: Positive    IMAGING:  Previous cervical spine x-ray 11/13/2023  reviewed demonstrates spondylitic changes most significant at C5-C6 and C6-C7.      ASSESSMENT AND PLAN: 68 y.o. male with cervical radiculopathy and left shoulder tendonitis. However on clinical examination today patient is primarily symptotic of his cervical radiculopathy. Referral to spine and pain provided. Follow up if shoulder symptoms worsen or fail to improve.     Scribe Attestation      I,:  Jessica Espino am acting as a scribe while in the presence of the attending physician.:       I,:  Seda Russell DO personally performed the services described in this documentation    as scribed in my presence.:

## 2024-02-21 PROBLEM — Z12.5 SCREENING FOR PROSTATE CANCER: Status: RESOLVED | Noted: 2019-01-22 | Resolved: 2024-02-21

## 2024-02-22 NOTE — PROGRESS NOTES
Pulmonary Follow Up Note   Malachi Greenwood 68 y.o. male MRN: [id number]  2/23/2024      Assessment/Plan:     COPD, severe (HCC)  Patient continues to struggle with shortness of breath on even minimal exertion which is in large part related to severe COPD.  He did not find any significant benefit with Trelegy Ellipta compared with Advair, but it is more costly.  I have prescribed Advair 250/50 micrograms to use twice daily.  He has albuterol to use on an as-needed basis.    ARACELIS on CPAP  Patient received a new CPAP device recently via fullface mask.  I reviewed compliance data from 1/24/2024 through 2/22/2024.  Patient use the device every night for an average of 9 hours and 54 minutes.  Average AHI 0.3.  He is demonstrating compliance and ongoing use is medically necessary.  I would like to perform overnight pulse oximetry while on CPAP to ensure the oxygen noted on original sleep study is resolved with current settings.  He is on auto titrating ranging between 7 and 15 cmH2O.    No follow-ups on file.    Visit orders:    Diagnoses and all orders for this visit:    COPD, severe (HCC)  -     POCT Oxygen Titration  -     Fluticasone-Salmeterol (Advair Diskus) 250-50 mcg/dose inhaler; Inhale 1 puff 2 (two) times a day Rinse mouth after use.  -     Pulse oximetry overnight    ARACELIS on CPAP  -     Pulse oximetry overnight    Nocturnal hypoxemia  -     Pulse oximetry overnight      History of Present Illness   HPI:  Malachi Greenwood is a 68 y.o. male who is here today for follow-up regarding COPD and obstructive sleep apnea.  He continues to struggle with shortness of breath with even minimal exertion.  He has a personal pulse oximeter and finds that his saturations will sometimes decrease to mid 80s.  He has been using Trelegy Ellipta once daily, but finds it to be expensive and equally beneficial to when he was on Advair in the past.  He has been compliant with CPAP.  He received a new device and is here today for his  compliance visit.  He wakes up feeling refreshed.  During his original sleep study, he did have evidence of oxygen desaturation, not tested since being on CPAP.    Review of Systems   Constitutional:  Negative for appetite change and fever.   HENT:  Positive for sneezing. Negative for ear pain, postnasal drip, rhinorrhea, sore throat and trouble swallowing.    Respiratory:  Positive for shortness of breath.    Cardiovascular:  Negative for chest pain.   Musculoskeletal:  Positive for myalgias.   Neurological:  Negative for headaches.       Medical, Family and Social history reviewed and updated as appropriate    Historical Information   Past Medical History:   Diagnosis Date    Arthritis     Asthma     Chest pain     atypical    Chronic bronchitis (HCC) 2014    Chronic kidney disease     Colon polyp     COPD (chronic obstructive pulmonary disease) (HCC)     CPAP (continuous positive airway pressure) dependence     Decreased glomerular filtration rate     Hamstring strain     Herniated lumbar intervertebral disc     History of alcohol use     uncomplicated    History of back pain     lower    History of colon polyps     sigmoid    History of genital warts     History of lipoma     History of muscle spasm     lumbar paraspinous muscle    History of umbilical hernia     Lateral pain of left hip     Low HDL (under 40)     Lumbar radiculopathy     Obesity     Pneumonia 1975    Respiratory distress     acute    Shortness of breath     Sleep apnea     CPAP PT WILL BRING    Sleep apnea, obstructive 2014     Past Surgical History:   Procedure Laterality Date    ADENOIDECTOMY      APPENDECTOMY      COLONOSCOPY      HAND SURGERY Left     CYST REMOVAL    HERNIA REPAIR  2017    HIP SURGERY Bilateral     IR BIOPSY BONE MARROW  04/07/2023    LIPECTOMY      thigh    LIPOMA RESECTION Left     HIP    [place] RPR UMBILICAL HRNA 5 YRS/> REDUCIBLE N/A 02/23/2017    Procedure: REPAIR HERNIA UMBILICAL;  Surgeon: Sushant Buckner MD;  Location:  [facility];  Service: General    TONSILLECTOMY      WRIST GANGLION EXCISION       Family History   Problem Relation Age of Onset    Heart disease Mother         cardiac disorder    Hypertension Mother          at age 78    Cancer Mother         ovarian cancer  at age 78    COPD Father          at age 83    Heart disease Family         cardiac disorder    Cancer Family         lung    Emphysema Family         pulmonary unspecified emphysema    Diabetes Family     Glaucoma Family     Cancer Family         lung    Cancer Paternal Grandmother          at age 78       Social History     Tobacco Use   Smoking Status Former    Current packs/day: 0.00    Average packs/day: 2.0 packs/day for 42.7 years (85.5 ttl pk-yrs)    Types: Cigarettes    Start date: 1972    Quit date: 10/2014    Years since quittin.4    Passive exposure: Past   Smokeless Tobacco Never   Tobacco Comments    Quit for a 16 year period in between start and final quit date.     Meds/Allergies     Current Outpatient Medications:     albuterol (2.5 mg/3 mL) 0.083 % nebulizer solution, Take 1 vial (2.5 mg total) by nebulization every 6 (six) hours as needed for wheezing, Disp: 50 vial, Rfl: 3    albuterol (PROVENTIL HFA,VENTOLIN HFA) 90 mcg/act inhaler, INHALE 2 PUFFS EVERY 4 HOURS AS NEEDED FOR SHORTNESS OF BREATH., Disp: 8.5 g, Rfl: 5    allopurinol (ZYLOPRIM) 100 mg tablet, TAKE 1 TABLET BY MOUTH TWICE A DAY, Disp: 180 tablet, Rfl: 1    Cholecalciferol (VITAMIN D3) 1000 units CAPS, Take by mouth, Disp: , Rfl:     cyclobenzaprine (FLEXERIL) 5 mg tablet, TAKE 1 TABLET BY MOUTH THREE TIMES A DAY AS NEEDED FOR MUSCLE SPASMS, Disp: 60 tablet, Rfl: 0    Fluticasone-Salmeterol (Advair Diskus) 250-50 mcg/dose inhaler, Inhale 1 puff 2 (two) times a day Rinse mouth after use., Disp: 180 blister, Rfl: 3    guaiFENesin (MUCINEX) 600 mg 12 hr tablet, Take 1,200 mg by mouth daily Once at hs, Disp: , Rfl:     meloxicam (MOBIC) 15 mg tablet, TAKE 1  TABLET (15 MG TOTAL) BY MOUTH DAILY., Disp: 90 tablet, Rfl: 1    Multiple Vitamins tablet, Take 1 tablet by mouth daily, Disp: , Rfl:     sildenafil (VIAGRA) 100 mg tablet, Take 1 tablet (100 mg total) by mouth daily as needed for erectile dysfunction, Disp: 6 tablet, Rfl: 5    tamsulosin (FLOMAX) 0.4 mg, TAKE 1 CAPSULE BY MOUTH EVERY DAY WITH DINNER, Disp: 90 capsule, Rfl: 3  Allergies   Allergen Reactions    Darvon [Propoxyphene] Other (See Comments)     hallucinations       Vitals: Blood pressure 142/78, pulse 103, temperature (!) 96.6 °F (35.9 °C), temperature source Tympanic, SpO2 95%. There is no height or weight on file to calculate BMI. Oxygen Therapy  SpO2: 95 %  Oxygen Therapy: None (Room air)    Physical Exam   Physical Exam  Constitutional:       General: He is not in acute distress.     Appearance: Normal appearance.   HENT:      Head: Normocephalic.      Mouth/Throat:      Pharynx: No oropharyngeal exudate.   Eyes:      General: No scleral icterus.  Neck:      Vascular: No JVD.   Cardiovascular:      Rate and Rhythm: Normal rate and regular rhythm.   Pulmonary:      Breath sounds: No wheezing, rhonchi or rales.   Abdominal:      Palpations: Abdomen is soft.      Tenderness: There is no abdominal tenderness.   Musculoskeletal:         General: Swelling present. No deformity.      Cervical back: Neck supple.   Lymphadenopathy:      Cervical: No cervical adenopathy.   Skin:     General: Skin is warm and dry.   Neurological:      Mental Status: He is alert and oriented to person, place, and time.   Psychiatric:         Mood and Affect: Mood normal.       Labs: I have personally reviewed pertinent lab results.  Lab Results   Component Value Date    WBC 5.50 02/19/2024    HGB 14.6 02/19/2024    HCT 43.6 02/19/2024     (H) 02/19/2024    PLT 83 (L) 02/19/2024     Lab Results   Component Value Date    CALCIUM 9.3 02/19/2024     06/14/2017    K 4.5 02/19/2024    CO2 27 02/19/2024      "02/19/2024    BUN 21 02/19/2024    CREATININE 1.38 (H) 02/19/2024     No results found for: \"IGE\"  Lab Results   Component Value Date    ALT 29 02/19/2024    AST 33 02/19/2024    ALKPHOS 95 02/19/2024    BILITOT 0.9 06/14/2017     Imaging and other studies: I have personally reviewed pertinent reports.   and I have personally reviewed pertinent films in PACS chest CT from 2/8/2023 shows emphysema and stable nodules measuring up to 7 mm in size    Pulmonary function testing:  Performed 10/24/2023 and personally interpreted  FEV1/FVC ratio 57%   FEV1 45% predicted  FVC 60% predicted.  No response to bronchodilators  TLC 86 % predicted   % predicted  DLCO corrected for hemoglobin 76 % predicted.  Spirometry with severe obstruction    Oxygen titration study was performed today.  Room air saturations at rest are 95% with a heart rate of 103 bpm.  Patient was able to ambulate for 5 minutes with saturations dropping to a low of 90%.  He was unable to walk any further.  Maximal heart rate 134 bpm.    Answers submitted by the patient for this visit:  Pulmonology Questionnaire (Submitted on 2/22/2024)  Chief Complaint: Primary symptoms  Do you have difficulty breathing?: Yes  Do you experience frequent throat clearing?: Yes  Chronicity: chronic  When did you first notice your symptoms?: more than 1 year ago  How often do your symptoms occur?: 2 to 4 times per day  Since you first noticed this problem, how has it changed?: gradually worsening  Do you have shortness of breath that occurs with effort or exertion?: Yes  Do you have ear congestion?: No  Do you have heartburn?: No  Do you have fatigue?: Yes  Do you have nasal congestion?: Yes  Do you have shortness of breath when lying flat?: No  Do you have shortness of breath when you wake up?: No  Do you have sweats?: No  Have you experienced weight loss?: No  Which of the following makes your symptoms worse?: any activity, change in weather, climbing stairs, emotional " stress, exercise, exposure to fumes, exposure to smoke, minimal activity, pollen, strenuous activity, URI  Which of the following makes your symptoms better?: cold air, rest, steroid inhaler

## 2024-02-23 ENCOUNTER — OFFICE VISIT (OUTPATIENT)
Dept: PULMONOLOGY | Facility: CLINIC | Age: 69
End: 2024-02-23
Payer: COMMERCIAL

## 2024-02-23 VITALS
HEART RATE: 103 BPM | DIASTOLIC BLOOD PRESSURE: 78 MMHG | SYSTOLIC BLOOD PRESSURE: 142 MMHG | OXYGEN SATURATION: 95 % | TEMPERATURE: 96.6 F

## 2024-02-23 DIAGNOSIS — J44.9 COPD, SEVERE (HCC): Primary | ICD-10-CM

## 2024-02-23 DIAGNOSIS — G47.33 OSA ON CPAP: ICD-10-CM

## 2024-02-23 DIAGNOSIS — G47.34 NOCTURNAL HYPOXEMIA: ICD-10-CM

## 2024-02-23 LAB
DME PARACHUTE DELIVERY DATE REQUESTED: NORMAL
DME PARACHUTE ITEM DESCRIPTION: NORMAL
DME PARACHUTE ORDER STATUS: NORMAL
DME PARACHUTE SUPPLIER NAME: NORMAL
DME PARACHUTE SUPPLIER PHONE: NORMAL

## 2024-02-23 PROCEDURE — 99214 OFFICE O/P EST MOD 30 MIN: CPT | Performed by: INTERNAL MEDICINE

## 2024-02-23 PROCEDURE — 94618 PULMONARY STRESS TESTING: CPT | Performed by: INTERNAL MEDICINE

## 2024-02-23 RX ORDER — FLUTICASONE PROPIONATE AND SALMETEROL 250; 50 UG/1; UG/1
1 POWDER RESPIRATORY (INHALATION) 2 TIMES DAILY
Qty: 180 BLISTER | Refills: 3 | Status: SHIPPED | OUTPATIENT
Start: 2024-02-23 | End: 2025-02-17

## 2024-02-23 NOTE — ASSESSMENT & PLAN NOTE
Patient continues to struggle with shortness of breath on even minimal exertion which is in large part related to severe COPD.  He did not find any significant benefit with Trelegy Ellipta compared with Advair, but it is more costly.  I have prescribed Advair 250/50 micrograms to use twice daily.  He has albuterol to use on an as-needed basis.

## 2024-02-28 ENCOUNTER — TELEPHONE (OUTPATIENT)
Dept: PAIN MEDICINE | Facility: CLINIC | Age: 69
End: 2024-02-28

## 2024-02-28 ENCOUNTER — HOSPITAL ENCOUNTER (OUTPATIENT)
Dept: RADIOLOGY | Facility: MEDICAL CENTER | Age: 69
Discharge: HOME | End: 2024-02-28
Payer: COMMERCIAL

## 2024-02-28 DIAGNOSIS — Z13.6 SCREENING FOR AAA (ABDOMINAL AORTIC ANEURYSM): ICD-10-CM

## 2024-02-28 LAB
DME PARACHUTE DELIVERY DATE EXPECTED: NORMAL
DME PARACHUTE DELIVERY DATE REQUESTED: NORMAL
DME PARACHUTE ITEM DESCRIPTION: NORMAL
DME PARACHUTE ORDER STATUS: NORMAL
DME PARACHUTE SUPPLIER NAME: NORMAL
DME PARACHUTE SUPPLIER PHONE: NORMAL

## 2024-02-28 PROCEDURE — 76706 US ABDL AORTA SCREEN AAA: CPT

## 2024-02-28 NOTE — TELEPHONE ENCOUNTER
Left message for patient to come in early on Monday the 4th to complete the new patient paperwork prior to seeing the provider.

## 2024-02-29 NOTE — PROGRESS NOTES
Assessment:  1. Cervical spondylosis    2. Cervical radiculopathy        Plan:  Orders Placed This Encounter   Procedures    MRI cervical spine without contrast     Standing Status:   Future     Standing Expiration Date:   3/4/2028     Scheduling Instructions:      There is no preparation for this test. Please leave your jewelry and valuables at home, wedding rings are the exception. All patients will be required to change into a hospital gown and pants.  Street clothes are not permitted in the MRI.  Magnetic nail polish must be removed prior to arrival for your test. Please bring your insurance cards, a form of photo ID and a list of your medications with you. Arrive 15 minutes prior to your appointment time in order to register. Please bring any prior CT or MRI studies of this area that were not performed at a [facility].            To schedule this appointment, please contact Central Scheduling at [phone].            Prior to your appointment, please make sure you complete the MRI Screening Form when you e-Check in for your appointment. This will be available starting 7 days before your appointment in [facility]. You may receive an e-mail with an activation code if you do not have a [employer] account. If you do not have access to a device, we will complete your screening at your appointment.     Order Specific Question:   What is the patient's sedation requirement? If Medication for Claustrophobia is selected, order medication at this point.     Answer:   No Sedation     Order Specific Question:   Does this procedure require the 3T MRI at [place] or [place]?     Answer:   No     Order Specific Question:   Release to patient through [employer]     Answer:   Immediate     Order Specific Question:   Is order priority selected as STAT?     Answer:   No     Order Specific Question:   Reason for Exam (FREE TEXT)     Answer:   possible left C8 radiculopathy       No orders of the defined types were placed in this  encounter.      My impressions and treatment recommendations were discussed in detail with the patient, who verbalized understanding and had no further questions.    68-year-old male presents her office with chief complaint of left scapular pain with radiation down the left upper extremity into the hand, particularly into the pinky and ring finger, possibly suggestive of C8 radiculopathy.  He has some slight weakness with  strength in the left hand.  We will order MRI of the cervical spine to assess for any significant compressive pathology which may be amenable to injection treatment.  Discussed epidural steroid injection in detail with the patient today.  Information about the procedure was also provided.  We will first see the results of the imaging and determine the next course of action. Differential diagnosis also includes cubital tunnel syndrome. If MRI negative, would consider EMG of the Left upper extremity.     [place] Prescription Drug Monitoring Program report was reviewed and was appropriate     Complete risks and benefits including bleeding, infection, tissue reaction, nerve injury and allergic reaction were discussed. The approach was demonstrated using models and literature was provided. Verbal and written consent was obtained.      Discharge instructions were provided. I personally saw and examined the patient and I agree with the above discussed plan of care.    History of Present Illness:    Malachi Greenwood is a 68 y.o. male who presents to [facility] for initial evaluation of the above stated pain complaints. The patient has a past medical and chronic pain history as outlined in the assessment section. He was referred by Seda Russell DO  [address] [place] [address] .    Patient is here with chief complaint of pain in the left neck, left shoulder radiating down the left lower extremity.  This is ongoing for last 6 months.  Moderate over the past  month.  3 out of 10 potentially with activity.  Intermittent.  Worse at nighttime.  Shooting, sharp, pressure-like, throbbing, dull/aching in nature.  Reports subjective weakness in the upper extremities.  Denies bowel incontinence or saddle anesthesia.    The pain is notable in the posterior left scapular region with radiation down the posterior left arm, through the elbow and down into the pinky and ring finger.     Pain is increased with bending, sitting, walking, exercise, coughing, sneezing.  Decreased with lying down and relaxation.    Cervical x-ray demonstrating notable spondylosis at multiple levels.    Past medical history notable for alcoholism, asthma, COPD.    Reported relief exercise, osteopathic manipulation, heat/ice therapy.  No relief with physical therapy.    No tobacco or marijuana use.  Alcohol use.  Not allergic to latex or contrast dye.    In the past he has used hydrocodone and oxycodone with relief.  In the past is also use Voltaren gel.  Anything significant and Flexeril with relief.    Review of Systems:    Review of Systems   Respiratory:  Positive for cough, shortness of breath and wheezing.    Cardiovascular:  Positive for leg swelling.   Endocrine: Positive for polyuria.           Past Medical History:   Diagnosis Date    Arthritis     Asthma     Chest pain     atypical    Chronic bronchitis ([facility]) 2014    Chronic kidney disease     Colon polyp     COPD (chronic obstructive pulmonary disease) ([facility])     CPAP (continuous positive airway pressure) dependence     Decreased glomerular filtration rate     Hamstring strain     Herniated lumbar intervertebral disc     History of alcohol use     uncomplicated    History of back pain     lower    History of colon polyps     sigmoid    History of genital warts     History of lipoma     History of muscle spasm     lumbar paraspinous muscle    History of umbilical hernia     Lateral pain of left hip     Low HDL (under 40)     Lumbar radiculopathy      Obesity     Pneumonia 1975    Respiratory distress     acute    Shortness of breath     Sleep apnea     CPAP PT WILL BRING    Sleep apnea, obstructive        Past Surgical History:   Procedure Laterality Date    ADENOIDECTOMY      APPENDECTOMY      COLONOSCOPY      HAND SURGERY Left     CYST REMOVAL    HERNIA REPAIR  2017    HIP SURGERY Bilateral     IR BIOPSY BONE MARROW  2023    LIPECTOMY      thigh    LIPOMA RESECTION Left     HIP    [place] RPR UMBILICAL HRNA 5 YRS/> REDUCIBLE N/A 2017    Procedure: REPAIR HERNIA UMBILICAL;  Surgeon: Sushant Buckner MD;  Location: [facility];  Service: General    TONSILLECTOMY      WRIST GANGLION EXCISION         Family History   Problem Relation Age of Onset    Heart disease Mother         cardiac disorder    Hypertension Mother          at age 78    Cancer Mother         ovarian cancer  at age 78    COPD Father          at age 83    Heart disease Family         cardiac disorder    Cancer Family         lung    Emphysema Family         pulmonary unspecified emphysema    Diabetes Family     Glaucoma Family     Cancer Family         lung    Cancer Paternal Grandmother          at age 78       Social History     Occupational History    Occupation: Fulltime employment   Tobacco Use    Smoking status: Former     Current packs/day: 0.00     Average packs/day: 2.0 packs/day for 42.7 years (85.5 ttl pk-yrs)     Types: Cigarettes     Start date: 1972     Quit date: 10/2014     Years since quittin.4     Passive exposure: Past    Smokeless tobacco: Never    Tobacco comments:     Quit for a 16 year period in between start and final quit date.   Vaping Use    Vaping status: Never Used   Substance and Sexual Activity    Alcohol use: Not Currently     Comment: Quit drinking     Drug use: Never    Sexual activity: Yes     Partners: Female     Birth control/protection: None         Current Outpatient Medications:     albuterol (2.5 mg/3 mL) 0.083 %  "nebulizer solution, Take 1 vial (2.5 mg total) by nebulization every 6 (six) hours as needed for wheezing, Disp: 50 vial, Rfl: 3    albuterol (PROVENTIL HFA,VENTOLIN HFA) 90 mcg/act inhaler, INHALE 2 PUFFS EVERY 4 HOURS AS NEEDED FOR SHORTNESS OF BREATH., Disp: 8.5 g, Rfl: 5    allopurinol (ZYLOPRIM) 100 mg tablet, TAKE 1 TABLET BY MOUTH TWICE A DAY, Disp: 180 tablet, Rfl: 1    Cholecalciferol (VITAMIN D3) 1000 units CAPS, Take by mouth, Disp: , Rfl:     cyclobenzaprine (FLEXERIL) 5 mg tablet, TAKE 1 TABLET BY MOUTH THREE TIMES A DAY AS NEEDED FOR MUSCLE SPASMS, Disp: 60 tablet, Rfl: 0    Fluticasone-Salmeterol (Advair Diskus) 250-50 mcg/dose inhaler, Inhale 1 puff 2 (two) times a day Rinse mouth after use., Disp: 180 blister, Rfl: 3    guaiFENesin (MUCINEX) 600 mg 12 hr tablet, Take 1,200 mg by mouth daily Once at hs, Disp: , Rfl:     meloxicam (MOBIC) 15 mg tablet, TAKE 1 TABLET (15 MG TOTAL) BY MOUTH DAILY., Disp: 90 tablet, Rfl: 1    Multiple Vitamins tablet, Take 1 tablet by mouth daily, Disp: , Rfl:     sildenafil (VIAGRA) 100 mg tablet, Take 1 tablet (100 mg total) by mouth daily as needed for erectile dysfunction, Disp: 6 tablet, Rfl: 5    tamsulosin (FLOMAX) 0.4 mg, TAKE 1 CAPSULE BY MOUTH EVERY DAY WITH DINNER, Disp: 90 capsule, Rfl: 3    Allergies   Allergen Reactions    Darvon [Propoxyphene] Other (See Comments)     hallucinations       Physical Exam:    /80   Pulse 101   Ht 6' 2\" (1.88 m)   Wt (!) 185 kg (408 lb)   BMI 52.38 kg/m²     Constitutional: normal, well developed, well nourished, alert, in no distress and non-toxic and no overt pain behavior.  Eyes: anicteric  HEENT: grossly intact  Neck: supple, symmetric, trachea midline and no masses   Pulmonary:even and unlabored  Cardiovascular:No edema or pitting edema present  Skin:Normal without rashes or lesions and well hydrated  Psychiatric:Mood and affect appropriate  Neurologic:Cranial Nerves II-XII grossly " intact  Musculoskeletal:normal    Cervical Spine Exam    Appearance:  Normal lordosis  Palpation/Tenderness:  Ttp  left posterior scapular region  Sensory:  no sensory deficits noted  Range of Motion:  Flexion:  No limitation  with pain  Extension:  Minimally limited  without pain  Rotation - Left:  Moderately limited  without pain  Rotation - Right:  Moderately limited  without pain  Motor Strength:  Left Arm Flexion  5/5  Left Arm Extension  5/5  Right Arm Flexion  5/5  Right Arm Extension  5/5  Left Wrist Flexion  5/5  Left Wrist Extension  5/5  Left Finger Abduction  5/5  Right Finger Abduction  5/5  Left Pincer Grasp  5/5  Right Pincer Grasp  5/5  Left    4/5  Right   5/5  Reflexes:  Left Biceps:  2+   Right Biceps:  2+   Left Brachioradialis:  2+   Right Brachioradialis:  2+   Left Triceps:  2+   Right Triceps:  2+   Special Tests:  Left Spurlings:  negative      Imaging    CERVICAL SPINE  11/7/23     INDICATION:   Unspecified osteoarthritis, unspecified site. Pain in right shoulder.      COMPARISON: Comparison made with previous examination(s) dated (SR) 11-Aug-2023,(CT) 07-Apr-2023,(CT) 08-Feb-2023,(NM) 14-Sep-2021,(CT) 19-May-2021.      VIEWS:  XR SPINE CERVICAL COMPLETE 4 OR 5 VW NON INJURY      FINDINGS:     No fracture.      There are spondylitic changes at C5-6 and C6-7. There is mild bilateral neural foraminal compromise suggested at C5-6 and C6-7. There may also be mild left neural foraminal compromise at the 3 4.     The vertebral body heights are preserved and there appears to remain normal alignment.     The prevertebral soft tissues are within normal nodes in appearance.     Calcifications in the neck bilaterally are likely vascular.     IMPRESSION:     Spondylitic changes are as described above.      LEFT SHOULDER  9/14/23     INDICATION:   M25.512: Pain in left shoulder.     COMPARISON:  None     VIEWS:  XR SHOULDER 2+ VW LEFT        FINDINGS:     There is no acute fracture or  dislocation.     No significant degenerative changes.     No lytic or blastic osseous lesion.     Soft tissues are unremarkable.     IMPRESSION:     No acute osseous abnormality.        THORACIC SPINE  12/9/2019     INDICATION:   M54.6: Pain in thoracic spine.     COMPARISON:  None     VIEWS:  XR SPINE THORACIC 3 VW        FINDINGS:     There is no fracture or pathologic bone lesion.     Thoracic vertebral alignment is within normal limits.     Diffuse discogenic degenerative changes of the thoracic spine are noted. Bridging anterior hypertrophic spurring is present.      There is no displacement of the paraspinal line.      The pedicles appear intact.     IMPRESSION:     Discogenic degenerative changes of the thoracic spine with bridging anterior hypertrophic spurs     No acute osseous abnormality      MRI cervical spine without contrast    (Results Pending)       Orders Placed This Encounter   Procedures    MRI cervical spine without contrast

## 2024-03-04 ENCOUNTER — CONSULT (OUTPATIENT)
Dept: PAIN MEDICINE | Facility: CLINIC | Age: 69
End: 2024-03-04
Payer: COMMERCIAL

## 2024-03-04 VITALS
WEIGHT: 315 LBS | HEART RATE: 101 BPM | SYSTOLIC BLOOD PRESSURE: 132 MMHG | DIASTOLIC BLOOD PRESSURE: 80 MMHG | BODY MASS INDEX: 40.43 KG/M2 | HEIGHT: 74 IN

## 2024-03-04 DIAGNOSIS — M54.12 CERVICAL RADICULOPATHY: ICD-10-CM

## 2024-03-04 DIAGNOSIS — M47.812 CERVICAL SPONDYLOSIS: Primary | ICD-10-CM

## 2024-03-04 PROCEDURE — 99204 OFFICE O/P NEW MOD 45 MIN: CPT | Performed by: STUDENT IN AN ORGANIZED HEALTH CARE EDUCATION/TRAINING PROGRAM

## 2024-03-04 NOTE — PATIENT INSTRUCTIONS
Epidural Steroid Injection   AMBULATORY CARE:   What you need to know about an epidural steroid injection (DYLLAN):  An DYLLAN is a procedure to inject steroid medicine into the epidural space. The epidural space is between your spinal cord and vertebrae. Steroids reduce inflammation and fluid buildup in your spine that may be causing pain. You may be given pain medicine along with the steroids.        How to prepare for an DYLLAN:  Your provider will talk to you about how to prepare for your procedure. He or she will tell you what medicines to take or not take on the day of your procedure. You may need to stop taking blood thinners or other medicines several days before your procedure. You may need to adjust any diabetes medicine you take on the day of your procedure. Steroid medicine can increase your blood sugar level. Arrange for someone to drive you home when you are discharged.  What will happen during an DYLLAN:   You will be given medicine to numb the procedure area. You will be awake for the procedure, but you will not feel pain. You may also be given medicine to help you relax. Contrast liquid will be used to help your provider see the area better. Tell the provider if you have ever had an allergic reaction to contrast liquid.    Your provider may place the needle into your neck area, middle of your back, or tailbone area. He or she may inject the medicine next to the nerves that are causing your pain. He or she may instead inject the medicine into a larger area of the epidural space. This helps the medicine spread to more nerves. Your provider will use a fluoroscope to help guide the needle to the right place. A fluoroscope is a type of x-ray. After the procedure, a bandage will be placed over the injection site to prevent infection.    What will happen after an DYLLAN:  You will have a bandage over the injection site to prevent infection. Your provider will tell you when you can bathe and any activity guidelines. You  will be able to go home.  Risks of an DYLLAN:  You may have temporary or permanent nerve damage or paralysis. You may have bleeding or develop a serious infection, such as meningitis (swelling of the brain coverings). An abscess may also develop. An abscess is a pus-filled area under the skin. You may need surgery to fix the abscess. You may have a seizure, anxiety, or trouble sleeping. If you are a man, you may have temporary erectile dysfunction (not able to have an erection).   Call your local emergency number (911 in the [place]) if:   You have a seizure.    You have trouble moving your legs.    Seek care immediately if:   Blood soaks through your bandage.    You have a fever or chills, severe back pain, and the procedure area is sensitive to the touch.    You cannot control when you urinate or have a bowel movement.    Call your doctor if:   You have weakness or numbness in your legs.    Your wound is red, swollen, or draining pus.    You have nausea or are vomiting.    Your face or neck is red and you feel warm.    You have more pain than you had before the procedure.    You have swelling in your hands or feet.    You have questions or concerns about your condition or care.    Care for your wound as directed:  You may remove the bandage before you go to bed the day of your procedure. You may take a shower, but do not take a bath for at least 24 hours.   Self-care:   Do not drive,  use machines, or do strenuous activity for 24 hours after your procedure or as directed.     Continue other treatments  as directed. Steroid injections alone will not control your pain. The injections are meant to be used with other treatments, such as physical therapy.    Follow up with your doctor as directed:  Write down your questions so you remember to ask them during your visits.   © Copyright [employer] 2023 Information is for End User's use only and may not be sold, redistributed or otherwise used for commercial purposes.  The above  information is an  only. It is not intended as medical advice for individual conditions or treatments. Talk to your doctor, nurse or pharmacist before following any medical regimen to see if it is safe and effective for you.

## 2024-03-05 LAB
DME PARACHUTE DELIVERY DATE ACTUAL: NORMAL
DME PARACHUTE DELIVERY DATE EXPECTED: NORMAL
DME PARACHUTE DELIVERY DATE REQUESTED: NORMAL
DME PARACHUTE ITEM DESCRIPTION: NORMAL
DME PARACHUTE ORDER STATUS: NORMAL
DME PARACHUTE SUPPLIER NAME: NORMAL
DME PARACHUTE SUPPLIER PHONE: NORMAL

## 2024-03-10 LAB
APOB+LDLR+PCSK9 GENE MUT ANL BLD/T: NOT DETECTED
BRCA1+BRCA2 DEL+DUP + FULL MUT ANL BLD/T: NOT DETECTED
MLH1+MSH2+MSH6+PMS2 GN DEL+DUP+FUL M: NOT DETECTED

## 2024-03-12 DIAGNOSIS — G47.34 NOCTURNAL HYPOXEMIA: Primary | ICD-10-CM

## 2024-03-18 ENCOUNTER — OFFICE VISIT (OUTPATIENT)
Dept: HEMATOLOGY ONCOLOGY | Facility: CLINIC | Age: 69
End: 2024-03-18
Payer: COMMERCIAL

## 2024-03-18 ENCOUNTER — HOSPITAL ENCOUNTER (OUTPATIENT)
Dept: MRI IMAGING | Facility: HOSPITAL | Age: 69
Discharge: HOME | End: 2024-03-18
Attending: STUDENT IN AN ORGANIZED HEALTH CARE EDUCATION/TRAINING PROGRAM
Payer: COMMERCIAL

## 2024-03-18 VITALS
SYSTOLIC BLOOD PRESSURE: 142 MMHG | HEART RATE: 111 BPM | WEIGHT: 315 LBS | TEMPERATURE: 98.1 F | BODY MASS INDEX: 40.43 KG/M2 | OXYGEN SATURATION: 94 % | DIASTOLIC BLOOD PRESSURE: 80 MMHG | RESPIRATION RATE: 17 BRPM | HEIGHT: 74 IN

## 2024-03-18 DIAGNOSIS — K70.30 ALCOHOLIC CIRRHOSIS OF LIVER WITHOUT ASCITES (HCC): ICD-10-CM

## 2024-03-18 DIAGNOSIS — M54.12 CERVICAL RADICULOPATHY: ICD-10-CM

## 2024-03-18 DIAGNOSIS — R16.2 HEPATOSPLENOMEGALY: ICD-10-CM

## 2024-03-18 DIAGNOSIS — D69.6 THROMBOCYTOPENIA (HCC): Primary | ICD-10-CM

## 2024-03-18 DIAGNOSIS — J44.9 COPD, SEVERE (HCC): ICD-10-CM

## 2024-03-18 PROCEDURE — G2211 COMPLEX E/M VISIT ADD ON: HCPCS | Performed by: INTERNAL MEDICINE

## 2024-03-18 PROCEDURE — 72141 MRI NECK SPINE W/O DYE: CPT

## 2024-03-18 PROCEDURE — 99214 OFFICE O/P EST MOD 30 MIN: CPT | Performed by: INTERNAL MEDICINE

## 2024-03-18 NOTE — PROGRESS NOTES
HPI: Continuation of care for alcoholic cirrhosis of liver with hepatosplenomegaly and thrombocytopenia secondary to splenomegaly.  Patient states he quit drinking alcohol in 1996 after drinking alcohol for some years.  Also he quit smoking cigarettes in 2014.  He follows with his gastroenterologist.  He states he had colonoscopy in 2022.  Ferritin is 150.  Blood counts are being monitored and platelet count has been running between [id number].  No history of bleeding.  He has couple of small purpuric spots back of his hands.  He is overweight.  He ambulates with a cane.  He gives history severe COPD, chronic renal disease, arthritis and chronic low back discomfort . History of gout.  Has tiredness.          Current Outpatient Medications:   •  albuterol (2.5 mg/3 mL) 0.083 % nebulizer solution, Take 1 vial (2.5 mg total) by nebulization every 6 (six) hours as needed for wheezing, Disp: 50 vial, Rfl: 3  •  albuterol (PROVENTIL HFA,VENTOLIN HFA) 90 mcg/act inhaler, INHALE 2 PUFFS EVERY 4 HOURS AS NEEDED FOR SHORTNESS OF BREATH., Disp: 8.5 g, Rfl: 5  •  allopurinol (ZYLOPRIM) 100 mg tablet, TAKE 1 TABLET BY MOUTH TWICE A DAY, Disp: 180 tablet, Rfl: 1  •  Cholecalciferol (VITAMIN D3) 1000 units CAPS, Take by mouth, Disp: , Rfl:   •  cyclobenzaprine (FLEXERIL) 5 mg tablet, TAKE 1 TABLET BY MOUTH THREE TIMES A DAY AS NEEDED FOR MUSCLE SPASMS, Disp: 60 tablet, Rfl: 0  •  Fluticasone-Salmeterol (Advair Diskus) 250-50 mcg/dose inhaler, Inhale 1 puff 2 (two) times a day Rinse mouth after use., Disp: 180 blister, Rfl: 3  •  guaiFENesin (MUCINEX) 600 mg 12 hr tablet, Take 1,200 mg by mouth daily Once at hs, Disp: , Rfl:   •  meloxicam (MOBIC) 15 mg tablet, TAKE 1 TABLET (15 MG TOTAL) BY MOUTH DAILY., Disp: 90 tablet, Rfl: 1  •  Multiple Vitamins tablet, Take 1 tablet by mouth daily, Disp: , Rfl:   •  sildenafil (VIAGRA) 100 mg tablet, Take 1 tablet (100 mg total) by mouth daily as needed for erectile dysfunction, Disp: 6  "tablet, Rfl: 5  •  tamsulosin (FLOMAX) 0.4 mg, TAKE 1 CAPSULE BY MOUTH EVERY DAY WITH DINNER, Disp: 90 capsule, Rfl: 3    Allergies   Allergen Reactions   • Darvon [Propoxyphene] Other (See Comments)     hallucinations       Oncology History    No history exists.       ROS:   Reviewed 12 systems: See symptoms in HPI  Presently no other neurological, cardiac, pulmonary, GI and  symptoms other than mentioned in HPI.  Other symptoms are in HPI.  No  fever, chills, bleeding, bone pains, skin rash, weight loss, night sweats, weakness, numbness and claudication.  No recent or frequent infections.  Not unusually sensitive to heat or cold.  No swelling of the ankles.  No swollen glands.  Patient is anxious.      /80 (BP Location: Left arm, Patient Position: Sitting, Cuff Size: Adult)   Pulse (!) 111   Temp 98.1 °F (36.7 °C)   Resp 17   Ht 6' 2\" (1.88 m)   Wt (!) 187 kg (412 lb)   SpO2 94%   BMI 52.90 kg/m²     Physical Exam:  Alert and oriented and not in distress.  Vitals are above.  No icterus.  No oral thrush.  No palpable neck mass.  Clear lung fields.  Regular heart rate.  Large abdomen and difficult to palpate accurately for organomegaly and ascites.  Abdomen is soft and nontender.  Trace edema of the ankles.  No calf tenderness.  No focal neurological deficit.  No skin rash.  Few tiny purpuric spots back of his hands.  No palpable lymphadenopathy in the neck and axillary areas.  There is no clubbing.  Patient is anxious.  Performance status 2 on ECOG scale.  He ambulates with a cane.    IMAGING:  LIVER:  Size:  Moderately enlarged.  The liver measures 20.7 cm in the midclavicular line.  Contour:  Surface contour is smooth.  Parenchyma:  There is moderate diffuse increased echogenicity with smooth echotexture and acoustic beam attenuation.  Most consistent with moderate hepatic steatosis.  No liver mass identified.  Dilated main portal vein measuring up to 2 cm.     BILIARY:  The gallbladder is normal " in caliber.  No wall thickening or pericholecystic fluid.  Shadowing gallstone(s) identified.  No sonographic Treviño's sign.  No intrahepatic biliary dilatation.  CBD measures 3.0 mm.  No choledocholithiasis.     KIDNEY:   Right kidney measures 11.6 x 5.0 x 5.0 cm. Volume 152.5 mL  Kidney within normal limits.     ASCITES:   None.     IMPRESSION:     Cholelithiasis without sonographic evidence of acute cholecystitis.     Hepatomegaly with hepatic steatosis.     Dilated main portal vein suggestive of portal vein hypertension     Workstation performed: [id number]        Imaging    US right upper quadrant (Order: [id number]) - 9/13/2022    FINDINGS:     SPLEEN:    21.6 x 21.1 x 6.6 cm   Splenic volume: 1585.0 mL  Normal contour.  No mass.  No perisplenic collections.  1.2 cm splenule     LEFT KIDNEY:    12.6 x 5.3 cm  Normal caliber and echogenicity.  No hydronephrosis.  No nephrolithiasis.  No solid mass lesions.  1.1 cm upper pole cyst     ABDOMINAL CAVITY:  No left upper quadrant ascites.     IMPRESSION:  Splenomegaly        Workstation performed: [id number]        Imaging    US left upper quadrant (Order: [id number]) - 1/5/2022    FINDINGS:     Examination, as on prior ultrasound, demonstrates hepatosplenomegaly.  Liver size of approximately 21 cm noted, splenic size of approximately 18 cm.     Otherwise there is a normal distribution of activity without evidence of cold defect.  There is no evidence of colloid shift.     IMPRESSION:     Hepatosplenomegaly noted, without evidence of colloid shift.        Workstation performed: [id number]        Imaging    NM liver spleen imaging static only (Order: [id number]) - 9/14/2021      LABS:  Comprehensive metabolic panel  Status: Final result      Contains abnormal data Comprehensive metabolic panel  Order: [id number]  Status: Final result       Visible to patient: Yes (seen)       Next appt: 05/15/2024 at 02:00 PM in Family Medicine (Saranya Eubanks PA-C)       Dx:  Hyperlipidemia with low HDL    0 Result Notes            Component  Ref Range & Units 2/19/24  1:06 PM 8/11/23 11:29 AM 2/9/23 10:20 AM 7/21/22  1:34 PM 1/5/22 11:18 AM 6/23/21  4:33 PM 6/14/21 12:07 PM   Sodium  135 - 147 mmol/L 142 143 140 142 141 R 143 R 142 R   Potassium  3.5 - 5.3 mmol/L 4.5 4.4 4.2 4.2 4.1 4.4 4.3   Chloride  96 - 108 mmol/L 107 111 High  110 High  110 High  108 R 111 High  R 110 High  R   CO2  21 - 32 mmol/L 27 25 26 25 28 26 26   ANION GAP  mmol/L 8 7 4 R 7 R 5 R 6 R 6 R   BUN  5 - 25 mg/dL 21 18 23 22 21 21 23   Creatinine  0.60 - 1.30 mg/dL 1.38 High  1.63 High  CM 1.42 High  CM 1.57 High  CM 1.63 High  CM 1.60 High  CM 1.62 High  CM   Comment: Standardized to IDMS reference method   Glucose, Fasting  65 - 99 mg/dL 102 High  108 High   High  CM 97 CM 82 CM 99 CM 99 CM   Calcium  8.4 - 10.2 mg/dL 9.3 9.8 R 9.4 R 9.1 R 9.6 R 9.4 R 9.3 R   AST  13 - 39 U/L 33 36 R, CM 38 R, CM 30 R, CM 28 R, CM  31 R, CM   ALT  7 - 52 U/L 29 37 R, CM 42 R, CM 40 R, CM 39 R, CM  41 R, CM   Comment: Specimen collection should occur prior to Sulfasalazine administration due to the potential for falsely depressed results.   Alkaline Phosphatase  34 - 104 U/L 95 94 R 100 R 96 R 106 R  97 R   Total Protein  6.4 - 8.4 g/dL 6.2 Low  6.7 6.7 6.8 7.2 R  7.2 R   Albumin  3.5 - 5.0 g/dL 4.0 3.8 3.7 3.8 4.0  4.0   Total Bilirubin  0.20 - 1.00 mg/dL 1.14 High  0.86 CM 1.06 High  CM 0.69 CM 1.27 High  CM  1.07 High  CM      CBC and differential  Status: Final result      Contains abnormal data CBC and differential  Order: [id number]  Status: Final result       Visible to patient: Yes (seen)       Next appt: 05/15/2024 at 02:00 PM in Family Medicine (Saranya Eubanks PA-C)       Dx: Splenomegaly; Thrombocytopenia (HCC);...    0 Result Notes            Component  Ref Range & Units 2/19/24  1:06 PM 8/11/23 11:29 AM 4/7/23  8:03 AM 2/9/23 10:20 AM 7/21/22  1:34 PM 1/5/22 11:18 AM 6/23/21  4:33 PM   WBC  4.31 - 10.16  Thousand/uL 5.50 5.09 5.00 5.85 5.98 6.76 6.85   RBC  3.88 - 5.62 Million/uL 4.36 4.50 4.46 4.60 4.68 4.88 4.91   Hemoglobin  12.0 - 17.0 g/dL 14.6 15.4 14.9 15.2 15.2 16.0 16.0   Hematocrit  36.5 - 49.3 % 43.6 45.9 45.1 46.7 47.2 48.6 47.8   MCV  82 - 98 fL 100 High  102 High  101 High  102 High  101 High  100 High  97   MCH  26.8 - 34.3 pg 33.5 34.2 33.4 33.0 32.5 32.8 32.6   MCHC  31.4 - 37.4 g/dL 33.5 33.6 33.0 32.5 32.2 32.9 33.5   RDW  11.6 - 15.1 % 14.8 14.6 14.2 14.5 14.2 14.2 14.2   MPV  8.9 - 12.7 fL 11.3 11.6 11.1 11.5 12.0 11.2 11.3   Platelets  149 - 390 Thousands/uL 83 Low  85 Low  CM 86 Low  87 Low  97 Low   Low  123 Low    Comment: Previously 7/21/23   nRBC  /100 WBCs 0 0 0 0 0 0 0   Neutrophils Relative  43 - 75 % 64 64 63 64 61 62 60   Immature Grans %  0 - 2 % 0 0 0 0 0 0 0   Lymphocytes Relative  14 - 44 % 18 18 20 19 22 21 21   Monocytes Relative  4 - 12 % 13 High  13 High  11 12 11 11 13 High    Eosinophils Relative  0 - 6 % 4 4 5 4 5 5 5   Basophils Relative  0 - 1 % 1 1 1 1 1 1 1   Neutrophils Absolute  1.85 - 7.62 Thousands/µL 3.55 3.30 3.13 3.74 3.65 4.18 4.09   Absolute Immature Grans  0.00 - 0.20 Thousand/uL 0.01 0.00 0.01 0.01 0.02 0.02 0.01   Absolute Lymphocytes  0.60 - 4.47 Thousands/µL 0.98 0.91 0.99 1.10 1.34 1.44 1.41   Absolute Monocytes  0.17 - 1.22 Thousand/µL 0.70 0.65 0.57 0.71 0.64 0.73 0.92        nt  Ref Range & Units 2/19/24  1:06 PM   Fibrinogen  207 - 520 mg/dL 247                       Component  Ref Range & Units 2/19/24  1:06 PM   Protime  11.6 - 14.5 seconds 14.7 High    INR  0.84 - 1.19 1.16              Component  Ref Range & Units 2/19/24  1:06 PM   PTT  23 - 37 seconds 32          Results for orders placed or performed in visit on 02/23/24   Overnight Oximetry Test   Result Value Ref Range    Supplier Name [employer] [facility]     Supplier Phone Number [phone]     Order Status Delivery Successful     Delivery Note      Delivery Request  Date 02/23/2024     Date Delivered  03/05/2024     Supplier Name 03/05/2024     Item Description Overnight Oximetry Test      Labs, Imaging, & Other studies:   All pertinent labs and imaging studies were personally reviewed      Reviewed and discussed with patient.    Assessment and plan: See diagnoses, orders and instructions below.  Continuation of care for alcoholic cirrhosis of liver with hepatosplenomegaly and thrombocytopenia secondary to splenomegaly.  Patient states he quit drinking alcohol in 1996 after drinking alcohol for some years.  Also he quit smoking cigarettes in 2014.  He follows with his gastroenterologist.  He states he had colonoscopy in 2022.  Ferritin is 150.  Blood counts are being monitored and platelet count has been running between [id number].  No history of bleeding.  He has couple of small purpuric spots back of his hands.  He is overweight.  He ambulates with a cane.  He gives history severe COPD, chronic renal disease, arthritis and chronic low back discomfort . History of gout.  Has tiredness.    Physical examination and test results are as recorded and discussed.  Thrombocytopenia is secondary to splenomegaly secondary to alcohol liver disease and cirrhosis.  Discussed and explained.  Questions answered.  Patient has quit drinking alcohol.  He follows with his GI/liver specialist.   Diet and activities per patient's primary physician and other consultants.  Patient to avoid falls and trauma.  Health screening tests.    Discussed precautions against COVID and other infections.  Patient to report to emergency room in case of bleeding and other medical emergencies.  All discussed in detail.  Questions answered.  Patient is capable of self-care at this time.  1. Thrombocytopenia (HCC)    - CBC and differential; Future  - Comprehensive metabolic panel; Future    2. Alcoholic cirrhosis of liver without ascites (HCC)      3. Hepatosplenomegaly      4. COPD, severe (HCC)    Blood work  prior to next visit in 6 months.     .  Patient will continue to follow with  primary physician and other consultants.  Patient  voiced understanding and agrees      Disclaimer: This document was prepared using a dictation device. If a word or phrase is confusing, or does not make sense, this is likely due to recognition error which was not discovered during the providers review. If you believe an error has occurred, please Contact me through [facility] Line service for chayito?cation.    Counseling / Coordination of Care   .  Provided counseling and support

## 2024-03-27 ENCOUNTER — TELEPHONE (OUTPATIENT)
Dept: RADIOLOGY | Facility: CLINIC | Age: 69
End: 2024-03-27

## 2024-03-27 NOTE — TELEPHONE ENCOUNTER
----- Message from Naldo Green MD sent at 3/27/2024  2:01 PM EDT -----  MRI is notable for degenerative changes throughout the cervical spine.  Particularly at the lower cervical region, he has degenerative changes and bone spurs that are closing off the tunnels where the nerves are exiting out of the neck.  This is more severe on the left side.  Because of this he has compression of the C8 nerve which was suspected when we ordered the test.  He is candidate for cervical epidural injection.  Would also recommend course of physical therapy.  If injection in PT fail, we will need to refer to spine surgery especially if there is persistent weakness.

## 2024-03-27 NOTE — TELEPHONE ENCOUNTER
S/W pt and advised of results and plan  Pt states he will do the YAIMA but will not go to PT since it made it worse in the past    Please place order for inj

## 2024-03-28 ENCOUNTER — TELEPHONE (OUTPATIENT)
Dept: RADIOLOGY | Facility: CLINIC | Age: 69
End: 2024-03-28

## 2024-03-28 DIAGNOSIS — M54.12 CERVICAL RADICULOPATHY: Primary | ICD-10-CM

## 2024-03-28 DIAGNOSIS — D69.6 THROMBOCYTOPENIA (HCC): ICD-10-CM

## 2024-03-28 NOTE — TELEPHONE ENCOUNTER
Once this pt is scheduled for YAIMA, please send back to Dr Green and Clinical   Will need order for Platelets prior to injection  Current order is for future in September per Hem/Onc

## 2024-03-28 NOTE — TELEPHONE ENCOUNTER
Order placed. He has a CBC that he needs to get and this needs to be done before the injection. His platelets are trending down and are in the 80's. Need this before doing a procedure in the neck

## 2024-03-28 NOTE — TELEPHONE ENCOUNTER
Pt scheduled for YAIMA with Dr Green on 4/16/24.    Pt denies taking prescription blood thinners, aspirin or nsaids.  (Per chart, pt takes mobic -- nursing team aware and will discuss with pt).     Pt given instructions over phone and sent instruction myc message as follow up.    Have you completed PT/HEP/Chiro in the past 6 months for dedicated area? No (eval only done on 12/18/23)   If yes, how long did you complete?  What was the frequency?  Did it provide relief?  If no, reason therapy was not completed? Per Dr Green's t/c note from 3/27/24, pt refusing PT as it has made his pain worse

## 2024-04-03 ENCOUNTER — TELEPHONE (OUTPATIENT)
Dept: GASTROENTEROLOGY | Facility: CLINIC | Age: 69
End: 2024-04-03

## 2024-04-03 NOTE — TELEPHONE ENCOUNTER
Pt needs to be scheduled for a 45 minute colonoscopy - hx of multiple sessile serrated adenoma polyps/hx to ta polyps (Dr Max pt). Spoke to pt whom is refusing further colonoscopies due to other health issues and also informed had a very hard time from recovering from last bowel prep due to kidney disease.

## 2024-04-11 ENCOUNTER — APPOINTMENT (OUTPATIENT)
Dept: LAB | Facility: MEDICAL CENTER | Age: 69
End: 2024-04-11
Payer: COMMERCIAL

## 2024-04-11 DIAGNOSIS — D69.6 THROMBOCYTOPENIA (HCC): ICD-10-CM

## 2024-04-11 LAB
ERYTHROCYTE [DISTWIDTH] IN BLOOD BY AUTOMATED COUNT: 14.6 % (ref 11.6–15.1)
HCT VFR BLD AUTO: 44.7 % (ref 36.5–49.3)
HGB BLD-MCNC: 15.1 G/DL (ref 12–17)
MCH RBC QN AUTO: 34.6 PG (ref 26.8–34.3)
MCHC RBC AUTO-ENTMCNC: 33.8 G/DL (ref 31.4–37.4)
MCV RBC AUTO: 102 FL (ref 82–98)
PLATELET # BLD AUTO: 77 THOUSANDS/UL (ref 149–390)
PMV BLD AUTO: 11.4 FL (ref 8.9–12.7)
RBC # BLD AUTO: 4.37 MILLION/UL (ref 3.88–5.62)
WBC # BLD AUTO: 5.01 THOUSAND/UL (ref 4.31–10.16)

## 2024-04-11 PROCEDURE — 36415 COLL VENOUS BLD VENIPUNCTURE: CPT

## 2024-04-11 PROCEDURE — 85027 COMPLETE CBC AUTOMATED: CPT

## 2024-04-12 ENCOUNTER — TELEPHONE (OUTPATIENT)
Age: 69
End: 2024-04-12

## 2024-04-12 DIAGNOSIS — M54.12 CERVICAL RADICULOPATHY: Primary | ICD-10-CM

## 2024-04-12 RX ORDER — PREGABALIN 50 MG/1
50 CAPSULE ORAL 2 TIMES DAILY
Qty: 30 CAPSULE | Refills: 0 | Status: SHIPPED | OUTPATIENT
Start: 2024-04-12

## 2024-04-12 NOTE — TELEPHONE ENCOUNTER
PA for PREGABLIN    Submitted via    []CMM-KEY   [x]Surescripts-Case ID # Case ID: [id number]    []Faxed to plan   []Other website   []Phone call Case ID #     Office notes sent, clinical questions answered. Awaiting determination    Turnaround time for your insurance to make a decision on your Prior Authorization can take 7-21 business days.

## 2024-05-02 DIAGNOSIS — N52.1 ERECTILE DYSFUNCTION DUE TO DISEASES CLASSIFIED ELSEWHERE: ICD-10-CM

## 2024-05-03 DIAGNOSIS — M54.12 CERVICAL RADICULOPATHY: ICD-10-CM

## 2024-05-03 RX ORDER — SILDENAFIL 100 MG/1
TABLET, FILM COATED ORAL
Qty: 6 TABLET | Refills: 5 | Status: SHIPPED | OUTPATIENT
Start: 2024-05-03

## 2024-05-08 ENCOUNTER — RA CDI HCC (OUTPATIENT)
Dept: OTHER | Facility: HOSPITAL | Age: 69
End: 2024-05-08

## 2024-05-14 PROBLEM — L98.9 SKIN LESION: Status: RESOLVED | Noted: 2017-05-24 | Resolved: 2024-05-14

## 2024-05-15 ENCOUNTER — OFFICE VISIT (OUTPATIENT)
Dept: FAMILY MEDICINE CLINIC | Facility: CLINIC | Age: 69
End: 2024-05-15
Payer: COMMERCIAL

## 2024-05-15 VITALS
OXYGEN SATURATION: 96 % | HEIGHT: 74 IN | DIASTOLIC BLOOD PRESSURE: 66 MMHG | TEMPERATURE: 97.9 F | SYSTOLIC BLOOD PRESSURE: 122 MMHG | WEIGHT: 315 LBS | BODY MASS INDEX: 40.43 KG/M2 | HEART RATE: 86 BPM

## 2024-05-15 DIAGNOSIS — M1A.3720 CHRONIC GOUT DUE TO RENAL IMPAIRMENT INVOLVING TOE OF LEFT FOOT WITHOUT TOPHUS: ICD-10-CM

## 2024-05-15 DIAGNOSIS — E78.6 HYPERLIPIDEMIA WITH LOW HDL: ICD-10-CM

## 2024-05-15 DIAGNOSIS — M79.651 PAIN OF RIGHT THIGH: ICD-10-CM

## 2024-05-15 DIAGNOSIS — N18.32 STAGE 3B CHRONIC KIDNEY DISEASE (HCC): ICD-10-CM

## 2024-05-15 DIAGNOSIS — J44.9 COPD, SEVERE (HCC): ICD-10-CM

## 2024-05-15 DIAGNOSIS — D69.3 CHRONIC ITP (IDIOPATHIC THROMBOCYTOPENIA) (HCC): ICD-10-CM

## 2024-05-15 DIAGNOSIS — E78.5 HYPERLIPIDEMIA WITH LOW HDL: ICD-10-CM

## 2024-05-15 DIAGNOSIS — G47.33 OSA ON CPAP: ICD-10-CM

## 2024-05-15 DIAGNOSIS — J45.40 MODERATE PERSISTENT ASTHMA, UNSPECIFIED WHETHER COMPLICATED: Primary | ICD-10-CM

## 2024-05-15 DIAGNOSIS — E66.01 MORBID OBESITY WITH BMI OF 50.0-59.9, ADULT (HCC): ICD-10-CM

## 2024-05-15 DIAGNOSIS — D69.6 THROMBOCYTOPENIA (HCC): ICD-10-CM

## 2024-05-15 PROCEDURE — 99214 OFFICE O/P EST MOD 30 MIN: CPT | Performed by: PHYSICIAN ASSISTANT

## 2024-05-15 PROCEDURE — G2211 COMPLEX E/M VISIT ADD ON: HCPCS | Performed by: PHYSICIAN ASSISTANT

## 2024-05-15 NOTE — PROGRESS NOTES
Assessment/Plan:     Diagnoses and all orders for this visit:    Moderate persistent asthma, unspecified whether complicated  Comments:  Asthma is currently stable continue current regimen  Orders:  -     CBC and differential    COPD, severe ([facility])  Comments:  Continue current regimen.  Call pulmonary for oxygen    ARACELIS on CPAP    Chronic gout due to renal impairment involving toe of left foot without tophus  Comments:  No recent flareups continue.  All    Stage 3b chronic kidney disease ([facility])  -     Comprehensive metabolic panel    Thrombocytopenia ([facility])  Comments:  Will continue to monitor CBC.  Follow-up with hematology  Orders:  -     CBC and differential    Chronic ITP (idiopathic thrombocytopenia) ([facility])    Morbid obesity with BMI of 50.0-59.9, adult ([facility])  Comments:  8weightloss encourage    Hyperlipidemia with low HDL  Comments:  Low-fat diet.  Orders:  -     Lipid panel    Pain of right thigh          Subjective:      Patient ID: Malachi Greenwood is a 68 y.o. male.    Presents in the office for follow-up chronic condition.  Patient has COPD severe and asthma overlap.  He is on Advair 250/50 along with Mucinex daily.  Is have a nebulizer and a Proventil inhaler if the need arises.  Is on CPAP for obstructive sleep apnea.  Is following with pulmonology.  States he was ordered home oxygen.  He has yet to receive this.  Will call pulmonology regards to this matter.  Has chronic kidney disease stage III.  His renal functions are currently stable.  Can has hereditary thrombocytopenia.  Does follow with hematology.  His last platelet count was 77,000.  PT and PTT are normal and fibrinogen was normal.  Lipids are diet controlled.  BPH he is on Flomax with good Cam outflow.  Has chronic pain due to degenerative disc disease.  He is currently using meloxicam 15 mg only as needed.  Has Flexeril if needed.  Given a prescription for Lyrica but has not yet started this.  States right now his pain is manageable.        The  following portions of the patient's history were reviewed and updated as appropriate: He   Patient Active Problem List    Diagnosis Date Noted    Alcohol dependence in sustained full remission ([facility]) 02/15/2024    Tendinopathy of left shoulder 12/06/2023    Atherosclerosis of aorta ([facility]) 09/14/2023    Colon polyps 12/03/2021    Skin lesion of right arm 12/03/2021    Internal hemorrhoids 12/01/2021    Gallstone 12/01/2021    Morbid obesity with BMI of 50.0-59.9, adult ([facility]) 09/14/2021    Hyperuricemia 06/17/2021    Cigarette nicotine dependence in remission 06/15/2021    Edema of both lower legs 06/14/2021    Chronic gout due to renal impairment 06/14/2021    BPH associated with nocturia 11/17/2020    GARNETT (dyspnea on exertion) 06/03/2020    Splenomegaly 08/27/2019    History of genital warts 06/06/2018    Stage 3b chronic kidney disease ([facility]) 02/20/2017    Nocturnal hypoxemia 04/01/2016    Hyperlipidemia with low HDL 11/19/2015    Arthritis 08/20/2015    Moderate persistent asthma 08/20/2015    Chronic ITP (idiopathic thrombocytopenia) ([facility]) 12/11/2014    ARACELIS on CPAP 08/24/2012    Vitamin D deficiency 08/24/2012    COPD, severe ([facility]) 07/12/2012     Current Outpatient Medications   Medication Sig Dispense Refill    albuterol (2.5 mg/3 mL) 0.083 % nebulizer solution Take 1 vial (2.5 mg total) by nebulization every 6 (six) hours as needed for wheezing 50 vial 3    albuterol (PROVENTIL HFA,VENTOLIN HFA) 90 mcg/act inhaler INHALE 2 PUFFS EVERY 4 HOURS AS NEEDED FOR SHORTNESS OF BREATH. 8.5 g 5    allopurinol (ZYLOPRIM) 100 mg tablet TAKE 1 TABLET BY MOUTH TWICE A  tablet 1    Cholecalciferol (VITAMIN D3) 1000 units CAPS Take by mouth      cyclobenzaprine (FLEXERIL) 5 mg tablet TAKE 1 TABLET BY MOUTH THREE TIMES A DAY AS NEEDED FOR MUSCLE SPASMS 60 tablet 0    Fluticasone-Salmeterol (Advair Diskus) 250-50 mcg/dose inhaler Inhale 1 puff 2 (two) times a day Rinse mouth after use. 180 blister 3    guaiFENesin (MUCINEX)  600 mg 12 hr tablet Take 1,200 mg by mouth daily Once at hs      meloxicam (MOBIC) 15 mg tablet TAKE 1 TABLET (15 MG TOTAL) BY MOUTH DAILY. (Patient taking differently: Take 15 mg by mouth daily As needed) 90 tablet 1    Multiple Vitamins tablet Take 1 tablet by mouth daily      sildenafil (VIAGRA) 100 mg tablet TAKE 1 TABLET DAILY AS NEEDED FOR ERECTILE DYSFUNCTION 6 tablet 5    tamsulosin (FLOMAX) 0.4 mg TAKE 1 CAPSULE BY MOUTH EVERY DAY WITH DINNER 90 capsule 3    pregabalin (LYRICA) 50 mg capsule Take 1 capsule (50 mg total) by mouth 2 (two) times a day Start by taking 1 pill at bedtime for 2 days. Then increase to 1 pill during the day and 1 pill at bedtime thereafter. (Patient not taking: Reported on 5/15/2024) 30 capsule 0     No current facility-administered medications for this visit.     He is allergic to darvon [propoxyphene]..    Review of Systems   Constitutional:  Negative for activity change, appetite change, chills, fatigue and fever.   HENT:  Negative for ear pain and sore throat.    Eyes:  Negative for visual disturbance.   Respiratory:  Positive for shortness of breath. Negative for cough.    Cardiovascular:  Positive for leg swelling. Negative for chest pain and palpitations.   Gastrointestinal:  Negative for abdominal pain, blood in stool, constipation, diarrhea and nausea.   Genitourinary:  Negative for difficulty urinating.   Musculoskeletal:  Positive for arthralgias, back pain and neck pain. Negative for myalgias.        Right  leg  pain   lateral  thigh  when  lying.     Skin:  Negative for rash.   Neurological:  Negative for dizziness, syncope and headaches.   Psychiatric/Behavioral:  Negative for self-injury, sleep disturbance and suicidal ideas. The patient is not nervous/anxious.          Objective:        Physical Exam  Vitals and nursing note reviewed.   Constitutional:       General: He is not in acute distress.     Appearance: He is well-developed. He is obese. He is not  ill-appearing.   HENT:      Head: Normocephalic and atraumatic.      Right Ear: Tympanic membrane, ear canal and external ear normal.      Left Ear: Tympanic membrane, ear canal and external ear normal.   Eyes:      Conjunctiva/sclera: Conjunctivae normal.      Pupils: Pupils are equal, round, and reactive to light.   Neck:      Thyroid: No thyromegaly.      Vascular: No carotid bruit.   Cardiovascular:      Rate and Rhythm: Normal rate and regular rhythm.      Heart sounds: Normal heart sounds. No murmur heard.  Pulmonary:      Effort: Pulmonary effort is normal.      Breath sounds: Normal breath sounds. No wheezing.   Abdominal:      General: Bowel sounds are normal.      Palpations: Abdomen is soft. There is no mass.      Tenderness: There is no abdominal tenderness.      Comments: Obese   abd   Musculoskeletal:      Right lower leg: Edema present.      Left lower leg: Edema present.      Comments: Non pitting   bilaterally.    Right  hip  nontender. Good   rom.   Tender   right  upper     thigh  near   groin.    Lymphadenopathy:      Cervical: No cervical adenopathy.   Skin:     General: Skin is warm and dry.   Neurological:      General: No focal deficit present.      Mental Status: He is alert and oriented to person, place, and time.   Psychiatric:         Mood and Affect: Mood normal.         Behavior: Behavior normal.         Thought Content: Thought content normal.         Judgment: Judgment normal.

## 2024-06-04 ENCOUNTER — HOSPITAL ENCOUNTER (OUTPATIENT)
Dept: CT IMAGING | Facility: HOSPITAL | Age: 69
Discharge: HOME | End: 2024-06-04
Attending: INTERNAL MEDICINE
Payer: COMMERCIAL

## 2024-06-04 DIAGNOSIS — F17.211 CIGARETTE NICOTINE DEPENDENCE IN REMISSION: ICD-10-CM

## 2024-06-04 PROCEDURE — 71271 CT THORAX LUNG CANCER SCR C-: CPT

## 2024-08-01 RX ORDER — PREGABALIN 50 MG/1
50 CAPSULE ORAL 2 TIMES DAILY
Qty: 30 CAPSULE | Refills: 0 | OUTPATIENT
Start: 2024-08-01

## 2024-08-06 ENCOUNTER — RA CDI HCC (OUTPATIENT)
Dept: OTHER | Facility: HOSPITAL | Age: 69
End: 2024-08-06

## 2024-08-12 ENCOUNTER — APPOINTMENT (OUTPATIENT)
Dept: LAB | Facility: MEDICAL CENTER | Age: 69
End: 2024-08-12
Payer: COMMERCIAL

## 2024-08-12 DIAGNOSIS — D69.6 THROMBOCYTOPENIA (HCC): ICD-10-CM

## 2024-08-12 LAB
ALBUMIN SERPL BCG-MCNC: 3.8 G/DL (ref 3.5–5)
ALP SERPL-CCNC: 89 U/L (ref 34–104)
ALT SERPL W P-5'-P-CCNC: 22 U/L (ref 7–52)
ANION GAP SERPL CALCULATED.3IONS-SCNC: 9 MMOL/L (ref 4–13)
ANISOCYTOSIS BLD QL SMEAR: PRESENT
AST SERPL W P-5'-P-CCNC: 32 U/L (ref 13–39)
BASOPHILS # BLD AUTO: 0.04 THOUSANDS/ÂΜL (ref 0–0.1)
BASOPHILS NFR BLD AUTO: 1 % (ref 0–1)
BILIRUB SERPL-MCNC: 1.53 MG/DL (ref 0.2–1)
BUN SERPL-MCNC: 18 MG/DL (ref 5–25)
CALCIUM SERPL-MCNC: 9 MG/DL (ref 8.4–10.2)
CHLORIDE SERPL-SCNC: 107 MMOL/L (ref 96–108)
CO2 SERPL-SCNC: 24 MMOL/L (ref 21–32)
CREAT SERPL-MCNC: 1.6 MG/DL (ref 0.6–1.3)
EOSINOPHIL # BLD AUTO: 0.21 THOUSAND/ÂΜL (ref 0–0.61)
EOSINOPHIL NFR BLD AUTO: 5 % (ref 0–6)
ERYTHROCYTE [DISTWIDTH] IN BLOOD BY AUTOMATED COUNT: 15 % (ref 11.6–15.1)
GFR SERPL CREATININE-BSD FRML MDRD: 43 ML/MIN/1.73SQ M
GLUCOSE SERPL-MCNC: 109 MG/DL (ref 65–140)
HCT VFR BLD AUTO: 42.7 % (ref 36.5–49.3)
HGB BLD-MCNC: 14.2 G/DL (ref 12–17)
IMM GRANULOCYTES # BLD AUTO: 0.02 THOUSAND/UL (ref 0–0.2)
IMM GRANULOCYTES NFR BLD AUTO: 0 % (ref 0–2)
LYMPHOCYTES # BLD AUTO: 0.83 THOUSANDS/ÂΜL (ref 0.6–4.47)
LYMPHOCYTES NFR BLD AUTO: 18 % (ref 14–44)
MACROCYTES BLD QL AUTO: PRESENT
MCH RBC QN AUTO: 34.3 PG (ref 26.8–34.3)
MCHC RBC AUTO-ENTMCNC: 33.3 G/DL (ref 31.4–37.4)
MCV RBC AUTO: 103 FL (ref 82–98)
MONOCYTES # BLD AUTO: 0.49 THOUSAND/ÂΜL (ref 0.17–1.22)
MONOCYTES NFR BLD AUTO: 11 % (ref 4–12)
NEUTROPHILS # BLD AUTO: 2.92 THOUSANDS/ÂΜL (ref 1.85–7.62)
NEUTS SEG NFR BLD AUTO: 65 % (ref 43–75)
NRBC BLD AUTO-RTO: 0 /100 WBCS
OVALOCYTES BLD QL SMEAR: PRESENT
PLATELET # BLD AUTO: 80 THOUSANDS/UL (ref 149–390)
PLATELET BLD QL SMEAR: ABNORMAL
PMV BLD AUTO: 10.9 FL (ref 8.9–12.7)
POIKILOCYTOSIS BLD QL SMEAR: PRESENT
POLYCHROMASIA BLD QL SMEAR: PRESENT
POTASSIUM SERPL-SCNC: 4.2 MMOL/L (ref 3.5–5.3)
PROT SERPL-MCNC: 6.2 G/DL (ref 6.4–8.4)
RBC # BLD AUTO: 4.14 MILLION/UL (ref 3.88–5.62)
RBC MORPH BLD: PRESENT
SODIUM SERPL-SCNC: 140 MMOL/L (ref 135–147)
WBC # BLD AUTO: 4.51 THOUSAND/UL (ref 4.31–10.16)

## 2024-08-12 PROCEDURE — 36415 COLL VENOUS BLD VENIPUNCTURE: CPT

## 2024-08-12 PROCEDURE — 85025 COMPLETE CBC W/AUTO DIFF WBC: CPT

## 2024-08-12 PROCEDURE — 80053 COMPREHEN METABOLIC PANEL: CPT

## 2024-08-15 ENCOUNTER — OFFICE VISIT (OUTPATIENT)
Dept: FAMILY MEDICINE CLINIC | Facility: CLINIC | Age: 69
End: 2024-08-15
Payer: COMMERCIAL

## 2024-08-15 VITALS
HEIGHT: 74 IN | SYSTOLIC BLOOD PRESSURE: 136 MMHG | BODY MASS INDEX: 40.43 KG/M2 | HEART RATE: 107 BPM | TEMPERATURE: 97.6 F | OXYGEN SATURATION: 97 % | DIASTOLIC BLOOD PRESSURE: 76 MMHG | WEIGHT: 315 LBS

## 2024-08-15 DIAGNOSIS — D69.3 CHRONIC ITP (IDIOPATHIC THROMBOCYTOPENIA) (HCC): ICD-10-CM

## 2024-08-15 DIAGNOSIS — J44.9 COPD, SEVERE (HCC): Primary | ICD-10-CM

## 2024-08-15 DIAGNOSIS — M1A.3720 CHRONIC GOUT DUE TO RENAL IMPAIRMENT INVOLVING TOE OF LEFT FOOT WITHOUT TOPHUS: ICD-10-CM

## 2024-08-15 DIAGNOSIS — E66.01 MORBID OBESITY WITH BMI OF 50.0-59.9, ADULT (HCC): ICD-10-CM

## 2024-08-15 DIAGNOSIS — G47.33 OSA ON CPAP: ICD-10-CM

## 2024-08-15 DIAGNOSIS — J44.9 COPD, SEVERE (HCC): ICD-10-CM

## 2024-08-15 DIAGNOSIS — E78.5 HYPERLIPIDEMIA WITH LOW HDL: ICD-10-CM

## 2024-08-15 DIAGNOSIS — R60.0 EDEMA OF BOTH LOWER LEGS: ICD-10-CM

## 2024-08-15 DIAGNOSIS — N18.32 STAGE 3B CHRONIC KIDNEY DISEASE (HCC): ICD-10-CM

## 2024-08-15 DIAGNOSIS — M48.02 NEURAL FORAMINAL STENOSIS OF CERVICAL SPINE: ICD-10-CM

## 2024-08-15 DIAGNOSIS — E78.6 HYPERLIPIDEMIA WITH LOW HDL: ICD-10-CM

## 2024-08-15 PROCEDURE — G2211 COMPLEX E/M VISIT ADD ON: HCPCS | Performed by: PHYSICIAN ASSISTANT

## 2024-08-15 PROCEDURE — 99214 OFFICE O/P EST MOD 30 MIN: CPT | Performed by: PHYSICIAN ASSISTANT

## 2024-08-15 RX ORDER — DULOXETIN HYDROCHLORIDE 30 MG/1
30 CAPSULE, DELAYED RELEASE ORAL DAILY
Qty: 30 CAPSULE | Refills: 3 | Status: SHIPPED | OUTPATIENT
Start: 2024-08-15

## 2024-08-15 RX ORDER — ALBUTEROL SULFATE 0.83 MG/ML
2.5 SOLUTION RESPIRATORY (INHALATION) EVERY 6 HOURS PRN
Qty: 150 ML | Refills: 3 | Status: SHIPPED | OUTPATIENT
Start: 2024-08-15

## 2024-08-15 NOTE — PROGRESS NOTES
Assessment/Plan:     Diagnoses and all orders for this visit:    COPD, severe ([facility])  Comments:  Continue current regimen.  Follow-up with pulmonary.  Patient is awaiting oxygen therapy  Orders:  -     albuterol (2.5 mg/3 mL) 0.083 % nebulizer solution; Take 3 mL (2.5 mg total) by nebulization every 6 (six) hours as needed for wheezing    ARACELIS on CPAP    Chronic ITP (idiopathic thrombocytopenia) ([facility])  Comments:  Platelets stable.  Follow-up with hematology    Chronic gout due to renal impairment involving toe of left foot without tophus  Comments:  Continue allopurinol 100 mg/day  Orders:  -     Uric acid; Future    Stage 3b chronic kidney disease ([facility])  Comments:  Avoid NSAIDs if possible.  Renal functions are currently stable  Orders:  -     Basic metabolic panel  -     Uric acid; Future    Morbid obesity with BMI of 50.0-59.9, adult ([facility])    Hyperlipidemia with low HDL  Comments:  Diet controlled  Orders:  -     Lipid panel    Neural foraminal stenosis of cervical spine  -     DULoxetine (CYMBALTA) 30 mg delayed release capsule; Take 1 capsule (30 mg total) by mouth daily    Edema of both lower legs    COPD, severe ([facility])  -     albuterol (2.5 mg/3 mL) 0.083 % nebulizer solution; Take 3 mL (2.5 mg total) by nebulization every 6 (six) hours as needed for wheezing          Subjective:      Patient ID: Malachi Greenwood is a 69 y.o. male.    Patient presents in the office for follow-up chronic conditions.  Patient has severe COPD asthma overlap.  Currently on Advair 250/50 and Proventil.  He does take Mucinex.  He also has a nebulizer.  Waiting for supplemental oxygen.  Been following with pulmonary.  Patient is also on CPAP.  Patient has thrombocytopenia.  Recent platelet level was stable.  He has follow-up with hematology next month.  Has chronic kidney disease stage III.  He has hyperuricemia and history of gout due to the chronic kidney disease.  Pressure is well-controlled.  Functions are stable.  Will continue  allopurinol 100 mg a day for prevention of gout.  Has severe osteoarthritis of his knees.  Patient also has chronic back pain.  Off the meloxicam to save his kidneys.  He uses Tylenol and Flexeril.  Lyrica was not excessive for due to all the side effects.  We will try Cymbalta 30 mg.  pH she is on Flomax 0.4 mg.  This greatly helps his to urinate.        The following portions of the patient's history were reviewed and updated as appropriate: He   Patient Active Problem List    Diagnosis Date Noted    Alcohol dependence in sustained full remission ([facility]) 02/15/2024    Tendinopathy of left shoulder 12/06/2023    Atherosclerosis of aorta ([facility]) 09/14/2023    Colon polyps 12/03/2021    Skin lesion of right arm 12/03/2021    Internal hemorrhoids 12/01/2021    Gallstone 12/01/2021    Morbid obesity with BMI of 50.0-59.9, adult ([facility]) 09/14/2021    Hyperuricemia 06/17/2021    Cigarette nicotine dependence in remission 06/15/2021    Edema of both lower legs 06/14/2021    Chronic gout due to renal impairment 06/14/2021    BPH associated with nocturia 11/17/2020    GARNETT (dyspnea on exertion) 06/03/2020    Splenomegaly 08/27/2019    History of genital warts 06/06/2018    Stage 3b chronic kidney disease ([facility]) 02/20/2017    Nocturnal hypoxemia 04/01/2016    Hyperlipidemia with low HDL 11/19/2015    Arthritis 08/20/2015    Moderate persistent asthma 08/20/2015    Chronic ITP (idiopathic thrombocytopenia) ([facility]) 12/11/2014    ARACELIS on CPAP 08/24/2012    Vitamin D deficiency 08/24/2012    COPD, severe ([facility]) 07/12/2012     Current Outpatient Medications   Medication Sig Dispense Refill    albuterol (2.5 mg/3 mL) 0.083 % nebulizer solution Take 3 mL (2.5 mg total) by nebulization every 6 (six) hours as needed for wheezing 150 mL 3    albuterol (PROVENTIL HFA,VENTOLIN HFA) 90 mcg/act inhaler INHALE 2 PUFFS EVERY 4 HOURS AS NEEDED FOR SHORTNESS OF BREATH. 8.5 g 5    allopurinol (ZYLOPRIM) 100 mg tablet TAKE 1 TABLET BY MOUTH TWICE A   tablet 1    Cholecalciferol (VITAMIN D3) 1000 units CAPS Take by mouth      cyclobenzaprine (FLEXERIL) 5 mg tablet TAKE 1 TABLET BY MOUTH THREE TIMES A DAY AS NEEDED FOR MUSCLE SPASMS 60 tablet 0    DULoxetine (CYMBALTA) 30 mg delayed release capsule Take 1 capsule (30 mg total) by mouth daily 30 capsule 3    Fluticasone-Salmeterol (Advair Diskus) 250-50 mcg/dose inhaler Inhale 1 puff 2 (two) times a day Rinse mouth after use. 180 blister 3    guaiFENesin (MUCINEX) 600 mg 12 hr tablet Take 1,200 mg by mouth daily Once at hs      Multiple Vitamins tablet Take 1 tablet by mouth daily      sildenafil (VIAGRA) 100 mg tablet TAKE 1 TABLET DAILY AS NEEDED FOR ERECTILE DYSFUNCTION 6 tablet 5    tamsulosin (FLOMAX) 0.4 mg TAKE 1 CAPSULE BY MOUTH EVERY DAY WITH DINNER 90 capsule 3     No current facility-administered medications for this visit.     He is allergic to darvon [propoxyphene]..    Review of Systems   Constitutional:  Negative for activity change, appetite change, chills, fatigue and fever.   HENT:  Negative for ear pain and sore throat.    Eyes:  Negative for visual disturbance.   Respiratory:  Positive for shortness of breath. Negative for cough.    Cardiovascular:  Positive for leg swelling. Negative for chest pain and palpitations.   Gastrointestinal:  Negative for abdominal pain, blood in stool, constipation, diarrhea and nausea.   Genitourinary:  Negative for difficulty urinating.   Musculoskeletal:  Positive for arthralgias, back pain and gait problem. Negative for myalgias.   Skin:  Negative for rash.   Neurological:  Negative for dizziness, syncope and headaches.   Psychiatric/Behavioral:  Negative for self-injury, sleep disturbance and suicidal ideas. The patient is not nervous/anxious.          Objective:        Physical Exam  Vitals and nursing note reviewed.   Constitutional:       General: He is not in acute distress.     Appearance: He is obese. He is not ill-appearing.   HENT:      Head:  Normocephalic and atraumatic.      Right Ear: Tympanic membrane, ear canal and external ear normal.      Left Ear: Tympanic membrane, ear canal and external ear normal.   Neck:      Vascular: No carotid bruit.   Cardiovascular:      Rate and Rhythm: Regular rhythm. Tachycardia present.      Heart sounds: Normal heart sounds.   Pulmonary:      Effort: Tachypnea present. No respiratory distress.      Breath sounds: Decreased breath sounds present.   Musculoskeletal:      Right lower leg: Edema present.      Left lower leg: Edema present.   Lymphadenopathy:      Cervical: No cervical adenopathy.   Skin:     General: Skin is warm and dry.   Neurological:      Mental Status: He is oriented to person, place, and time.   Psychiatric:         Mood and Affect: Mood normal.         Behavior: Behavior normal.         Thought Content: Thought content normal.         Judgment: Judgment normal.

## 2024-08-16 DIAGNOSIS — M19.90 ARTHRITIS: ICD-10-CM

## 2024-08-16 DIAGNOSIS — M48.02 NEURAL FORAMINAL STENOSIS OF CERVICAL SPINE: ICD-10-CM

## 2024-08-19 ENCOUNTER — PATIENT MESSAGE (OUTPATIENT)
Dept: PULMONOLOGY | Facility: CLINIC | Age: 69
End: 2024-08-19

## 2024-08-19 RX ORDER — CYCLOBENZAPRINE HCL 5 MG
5 TABLET ORAL 3 TIMES DAILY PRN
Qty: 60 TABLET | Refills: 0 | Status: SHIPPED | OUTPATIENT
Start: 2024-08-19

## 2024-08-21 NOTE — PATIENT COMMUNICATION
"Received message from [employer] in [place] \"This order will reflect as canceled, as the face to face and testing are over 30 days prior to the O2 order. The patient will be required to have a new office visit, new testing, and new O2 order all to be completed within 30 days of each other. Also, an additional respiratory diagnosis is required. Thank you \"   \"We will contact the patient to advise he can receive O2 at an out of pocket rate and should follow up with your office. Thank you \"       "

## 2024-08-21 NOTE — PATIENT COMMUNICATION
As per paracte, oxygen order was canceled.  [facility]

## 2024-08-26 ENCOUNTER — OFFICE VISIT (OUTPATIENT)
Dept: PULMONOLOGY | Facility: CLINIC | Age: 69
End: 2024-08-26
Payer: COMMERCIAL

## 2024-08-26 VITALS
DIASTOLIC BLOOD PRESSURE: 68 MMHG | TEMPERATURE: 97.2 F | OXYGEN SATURATION: 95 % | SYSTOLIC BLOOD PRESSURE: 136 MMHG | HEART RATE: 100 BPM

## 2024-08-26 DIAGNOSIS — R06.09 DOE (DYSPNEA ON EXERTION): ICD-10-CM

## 2024-08-26 DIAGNOSIS — J44.9 COPD, SEVERE (HCC): Primary | ICD-10-CM

## 2024-08-26 DIAGNOSIS — G47.33 OSA ON CPAP: ICD-10-CM

## 2024-08-26 DIAGNOSIS — G47.34 NOCTURNAL HYPOXEMIA: ICD-10-CM

## 2024-08-26 PROCEDURE — 99214 OFFICE O/P EST MOD 30 MIN: CPT | Performed by: INTERNAL MEDICINE

## 2024-08-26 PROCEDURE — 94618 PULMONARY STRESS TESTING: CPT

## 2024-08-26 NOTE — PROGRESS NOTES
Pulmonary Follow Up Note   Malachi Greenwood 69 y.o. male MRN: [id number]  8/26/2024      Assessment/Plan:    Severe COPD  Patient reports worsening shortness of breath and decreased exercise tolerance.   POCT oxygen titration study with no evidence of oxygen desaturation on RA.  Plan:  Continue Advair twice daily and albuterol as needed.  Follow up with Dr. Rivas at next scheduled appointment.   Unfortunately, patient does not qualify for portable home oxygen at this time.     ARACELIS on CPAP  Reports being compliant with CPAP.    Nocturnal hypoxemia  Patient does have evidence of oxygen desaturation during sleep. Nocturnal oxygen was offered, however patient declined at this time. He was advised to contact the office if he wishes to have nocturnal oxygen ordered.    Sinus tachycardia  Noted during walk test. Recommend cardiology follow up.      History of Present Illness   HPI:  Malachi Greenwood is a 69 y.o. male with a PMHx of severe COPD, ARACELIS on CPAP, stage III CKD, HLD, BPH, former smoker, who comes to the office for a follow up visit regarding severe COPD.    Patient follows with Dr. Rivas and was last seen in the office on 02/23/2024. Today, he reports feeling short of breath and was wondering if he would qualify for portable oxygen. He continues to use Advair twice daily and albuterol as needed. He has ARACELIS and reports being compliant with the CPAP at night.     Review of Systems   Constitutional:  Negative for appetite change and fever.   HENT:  Positive for sneezing. Negative for ear pain, postnasal drip, rhinorrhea, sore throat and trouble swallowing.    Respiratory:  Positive for shortness of breath.    Cardiovascular:  Negative for chest pain.   Musculoskeletal:  Negative for myalgias.   Neurological:  Negative for headaches.         Historical Information   Past Medical History:   Diagnosis Date    Arthritis     Asthma     Chest pain     atypical    Chronic bronchitis (HCC) 2014    Chronic  kidney disease     Colon polyp     COPD (chronic obstructive pulmonary disease) (HCC)     CPAP (continuous positive airway pressure) dependence     Decreased glomerular filtration rate     Hamstring strain     Herniated lumbar intervertebral disc     History of alcohol use     uncomplicated    History of back pain     lower    History of colon polyps     sigmoid    History of genital warts     History of lipoma     History of muscle spasm     lumbar paraspinous muscle    History of umbilical hernia     Lateral pain of left hip     Low HDL (under 40)     Lumbar radiculopathy     Obesity     Pneumonia 1975    Respiratory distress     acute    Shortness of breath     Sleep apnea     CPAP PT WILL BRING    Sleep apnea, obstructive      Past Surgical History:   Procedure Laterality Date    ADENOIDECTOMY      APPENDECTOMY      COLONOSCOPY      HAND SURGERY Left     CYST REMOVAL    HERNIA REPAIR  2017    HIP SURGERY Bilateral     IR BIOPSY BONE MARROW  2023    LIPECTOMY      thigh    LIPOMA RESECTION Left     HIP    ID RPR UMBILICAL HRNA 5 YRS/> REDUCIBLE N/A 2017    Procedure: REPAIR HERNIA UMBILICAL;  Surgeon: Sushant Buckner MD;  Location: [facility];  Service: General    TONSILLECTOMY      WRIST GANGLION EXCISION       Family History   Problem Relation Age of Onset    Heart disease Mother         cardiac disorder    Hypertension Mother          at age 78    Cancer Mother         ovarian cancer  at age 78    COPD Father          at age 83    Heart disease Family         cardiac disorder    Cancer Family         lung    Emphysema Family         pulmonary unspecified emphysema    Diabetes Family     Glaucoma Family     Cancer Family         lung    Cancer Paternal Grandmother          at age 78         Meds/Allergies     Current Outpatient Medications:     albuterol (2.5 mg/3 mL) 0.083 % nebulizer solution, Take 3 mL (2.5 mg total) by nebulization every 6 (six) hours as needed for wheezing,  Disp: 150 mL, Rfl: 3    albuterol (PROVENTIL HFA,VENTOLIN HFA) 90 mcg/act inhaler, INHALE 2 PUFFS EVERY 4 HOURS AS NEEDED FOR SHORTNESS OF BREATH., Disp: 8.5 g, Rfl: 5    allopurinol (ZYLOPRIM) 100 mg tablet, TAKE 1 TABLET BY MOUTH TWICE A DAY, Disp: 180 tablet, Rfl: 1    Cholecalciferol (VITAMIN D3) 1000 units CAPS, Take by mouth, Disp: , Rfl:     cyclobenzaprine (FLEXERIL) 5 mg tablet, TAKE 1 TABLET BY MOUTH THREE TIMES A DAY AS NEEDED FOR MUSCLE SPASMS, Disp: 60 tablet, Rfl: 0    DULoxetine (CYMBALTA) 30 mg delayed release capsule, Take 1 capsule (30 mg total) by mouth daily, Disp: 30 capsule, Rfl: 3    Fluticasone-Salmeterol (Advair Diskus) 250-50 mcg/dose inhaler, Inhale 1 puff 2 (two) times a day Rinse mouth after use., Disp: 180 blister, Rfl: 3    guaiFENesin (MUCINEX) 600 mg 12 hr tablet, Take 1,200 mg by mouth daily Once at hs, Disp: , Rfl:     Multiple Vitamins tablet, Take 1 tablet by mouth daily, Disp: , Rfl:     sildenafil (VIAGRA) 100 mg tablet, TAKE 1 TABLET DAILY AS NEEDED FOR ERECTILE DYSFUNCTION, Disp: 6 tablet, Rfl: 5    tamsulosin (FLOMAX) 0.4 mg, TAKE 1 CAPSULE BY MOUTH EVERY DAY WITH DINNER, Disp: 90 capsule, Rfl: 3  Allergies   Allergen Reactions    Darvon [Propoxyphene] Other (See Comments)     hallucinations       Vitals: Blood pressure 136/68, pulse 100, temperature (!) 97.2 °F (36.2 °C), temperature source Tympanic, SpO2 95%. There is no height or weight on file to calculate BMI. Oxygen Therapy  SpO2: 95 %  Oxygen Therapy: None (Room air)      Physical Exam  Physical Exam  Vitals reviewed.   Constitutional:       General: He is not in acute distress.     Appearance: He is not ill-appearing or toxic-appearing.   HENT:      Head: Normocephalic.   Eyes:      General: No scleral icterus.     Pupils: Pupils are equal, round, and reactive to light.   Cardiovascular:      Rate and Rhythm: Normal rate and regular rhythm.      Heart sounds: No murmur heard.  Pulmonary:      Effort: Pulmonary effort  "is normal. No respiratory distress.      Breath sounds: Normal breath sounds. No wheezing, rhonchi or rales.   Abdominal:      General: There is no distension.      Palpations: Abdomen is soft.      Tenderness: There is no abdominal tenderness. There is no guarding.   Musculoskeletal:      Right lower leg: No edema.      Left lower leg: No edema.   Skin:     General: Skin is warm.      Capillary Refill: Capillary refill takes less than 2 seconds.   Neurological:      General: No focal deficit present.      Mental Status: He is alert and oriented to person, place, and time. Mental status is at baseline.      Cranial Nerves: No cranial nerve deficit.   Psychiatric:         Mood and Affect: Mood normal.         Labs: I have personally reviewed pertinent lab results.  Lab Results   Component Value Date    WBC 4.51 08/12/2024    HGB 14.2 08/12/2024    HCT 42.7 08/12/2024     (H) 08/12/2024    PLT 80 (L) 08/12/2024     Lab Results   Component Value Date    CALCIUM 9.0 08/12/2024     06/14/2017    K 4.2 08/12/2024    CO2 24 08/12/2024     08/12/2024    BUN 18 08/12/2024    CREATININE 1.60 (H) 08/12/2024     No results found for: \"IGE\"  Lab Results   Component Value Date    ALT 22 08/12/2024    AST 32 08/12/2024    ALKPHOS 89 08/12/2024    BILITOT 0.9 06/14/2017       Imaging and other studies: I have personally reviewed pertinent reports.   and I have personally reviewed pertinent films in PACS    LDCT chest 06/04/2024: Stable chest CT. No new suspicious appearing pulmonary nodule.     Pulmonary function testing 10/24/2023:   FEV1/FVC Ratio: 57%  Forced Vital Capacity: 2.81 L           60% predicted  FEV1: 1.61 L45% predicted     After administration of bronchodilator   FEV1/FVC Ratio: 59%  FVC: 2.96 L, 63% predicted, 5% change  FEV1: 1.73 L, 48% predicted, 7% change     Lung volumes by body plethysmography:   Total Lung Capacity 86% predicted   Residual volume 120% predicted     DLCO corrected for " patients hemoglobin level: 76%    EKG, Pathology, and Other Studies: I have personally reviewed pertinent reports.        Hallie Greenfield MD  Pulmonary and Critical Care Fellowship, PGY-6  [facility]    Answers submitted by the patient for this visit:  Pulmonology Questionnaire (Submitted on 8/26/2024)  Chief Complaint: Primary symptoms  Do you experience frequent throat clearing?: Yes  Chronicity: chronic  When did you first notice your symptoms?: more than 1 year ago  How often do your symptoms occur?: 2 to 4 times per day  Since you first noticed this problem, how has it changed?: unchanged  Do you have shortness of breath that occurs with effort or exertion?: Yes  Do you have ear congestion?: No  Do you have heartburn?: No  Do you have fatigue?: Yes  Do you have nasal congestion?: No  Do you have shortness of breath when lying flat?: No  Do you have shortness of breath when you wake up?: No  Do you have sweats?: No  Have you experienced weight loss?: No  Which of the following makes your symptoms worse?: change in weather, climbing stairs, exercise, exposure to fumes, exposure to smoke, pollen, strenuous activity, URI  Which of the following makes your symptoms better?: cold air, rest

## 2024-09-10 DIAGNOSIS — M48.02 NEURAL FORAMINAL STENOSIS OF CERVICAL SPINE: ICD-10-CM

## 2024-09-10 RX ORDER — DULOXETIN HYDROCHLORIDE 30 MG/1
30 CAPSULE, DELAYED RELEASE ORAL DAILY
Qty: 90 CAPSULE | Refills: 0 | Status: SHIPPED | OUTPATIENT
Start: 2024-09-10

## 2024-09-12 DIAGNOSIS — M19.90 ARTHRITIS: ICD-10-CM

## 2024-09-12 DIAGNOSIS — M48.02 NEURAL FORAMINAL STENOSIS OF CERVICAL SPINE: ICD-10-CM

## 2024-09-12 RX ORDER — CYCLOBENZAPRINE HCL 5 MG
TABLET ORAL
Qty: 60 TABLET | Refills: 0 | Status: SHIPPED | OUTPATIENT
Start: 2024-09-12

## 2024-09-13 ENCOUNTER — OFFICE VISIT (OUTPATIENT)
Dept: PULMONOLOGY | Facility: CLINIC | Age: 69
End: 2024-09-13
Payer: COMMERCIAL

## 2024-09-13 VITALS
BODY MASS INDEX: 40.43 KG/M2 | WEIGHT: 315 LBS | SYSTOLIC BLOOD PRESSURE: 120 MMHG | HEIGHT: 74 IN | HEART RATE: 101 BPM | DIASTOLIC BLOOD PRESSURE: 64 MMHG | OXYGEN SATURATION: 96 % | TEMPERATURE: 98.1 F

## 2024-09-13 DIAGNOSIS — F17.211 CIGARETTE NICOTINE DEPENDENCE IN REMISSION: ICD-10-CM

## 2024-09-13 DIAGNOSIS — G47.33 OSA ON CPAP: ICD-10-CM

## 2024-09-13 DIAGNOSIS — G47.34 NOCTURNAL HYPOXEMIA: ICD-10-CM

## 2024-09-13 DIAGNOSIS — J44.9 COPD, SEVERE (HCC): Primary | ICD-10-CM

## 2024-09-13 PROCEDURE — 99214 OFFICE O/P EST MOD 30 MIN: CPT | Performed by: INTERNAL MEDICINE

## 2024-09-13 PROCEDURE — G2211 COMPLEX E/M VISIT ADD ON: HCPCS | Performed by: INTERNAL MEDICINE

## 2024-09-13 NOTE — ASSESSMENT & PLAN NOTE
He has been compliant with CPAP during hours of sleep.  I reviewed compliance data for 8/14/2024 through 9/12/2024.  He used the device every night for an average of 9 hours and 50 minutes.  Average AHI 0.5.  He is demonstrating compliance, deriving clinical benefit and ongoing use is medically necessary.

## 2024-09-13 NOTE — ASSESSMENT & PLAN NOTE
He has demonstrated nocturnal hypoxia in the past and would qualify for stationary concentrator to use with CPAP.  However, he declines that equipment and will seek an [employer] to pay out-of-pocket.

## 2024-09-13 NOTE — PROGRESS NOTES
Pulmonary Follow Up Note   Malachi Greenwood 69 y.o. male MRN: [id number]  9/13/2024      Assessment/Plan:     COPD, severe (HCC)  Continue Advair 250/50 twice daily.  He did not find Trelegy Ellipta to be beneficial in the past.    ARACELIS on CPAP  He has been compliant with CPAP during hours of sleep.  I reviewed compliance data for 8/14/2024 through 9/12/2024.  He used the device every night for an average of 9 hours and 50 minutes.  Average AHI 0.5.  He is demonstrating compliance, deriving clinical benefit and ongoing use is medically necessary.    Nocturnal hypoxemia  He has demonstrated nocturnal hypoxia in the past and would qualify for stationary concentrator to use with CPAP.  However, he declines that equipment and will seek an [employer] to pay out-of-pocket.    Return in about 6 months (around 3/13/2025).    Visit orders:    Diagnoses and all orders for this visit:    COPD, severe (HCC)    Nocturnal hypoxemia    ARACELIS on CPAP    Cigarette nicotine dependence in remission      History of Present Illness   HPI:  Malachi Greenwood is a 69 y.o. male who is here today for follow-up regarding severe COPD and obstructive sleep apnea.  His pulmonary status is overall stable since his last visit with me in February.  He was seen more recently by our team in hopes of acquiring supplemental oxygen to use with exertion.  6-minute walk test did not qualify him.  He is considering paying out-of-pocket for an [employer].  He has been using CPAP on a nightly basis and derives significant benefit from it.  He has no cough, wheeze or sputum production.  He is compliant with Advair twice daily and uses his albuterol rescue inhaler once before bedtime but not typically throughout the course of the day.  He rarely uses the nebulizer.    Review of Systems   Constitutional:  Negative for appetite change and fever.   HENT:  Negative for ear pain, postnasal drip, rhinorrhea, sneezing, sore throat and trouble swallowing.    Respiratory:   Positive for shortness of breath.    Cardiovascular:  Negative for chest pain.   Musculoskeletal:  Negative for myalgias.   Neurological:  Negative for headaches.   All other systems reviewed and are negative.      Medical, Family and Social history reviewed and updated as appropriate    Historical Information   Past Medical History:   Diagnosis Date    Arthritis     Asthma     Chest pain     atypical    Chronic bronchitis (HCC)     Chronic kidney disease     Colon polyp     COPD (chronic obstructive pulmonary disease) (HCC)     CPAP (continuous positive airway pressure) dependence     Decreased glomerular filtration rate     Hamstring strain     Herniated lumbar intervertebral disc     History of alcohol use     uncomplicated    History of back pain     lower    History of colon polyps     sigmoid    History of genital warts     History of lipoma     History of muscle spasm     lumbar paraspinous muscle    History of umbilical hernia     Lateral pain of left hip     Low HDL (under 40)     Lumbar radiculopathy     Obesity     Pneumonia 1975    Respiratory distress     acute    Shortness of breath     Sleep apnea     CPAP PT WILL BRING    Sleep apnea, obstructive      Past Surgical History:   Procedure Laterality Date    ADENOIDECTOMY      APPENDECTOMY      COLONOSCOPY      HAND SURGERY Left     CYST REMOVAL    HERNIA REPAIR  2017    HIP SURGERY Bilateral     IR BIOPSY BONE MARROW  2023    LIPECTOMY      thigh    LIPOMA RESECTION Left     HIP    MI RPR UMBILICAL HRNA 5 YRS/> REDUCIBLE N/A 2017    Procedure: REPAIR HERNIA UMBILICAL;  Surgeon: Sushant Buckner MD;  Location: [facility];  Service: General    TONSILLECTOMY      WRIST GANGLION EXCISION       Family History   Problem Relation Age of Onset    Heart disease Mother         cardiac disorder    Hypertension Mother          at age 78    Cancer Mother         ovarian cancer  at age 78    COPD Father          at age 83    Heart  disease Family         cardiac disorder    Cancer Family         lung    Emphysema Family         pulmonary unspecified emphysema    Diabetes Family     Glaucoma Family     Cancer Family         lung    Cancer Paternal Grandmother          at age 78       Social History     Tobacco Use   Smoking Status Former    Current packs/day: 0.00    Average packs/day: 2.0 packs/day for 42.7 years (85.5 ttl pk-yrs)    Types: Cigarettes    Start date: 1972    Quit date: 10/2014    Years since quittin.9    Passive exposure: Past   Smokeless Tobacco Never   Tobacco Comments    Quit for a 16 year period in between start and final quit date.         Meds/Allergies     Current Outpatient Medications:     albuterol (2.5 mg/3 mL) 0.083 % nebulizer solution, Take 3 mL (2.5 mg total) by nebulization every 6 (six) hours as needed for wheezing, Disp: 150 mL, Rfl: 3    albuterol (PROVENTIL HFA,VENTOLIN HFA) 90 mcg/act inhaler, INHALE 2 PUFFS EVERY 4 HOURS AS NEEDED FOR SHORTNESS OF BREATH., Disp: 8.5 g, Rfl: 5    allopurinol (ZYLOPRIM) 100 mg tablet, TAKE 1 TABLET BY MOUTH TWICE A DAY, Disp: 180 tablet, Rfl: 1    Cholecalciferol (VITAMIN D3) 1000 units CAPS, Take by mouth, Disp: , Rfl:     cyclobenzaprine (FLEXERIL) 5 mg tablet, TAKE 1 TABLET BY MOUTH THREE TIMES A DAY AS NEEDED FOR MUSCLE SPASM, Disp: 60 tablet, Rfl: 0    DULoxetine (CYMBALTA) 30 mg delayed release capsule, TAKE 1 CAPSULE BY MOUTH EVERY DAY, Disp: 90 capsule, Rfl: 0    Fluticasone-Salmeterol (Advair Diskus) 250-50 mcg/dose inhaler, Inhale 1 puff 2 (two) times a day Rinse mouth after use., Disp: 180 blister, Rfl: 3    guaiFENesin (MUCINEX) 600 mg 12 hr tablet, Take 1,200 mg by mouth daily Once at hs, Disp: , Rfl:     Multiple Vitamins tablet, Take 1 tablet by mouth daily, Disp: , Rfl:     sildenafil (VIAGRA) 100 mg tablet, TAKE 1 TABLET DAILY AS NEEDED FOR ERECTILE DYSFUNCTION, Disp: 6 tablet, Rfl: 5    tamsulosin (FLOMAX) 0.4 mg, TAKE 1 CAPSULE BY MOUTH EVERY  "DAY WITH DINNER, Disp: 90 capsule, Rfl: 3  Allergies   Allergen Reactions    Darvon [Propoxyphene] Other (See Comments)     hallucinations       Vitals: Blood pressure 120/64, pulse 101, temperature 98.1 °F (36.7 °C), temperature source Tympanic, height 6' 2\" (1.88 m), weight (!) 188 kg (414 lb), SpO2 96%. Body mass index is 53.15 kg/m². Oxygen Therapy  SpO2: 96 %    Physical Exam   Physical Exam  Vitals reviewed.   Constitutional:       General: He is not in acute distress.     Appearance: He is well-developed. He is obese.   Eyes:      General: No scleral icterus.  Neck:      Vascular: No JVD.   Cardiovascular:      Rate and Rhythm: Normal rate and regular rhythm.   Skin:     General: Skin is warm and dry.   Neurological:      Mental Status: He is alert and oriented to person, place, and time.   Psychiatric:         Mood and Affect: Mood normal.         Labs: I have personally reviewed pertinent lab results.  Lab Results   Component Value Date    WBC 4.51 08/12/2024    HGB 14.2 08/12/2024    HCT 42.7 08/12/2024     (H) 08/12/2024    PLT 80 (L) 08/12/2024     Lab Results   Component Value Date    CALCIUM 9.0 08/12/2024     06/14/2017    K 4.2 08/12/2024    CO2 24 08/12/2024     08/12/2024    BUN 18 08/12/2024    CREATININE 1.60 (H) 08/12/2024     No results found for: \"IGE\"  Lab Results   Component Value Date    ALT 22 08/12/2024    AST 32 08/12/2024    ALKPHOS 89 08/12/2024    BILITOT 0.9 06/14/2017       Imaging and other studies: Personally reviewed the following image studies in PACS and associated radiology reports: CT chest. My interpretation of the radiology images/reports is: 6/4/2024.  Lung cancer screening CT shows no concerning findings    Pulmonary function testing:  Performed 10/24/2023 and personally interpreted  FEV1/FVC ratio 57%   FEV1 45% predicted  FVC 60% predicted.  No response to bronchodilators  TLC 86 % predicted   % predicted  DLCO corrected for hemoglobin 76 % " predicted.  Spirometry with severe obstruction  Answers submitted by the patient for this visit:  Pulmonology Questionnaire (Submitted on 9/8/2024)  Chief Complaint: Primary symptoms  Do you have difficulty breathing?: Yes  Do you experience frequent throat clearing?: Yes  Chronicity: recurrent  When did you first notice your symptoms?: 1 to 4 weeks ago  How often do your symptoms occur?: daily  Since you first noticed this problem, how has it changed?: waxing and waning  Do you have shortness of breath that occurs with effort or exertion?: Yes  Do you have ear congestion?: No  Do you have heartburn?: No  Do you have fatigue?: Yes  Do you have nasal congestion?: No  Do you have shortness of breath when lying flat?: Yes  Do you have shortness of breath when you wake up?: No  Do you have sweats?: No  Have you experienced weight loss?: No  Which of the following makes your symptoms worse?: change in weather, climbing stairs, exposure to fumes, exposure to smoke, pollen, strenuous activity  Which of the following makes your symptoms better?: cold air

## 2024-09-13 NOTE — ASSESSMENT & PLAN NOTE
Continue Advair 250/50 twice daily.  He did not find Trelegy Ellipta to be beneficial in the past.

## 2024-09-19 ENCOUNTER — OFFICE VISIT (OUTPATIENT)
Dept: HEMATOLOGY ONCOLOGY | Facility: CLINIC | Age: 69
End: 2024-09-19
Payer: COMMERCIAL

## 2024-09-19 VITALS
WEIGHT: 315 LBS | RESPIRATION RATE: 20 BRPM | SYSTOLIC BLOOD PRESSURE: 154 MMHG | DIASTOLIC BLOOD PRESSURE: 68 MMHG | TEMPERATURE: 97.4 F | BODY MASS INDEX: 40.43 KG/M2 | HEIGHT: 74 IN | OXYGEN SATURATION: 92 % | HEART RATE: 120 BPM

## 2024-09-19 DIAGNOSIS — D69.6 THROMBOCYTOPENIA (HCC): Primary | ICD-10-CM

## 2024-09-19 DIAGNOSIS — K70.30 ALCOHOLIC CIRRHOSIS OF LIVER WITHOUT ASCITES (HCC): ICD-10-CM

## 2024-09-19 DIAGNOSIS — R16.2 HEPATOSPLENOMEGALY: ICD-10-CM

## 2024-09-19 PROCEDURE — 99214 OFFICE O/P EST MOD 30 MIN: CPT | Performed by: INTERNAL MEDICINE

## 2024-09-19 PROCEDURE — G2211 COMPLEX E/M VISIT ADD ON: HCPCS | Performed by: INTERNAL MEDICINE

## 2024-09-19 NOTE — PROGRESS NOTES
[facility] [place]  [address] [address]  [phone]  [phone]     Date of Visit: 9/19/2024  Name: Malachi Greenwood   YOB: 1955     Assessment/Plan  Mr. Greenwood is a pleasant 69-year-old male with a pertinent past medical history of alcohol cirrhosis with hepatosplenomegaly and thrombocytopenia secondary to splenomegaly     1. Thrombocytopenia (HCC)  Secondary to splenomegaly.  Most recent CBC on 08/12/2024 showed platelet count 80,000, stable.  We discussed with the patient that his platelet count has remained stable and at this point, he can either follow-up with his PCP if they are willing to monitor his platelet counts 2-3 times a year and reconsult hematology if needed in the future vs following-up with us in 6 months to monitor his counts.  He will discuss with his PCP and will contact the office with a decision. In the meantime, we will keep his appointment for 6 months with repeat blood work to be done prior to his next appointment.  He reports that he continues to abstain from alcohol.    - CBC and differential; Future  - Comprehensive metabolic panel; Future    2. Hepatosplenomegaly    3. Alcoholic cirrhosis of liver without ascites (HCC)  He will continue to follow-up with GI  - CBC and differential; Future  - Comprehensive metabolic panel; Future      HISTORY OF PRESENT ILLNESS: Malachi Greenwood is a 69 y.o. male with a history of COPD, alcoholic cirrhosis with hepatosplenomegaly and thrombocytopenia who presents for follow-up appointment.      INTERIM HISTORY: He reports shortness of breath with exertion.  He states that his pulmonologist is working on getting him qualified for home oxygen.  He states that he keeps his pulse ox on him at all time to monitor his SpO2.     REVIEW OF SYSTEMS:  Review of Systems   Constitutional:  Positive for fatigue. Negative for appetite change, chills, diaphoresis, fever and unexpected  weight change.   HENT:   Negative for hearing loss, lump/mass, mouth sores and sore throat.    Respiratory:  Negative for chest tightness, cough, hemoptysis, shortness of breath and wheezing.    Cardiovascular:  Negative for chest pain, leg swelling and palpitations.   Gastrointestinal:  Negative for abdominal distention, abdominal pain, blood in stool, constipation, diarrhea and nausea.   Musculoskeletal:  Negative for arthralgias, back pain, flank pain and myalgias.   Skin:  Negative for itching, rash and wound.   Neurological:  Negative for headaches and light-headedness.   Hematological:  Negative for adenopathy.        MEDICATIONS:    Current Outpatient Medications:     albuterol (2.5 mg/3 mL) 0.083 % nebulizer solution, Take 3 mL (2.5 mg total) by nebulization every 6 (six) hours as needed for wheezing, Disp: 150 mL, Rfl: 3    albuterol (PROVENTIL HFA,VENTOLIN HFA) 90 mcg/act inhaler, INHALE 2 PUFFS EVERY 4 HOURS AS NEEDED FOR SHORTNESS OF BREATH., Disp: 8.5 g, Rfl: 5    allopurinol (ZYLOPRIM) 100 mg tablet, TAKE 1 TABLET BY MOUTH TWICE A DAY, Disp: 180 tablet, Rfl: 1    Cholecalciferol (VITAMIN D3) 1000 units CAPS, Take by mouth, Disp: , Rfl:     cyclobenzaprine (FLEXERIL) 5 mg tablet, TAKE 1 TABLET BY MOUTH THREE TIMES A DAY AS NEEDED FOR MUSCLE SPASM, Disp: 60 tablet, Rfl: 0    DULoxetine (CYMBALTA) 30 mg delayed release capsule, TAKE 1 CAPSULE BY MOUTH EVERY DAY, Disp: 90 capsule, Rfl: 0    Fluticasone-Salmeterol (Advair Diskus) 250-50 mcg/dose inhaler, Inhale 1 puff 2 (two) times a day Rinse mouth after use., Disp: 180 blister, Rfl: 3    guaiFENesin (MUCINEX) 600 mg 12 hr tablet, Take 1,200 mg by mouth daily Once at hs, Disp: , Rfl:     Multiple Vitamins tablet, Take 1 tablet by mouth daily, Disp: , Rfl:     sildenafil (VIAGRA) 100 mg tablet, TAKE 1 TABLET DAILY AS NEEDED FOR ERECTILE DYSFUNCTION, Disp: 6 tablet, Rfl: 5    tamsulosin (FLOMAX) 0.4 mg, TAKE 1 CAPSULE BY MOUTH EVERY DAY WITH DINNER, Disp: 90  capsule, Rfl: 3     ALLERGIES:  Allergies   Allergen Reactions    Darvon [Propoxyphene] Other (See Comments)     hallucinations        ACTIVE PROBLEMS:  Patient Active Problem List   Diagnosis    Arthritis    COPD, severe (HCC)    Stage 3b chronic kidney disease (HCC)    Hyperlipidemia with low HDL    Moderate persistent asthma    Nocturnal hypoxemia    ARACELIS on CPAP    Vitamin D deficiency    History of genital warts    Splenomegaly    GARNETT (dyspnea on exertion)    BPH associated with nocturia    Edema of both lower legs    Chronic gout due to renal impairment    Cigarette nicotine dependence in remission    Hyperuricemia    Chronic ITP (idiopathic thrombocytopenia) (HCC)    Morbid obesity with BMI of 50.0-59.9, adult (HCC)    Internal hemorrhoids    Gallstone    Colon polyps    Skin lesion of right arm    Atherosclerosis of aorta (HCC)    Tendinopathy of left shoulder    Alcohol dependence in sustained full remission (HCC)    Thrombocytopenia (HCC)    Hepatosplenomegaly    Alcoholic cirrhosis of liver without ascites (HCC)          PAST MEDICAL HISTORY:   Past Medical History:   Diagnosis Date    Arthritis     Asthma     Chest pain     atypical    Chronic bronchitis (HCC) 2014    Chronic kidney disease     Colon polyp     COPD (chronic obstructive pulmonary disease) (HCC)     CPAP (continuous positive airway pressure) dependence     Decreased glomerular filtration rate     Hamstring strain     Herniated lumbar intervertebral disc     History of alcohol use     uncomplicated    History of back pain     lower    History of colon polyps     sigmoid    History of genital warts     History of lipoma     History of muscle spasm     lumbar paraspinous muscle    History of umbilical hernia     Lateral pain of left hip     Low HDL (under 40)     Lumbar radiculopathy     Obesity     Pneumonia 1975    Respiratory distress     acute    Shortness of breath     Sleep apnea     CPAP PT WILL BRING    Sleep apnea, obstructive 2014         PAST SURGICAL HISTORY:  Past Surgical History:   Procedure Laterality Date    ADENOIDECTOMY      APPENDECTOMY      COLONOSCOPY      HAND SURGERY Left     CYST REMOVAL    HERNIA REPAIR  2017    HIP SURGERY Bilateral     IR BIOPSY BONE MARROW  2023    LIPECTOMY      thigh    LIPOMA RESECTION Left     HIP    [place] RPR UMBILICAL HRNA 5 YRS/> REDUCIBLE N/A 2017    Procedure: REPAIR HERNIA UMBILICAL;  Surgeon: Sushant Buckner MD;  Location: [facility];  Service: General    TONSILLECTOMY      WRIST GANGLION EXCISION          SOCIAL HISTORY:  Social History     Socioeconomic History    Marital status: /Civil Union     Spouse name: None    Number of children: None    Years of education: None    Highest education level: None   Occupational History    Occupation: Fulltime employment   Tobacco Use    Smoking status: Former     Current packs/day: 0.00     Average packs/day: 2.0 packs/day for 42.7 years (85.5 ttl pk-yrs)     Types: Cigarettes     Start date: 1972     Quit date: 10/2014     Years since quittin.9     Passive exposure: Past    Smokeless tobacco: Never    Tobacco comments:     Quit for a 16 year period in between start and final quit date.   Vaping Use    Vaping status: Never Used   Substance and Sexual Activity    Alcohol use: Not Currently     Comment: Quit drinking     Drug use: Never    Sexual activity: Yes     Partners: Female     Birth control/protection: None   Other Topics Concern    None   Social History Narrative    Always uses seatbelt     Social Determinants of Health     Financial Resource Strain: Low Risk  (2/15/2024)    Overall Financial Resource Strain (CARDIA)     Difficulty of Paying Living Expenses: Not very hard   Food Insecurity: Not on file   Transportation Needs: No Transportation Needs (2/15/2024)    PRAPARE - Transportation     Lack of Transportation (Medical): No     Lack of Transportation (Non-Medical): No   Physical Activity: Not on file   Stress: Not on  "file   Social Connections: Not on file   Intimate Partner Violence: Not on file   Housing Stability: Not on file        FAMILY HISTORY:  Family History   Problem Relation Age of Onset    Heart disease Mother         cardiac disorder    Hypertension Mother          at age 78    Cancer Mother         ovarian cancer  at age 78    COPD Father          at age 83    Heart disease Family         cardiac disorder    Cancer Family         lung    Emphysema Family         pulmonary unspecified emphysema    Diabetes Family     Glaucoma Family     Cancer Family         lung    Cancer Paternal Grandmother          at age 78        Objective    Objective    PHYSICAL EXAMINATION:   Blood pressure 154/68, pulse (!) 120, temperature (!) 97.4 °F (36.3 °C), temperature source Temporal, resp. rate 20, height 6' 2\" (1.88 m), weight (!) 190 kg (419 lb), SpO2 92%.     Pain Score: 0-No pain      Physical Exam  Vitals and nursing note reviewed.   Constitutional:       General: He is not in acute distress.     Appearance: He is well-developed.   HENT:      Head: Normocephalic and atraumatic.   Eyes:      Conjunctiva/sclera: Conjunctivae normal.   Cardiovascular:      Rate and Rhythm: Normal rate and regular rhythm.      Heart sounds: No murmur heard.  Pulmonary:      Effort: Pulmonary effort is normal. No respiratory distress.      Breath sounds: Normal breath sounds.   Abdominal:      Palpations: Abdomen is soft.      Tenderness: There is no abdominal tenderness.   Musculoskeletal:         General: No swelling.      Cervical back: Neck supple.   Skin:     General: Skin is warm and dry.      Capillary Refill: Capillary refill takes less than 2 seconds.   Neurological:      Mental Status: He is alert.   Psychiatric:         Mood and Affect: Mood normal.         I reviewed lab data in the chart.    WBC   Date Value Ref Range Status   2024 4.51 4.31 - 10.16 Thousand/uL Final   2024 5.01 4.31 - 10.16 Thousand/uL Final "   02/19/2024 5.50 4.31 - 10.16 Thousand/uL Final   06/14/2017 7.1 3.8 - 10.8 Thousand/uL Final   02/14/2017 6.4 3.8 - 10.8 Thousand/uL Final     Hemoglobin   Date Value Ref Range Status   08/12/2024 14.2 12.0 - 17.0 g/dL Final   04/11/2024 15.1 12.0 - 17.0 g/dL Final   02/19/2024 14.6 12.0 - 17.0 g/dL Final   06/14/2017 15.6 13.2 - 17.1 g/dL Final   02/14/2017 16.1 13.2 - 17.1 g/dL Final     Platelets   Date Value Ref Range Status   08/12/2024 80 (L) 149 - 390 Thousands/uL Final     Comment:     Manual Review of Smear Performed   04/11/2024 77 (L) 149 - 390 Thousands/uL Final     Comment:     Previously 7/21/23 02/19/2024 83 (L) 149 - 390 Thousands/uL Final     Comment:     Previously 7/21/23 06/14/2017 129 (L) 140 - 400 Thousand/uL Final   02/14/2017 119 (L) 140 - 400 Thousand/uL Final     MCV   Date Value Ref Range Status   08/12/2024 103 (H) 82 - 98 fL Final   04/11/2024 102 (H) 82 - 98 fL Final   02/19/2024 100 (H) 82 - 98 fL Final   06/14/2017 97.2 80.0 - 100.0 fL Final   02/14/2017 97.8 80.0 - 100.0 fL Final      Sodium   Date Value Ref Range Status   06/14/2017 142 135 - 146 mmol/L Final   09/15/2016 140 135 - 146 mmol/L Final   03/22/2016 143 135 - 146 mmol/L Final     Potassium   Date Value Ref Range Status   08/12/2024 4.2 3.5 - 5.3 mmol/L Final   02/19/2024 4.5 3.5 - 5.3 mmol/L Final   08/11/2023 4.4 3.5 - 5.3 mmol/L Final   06/14/2017 4.6 3.5 - 5.3 mmol/L Final   09/15/2016 4.4 3.5 - 5.3 mmol/L Final   03/22/2016 4.5 3.5 - 5.3 mmol/L Final     Chloride   Date Value Ref Range Status   08/12/2024 107 96 - 108 mmol/L Final   02/19/2024 107 96 - 108 mmol/L Final   08/11/2023 111 (H) 96 - 108 mmol/L Final   06/14/2017 108 98 - 110 mmol/L Final   09/15/2016 107 98 - 110 mmol/L Final   03/22/2016 107 98 - 110 mmol/L Final     CO2   Date Value Ref Range Status   08/12/2024 24 21 - 32 mmol/L Final   02/19/2024 27 21 - 32 mmol/L Final   08/11/2023 25 21 - 32 mmol/L Final   06/14/2017 26 20 - 31 mmol/L Final    09/15/2016 25 20 - 31 mmol/L Final   03/22/2016 25 19 - 30 mmol/L Final     BUN   Date Value Ref Range Status   08/12/2024 18 5 - 25 mg/dL Final   02/19/2024 21 5 - 25 mg/dL Final   08/11/2023 18 5 - 25 mg/dL Final   06/14/2017 26 (H) 7 - 25 mg/dL Final   09/15/2016 24 7 - 25 mg/dL Final   03/22/2016 21 7 - 25 mg/dL Final     Creatinine   Date Value Ref Range Status   08/12/2024 1.60 (H) 0.60 - 1.30 mg/dL Final     Comment:     Standardized to IDMS reference method   02/19/2024 1.38 (H) 0.60 - 1.30 mg/dL Final     Comment:     Standardized to IDMS reference method   08/11/2023 1.63 (H) 0.60 - 1.30 mg/dL Final     Comment:     Standardized to IDMS reference method   06/14/2017 1.70 (H) 0.70 - 1.25 mg/dL Final     Comment:     Result Comment: For patients >49 years of age, the reference limit  for Creatinine is approximately 13% higher for people  identified as -American.     09/15/2016 1.52 (H) 0.70 - 1.25 mg/dL Final     Comment:     Result Comment: For patients >49 years of age, the reference limit  for Creatinine is approximately 13% higher for people  identified as -American.     03/22/2016 1.54 (H) 0.70 - 1.33 mg/dL Final     Comment:     Result Comment: For patients >49 years of age, the reference limit  for Creatinine is approximately 13% higher for people  identified as -American.       Glucose   Date Value Ref Range Status   08/12/2024 109 65 - 140 mg/dL Final     Comment:     If the patient is fasting, the ADA then defines impaired fasting glucose as > 100 mg/dL and diabetes as > or equal to 123 mg/dL.   09/05/2019 99 65 - 140 mg/dL Final     Comment:       If the patient is fasting, the ADA then defines impaired fasting glucose as > 100 mg/dL and diabetes as > or equal to 123 mg/dL.  Specimen collection should occur prior to Sulfasalazine administration due to the potential for falsely depressed results. Specimen collection should occur prior to Sulfapyridine administration due to  the potential for falsely elevated results.   01/22/2019 142 (H) 65 - 140 mg/dL Final     Comment:       If the patient is fasting, the ADA then defines impaired fasting glucose as > 100 mg/dL and diabetes as > or equal to 123 mg/dL.  Specimen collection should occur prior to Sulfasalazine administration due to the potential for falsely depressed results. Specimen collection should occur prior to Sulfapyridine administration due to the potential for falsely elevated results.     Calcium   Date Value Ref Range Status   08/12/2024 9.0 8.4 - 10.2 mg/dL Final   02/19/2024 9.3 8.4 - 10.2 mg/dL Final   08/11/2023 9.8 8.3 - 10.1 mg/dL Final   06/14/2017 9.3 8.6 - 10.3 mg/dL Final   09/15/2016 9.2 8.6 - 10.3 mg/dL Final   03/22/2016 9.5 8.6 - 10.3 mg/dL Final     Total Protein   Date Value Ref Range Status   06/14/2017 6.2 6.1 - 8.1 g/dL Final   03/22/2016 6.4 6.1 - 8.1 g/dL Final     Albumin   Date Value Ref Range Status   08/12/2024 3.8 3.5 - 5.0 g/dL Final   02/19/2024 4.0 3.5 - 5.0 g/dL Final   08/11/2023 3.8 3.5 - 5.0 g/dL Final   06/14/2017 4.0 3.6 - 5.1 g/dL Final   03/22/2016 4.3 3.6 - 5.1 g/dL Final     Total Bilirubin   Date Value Ref Range Status   08/12/2024 1.53 (H) 0.20 - 1.00 mg/dL Final     Comment:     Use of this assay is not recommended for patients undergoing treatment with eltrombopag due to the potential for falsely elevated results.  N-acetyl-p-benzoquinone imine (metabolite of Acetaminophen) will generate erroneously low results in samples for patients that have taken an overdose of Acetaminophen.   02/19/2024 1.14 (H) 0.20 - 1.00 mg/dL Final     Comment:     Use of this assay is not recommended for patients undergoing treatment with eltrombopag due to the potential for falsely elevated results.  N-acetyl-p-benzoquinone imine (metabolite of Acetaminophen) will generate erroneously low results in samples for patients that have taken an overdose of Acetaminophen.   08/11/2023 0.86 0.20 - 1.00 mg/dL  "Final     Comment:     Use of this assay is not recommended for patients undergoing treatment with eltrombopag due to the potential for falsely elevated results.     Alkaline Phosphatase   Date Value Ref Range Status   08/12/2024 89 34 - 104 U/L Final   02/19/2024 95 34 - 104 U/L Final   08/11/2023 94 46 - 116 U/L Final   06/14/2017 85 40 - 115 U/L Final   03/22/2016 93 40 - 115 U/L Final     AST   Date Value Ref Range Status   08/12/2024 32 13 - 39 U/L Final   02/19/2024 33 13 - 39 U/L Final   08/11/2023 36 5 - 45 U/L Final     Comment:     Specimen collection should occur prior to Sulfasalazine administration due to the potential for falsely depressed results.    06/14/2017 21 10 - 35 U/L Final   03/22/2016 26 10 - 35 U/L Final     ALT   Date Value Ref Range Status   08/12/2024 22 7 - 52 U/L Final     Comment:     Specimen collection should occur prior to Sulfasalazine administration due to the potential for falsely depressed results.    02/19/2024 29 7 - 52 U/L Final     Comment:     Specimen collection should occur prior to Sulfasalazine administration due to the potential for falsely depressed results.    08/11/2023 37 12 - 78 U/L Final     Comment:     Specimen collection should occur prior to Sulfasalazine and/or Sulfapyridine administration due to the potential for falsely depressed results.    06/14/2017 24 9 - 46 U/L Final     Comment:     Performed at: [facility], [address] [address]     03/22/2016 29 9 - 46 U/L Final     Comment:     Performed at: [facility] [place]  [address] [place] [address]        No results found for: \"LDH\"  No results found for: \"TSH\"  No results found for: \"W9GFPUS\"   No results found for: \"FREET4\"      RECENT IMAGING:  No results found.       Assessment        Return in about 6 months (around 3/19/2025) for Office Visit.     "

## 2024-09-30 DIAGNOSIS — E79.0 HYPERURICEMIA: ICD-10-CM

## 2024-09-30 RX ORDER — ALLOPURINOL 100 MG/1
TABLET ORAL
Qty: 180 TABLET | Refills: 1 | Status: SHIPPED | OUTPATIENT
Start: 2024-09-30

## 2024-10-02 ENCOUNTER — ESTABLISHED COMPREHENSIVE EXAM (OUTPATIENT)
Dept: URBAN - METROPOLITAN AREA CLINIC 6 | Facility: CLINIC | Age: 69
End: 2024-10-02

## 2024-10-02 DIAGNOSIS — H25.813: ICD-10-CM

## 2024-10-02 DIAGNOSIS — H04.123: ICD-10-CM

## 2024-10-02 PROCEDURE — 92014 COMPRE OPH EXAM EST PT 1/>: CPT

## 2024-10-02 ASSESSMENT — VISUAL ACUITY
OS_SC: 20/40
OU_CC: J4
OD_SC: 20/25

## 2024-10-02 ASSESSMENT — TONOMETRY
OS_IOP_MMHG: 17
OD_IOP_MMHG: 16

## 2024-10-21 ENCOUNTER — APPOINTMENT (EMERGENCY)
Dept: RADIOLOGY | Facility: HOSPITAL | Age: 69
End: 2024-10-21
Payer: COMMERCIAL

## 2024-10-21 ENCOUNTER — APPOINTMENT (EMERGENCY)
Dept: CT IMAGING | Facility: HOSPITAL | Age: 69
End: 2024-10-21
Payer: COMMERCIAL

## 2024-10-21 ENCOUNTER — HOSPITAL ENCOUNTER (EMERGENCY)
Facility: HOSPITAL | Age: 69
Discharge: HOME | End: 2024-10-21
Attending: EMERGENCY MEDICINE
Payer: COMMERCIAL

## 2024-10-21 VITALS
TEMPERATURE: 97.8 F | BODY MASS INDEX: 40.43 KG/M2 | OXYGEN SATURATION: 94 % | HEART RATE: 98 BPM | WEIGHT: 315 LBS | DIASTOLIC BLOOD PRESSURE: 77 MMHG | RESPIRATION RATE: 18 BRPM | SYSTOLIC BLOOD PRESSURE: 177 MMHG | HEIGHT: 74 IN

## 2024-10-21 DIAGNOSIS — D69.6 THROMBOCYTOPENIA (HCC): ICD-10-CM

## 2024-10-21 DIAGNOSIS — S42.291A HUMERAL HEAD FRACTURE, RIGHT, CLOSED, INITIAL ENCOUNTER: Primary | ICD-10-CM

## 2024-10-21 LAB
ALBUMIN SERPL BCG-MCNC: 3.9 G/DL (ref 3.5–5)
ALP SERPL-CCNC: 102 U/L (ref 34–104)
ALT SERPL W P-5'-P-CCNC: 27 U/L (ref 7–52)
ANION GAP SERPL CALCULATED.3IONS-SCNC: 6 MMOL/L (ref 4–13)
ANISOCYTOSIS BLD QL SMEAR: PRESENT
AST SERPL W P-5'-P-CCNC: 33 U/L (ref 13–39)
BASOPHILS # BLD AUTO: 0.04 THOUSANDS/ΜL (ref 0–0.1)
BASOPHILS NFR BLD AUTO: 1 % (ref 0–1)
BILIRUB SERPL-MCNC: 1.36 MG/DL (ref 0.2–1)
BUN SERPL-MCNC: 22 MG/DL (ref 5–25)
CALCIUM SERPL-MCNC: 9.2 MG/DL (ref 8.4–10.2)
CHLORIDE SERPL-SCNC: 106 MMOL/L (ref 96–108)
CO2 SERPL-SCNC: 28 MMOL/L (ref 21–32)
CREAT SERPL-MCNC: 1.44 MG/DL (ref 0.6–1.3)
EOSINOPHIL # BLD AUTO: 0.09 THOUSAND/ΜL (ref 0–0.61)
EOSINOPHIL NFR BLD AUTO: 1 % (ref 0–6)
ERYTHROCYTE [DISTWIDTH] IN BLOOD BY AUTOMATED COUNT: 15.3 % (ref 11.6–15.1)
GFR SERPL CREATININE-BSD FRML MDRD: 49 ML/MIN/1.73SQ M
GLUCOSE SERPL-MCNC: 111 MG/DL (ref 65–140)
HCT VFR BLD AUTO: 42.6 % (ref 36.5–49.3)
HGB BLD-MCNC: 14.3 G/DL (ref 12–17)
IMM GRANULOCYTES # BLD AUTO: 0.01 THOUSAND/UL (ref 0–0.2)
IMM GRANULOCYTES NFR BLD AUTO: 0 % (ref 0–2)
LYMPHOCYTES # BLD AUTO: 0.69 THOUSANDS/ΜL (ref 0.6–4.47)
LYMPHOCYTES NFR BLD AUTO: 10 % (ref 14–44)
MACROCYTES BLD QL AUTO: PRESENT
MCH RBC QN AUTO: 34.6 PG (ref 26.8–34.3)
MCHC RBC AUTO-ENTMCNC: 33.6 G/DL (ref 31.4–37.4)
MCV RBC AUTO: 103 FL (ref 82–98)
MONOCYTES # BLD AUTO: 0.84 THOUSAND/ΜL (ref 0.17–1.22)
MONOCYTES NFR BLD AUTO: 12 % (ref 4–12)
NEUTROPHILS # BLD AUTO: 5.36 THOUSANDS/ΜL (ref 1.85–7.62)
NEUTS SEG NFR BLD AUTO: 76 % (ref 43–75)
NRBC BLD AUTO-RTO: 0 /100 WBCS
OVALOCYTES BLD QL SMEAR: PRESENT
PLATELET # BLD AUTO: 73 THOUSANDS/UL (ref 149–390)
PLATELET BLD QL SMEAR: ABNORMAL
PMV BLD AUTO: 10.4 FL (ref 8.9–12.7)
POIKILOCYTOSIS BLD QL SMEAR: PRESENT
POTASSIUM SERPL-SCNC: 4.6 MMOL/L (ref 3.5–5.3)
PROT SERPL-MCNC: 6.4 G/DL (ref 6.4–8.4)
RBC # BLD AUTO: 4.13 MILLION/UL (ref 3.88–5.62)
RBC MORPH BLD: PRESENT
SODIUM SERPL-SCNC: 140 MMOL/L (ref 135–147)
WBC # BLD AUTO: 7.03 THOUSAND/UL (ref 4.31–10.16)

## 2024-10-21 PROCEDURE — 36415 COLL VENOUS BLD VENIPUNCTURE: CPT

## 2024-10-21 PROCEDURE — 80053 COMPREHEN METABOLIC PANEL: CPT

## 2024-10-21 PROCEDURE — 85025 COMPLETE CBC W/AUTO DIFF WBC: CPT

## 2024-10-21 PROCEDURE — 99285 EMERGENCY DEPT VISIT HI MDM: CPT

## 2024-10-21 PROCEDURE — 96374 THER/PROPH/DIAG INJ IV PUSH: CPT

## 2024-10-21 PROCEDURE — 73201 CT UPPER EXTREMITY W/DYE: CPT

## 2024-10-21 PROCEDURE — 90715 TDAP VACCINE 7 YRS/> IM: CPT

## 2024-10-21 PROCEDURE — 99285 EMERGENCY DEPT VISIT HI MDM: CPT | Performed by: EMERGENCY MEDICINE

## 2024-10-21 PROCEDURE — 73030 X-RAY EXAM OF SHOULDER: CPT

## 2024-10-21 PROCEDURE — 90471 IMMUNIZATION ADMIN: CPT

## 2024-10-21 RX ORDER — DOCUSATE SODIUM 100 MG/1
100 CAPSULE, LIQUID FILLED ORAL EVERY 12 HOURS
Qty: 60 CAPSULE | Refills: 0 | Status: SHIPPED | OUTPATIENT
Start: 2024-10-21

## 2024-10-21 RX ORDER — FENTANYL CITRATE 50 UG/ML
75 INJECTION, SOLUTION INTRAMUSCULAR; INTRAVENOUS ONCE
Status: COMPLETED | OUTPATIENT
Start: 2024-10-21 | End: 2024-10-21

## 2024-10-21 RX ORDER — OXYCODONE HYDROCHLORIDE 5 MG/1
5 TABLET ORAL EVERY 4 HOURS PRN
Qty: 20 TABLET | Refills: 0 | Status: SHIPPED | OUTPATIENT
Start: 2024-10-21 | End: 2024-10-28

## 2024-10-21 RX ORDER — LIDOCAINE 50 MG/G
1 PATCH TOPICAL ONCE
Status: DISCONTINUED | OUTPATIENT
Start: 2024-10-21 | End: 2024-10-22 | Stop reason: HOSPADM

## 2024-10-21 RX ORDER — HYDROCODONE BITARTRATE AND ACETAMINOPHEN 5; 325 MG/1; MG/1
1 TABLET ORAL ONCE
Status: COMPLETED | OUTPATIENT
Start: 2024-10-21 | End: 2024-10-21

## 2024-10-21 RX ORDER — OXYCODONE HYDROCHLORIDE 5 MG/1
5 TABLET ORAL ONCE
Status: COMPLETED | OUTPATIENT
Start: 2024-10-21 | End: 2024-10-21

## 2024-10-21 RX ADMIN — OXYCODONE HYDROCHLORIDE 5 MG: 5 TABLET ORAL at 21:55

## 2024-10-21 RX ADMIN — HYDROCODONE BITARTRATE AND ACETAMINOPHEN 1 TABLET: 5; 325 TABLET ORAL at 18:25

## 2024-10-21 RX ADMIN — TETANUS TOXOID, REDUCED DIPHTHERIA TOXOID AND ACELLULAR PERTUSSIS VACCINE, ADSORBED 0.5 ML: 5; 2.5; 8; 8; 2.5 SUSPENSION INTRAMUSCULAR at 18:25

## 2024-10-21 RX ADMIN — FENTANYL CITRATE 75 MCG: 50 INJECTION INTRAMUSCULAR; INTRAVENOUS at 19:23

## 2024-10-21 RX ADMIN — IOHEXOL 100 ML: 350 INJECTION, SOLUTION INTRAVENOUS at 20:10

## 2024-10-21 NOTE — ED PROVIDER NOTES
Time reflects when diagnosis was documented in both MDM as applicable and the Disposition within this note       Time User Action Codes Description Comment    10/21/2024 10:04 PM Mildred Ulloa Add [S42.291A] Humeral head fracture, right, closed, initial encounter     10/21/2024 10:04 PM Mildred Ulloa Add [D69.6] Thrombocytopenia (HCC)           ED Disposition       None          Assessment & Plan       Medical Decision Making  Patient is a 69-year-old male PMHx thrombocytopenia and CKD who presents with right shoulder pain s/p mechanical fall.  Possible etiologies fracture, sprain, rotator cuff injury, dislocation.  Obtain x-rays of the right shoulder, update tetanus, and provide something for pain. Fracture noted on x-ray.  Due to patient's thrombocytopenia and risk of bleeding we will obtain a CT of the right upper extremity to further evaluate.     Amount and/or Complexity of Data Reviewed  Labs: ordered.  Radiology: ordered.    Risk  Prescription drug management.        ED Course as of 10/21/24 2211   Mon Oct 21, 2024   2101 Reevaluate the patient states pain is about the same as when he arrived.  However significantly less when he does not move it.  He declines any additional pain medicine at this time.  Patient informed of fracture noted on CT, but that we are awaiting official read.  Patient states if he is discharged he would like to follow-up with the orthopedics team at [facility].   2206 Spoke with Ortho on-call who has reviewed x-ray images and recommended patient being treated conservatively and following up outpatient.  CT scans are still pending official read.  So will sign patient out to Dr. Hart. Discharged paperwork as been prepped, sling has been applied, and prescriptions for oxycodone 5mg and colace have been sent.  Patient has been updated on plan.  He understands and agrees with current management.       Medications   lidocaine (LIDODERM) 5 % patch 1 patch (1  patch Topical Not Given 10/21/24 1954)   HYDROcodone-acetaminophen (NORCO) 5-325 mg per tablet 1 tablet (1 tablet Oral Given 10/21/24 1825)   tetanus-diphtheria-acellular pertussis (BOOSTRIX) IM injection 0.5 mL (0.5 mL Intramuscular Given 10/21/24 1825)   fentaNYL injection 75 mcg (75 mcg Intravenous Given 10/21/24 1923)   iohexol (OMNIPAQUE) 350 MG/ML injection (MULTI-DOSE) 100 mL (100 mL Intravenous Given 10/21/24 2010)   oxyCODONE (ROXICODONE) IR tablet 5 mg (5 mg Oral Given 10/21/24 2155)       ED Risk Strat Scores                 SBIRT 22yo+      Flowsheet Row Most Recent Value   Initial Alcohol Screen: US AUDIT-C     1. How often do you have a drink containing alcohol? 0 Filed at: 10/21/2024 1647   2. How many drinks containing alcohol do you have on a typical day you are drinking?  0 Filed at: 10/21/2024 1647   3b. FEMALE Any Age, or MALE 65+: How often do you have 4 or more drinks on one occassion? 0 Filed at: 10/21/2024 1647   Audit-C Score 0 Filed at: 10/21/2024 1647   ANABEL: How many times in the past year have you...    Used an illegal drug or used a prescription medication for non-medical reasons? Never Filed at: 10/21/2024 1647                          History of Present Illness       Chief Complaint   Patient presents with    Fall     Pt states around 1400 he fell in a parking lot. Pt caught himself with R arm/shoulder. R shoulder pain. -LOC -thinners +HS Abrasion to nose and lip        Past Medical History:   Diagnosis Date    Arthritis     Asthma     Chest pain     atypical    Chronic bronchitis (HCC) 2014    Chronic kidney disease     Colon polyp     COPD (chronic obstructive pulmonary disease) (HCC)     CPAP (continuous positive airway pressure) dependence     Decreased glomerular filtration rate     Hamstring strain     Herniated lumbar intervertebral disc     History of alcohol use     uncomplicated    History of back pain     lower    History of colon polyps     sigmoid    History of genital  warts     History of lipoma     History of muscle spasm     lumbar paraspinous muscle    History of umbilical hernia     Lateral pain of left hip     Low HDL (under 40)     Lumbar radiculopathy     Obesity     Pneumonia 1975    Respiratory distress     acute    Shortness of breath     Sleep apnea     CPAP PT WILL BRING    Sleep apnea, obstructive       Past Surgical History:   Procedure Laterality Date    ADENOIDECTOMY      APPENDECTOMY      COLONOSCOPY      HAND SURGERY Left     CYST REMOVAL    HERNIA REPAIR  2017    HIP SURGERY Bilateral     IR BIOPSY BONE MARROW  2023    LIPECTOMY      thigh    LIPOMA RESECTION Left     HIP    TX RPR UMBILICAL HRNA 5 YRS/> REDUCIBLE N/A 2017    Procedure: REPAIR HERNIA UMBILICAL;  Surgeon: Sushant Buckner MD;  Location: [facility];  Service: General    TONSILLECTOMY      WRIST GANGLION EXCISION        Family History   Problem Relation Age of Onset    Heart disease Mother         cardiac disorder    Hypertension Mother          at age 78    Cancer Mother         ovarian cancer  at age 78    COPD Father          at age 83    Heart disease Family         cardiac disorder    Cancer Family         lung    Emphysema Family         pulmonary unspecified emphysema    Diabetes Family     Glaucoma Family     Cancer Family         lung    Cancer Paternal Grandmother          at age 78      Social History     Tobacco Use    Smoking status: Former     Current packs/day: 0.00     Average packs/day: 2.0 packs/day for 42.7 years (85.5 ttl pk-yrs)     Types: Cigarettes     Start date: 1972     Quit date: 10/2014     Years since quitting: 10.0     Passive exposure: Past    Smokeless tobacco: Never    Tobacco comments:     Quit for a 16 year period in between start and final quit date.   Vaping Use    Vaping status: Never Used   Substance Use Topics    Alcohol use: Not Currently     Comment: Quit drinking     Drug use: Never      E-Cigarette/Vaping     E-Cigarette Use Never User       E-Cigarette/Vaping Substances    Nicotine No     THC No     CBD No     Flavoring No     Other No     Unknown No       I have reviewed and agree with the history as documented.       Fall  Mechanism of injury: fall    Injury location:  Face, shoulder/arm and hand  Facial injury location:  Nose and upper lip  Shoulder/arm injury location:  R shoulder and R forearm  Hand injury location:  L palm  Incident location:  Outdoors (parking lot)  Time since incident: 1400 today.  Arrived directly from scene: no (He finished shopping at an auction before coming)    Fall:     Fall occurred:  Walking (Patient states he was pushing a shopping cart when it rolled away from him causing him to land facedown with outstretched arms)    Impact surface: asphalt.    Point of impact:  Face, outstretched arms and hands    Entrapped after fall: no    Protective equipment: none    Tetanus status:  Out of date  Prior to arrival data:     Patient ambulatory at scene: yes      Blood loss:  Minimal    Responsiveness at scene:  Alert    Orientation at scene:  Person, place, time and situation    Loss of consciousness: no      Amnesic to event: no      Airway interventions:  None    Breathing interventions:  None  Associated symptoms: no abdominal pain, no back pain, no chest pain, no headaches, no loss of consciousness, no nausea, no neck pain and no vomiting    Risk factors: asthma, COPD and kidney disease      Patient with a past medical history of COPD, ARACELIS, asthma, CKD, and a lumbar radiculopathy presents after falling face down with outstretched hands onto asphalt after a basket rolled away from him in the parking lot.  He is reporting right shoulder pain with decreased ROM as well as abrasions to nose, upper lip, L palm, and R forearm.  After the fall patient finished shopping at the auction and then picked up his wife DAHLIA.  No OTC meds tried. patient uses a cane at baseline for ambulation. Denies LOC, blood  thinner use, any preceding symptoms, CP, SOB, dizziness, headache, lightheaded.    Review of Systems   Constitutional:  Negative for chills and fever.   HENT:  Negative for sore throat.    Respiratory:  Negative for cough and shortness of breath.    Cardiovascular:  Negative for chest pain, palpitations and leg swelling.   Gastrointestinal:  Negative for abdominal pain, constipation, diarrhea, nausea and vomiting.   Genitourinary:  Negative for decreased urine volume, difficulty urinating, frequency and urgency.   Musculoskeletal:  Positive for arthralgias and myalgias. Negative for back pain and neck pain.   Skin:  Positive for wound.   Neurological:  Negative for dizziness, loss of consciousness, syncope, weakness, numbness and headaches.       Objective       ED Triage Vitals [10/21/24 1647]   Temperature Pulse Blood Pressure Respirations SpO2 Patient Position - Orthostatic VS   97.8 °F (36.6 °C) 101 145/68 18 95 % Sitting      Temp Source Heart Rate Source BP Location FiO2 (%) Pain Score    Oral Monitor Right arm -- 8      Vitals      Date and Time Temp Pulse SpO2 Resp BP Pain Score FACES Pain Rating User   10/21/24 2155 -- -- -- -- -- 6 -- ANNE   10/21/24 1954 -- -- -- -- -- 3 -- JAMIN   10/21/24 1923 -- 82 99 % 18 144/71 6 -- ANNE   10/21/24 1825 -- -- -- -- -- 5 -- MD   10/21/24 1647 97.8 °F (36.6 °C) 101 95 % 18 145/68 8 -- LS            Physical Exam  Vitals and nursing note reviewed.   Constitutional:       Appearance: Normal appearance. He is obese.   HENT:      Head: Normocephalic and atraumatic. No raccoon eyes or laceration.      Jaw: No tenderness.      Right Ear: Tympanic membrane normal. No hemotympanum.      Left Ear: Tympanic membrane normal. No hemotympanum.      Mouth/Throat:      Mouth: Mucous membranes are moist.      Comments: Abrasion noted to nose and philtrum.  No active bleeding  Bruise to center of upper lip.  No loose teeth or tenderness.  Eyes:      Extraocular Movements: Extraocular  movements intact.      Conjunctiva/sclera: Conjunctivae normal.      Pupils: Pupils are equal, round, and reactive to light.   Cardiovascular:      Rate and Rhythm: Normal rate and regular rhythm.   Pulmonary:      Effort: Pulmonary effort is normal. No respiratory distress.      Breath sounds: Normal breath sounds.   Abdominal:      General: Bowel sounds are normal. There is no distension.      Palpations: Abdomen is soft.      Tenderness: There is no abdominal tenderness. There is no guarding.   Musculoskeletal:         General: Tenderness present.      Right shoulder: Tenderness present. No crepitus. Decreased range of motion. Normal pulse.      Right lower leg: Edema present.      Left lower leg: Edema present.      Comments: Pitting edema to the bilateral lower extremities up to the knees  Difficulty assessing patient's strength in right upper extremity secondary to pain   Skin:     General: Skin is warm and dry.      Findings: Abrasion and bruising present.      Comments: Abrasions noted to right forearm and left palm.   Neurological:      General: No focal deficit present.      Mental Status: He is alert and oriented to person, place, and time. Mental status is at baseline.      Cranial Nerves: Cranial nerves 2-12 are intact.      Sensory: Sensation is intact.      Comments: Patient has some difficulty lifting his bilateral lower extremities against gravity but strength is otherwise intact.   Psychiatric:         Mood and Affect: Mood normal.         Results Reviewed       Procedure Component Value Units Date/Time    CBC and differential [[id number]]  (Abnormal) Collected: 10/21/24 1852    Lab Status: Final result Specimen: Blood from Arm, Left Updated: 10/21/24 2017     WBC 7.03 Thousand/uL      RBC 4.13 Million/uL      Hemoglobin 14.3 g/dL      Hematocrit 42.6 %       fL      MCH 34.6 pg      MCHC 33.6 g/dL      RDW 15.3 %      MPV 10.4 fL      Platelets 73 Thousands/uL      nRBC 0 /100 WBCs       Segmented % 76 %      Immature Grans % 0 %      Lymphocytes % 10 %      Monocytes % 12 %      Eosinophils Relative 1 %      Basophils Relative 1 %      Absolute Neutrophils 5.36 Thousands/µL      Absolute Immature Grans 0.01 Thousand/uL      Absolute Lymphocytes 0.69 Thousands/µL      Absolute Monocytes 0.84 Thousand/µL      Eosinophils Absolute 0.09 Thousand/µL      Basophils Absolute 0.04 Thousands/µL     Narrative:      This is an appended report.  These results have been appended to a previously verified report.    Smear Review(Phlebs Do Not Order) [[id number]]  (Abnormal) Collected: 10/21/24 1852    Lab Status: Final result Specimen: Blood from Arm, Left Updated: 10/21/24 2017     RBC Morphology Present     Platelet Estimate Decreased     Anisocytosis Present     Macrocytes Present     Ovalocytes Present     Poikilocytes Present    Comprehensive metabolic panel [[id number]]  (Abnormal) Collected: 10/21/24 1852    Lab Status: Final result Specimen: Blood from Arm, Left Updated: 10/21/24 1921     Sodium 140 mmol/L      Potassium 4.6 mmol/L      Chloride 106 mmol/L      CO2 28 mmol/L      ANION GAP 6 mmol/L      BUN 22 mg/dL      Creatinine 1.44 mg/dL      Glucose 111 mg/dL      Calcium 9.2 mg/dL      AST 33 U/L      ALT 27 U/L      Alkaline Phosphatase 102 U/L      Total Protein 6.4 g/dL      Albumin 3.9 g/dL      Total Bilirubin 1.36 mg/dL      eGFR 49 ml/min/1.73sq m     Narrative:      National Kidney Disease Foundation guidelines for Chronic Kidney Disease (CKD):     Stage 1 with normal or high GFR (GFR > 90 mL/min/1.73 square meters)    Stage 2 Mild CKD (GFR = 60-89 mL/min/1.73 square meters)    Stage 3A Moderate CKD (GFR = 45-59 mL/min/1.73 square meters)    Stage 3B Moderate CKD (GFR = 30-44 mL/min/1.73 square meters)    Stage 4 Severe CKD (GFR = 15-29 mL/min/1.73 square meters)    Stage 5 End Stage CKD (GFR <15 mL/min/1.73 square meters)  Note: GFR calculation is accurate only with a steady state creatinine             XR shoulder 2+ views RIGHT    (Results Pending)   CT upper extremity w contrast right    (Results Pending)       Procedures    ED Medication and Procedure Management   Prior to Admission Medications   Prescriptions Last Dose Informant Patient Reported? Taking?   Cholecalciferol (VITAMIN D3) 1000 units CAPS  Self Yes No   Sig: Take by mouth   DULoxetine (CYMBALTA) 30 mg delayed release capsule  Self No No   Sig: TAKE 1 CAPSULE BY MOUTH EVERY DAY   Fluticasone-Salmeterol (Advair Diskus) 250-50 mcg/dose inhaler  Self No No   Sig: Inhale 1 puff 2 (two) times a day Rinse mouth after use.   Multiple Vitamins tablet  Self Yes No   Sig: Take 1 tablet by mouth daily   albuterol (2.5 mg/3 mL) 0.083 % nebulizer solution  Self No No   Sig: Take 3 mL (2.5 mg total) by nebulization every 6 (six) hours as needed for wheezing   albuterol (PROVENTIL HFA,VENTOLIN HFA) 90 mcg/act inhaler  Self No No   Sig: INHALE 2 PUFFS EVERY 4 HOURS AS NEEDED FOR SHORTNESS OF BREATH.   allopurinol (ZYLOPRIM) 100 mg tablet   No No   Sig: TAKE 1 TABLET BY MOUTH TWICE A DAY   cyclobenzaprine (FLEXERIL) 5 mg tablet  Self No No   Sig: TAKE 1 TABLET BY MOUTH THREE TIMES A DAY AS NEEDED FOR MUSCLE SPASM   guaiFENesin (MUCINEX) 600 mg 12 hr tablet  Self Yes No   Sig: Take 1,200 mg by mouth daily Once at hs   sildenafil (VIAGRA) 100 mg tablet  Self No No   Sig: TAKE 1 TABLET DAILY AS NEEDED FOR ERECTILE DYSFUNCTION   tamsulosin (FLOMAX) 0.4 mg  Self No No   Sig: TAKE 1 CAPSULE BY MOUTH EVERY DAY WITH DINNER      Facility-Administered Medications: None     Patient's Medications   Discharge Prescriptions    DOCUSATE SODIUM (COLACE) 100 MG CAPSULE    Take 1 capsule (100 mg total) by mouth every 12 (twelve) hours       Start Date: 10/21/2024End Date: --       Order Dose: 100 mg       Quantity: 60 capsule    Refills: 0    OXYCODONE (ROXICODONE) 5 IMMEDIATE RELEASE TABLET    Take 1 tablet (5 mg total) by mouth every 4 (four) hours as needed for moderate  pain or severe pain for up to 7 days Max Daily Amount: 30 mg       Start Date: 10/21/2024End Date: 10/28/2024       Order Dose: 5 mg       Quantity: 20 tablet    Refills: 0     No discharge procedures on file.  ED SEPSIS DOCUMENTATION   Time reflects when diagnosis was documented in both MDM as applicable and the Disposition within this note       Time User Action Codes Description Comment    10/21/2024 10:04 PM Mildred Ulloa Add [S42.291A] Humeral head fracture, right, closed, initial encounter     10/21/2024 10:04 PM Mildred Ulloa Add [D69.6] Thrombocytopenia (HCC)                  Kathy Pandya MD  10/21/24 5077

## 2024-10-22 ENCOUNTER — VBI (OUTPATIENT)
Dept: FAMILY MEDICINE CLINIC | Facility: CLINIC | Age: 69
End: 2024-10-22

## 2024-10-22 NOTE — TELEPHONE ENCOUNTER
10/22/24 3:16 PM    Patient contacted post ED visit, first outreach attempt made. Message was left for patient to return a call to the [facility] Department at [facility]: Phone [phone].    Thank you.  Jessica Palencia MA  [facility]

## 2024-10-22 NOTE — ED CARE HANDOFF
Emergency Department Sign Out Note        Sign out and transfer of care from Kathy Pandya MD. See Separate Emergency Department note.     The patient, Malachi Greenwood, was evaluated by the previous provider for fall..    Workup Completed:  - Labs at patient's baseline with the exception of slightly worse thrombocytopenia when compared with previous   - Xray shows right humeral fracture   - CT scan ordered and pending at sign-out    ED Course / Workup Pending (followup):  69-year-old male presents after mechanical fall onto outstretched arm.  At time of signout, lab work had been collected and showed worse thrombocytopenia when compared with patient's typical thrombocytopenia, otherwise were within patient's baseline range.  X-ray showed fracture of the proximal right humerus.   Because the patient has significant thrombocytopenia, CT scan of the shoulder was done to rule out any bleeding in the joint space.  This was pending at time of signout.                  ED Course as of 10/22/24 0623   Mon Oct 21, 2024   2202 Signed out to me pending CT - patient with fall onto outstretched arm. Proximal humerus fracture, platelets are 71. CT done to rule out bleeding in area of the fracture. If wnl, can treat conservatively per orthopedic surgeon with outpatient follow up   2337 No evidence of bleeding around site of fracture on CT scan - plan to discharge with orthopedic follow up     Procedures  Medical Decision Making  69-year-old male presenting with right arm pain after fall on outstretched arm.  At time of signout to me, CT scan was pending as noted above.  This was ordered to rule out any significant developing hematoma given his worse thrombocytopenia when compared to baseline.  CT scan showed no expanding hematoma or bleeding in the joint space around the fracture.  Ultimately, he was able to be discharged in stable condition with medication for pain and prompt referral to orthopedic surgery for further  evaluation and management.    Problems Addressed:  Humeral head fracture, right, closed, initial encounter: acute illness or injury  Thrombocytopenia (HCC): acute illness or injury    Amount and/or Complexity of Data Reviewed  Labs: ordered.  Radiology: ordered.    Risk  OTC drugs.  Prescription drug management.            Disposition  Final diagnoses:   Humeral head fracture, right, closed, initial encounter   Thrombocytopenia (HCC)     Time reflects when diagnosis was documented in both MDM as applicable and the Disposition within this note       Time User Action Codes Description Comment    10/21/2024 10:04 PM Mildred Ulloa Add [S42.291A] Humeral head fracture, right, closed, initial encounter     10/21/2024 10:04 PM Mildred Ulloa Add [D69.6] Thrombocytopenia (HCC)           ED Disposition       ED Disposition   Discharge    Condition   Stable    Date/Time   Mon Oct 21, 2024 11:37 PM    Comment   Malachi Greenwood discharge to home/self care.                   Follow-up Information       Follow up With Specialties Details Why Contact Info Additional Information    Saranya Eubanks PA-C Family Medicine, Physician Assistant Schedule an appointment as soon as possible for a visit   [address] [place] [address]  [phone]       [facility] [place] [facility] Schedule an appointment as soon as possible for a visit  Follow-up outpatient for humeral head fracture [address] [place] [id number] [phone] [facility] [place], [address] [place] [address], [place] [address], [place], [place], [address] [phone]     [facility] Emergency Department Emergency Medicine  As needed [facility] [id number] [phone] [facility] Emergency Department, [address], [place], [address]           Discharge Medication List as of 10/21/2024 11:37 PM        START taking these medications    Details   docusate sodium (COLACE) 100 mg capsule Take 1 capsule (100 mg total) by mouth every 12 (twelve) hours, Starting Mon 10/21/2024, Normal      oxyCODONE (ROXICODONE) 5 immediate release tablet Take 1 tablet (5 mg total) by mouth every 4 (four) hours as needed for moderate pain or severe pain for up to 7 days Max Daily Amount: 30 mg, Starting Mon 10/21/2024, Until Mon 10/28/2024 at 2359, Normal           CONTINUE these medications which have NOT CHANGED    Details   albuterol (2.5 mg/3 mL) 0.083 % nebulizer solution Take 3 mL (2.5 mg total) by nebulization every 6 (six) hours as needed for wheezing, Starting Thu 8/15/2024, Normal      albuterol (PROVENTIL HFA,VENTOLIN HFA) 90 mcg/act inhaler INHALE 2 PUFFS EVERY 4 HOURS AS NEEDED FOR SHORTNESS OF BREATH., Normal      allopurinol (ZYLOPRIM) 100 mg tablet TAKE 1 TABLET BY MOUTH TWICE A DAY, Normal      Cholecalciferol (VITAMIN D3) 1000 units CAPS Take by mouth, Starting Thu 8/21/2014, Historical Med      cyclobenzaprine (FLEXERIL) 5 mg tablet TAKE 1 TABLET BY MOUTH THREE TIMES A DAY AS NEEDED FOR MUSCLE SPASM, Normal      DULoxetine (CYMBALTA) 30 mg delayed release capsule TAKE 1 CAPSULE BY MOUTH EVERY DAY, Starting Tue 9/10/2024, Normal      Fluticasone-Salmeterol (Advair Diskus) 250-50 mcg/dose inhaler Inhale 1 puff 2 (two) times a day Rinse mouth after use., Starting Fri 2/23/2024, Until Mon 2/17/2025, Normal      guaiFENesin (MUCINEX) 600 mg 12 hr tablet Take 1,200 mg by mouth daily Once at hs, Historical Med      Multiple Vitamins tablet Take 1 tablet by mouth daily, Starting Thu 8/21/2014, Historical Med      sildenafil (VIAGRA) 100 mg tablet TAKE 1 TABLET DAILY AS NEEDED FOR ERECTILE DYSFUNCTION, Normal      tamsulosin (FLOMAX) 0.4 mg TAKE 1 CAPSULE BY MOUTH EVERY DAY WITH DINNER, Normal           No discharge procedures on file.       ED  Provider  Electronically Signed by     Cecy Hart,   10/22/24 0634

## 2024-10-22 NOTE — DISCHARGE INSTRUCTIONS
Use sling for comfort during the day.    Apply ice to the right shoulder for 15 to 20-minute intervals several times over the next few days.    You may take oxycodone 5 mg orally up to every 4 hours as needed for pain.  Use of a stool softener while on this is is recommended to prevent constipation.    Contact the orthopedics office to arrange for an appointment with sports medicine/orthopedics.  Surgical treatment is not anticipated.    Return to the emergency department if you experience significant increase in pain, new numbness or weakness in the lower arm/hand or for any other worsening.

## 2024-10-24 ENCOUNTER — OFFICE VISIT (OUTPATIENT)
Dept: OBGYN CLINIC | Facility: CLINIC | Age: 69
End: 2024-10-24
Payer: COMMERCIAL

## 2024-10-24 VITALS
HEIGHT: 74 IN | WEIGHT: 315 LBS | DIASTOLIC BLOOD PRESSURE: 86 MMHG | BODY MASS INDEX: 40.43 KG/M2 | HEART RATE: 77 BPM | SYSTOLIC BLOOD PRESSURE: 143 MMHG

## 2024-10-24 DIAGNOSIS — S42.294A OTHER CLOSED NONDISPLACED FRACTURE OF PROXIMAL END OF RIGHT HUMERUS, INITIAL ENCOUNTER: Primary | ICD-10-CM

## 2024-10-24 DIAGNOSIS — M25.511 RIGHT SHOULDER PAIN, UNSPECIFIED CHRONICITY: ICD-10-CM

## 2024-10-24 PROCEDURE — 23620 CLTX GR HMRL TBRS FX WO MNPJ: CPT | Performed by: ORTHOPAEDIC SURGERY

## 2024-10-24 PROCEDURE — 99214 OFFICE O/P EST MOD 30 MIN: CPT | Performed by: ORTHOPAEDIC SURGERY

## 2024-10-24 NOTE — TELEPHONE ENCOUNTER
10/24/24 1:52 PM    Patient contacted post ED visit, VBI department spoke with patient/caregiver and outreach was successful.    Thank you.  Jessica Palencia MA  [facility]

## 2024-10-24 NOTE — PROGRESS NOTES
ORTHO CARE [facility] [address] [id number] [phone]       Malachi Greenwood  [id number]  1955    ORTHOPAEDIC SURGERY OUTPATIENT NOTE  10/24/2024      HISTORY:  69 y.o. male presents today evaluation for his right shoulder.  Patient had an injury on 10/21/2024.  Patient t states he was running after a shopping cart in the parking lot and fell face down with his right arm outstretched.  Patient was seen at the [facility] and had x-ray taken of the right shoulder which showed minimally displaced greater tuberosity fracture.  He also had a CT of the right upper extremity which showed comminuted nondisplaced fracture of the right proximal humerus and was placed in a protective sling.  He states having pain over the lateral aspect of the right shoulder rating down to the elbow.  Denies any numbness or tingling.  Patient has severe COPD and is working with a pulmonologist on getting a portable oxygen tank.  Patient is currently taking oxycodone 5 mg at night that was prescribed by the ED.  Patient cannot take NSAIDs due to having chronic kidney disease.  Patient is right-hand dominant. Patient is present in a wheelchair today but normally ambulates with a cane .     Past Medical History:   Diagnosis Date    Arthritis     Asthma     Chest pain     atypical    Chronic bronchitis ([facility]) 2014    Chronic kidney disease     Colon polyp     COPD (chronic obstructive pulmonary disease) ([facility])     CPAP (continuous positive airway pressure) dependence     Decreased glomerular filtration rate     Hamstring strain     Herniated lumbar intervertebral disc     History of alcohol use     uncomplicated    History of back pain     lower    History of colon polyps     sigmoid    History of genital warts     History of lipoma     History of muscle spasm     lumbar paraspinous muscle    History of umbilical hernia     Lateral pain of left hip      Low HDL (under 40)     Lumbar radiculopathy     Obesity     Pneumonia 1975    Respiratory distress     acute    Shortness of breath     Sleep apnea     CPAP PT WILL BRING    Sleep apnea, obstructive 2014       Past Surgical History:   Procedure Laterality Date    ADENOIDECTOMY      APPENDECTOMY      COLONOSCOPY      HAND SURGERY Left     CYST REMOVAL    HERNIA REPAIR  2017    HIP SURGERY Bilateral     IR BIOPSY BONE MARROW  04/07/2023    LIPECTOMY      thigh    LIPOMA RESECTION Left     HIP    ME RPR UMBILICAL HRNA 5 YRS/> REDUCIBLE N/A 02/23/2017    Procedure: REPAIR HERNIA UMBILICAL;  Surgeon: Sushant Buckner MD;  Location: [facility];  Service: General    TONSILLECTOMY      WRIST GANGLION EXCISION         Social History     Socioeconomic History    Marital status: /Civil Union     Spouse name: Not on file    Number of children: Not on file    Years of education: Not on file    Highest education level: Not on file   Occupational History    Occupation: Fulltime employment   Tobacco Use    Smoking status: Former     Current packs/day: 0.00     Average packs/day: 2.0 packs/day for 42.7 years (85.5 ttl pk-yrs)     Types: Cigarettes     Start date: 1/1/1972     Quit date: 10/2014     Years since quitting: 10.0     Passive exposure: Past    Smokeless tobacco: Never    Tobacco comments:     Quit for a 16 year period in between start and final quit date.   Vaping Use    Vaping status: Never Used   Substance and Sexual Activity    Alcohol use: Not Currently     Comment: Quit drinking 1996    Drug use: Never    Sexual activity: Yes     Partners: Female     Birth control/protection: None   Other Topics Concern    Not on file   Social History Narrative    Always uses seatbelt     Social Determinants of Health     Financial Resource Strain: Low Risk  (2/15/2024)    Overall Financial Resource Strain (CARDIA)     Difficulty of Paying Living Expenses: Not very hard   Food Insecurity: Not on file   Transportation Needs: No  Transportation Needs (2/15/2024)    PRAPARE - Transportation     Lack of Transportation (Medical): No     Lack of Transportation (Non-Medical): No   Physical Activity: Not on file   Stress: Not on file   Social Connections: Not on file   Intimate Partner Violence: Not on file   Housing Stability: Not on file       Family History   Problem Relation Age of Onset    Heart disease Mother         cardiac disorder    Hypertension Mother          at age 78    Cancer Mother         ovarian cancer  at age 78    COPD Father          at age 83    Heart disease Family         cardiac disorder    Cancer Family         lung    Emphysema Family         pulmonary unspecified emphysema    Diabetes Family     Glaucoma Family     Cancer Family         lung    Cancer Paternal Grandmother          at age 78        Patient's Medications   New Prescriptions    No medications on file   Previous Medications    ALBUTEROL (2.5 MG/3 ML) 0.083 % NEBULIZER SOLUTION    Take 3 mL (2.5 mg total) by nebulization every 6 (six) hours as needed for wheezing    ALBUTEROL (PROVENTIL HFA,VENTOLIN HFA) 90 MCG/ACT INHALER    INHALE 2 PUFFS EVERY 4 HOURS AS NEEDED FOR SHORTNESS OF BREATH.    ALLOPURINOL (ZYLOPRIM) 100 MG TABLET    TAKE 1 TABLET BY MOUTH TWICE A DAY    CHOLECALCIFEROL (VITAMIN D3) 1000 UNITS CAPS    Take by mouth    CYCLOBENZAPRINE (FLEXERIL) 5 MG TABLET    TAKE 1 TABLET BY MOUTH THREE TIMES A DAY AS NEEDED FOR MUSCLE SPASM    DOCUSATE SODIUM (COLACE) 100 MG CAPSULE    Take 1 capsule (100 mg total) by mouth every 12 (twelve) hours    DULOXETINE (CYMBALTA) 30 MG DELAYED RELEASE CAPSULE    TAKE 1 CAPSULE BY MOUTH EVERY DAY    FLUTICASONE-SALMETEROL (ADVAIR DISKUS) 250-50 MCG/DOSE INHALER    Inhale 1 puff 2 (two) times a day Rinse mouth after use.    GUAIFENESIN (MUCINEX) 600 MG 12 HR TABLET    Take 1,200 mg by mouth daily Once at hs    MULTIPLE VITAMINS TABLET    Take 1 tablet by mouth daily    OXYCODONE (ROXICODONE) 5 IMMEDIATE  "RELEASE TABLET    Take 1 tablet (5 mg total) by mouth every 4 (four) hours as needed for moderate pain or severe pain for up to 7 days Max Daily Amount: 30 mg    SILDENAFIL (VIAGRA) 100 MG TABLET    TAKE 1 TABLET DAILY AS NEEDED FOR ERECTILE DYSFUNCTION    TAMSULOSIN (FLOMAX) 0.4 MG    TAKE 1 CAPSULE BY MOUTH EVERY DAY WITH DINNER   Modified Medications    No medications on file   Discontinued Medications    No medications on file       Allergies   Allergen Reactions    Darvon [Propoxyphene] Other (See Comments)     hallucinations        /86 (BP Location: Left arm, Patient Position: Sitting, Cuff Size: Standard)   Pulse 77   Ht 6' 2\" (1.88 m)   Wt (!) 191 kg (420 lb)   BMI 53.92 kg/m²      REVIEW OF SYSTEMS:  Constitutional: Negative.    HEENT: Negative.    Respiratory: Negative.    Skin: Negative.    Neurological: Negative.    Psychiatric/Behavioral: Negative.  Musculoskeletal: Negative except for that mentioned in the HPI.    Gen: No acute distress, resting comfortably in bed  HEENT: Eyes clear, moist mucus membranes, hearing intact  Respiratory: No audible wheezing or stridor  Cardiovascular: Well Perfused peripherally, 2+ distal pulse  Abdomen: nondistended, no peritoneal signs     PHYSICAL EXAM:   Right shoulder   Skin intact  + ecchymosis   Able to move all digits   Presently sitting in a wheelchair and protective sling right upper extremity     IMAGING:      ASSESSMENT AND PLAN:  69 y.o. male  Nondisplaced right proximal humerus fracture, DOI 10/21/24     Xr right shoulder and CT right shoulder were  reviewed in the office today. Advised patient to be in the sling for the next two weeks. Advised patient to do range of motion exercises Flexion and extension to prevent stiffness. Recommended patient to take Vit D and calcium to help with bone healing.  He is to follow up in two weeks and at that time will repeat XR Right shoulder. At next office visit will start pendulum exercises and start PT "     Fracture / Dislocation Treatment    Date/Time: 10/24/2024 3:45 PM    Performed by: Seda Russell DO  Authorized by: Seda Russell DO    Patient Location:  [facility]  [place] Protocol:  procedure performed by consultantConsent: Verbal consent obtained.  Risks and benefits: risks, benefits and alternatives were discussed  Consent given by: patient  Timeout called at: 10/24/2024 3:42 PM.  Patient understanding: patient states understanding of the procedure being performed  Site marked: the operative site was marked    Injury location:  Shoulder  Location details:  Right shoulder  Injury type:  Fracture  Fracture type: greater humeral tuberosity    Immobilization:  Sling  Neurovascular status: Neurovascularly intact       Scribe Attestation      I,:  Walter Mcguire MA am acting as a scribe while in the presence of the attending physician.:       I,:  Seda Russell DO personally performed the services described in this documentation    as scribed in my presence.:

## 2024-10-25 NOTE — ED ATTENDING ATTESTATION
10/21/2024  I, Mildred Ulloa MD, saw and evaluated the patient. I have discussed the patient with the resident/non-physician practitioner and agree with the resident's/non-physician practitioner's findings, Plan of Care, and MDM as documented in the resident's/non-physician practitioner's note, except where noted. All available labs and Radiology studies were reviewed.  I was present for key portions of any procedure(s) performed by the resident/non-physician practitioner and I was immediately available to provide assistance.       At this point I agree with the current assessment done in the Emergency Department.  I have conducted an independent evaluation of this patient a history and physical is as follows:    Patient is a 69-year-old male with history of asthma, COPD, thrombocytopenia and CKD who presents to the emergency department for evaluation with right arm pain following trip and fall forward.  He is uncertain exactly how he landed on his right arm but immediately appreciated pain in the shoulder.  He is right-hand dominant and needed to lift the hand with his left arm in order to create a small amount of paperwork shortly after the fall.  No numbness or paresthesias distally in the arm.  He additionally scraped his nose and upper lip which had transient bleeding.  He does not appreciate any discomfort or malalignment of the teeth.  No headache.  No chest discomfort or dyspnea.  He has been ambulatory without difficulty.    Fall occurred while out of town.  He drove back to the area and subsequently had someone give him a ride to the ED.  He expresses greatest concern is for possible bleeding into some issues as a result of his thrombocytopenia.  Platelet count typically around 80,000.  It has been as low as 77,000.  No prior history of right shoulder injury.  He did see several specialists after having trouble with his left shoulder and was ultimately found to have cervical  radiculopathy.    On exam mild abrasion appreciated on tip of nose as well as upper lip with mild to moderate swelling.  No active bleeding.  Central incisors (upper) without laxity.  No gingival bleeding.  No cervical, thoracic or lumbar tenderness.  Speech clear.  No facial asymmetry.  He is able to range lower extremities and left upper extremity without difficulty.  Good strength with right hand , finger abduction, dorsi and volar flexion.  He is very uncomfortable with all of these maneuvers.  Mild tenderness over distal right upper arm and moderate tenderness over lateral, superior and anterior aspect of the right shoulder.  No clavicular tenderness.  No discoloration or appreciated soft tissue swelling in this region.  Tenderness is present over the medial aspect of the right upper arm.  +2 right radial pulse.    Concern for humeral fracture, shoulder dislocation, joint effusion, soft tissue swelling/hematoma.  X-ray interpreted by Dr. Pandya and myself.  Comminuted  /Impacted humeral head fracture appreciated without dislocation.    ED Course  ED Course as of 10/24/24 2317   Mon Oct 21, 2024   2005 Heading for CT at this time.  Chemistry at baseline.  CBC pending.   2131 Discussed imaging findings and lab results.  Impacted/comminuted fracture without dislocation appreciated right humeral head/neck.  Hemoglobin stable.  Platelet count only slightly reduced from baseline of 80.    CT images pending radiology interpretation.  I do not appreciate active bleed/large fluid collection.  Although area is uncomfortable patient does believe he would be able to manage at home which is on 1 level.  He would be interested in following up with [facility] orthopedics at the [place] Location which is convenient for him.    Preparing discharge papers in anticipation of this.  He is aware plan might change should active bleeding or concern for collection be noted.   2200 I have reached out to on-call orthopedic surgeon  Dr. Davidson regarding patient injury including baseline thrombocytopenia -and whether alternate disposition plan would be appropriate.  CT images have been sent to be [facility] for interpretation.   2208 Care is being transferred to [facility].   Dr. Davidson reviewed x-ray images and 1 CT image.  Patient identified to be appropriate for outpatient discharge as long as no active bleeding appreciated.  Patient receptive to plan.      Critical Care Time  Procedures

## 2024-10-27 ENCOUNTER — APPOINTMENT (EMERGENCY)
Dept: VASCULAR ULTRASOUND | Facility: HOSPITAL | Age: 69
End: 2024-10-27
Payer: COMMERCIAL

## 2024-10-27 ENCOUNTER — APPOINTMENT (EMERGENCY)
Dept: RADIOLOGY | Facility: HOSPITAL | Age: 69
End: 2024-10-27
Payer: COMMERCIAL

## 2024-10-27 ENCOUNTER — HOSPITAL ENCOUNTER (EMERGENCY)
Facility: HOSPITAL | Age: 69
Discharge: HOME | End: 2024-10-27
Attending: EMERGENCY MEDICINE | Admitting: EMERGENCY MEDICINE
Payer: COMMERCIAL

## 2024-10-27 VITALS
RESPIRATION RATE: 18 BRPM | DIASTOLIC BLOOD PRESSURE: 67 MMHG | SYSTOLIC BLOOD PRESSURE: 145 MMHG | TEMPERATURE: 98.2 F | OXYGEN SATURATION: 97 % | HEART RATE: 85 BPM

## 2024-10-27 DIAGNOSIS — I82.452 DEEP VENOUS THROMBOSIS (DVT) OF LEFT PERONEAL VEIN, UNSPECIFIED CHRONICITY (HCC): ICD-10-CM

## 2024-10-27 DIAGNOSIS — L03.116 CELLULITIS OF LEFT LOWER EXTREMITY: Primary | ICD-10-CM

## 2024-10-27 PROCEDURE — 73564 X-RAY EXAM KNEE 4 OR MORE: CPT

## 2024-10-27 PROCEDURE — 93971 EXTREMITY STUDY: CPT

## 2024-10-27 PROCEDURE — 99285 EMERGENCY DEPT VISIT HI MDM: CPT | Performed by: EMERGENCY MEDICINE

## 2024-10-27 PROCEDURE — 99284 EMERGENCY DEPT VISIT MOD MDM: CPT

## 2024-10-27 PROCEDURE — 73630 X-RAY EXAM OF FOOT: CPT

## 2024-10-27 RX ORDER — CEPHALEXIN 500 MG/1
500 CAPSULE ORAL EVERY 6 HOURS SCHEDULED
Qty: 28 CAPSULE | Refills: 0 | Status: SHIPPED | OUTPATIENT
Start: 2024-10-27 | End: 2024-10-29 | Stop reason: SDUPTHER

## 2024-10-27 RX ADMIN — CEPHALEXIN 500 MG: 250 CAPSULE ORAL at 17:48

## 2024-10-27 NOTE — DISCHARGE INSTRUCTIONS
We will start you on Keflex for cellulitis of your left lower leg.  An ultrasound did show 1 DVT in one of your peroneal veins however given your exam, past platelet history, current injuries we did not feel that starting you on anticoagulation is the most beneficial at this time.  Please continue to keep your leg rested, iced, elevated at home and follow-up with your primary care provider this week preferably in 48 hours to have further assessment and strict made on continued management of your symptoms.

## 2024-10-27 NOTE — ED ATTENDING ATTESTATION
10/27/2024  I, Quentin Malik MD, saw and evaluated the patient. I have discussed the patient with the resident/non-physician practitioner and agree with the resident's/non-physician practitioner's findings, Plan of Care, and MDM as documented in the resident's/non-physician practitioner's note, except where noted. All available labs and Radiology studies were reviewed.  I was present for key portions of any procedure(s) performed by the resident/non-physician practitioner and I was immediately available to provide assistance.       At this point I agree with the current assessment done in the Emergency Department.  I have conducted an independent evaluation of this patient a history and physical is as follows: Left lower extremity pain and swelling.  No increased chest pain or shortness of breath.  Had mechanical fall last week where he suffered a proximal humerus fracture.  He has a history of thrombocytopenia.  He reports pain and swelling of his left lower extremity that is worse than typical.    X-ray left knee and left foot.  DVT study left lower extremity  Basic laboratory studies performed within the past week, no indication for recheck at this time.  No headache.  No chest pain or shortness of breath.  Hemodynamically stable.  Redness and warmth to left lower extremity.    DVT study with no acute DVT, evidence of chronic DVT in single peroneal vein.  Due to thrombocytopenia, risk of falling, chronic nature DVT, will not initiate emergent anticoagulation.  Due to redness and overlying warmth, will treat as cellulitis with information to follow-up with PCP for reevaluation.    XR foot 3+ views LEFT   ED Interpretation by Hayder Sorto MD (10/27 1638)   No obvious fracture or dislocation.      XR knee 4+ vw left injury   ED Interpretation by Hayder Sorto MD (10/27 1638)   No obvious fracture or dislocation.      VAS lower limb venous duplex study, unilateral/limited    (Results Pending)          ED Course         Critical Care Time  Procedures

## 2024-10-27 NOTE — ED PROVIDER NOTES
Time reflects when diagnosis was documented in both MDM as applicable and the Disposition within this note       Time User Action Codes Description Comment    10/27/2024  5:34 PM Hayder Pederson [L03.116] Cellulitis of left lower extremity     10/27/2024  5:34 PM Hayder Pederson [I82.452] Deep venous thrombosis (DVT) of left peroneal vein, unspecified chronicity (HCC)           ED Disposition       ED Disposition   Discharge    Condition   Stable    Date/Time   Sun Oct 27, 2024  5:35 PM    Comment   Malachi NGUYEN Bergholz discharge to home/self care.                   Assessment & Plan       Medical Decision Making  69-year-old male presenting to the ED with left lower leg swelling after a fall a week ago.    Differential includes DVT, superficial venous thrombosis ,bruising, musculoskeletal pain, sprain, strain.  Will also consider cellulitis if other exam findings are negative.    Will order duplex ultrasound of the left lower leg as well as x-rays of the left foot and left knee.  Patient had labs a week ago when he was just in the hospital for the initial evaluation of his right humerus, appreciated these labs will consider further if needed.    X-ray showed no acute fractures or dislocations.    Ultrasound shows peroneal nerve DVT but otherwise no signs of further DVTs.  Given patient's low platelet history and broken humerus, there is lower benefit of starting anticoagulation as opposed to having patient follow-up with primary care provider for further management.  Patient will be started on Keflex for possible cellulitis.    Patient is comfortable with this plan and will follow-up with primary care provider this week.    At time of discharge, patient's vitals were normal and patient was able to ambulate at baseline.  Patient discharged.    Amount and/or Complexity of Data Reviewed  Radiology: ordered and independent interpretation performed.    Risk  Prescription drug management.              Medications   cephalexin (KEFLEX) capsule 500 mg (500 mg Oral Given 10/27/24 7996)       ED Risk Strat Scores                                               History of Present Illness       Chief Complaint   Patient presents with    Leg Pain     Had a fall about a week ago and broke R shoulder. Pt now experiencing redness and swelling in LLE.        Past Medical History:   Diagnosis Date    Arthritis     Asthma     Chest pain     atypical    Chronic bronchitis (HCC)     Chronic kidney disease     Colon polyp     COPD (chronic obstructive pulmonary disease) (HCC)     CPAP (continuous positive airway pressure) dependence     Decreased glomerular filtration rate     Hamstring strain     Herniated lumbar intervertebral disc     History of alcohol use     uncomplicated    History of back pain     lower    History of colon polyps     sigmoid    History of genital warts     History of lipoma     History of muscle spasm     lumbar paraspinous muscle    History of umbilical hernia     Lateral pain of left hip     Low HDL (under 40)     Lumbar radiculopathy     Obesity     Pneumonia 1975    Respiratory distress     acute    Shortness of breath     Sleep apnea     CPAP PT WILL BRING    Sleep apnea, obstructive       Past Surgical History:   Procedure Laterality Date    ADENOIDECTOMY      APPENDECTOMY      COLONOSCOPY      HAND SURGERY Left     CYST REMOVAL    HERNIA REPAIR  2017    HIP SURGERY Bilateral     IR BIOPSY BONE MARROW  2023    LIPECTOMY      thigh    LIPOMA RESECTION Left     HIP    TN RPR UMBILICAL HRNA 5 YRS/> REDUCIBLE N/A 2017    Procedure: REPAIR HERNIA UMBILICAL;  Surgeon: Sushant Buckner MD;  Location: [facility];  Service: General    TONSILLECTOMY      WRIST GANGLION EXCISION        Family History   Problem Relation Age of Onset    Heart disease Mother         cardiac disorder    Hypertension Mother          at age 78    Cancer Mother         ovarian cancer  at age 78    COPD  Father          at age 83    Heart disease Family         cardiac disorder    Cancer Family         lung    Emphysema Family         pulmonary unspecified emphysema    Diabetes Family     Glaucoma Family     Cancer Family         lung    Cancer Paternal Grandmother          at age 78      Social History     Tobacco Use    Smoking status: Former     Current packs/day: 0.00     Average packs/day: 2.0 packs/day for 42.7 years (85.5 ttl pk-yrs)     Types: Cigarettes     Start date: 1972     Quit date: 10/2014     Years since quitting: 10.0     Passive exposure: Past    Smokeless tobacco: Never    Tobacco comments:     Quit for a 16 year period in between start and final quit date.   Vaping Use    Vaping status: Never Used   Substance Use Topics    Alcohol use: Not Currently     Comment: Quit drinking     Drug use: Never      E-Cigarette/Vaping    E-Cigarette Use Never User       E-Cigarette/Vaping Substances    Nicotine No     THC No     CBD No     Flavoring No     Other No     Unknown No       I have reviewed and agree with the history as documented.     69-year-old male with significant history of low platelets presenting to the ED for complaint of left lower leg pain, redness, swelling.  Patient had a fall a week ago in which she had broken his humeral neck and was placed in a sling.  Patient had noted bruising on his knees bilaterally and on his other hand but no injuries at the time of evaluation.  Patient was seen in the hospital and then discharged.  Throughout the week, patient has noted increasing pain in the left lower leg and then in the past 2 days noticed redness and warmth in the left lower leg.  Patient is having significantly more pain which prompted him to come in for evaluation.  Patient has been taking pain medications at home nightly for his humeral pain which she states does help with his leg but his leg is now becoming worse.  Patient denies shortness of breath, difficulty  breathing, palpitations, chest pain, fever, chills, nausea, vomiting, diarrhea.  Patient is able to bear weight at a baseline level but with discomfort.  Patient is able to wiggle toes, ambulate at ankle, ambulate at knee.  Patient has full sensation.        Review of Systems   Constitutional:  Negative for chills and fever.   HENT:  Negative for congestion.    Eyes:  Negative for visual disturbance.   Respiratory:  Negative for chest tightness and shortness of breath.    Cardiovascular:  Negative for chest pain and palpitations.   Gastrointestinal:  Negative for abdominal pain, diarrhea, nausea and vomiting.   Genitourinary:  Negative for dysuria.   Musculoskeletal:  Positive for arthralgias and joint swelling. Negative for gait problem, myalgias, neck pain and neck stiffness.   Skin:  Positive for color change and rash.   Neurological:  Negative for dizziness, weakness and headaches.   Psychiatric/Behavioral:  Negative for agitation and confusion.            Objective       ED Triage Vitals [10/27/24 1521]   Temperature Pulse Blood Pressure Respirations SpO2 Patient Position - Orthostatic VS   98.2 °F (36.8 °C) 85 159/76 18 99 % Sitting      Temp Source Heart Rate Source BP Location FiO2 (%) Pain Score    Oral Monitor Left arm -- --      Vitals      Date and Time Temp Pulse SpO2 Resp BP Pain Score FACES Pain Rating User   10/27/24 1724 -- 85 97 % -- 145/67 -- -- BY   10/27/24 1521 -- 85 99 % 18 159/76 -- -- AL   10/27/24 1521 98.2 °F (36.8 °C) -- -- -- -- -- -- LD            Physical Exam  Constitutional:       Appearance: He is obese.   HENT:      Head: Normocephalic and atraumatic.      Right Ear: External ear normal.      Left Ear: External ear normal.      Nose: Nose normal.      Mouth/Throat:      Pharynx: Oropharynx is clear.   Eyes:      Extraocular Movements: Extraocular movements intact.   Cardiovascular:      Rate and Rhythm: Normal rate.      Pulses: Normal pulses.      Heart sounds: Normal heart  sounds.   Pulmonary:      Effort: Pulmonary effort is normal.      Breath sounds: Normal breath sounds.   Abdominal:      General: Bowel sounds are normal.   Musculoskeletal:      Cervical back: Normal range of motion.      Right knee: Normal.      Left knee: Swelling and ecchymosis present. Tenderness present.      Right lower le+ Pitting Edema present.      Left lower leg: Swelling and tenderness present. 2+ Pitting Edema present.      Right ankle: Normal.      Left ankle: Swelling present. Tenderness present.      Right foot: Normal.      Left foot: Swelling, tenderness and bony tenderness present.   Skin:     General: Skin is warm.      Capillary Refill: Capillary refill takes less than 2 seconds.   Neurological:      General: No focal deficit present.      Mental Status: He is alert and oriented to person, place, and time.   Psychiatric:         Mood and Affect: Mood normal.         Behavior: Behavior normal.         Results Reviewed       None            XR foot 3+ views LEFT   ED Interpretation by Hayder Sorto MD (10/27 1638)   No obvious fracture or dislocation.      XR knee 4+ vw left injury   ED Interpretation by Hayder Sorto MD (10/27 1638)   No obvious fracture or dislocation.      VAS lower limb venous duplex study, unilateral/limited    (Results Pending)       Procedures    ED Medication and Procedure Management   Prior to Admission Medications   Prescriptions Last Dose Informant Patient Reported? Taking?   Cholecalciferol (VITAMIN D3) 1000 units CAPS  Self Yes No   Sig: Take by mouth   DULoxetine (CYMBALTA) 30 mg delayed release capsule  Self No No   Sig: TAKE 1 CAPSULE BY MOUTH EVERY DAY   Fluticasone-Salmeterol (Advair Diskus) 250-50 mcg/dose inhaler  Self No No   Sig: Inhale 1 puff 2 (two) times a day Rinse mouth after use.   Multiple Vitamins tablet  Self Yes No   Sig: Take 1 tablet by mouth daily   albuterol (2.5 mg/3 mL) 0.083 % nebulizer solution  Self No No   Sig: Take 3 mL  (2.5 mg total) by nebulization every 6 (six) hours as needed for wheezing   albuterol (PROVENTIL HFA,VENTOLIN HFA) 90 mcg/act inhaler  Self No No   Sig: INHALE 2 PUFFS EVERY 4 HOURS AS NEEDED FOR SHORTNESS OF BREATH.   allopurinol (ZYLOPRIM) 100 mg tablet   No No   Sig: TAKE 1 TABLET BY MOUTH TWICE A DAY   cyclobenzaprine (FLEXERIL) 5 mg tablet  Self No No   Sig: TAKE 1 TABLET BY MOUTH THREE TIMES A DAY AS NEEDED FOR MUSCLE SPASM   docusate sodium (COLACE) 100 mg capsule   No No   Sig: Take 1 capsule (100 mg total) by mouth every 12 (twelve) hours   guaiFENesin (MUCINEX) 600 mg 12 hr tablet  Self Yes No   Sig: Take 1,200 mg by mouth daily Once at hs   oxyCODONE (ROXICODONE) 5 immediate release tablet   No No   Sig: Take 1 tablet (5 mg total) by mouth every 4 (four) hours as needed for moderate pain or severe pain for up to 7 days Max Daily Amount: 30 mg   sildenafil (VIAGRA) 100 mg tablet  Self No No   Sig: TAKE 1 TABLET DAILY AS NEEDED FOR ERECTILE DYSFUNCTION   tamsulosin (FLOMAX) 0.4 mg  Self No No   Sig: TAKE 1 CAPSULE BY MOUTH EVERY DAY WITH DINNER      Facility-Administered Medications: None     Discharge Medication List as of 10/27/2024  5:35 PM        START taking these medications    Details   cephalexin (KEFLEX) 500 mg capsule Take 1 capsule (500 mg total) by mouth every 6 (six) hours for 7 days, Starting Sun 10/27/2024, Until Sun 11/3/2024, Normal           CONTINUE these medications which have NOT CHANGED    Details   albuterol (2.5 mg/3 mL) 0.083 % nebulizer solution Take 3 mL (2.5 mg total) by nebulization every 6 (six) hours as needed for wheezing, Starting Thu 8/15/2024, Normal      albuterol (PROVENTIL HFA,VENTOLIN HFA) 90 mcg/act inhaler INHALE 2 PUFFS EVERY 4 HOURS AS NEEDED FOR SHORTNESS OF BREATH., Normal      allopurinol (ZYLOPRIM) 100 mg tablet TAKE 1 TABLET BY MOUTH TWICE A DAY, Normal      Cholecalciferol (VITAMIN D3) 1000 units CAPS Take by mouth, Starting u 8/21/2014, Historical Med       cyclobenzaprine (FLEXERIL) 5 mg tablet TAKE 1 TABLET BY MOUTH THREE TIMES A DAY AS NEEDED FOR MUSCLE SPASM, Normal      docusate sodium (COLACE) 100 mg capsule Take 1 capsule (100 mg total) by mouth every 12 (twelve) hours, Starting Mon 10/21/2024, Normal      DULoxetine (CYMBALTA) 30 mg delayed release capsule TAKE 1 CAPSULE BY MOUTH EVERY DAY, Starting Tue 9/10/2024, Normal      Fluticasone-Salmeterol (Advair Diskus) 250-50 mcg/dose inhaler Inhale 1 puff 2 (two) times a day Rinse mouth after use., Starting Fri 2/23/2024, Until Mon 2/17/2025, Normal      guaiFENesin (MUCINEX) 600 mg 12 hr tablet Take 1,200 mg by mouth daily Once at hs, Historical Med      Multiple Vitamins tablet Take 1 tablet by mouth daily, Starting Thu 8/21/2014, Historical Med      oxyCODONE (ROXICODONE) 5 immediate release tablet Take 1 tablet (5 mg total) by mouth every 4 (four) hours as needed for moderate pain or severe pain for up to 7 days Max Daily Amount: 30 mg, Starting Mon 10/21/2024, Until Mon 10/28/2024 at 2359, Normal      sildenafil (VIAGRA) 100 mg tablet TAKE 1 TABLET DAILY AS NEEDED FOR ERECTILE DYSFUNCTION, Normal      tamsulosin (FLOMAX) 0.4 mg TAKE 1 CAPSULE BY MOUTH EVERY DAY WITH DINNER, Normal           No discharge procedures on file.  ED SEPSIS DOCUMENTATION   Time reflects when diagnosis was documented in both MDM as applicable and the Disposition within this note       Time User Action Codes Description Comment    10/27/2024  5:34 PM Hayder Sorto [L03.116] Cellulitis of left lower extremity     10/27/2024  5:34 PM Hayder Sorto [I82.452] Deep venous thrombosis (DVT) of left peroneal vein, unspecified chronicity (HCC)                  Hayder Sorto MD  10/28/24 0108

## 2024-10-28 ENCOUNTER — VBI (OUTPATIENT)
Dept: FAMILY MEDICINE CLINIC | Facility: CLINIC | Age: 69
End: 2024-10-28

## 2024-10-28 PROCEDURE — 93971 EXTREMITY STUDY: CPT | Performed by: SURGERY

## 2024-10-28 NOTE — TELEPHONE ENCOUNTER
10/28/24 11:07 AM    Patient contacted post ED visit, [facility] department spoke with patient/caregiver and outreach was successful.    Thank you.  Paige France MA  [facility]

## 2024-10-29 ENCOUNTER — OFFICE VISIT (OUTPATIENT)
Dept: FAMILY MEDICINE CLINIC | Facility: CLINIC | Age: 69
End: 2024-10-29
Payer: COMMERCIAL

## 2024-10-29 VITALS
DIASTOLIC BLOOD PRESSURE: 64 MMHG | TEMPERATURE: 97.8 F | OXYGEN SATURATION: 95 % | SYSTOLIC BLOOD PRESSURE: 138 MMHG | HEART RATE: 95 BPM

## 2024-10-29 DIAGNOSIS — R16.2 HEPATOSPLENOMEGALY: ICD-10-CM

## 2024-10-29 DIAGNOSIS — R60.0 EDEMA OF BOTH LOWER LEGS: ICD-10-CM

## 2024-10-29 DIAGNOSIS — K70.30 ALCOHOLIC CIRRHOSIS OF LIVER WITHOUT ASCITES (HCC): Primary | ICD-10-CM

## 2024-10-29 DIAGNOSIS — L03.116 CELLULITIS OF LEFT LOWER EXTREMITY: ICD-10-CM

## 2024-10-29 DIAGNOSIS — E66.01 MORBID OBESITY WITH BMI OF 50.0-59.9, ADULT (HCC): ICD-10-CM

## 2024-10-29 PROBLEM — L98.9 SKIN LESION OF RIGHT ARM: Status: RESOLVED | Noted: 2021-12-03 | Resolved: 2024-10-29

## 2024-10-29 PROCEDURE — G2211 COMPLEX E/M VISIT ADD ON: HCPCS | Performed by: INTERNAL MEDICINE

## 2024-10-29 PROCEDURE — 99214 OFFICE O/P EST MOD 30 MIN: CPT | Performed by: INTERNAL MEDICINE

## 2024-10-29 RX ORDER — FUROSEMIDE 20 MG/1
20 TABLET ORAL DAILY
Qty: 30 TABLET | Refills: 5 | Status: SHIPPED | OUTPATIENT
Start: 2024-10-29

## 2024-10-29 RX ORDER — CEPHALEXIN 500 MG/1
500 CAPSULE ORAL EVERY 6 HOURS SCHEDULED
Qty: 28 CAPSULE | Refills: 0 | Status: SHIPPED | OUTPATIENT
Start: 2024-10-29 | End: 2024-10-29

## 2024-10-29 RX ORDER — CEPHALEXIN 500 MG/1
500 CAPSULE ORAL EVERY 6 HOURS SCHEDULED
Qty: 40 CAPSULE | Refills: 0 | Status: SHIPPED | OUTPATIENT
Start: 2024-10-29 | End: 2024-11-08

## 2024-10-29 NOTE — ASSESSMENT & PLAN NOTE
Does bilateral edema both lower extremities left worse than right we will trial low-dose Lasix at 20

## 2024-10-29 NOTE — ASSESSMENT & PLAN NOTE
There is no significant warmth and erythema of his left lower extremity that is improved per the patient but will continue his antibiotics for another 10 days  Orders:    furosemide (LASIX) 20 mg tablet; Take 1 tablet (20 mg total) by mouth daily    cephalexin (KEFLEX) 500 mg capsule; Take 1 capsule (500 mg total) by mouth every 6 (six) hours for 10 days

## 2024-10-29 NOTE — ASSESSMENT & PLAN NOTE
Fortunately at least partially due to his hepatosplenomegaly he has a low platelet count and bruises easily

## 2024-10-29 NOTE — PROGRESS NOTES
Ambulatory Visit  Name: Malachi Greenwood      : 1955      MRN: [id number]  Encounter Provider: Dina Souza MD  Encounter Date: 10/29/2024   Encounter department: [facility]    Assessment & Plan  Cellulitis of left lower extremity  There is no significant warmth and erythema of his left lower extremity that is improved per the patient but will continue his antibiotics for another 10 days  Orders:    furosemide (LASIX) 20 mg tablet; Take 1 tablet (20 mg total) by mouth daily    cephalexin (KEFLEX) 500 mg capsule; Take 1 capsule (500 mg total) by mouth every 6 (six) hours for 10 days    Alcoholic cirrhosis of liver without ascites (HCC)  Has not drank in about 10 years         Hepatosplenomegaly  Fortunately at least partially due to his hepatosplenomegaly he has a low platelet count and bruises easily         Edema of both lower legs  Does bilateral edema both lower extremities left worse than right we will trial low-dose Lasix at 20         Morbid obesity with BMI of 50.0-59.9, adult (HCC)   bands morbidly obese not helping his swelling            History of Present Illness     Emergency room 1 week ago with increased redness and swelling of his left lower extremity.  Vascular Doppler did not show evidence of an acute DVT but he did not he did appear to have cellulitis and was treated for same with Keflex his leg is slowly gotten better but is still swollen and still has some erythema there          Review of Systems   Constitutional:  Negative for chills and fever.   HENT:  Negative for ear pain and sore throat.    Eyes:  Negative for pain and visual disturbance.   Respiratory:  Negative for cough and shortness of breath.    Cardiovascular:  Positive for leg swelling. Negative for chest pain and palpitations.   Gastrointestinal:  Negative for abdominal pain and vomiting.   Genitourinary:  Negative for dysuria, frequency and hematuria.   Musculoskeletal:  Positive for gait problem. Negative  for arthralgias and back pain.   Skin:  Positive for color change. Negative for rash.   Neurological:  Negative for seizures and syncope.   Hematological:  Bruises/bleeds easily.   Psychiatric/Behavioral: Negative.     All other systems reviewed and are negative.          Objective     /64 (BP Location: Left arm, Patient Position: Sitting, Cuff Size: Extra-Large)   Pulse 95   Temp 97.8 °F (36.6 °C) (Temporal)   SpO2 95%     Physical Exam  Constitutional:       Appearance: He is obese.   HENT:      Head: Normocephalic and atraumatic.      Right Ear: External ear normal.      Left Ear: External ear normal.      Nose: Nose normal.      Mouth/Throat:      Mouth: Mucous membranes are moist.      Pharynx: No posterior oropharyngeal erythema.   Eyes:      Extraocular Movements: Extraocular movements intact.      Conjunctiva/sclera: Conjunctivae normal.      Pupils: Pupils are equal, round, and reactive to light.   Cardiovascular:      Rate and Rhythm: Normal rate and regular rhythm.   Pulmonary:      Effort: Pulmonary effort is normal.      Breath sounds: Normal breath sounds.   Abdominal:      General: Bowel sounds are normal.      Palpations: Abdomen is soft.      Tenderness: There is no abdominal tenderness.      Hernia: No hernia is present.   Musculoskeletal:         General: No signs of injury.      Right lower leg: Edema present.      Left lower leg: Edema present.      Comments: Left calf swelling worse than right   Skin:     Findings: Bruising (both legs erythema and warmth left calf) and erythema present.   Neurological:      General: No focal deficit present.      Mental Status: He is alert and oriented to person, place, and time.   Psychiatric:         Mood and Affect: Mood normal.         Behavior: Behavior normal.         Thought Content: Thought content normal.         Judgment: Judgment normal.

## 2024-11-07 ENCOUNTER — RA CDI HCC (OUTPATIENT)
Dept: OTHER | Facility: HOSPITAL | Age: 69
End: 2024-11-07

## 2024-11-07 ENCOUNTER — OFFICE VISIT (OUTPATIENT)
Dept: OBGYN CLINIC | Facility: CLINIC | Age: 69
End: 2024-11-07

## 2024-11-07 ENCOUNTER — HOSPITAL ENCOUNTER (OUTPATIENT)
Dept: RADIOLOGY | Facility: HOSPITAL | Age: 69
End: 2024-11-07
Attending: ORTHOPAEDIC SURGERY
Payer: COMMERCIAL

## 2024-11-07 VITALS
HEART RATE: 76 BPM | BODY MASS INDEX: 40.43 KG/M2 | DIASTOLIC BLOOD PRESSURE: 70 MMHG | WEIGHT: 315 LBS | HEIGHT: 74 IN | SYSTOLIC BLOOD PRESSURE: 148 MMHG

## 2024-11-07 DIAGNOSIS — S42.294A OTHER CLOSED NONDISPLACED FRACTURE OF PROXIMAL END OF RIGHT HUMERUS, INITIAL ENCOUNTER: ICD-10-CM

## 2024-11-07 DIAGNOSIS — S42.294A OTHER CLOSED NONDISPLACED FRACTURE OF PROXIMAL END OF RIGHT HUMERUS, INITIAL ENCOUNTER: Primary | ICD-10-CM

## 2024-11-07 PROCEDURE — 73030 X-RAY EXAM OF SHOULDER: CPT

## 2024-11-07 PROCEDURE — 99024 POSTOP FOLLOW-UP VISIT: CPT | Performed by: ORTHOPAEDIC SURGERY

## 2024-11-07 NOTE — PROGRESS NOTES
[facility] [address] [id number] [phone]       Malachi Greenwood  [id number]  1955    ORTHOPAEDIC SURGERY OUTPATIENT NOTE  11/7/2024      HISTORY:  69 y.o. male presents for follow-up on his right proximal humerus fracture.  When we last saw him he recommended protecting this in a sling with gentle elbow range of motion.  He reports pain is well-controlled and he has been moving the elbow.    Past Medical History:   Diagnosis Date    Arthritis     Asthma     Chest pain     atypical    Chronic bronchitis (HCC) 2014    Chronic kidney disease     Colon polyp     COPD (chronic obstructive pulmonary disease) ([facility])     CPAP (continuous positive airway pressure) dependence     Decreased glomerular filtration rate     Hamstring strain     Herniated lumbar intervertebral disc     History of alcohol use     uncomplicated    History of back pain     lower    History of colon polyps     sigmoid    History of genital warts     History of lipoma     History of muscle spasm     lumbar paraspinous muscle    History of umbilical hernia     Lateral pain of left hip     Low HDL (under 40)     Lumbar radiculopathy     Obesity     Pneumonia 1975    Respiratory distress     acute    Shortness of breath     Sleep apnea     CPAP PT WILL BRING    Sleep apnea, obstructive 2014       Past Surgical History:   Procedure Laterality Date    ADENOIDECTOMY      APPENDECTOMY      COLONOSCOPY      HAND SURGERY Left     CYST REMOVAL    HERNIA REPAIR  2017    HIP SURGERY Bilateral     IR BIOPSY BONE MARROW  04/07/2023    LIPECTOMY      thigh    LIPOMA RESECTION Left     HIP    CT RPR UMBILICAL HRNA 5 YRS/> REDUCIBLE N/A 02/23/2017    Procedure: REPAIR HERNIA UMBILICAL;  Surgeon: Sushant Buckner MD;  Location: [facility];  Service: General    TONSILLECTOMY      WRIST GANGLION EXCISION         Social History     Socioeconomic History    Marital status:  /Civil Union     Spouse name: Not on file    Number of children: Not on file    Years of education: Not on file    Highest education level: Not on file   Occupational History    Occupation: Fulltime employment   Tobacco Use    Smoking status: Former     Current packs/day: 0.00     Average packs/day: 2.0 packs/day for 42.7 years (85.5 ttl pk-yrs)     Types: Cigarettes     Start date: 1972     Quit date: 10/2014     Years since quitting: 10.1     Passive exposure: Past    Smokeless tobacco: Never    Tobacco comments:     Quit for a 16 year period in between start and final quit date.   Vaping Use    Vaping status: Never Used   Substance and Sexual Activity    Alcohol use: Not Currently     Comment: Quit drinking     Drug use: Never    Sexual activity: Yes     Partners: Female     Birth control/protection: None   Other Topics Concern    Not on file   Social History Narrative    Always uses seatbelt     Social Determinants of Health     Financial Resource Strain: Low Risk  (2/15/2024)    Overall Financial Resource Strain (CARDIA)     Difficulty of Paying Living Expenses: Not very hard   Food Insecurity: Not on file   Transportation Needs: No Transportation Needs (2/15/2024)    PRAPARE - Transportation     Lack of Transportation (Medical): No     Lack of Transportation (Non-Medical): No   Physical Activity: Not on file   Stress: Not on file   Social Connections: Not on file   Intimate Partner Violence: Not on file   Housing Stability: Not on file       Family History   Problem Relation Age of Onset    Heart disease Mother         cardiac disorder    Hypertension Mother          at age 78    Cancer Mother         ovarian cancer  at age 78    COPD Father          at age 83    Heart disease Family         cardiac disorder    Cancer Family         lung    Emphysema Family         pulmonary unspecified emphysema    Diabetes Family     Glaucoma Family     Cancer Family         lung    Cancer Paternal  "Grandmother          at age 78        Patient's Medications   New Prescriptions    No medications on file   Previous Medications    ALBUTEROL (2.5 MG/3 ML) 0.083 % NEBULIZER SOLUTION    Take 3 mL (2.5 mg total) by nebulization every 6 (six) hours as needed for wheezing    ALBUTEROL (PROVENTIL HFA,VENTOLIN HFA) 90 MCG/ACT INHALER    INHALE 2 PUFFS EVERY 4 HOURS AS NEEDED FOR SHORTNESS OF BREATH.    ALLOPURINOL (ZYLOPRIM) 100 MG TABLET    TAKE 1 TABLET BY MOUTH TWICE A DAY    CEPHALEXIN (KEFLEX) 500 MG CAPSULE    Take 1 capsule (500 mg total) by mouth every 6 (six) hours for 10 days    CHOLECALCIFEROL (VITAMIN D3) 1000 UNITS CAPS    Take by mouth    CYCLOBENZAPRINE (FLEXERIL) 5 MG TABLET    TAKE 1 TABLET BY MOUTH THREE TIMES A DAY AS NEEDED FOR MUSCLE SPASM    DOCUSATE SODIUM (COLACE) 100 MG CAPSULE    Take 1 capsule (100 mg total) by mouth every 12 (twelve) hours    DULOXETINE (CYMBALTA) 30 MG DELAYED RELEASE CAPSULE    TAKE 1 CAPSULE BY MOUTH EVERY DAY    FLUTICASONE-SALMETEROL (ADVAIR DISKUS) 250-50 MCG/DOSE INHALER    Inhale 1 puff 2 (two) times a day Rinse mouth after use.    FUROSEMIDE (LASIX) 20 MG TABLET    Take 1 tablet (20 mg total) by mouth daily    GUAIFENESIN (MUCINEX) 600 MG 12 HR TABLET    Take 1,200 mg by mouth daily Once at hs    MULTIPLE VITAMINS TABLET    Take 1 tablet by mouth daily    SILDENAFIL (VIAGRA) 100 MG TABLET    TAKE 1 TABLET DAILY AS NEEDED FOR ERECTILE DYSFUNCTION    TAMSULOSIN (FLOMAX) 0.4 MG    TAKE 1 CAPSULE BY MOUTH EVERY DAY WITH DINNER   Modified Medications    No medications on file   Discontinued Medications    No medications on file       Allergies   Allergen Reactions    Darvon [Propoxyphene] Other (See Comments)     hallucinations        /70 (BP Location: Left arm, Patient Position: Sitting, Cuff Size: Standard)   Pulse 76   Ht 6' 2\" (1.88 m)   Wt (!) 191 kg (420 lb)   BMI 53.92 kg/m²      REVIEW OF SYSTEMS:  Constitutional: Negative.    HEENT: Negative.  "   Respiratory: Negative.    Skin: Negative.    Neurological: Negative.    Psychiatric/Behavioral: Negative.  Musculoskeletal: Negative except for that mentioned in the HPI.    Gen: No acute distress, resting comfortably in bed  HEENT: Eyes clear, moist mucus membranes, hearing intact  Respiratory: No audible wheezing or stridor  Cardiovascular: Well Perfused peripherally, 2+ distal pulse  Abdomen: nondistended, no peritoneal signs     PHYSICAL EXAM:    Right shoulder  No ecchymosis erythema  Range of motion not evaluated of the shoulder  Elbow range of motion is intact  Neuro vas intact distally    IMAGING: X-ray of the right shoulder taken the office today this was reviewed and demonstrates proximal humerus fracture in stable alignment    ASSESSMENT AND PLAN:  69 y.o. male with right proximal wrist fracture.  This being treated conservatively.  He will start on physical therapy and pendulums were demonstrated.  We will see him back in 4 weeks with repeat Xr. He continues to be NWB RUE.     Scribe Attestation      I,:  Brandon Jenkins PA-C am acting as a scribe while in the presence of the attending physician.:       I,:  Seda Russell personally performed the services described in this documentation    as scribed in my presence.:

## 2024-11-17 DIAGNOSIS — N52.1 ERECTILE DYSFUNCTION DUE TO DISEASES CLASSIFIED ELSEWHERE: ICD-10-CM

## 2024-11-18 ENCOUNTER — TELEPHONE (OUTPATIENT)
Dept: FAMILY MEDICINE CLINIC | Facility: CLINIC | Age: 69
End: 2024-11-18

## 2024-11-18 DIAGNOSIS — L03.116 CELLULITIS OF LEFT LOWER EXTREMITY: Primary | ICD-10-CM

## 2024-11-18 RX ORDER — CEPHALEXIN 500 MG/1
500 CAPSULE ORAL EVERY 12 HOURS SCHEDULED
Qty: 14 CAPSULE | Refills: 0 | Status: SHIPPED | OUTPATIENT
Start: 2024-11-18 | End: 2024-11-25

## 2024-11-18 NOTE — TELEPHONE ENCOUNTER
Patient called the RX Refill Line. Message is being forwarded to the office.     Patient is requesting a refill of the cephalexin, stated that the cellulitis is better but not gone and it's still red and warm to the touch  He will run out of antibiotics today  He's like refill sent to the [facility] in [place]    Please contact patient at

## 2024-11-19 RX ORDER — SILDENAFIL 100 MG/1
TABLET, FILM COATED ORAL
Qty: 6 TABLET | Refills: 5 | Status: SHIPPED | OUTPATIENT
Start: 2024-11-19

## 2024-11-20 DIAGNOSIS — L03.116 CELLULITIS OF LEFT LOWER EXTREMITY: ICD-10-CM

## 2024-11-20 RX ORDER — FUROSEMIDE 20 MG/1
20 TABLET ORAL DAILY
Qty: 90 TABLET | Refills: 1 | Status: SHIPPED | OUTPATIENT
Start: 2024-11-20

## 2024-11-21 ENCOUNTER — OFFICE VISIT (OUTPATIENT)
Dept: FAMILY MEDICINE CLINIC | Facility: CLINIC | Age: 69
End: 2024-11-21
Payer: COMMERCIAL

## 2024-11-21 VITALS
SYSTOLIC BLOOD PRESSURE: 118 MMHG | HEART RATE: 102 BPM | BODY MASS INDEX: 40.43 KG/M2 | DIASTOLIC BLOOD PRESSURE: 74 MMHG | TEMPERATURE: 98.1 F | OXYGEN SATURATION: 95 % | HEIGHT: 74 IN | WEIGHT: 315 LBS

## 2024-11-21 DIAGNOSIS — R26.9 GAIT ABNORMALITY: ICD-10-CM

## 2024-11-21 DIAGNOSIS — K70.30 ALCOHOLIC CIRRHOSIS OF LIVER WITHOUT ASCITES (HCC): ICD-10-CM

## 2024-11-21 DIAGNOSIS — E78.5 HYPERLIPIDEMIA WITH LOW HDL: ICD-10-CM

## 2024-11-21 DIAGNOSIS — S42.201A CLOSED FRACTURE OF PROXIMAL END OF RIGHT HUMERUS, UNSPECIFIED FRACTURE MORPHOLOGY, INITIAL ENCOUNTER: ICD-10-CM

## 2024-11-21 DIAGNOSIS — J45.40 MODERATE PERSISTENT ASTHMA, UNSPECIFIED WHETHER COMPLICATED: Primary | ICD-10-CM

## 2024-11-21 DIAGNOSIS — N40.1 BPH ASSOCIATED WITH NOCTURIA: ICD-10-CM

## 2024-11-21 DIAGNOSIS — R35.1 BPH ASSOCIATED WITH NOCTURIA: ICD-10-CM

## 2024-11-21 DIAGNOSIS — E66.01 MORBID OBESITY WITH BMI OF 50.0-59.9, ADULT (HCC): ICD-10-CM

## 2024-11-21 DIAGNOSIS — D69.3 CHRONIC ITP (IDIOPATHIC THROMBOCYTOPENIA) (HCC): ICD-10-CM

## 2024-11-21 DIAGNOSIS — E78.6 HYPERLIPIDEMIA WITH LOW HDL: ICD-10-CM

## 2024-11-21 DIAGNOSIS — M17.0 OSTEOARTHRITIS OF BOTH KNEES, UNSPECIFIED OSTEOARTHRITIS TYPE: ICD-10-CM

## 2024-11-21 DIAGNOSIS — Z23 ENCOUNTER FOR IMMUNIZATION: ICD-10-CM

## 2024-11-21 DIAGNOSIS — L03.116 CELLULITIS OF LEFT LOWER EXTREMITY: ICD-10-CM

## 2024-11-21 DIAGNOSIS — M1A.3720 CHRONIC GOUT DUE TO RENAL IMPAIRMENT INVOLVING TOE OF LEFT FOOT WITHOUT TOPHUS: ICD-10-CM

## 2024-11-21 DIAGNOSIS — N18.32 STAGE 3B CHRONIC KIDNEY DISEASE (HCC): ICD-10-CM

## 2024-11-21 DIAGNOSIS — Z12.5 SCREENING FOR PROSTATE CANCER: ICD-10-CM

## 2024-11-21 DIAGNOSIS — J44.9 COPD, SEVERE (HCC): ICD-10-CM

## 2024-11-21 PROCEDURE — 90471 IMMUNIZATION ADMIN: CPT

## 2024-11-21 PROCEDURE — 90662 IIV NO PRSV INCREASED AG IM: CPT

## 2024-11-21 PROCEDURE — 99214 OFFICE O/P EST MOD 30 MIN: CPT | Performed by: PHYSICIAN ASSISTANT

## 2024-11-21 PROCEDURE — 90678 RSV VACC PREF BIVALENT IM: CPT

## 2024-11-21 PROCEDURE — G0008 ADMIN INFLUENZA VIRUS VAC: HCPCS

## 2024-11-21 RX ORDER — TAMSULOSIN HYDROCHLORIDE 0.4 MG/1
0.8 CAPSULE ORAL
Qty: 180 CAPSULE | Refills: 1 | Status: SHIPPED | OUTPATIENT
Start: 2024-11-21

## 2024-11-21 NOTE — ASSESSMENT & PLAN NOTE
Lipids are currently diet controlled  Orders:    Comprehensive metabolic panel; Future    Lipid panel; Future    Ambulatory Referral to Cardiology; Future

## 2024-11-21 NOTE — ASSESSMENT & PLAN NOTE
Increase Flomax   Orders:    tamsulosin (FLOMAX) 0.4 mg; Take 2 capsules (0.8 mg total) by mouth daily with dinner

## 2024-11-21 NOTE — ASSESSMENT & PLAN NOTE
Continue current regimen.  Patient currently stable.  He is awaiting oxygen therapy  Orders:    CBC and differential; Future    Ambulatory Referral to Cardiology; Future

## 2024-11-21 NOTE — ASSESSMENT & PLAN NOTE
Exercise is limited due to osteoarthritis of his knees and severe COPD    Orders:    Ambulatory Referral to Cardiology; Future

## 2024-11-21 NOTE — ASSESSMENT & PLAN NOTE
Lab Results   Component Value Date    EGFR 49 10/21/2024    EGFR 43 08/12/2024    EGFR 52 02/19/2024    CREATININE 1.44 (H) 10/21/2024    CREATININE 1.60 (H) 08/12/2024    CREATININE 1.38 (H) 02/19/2024     Renal functions are currently stable

## 2024-11-21 NOTE — ASSESSMENT & PLAN NOTE
Severe COPD asthma overlap.  He is following closely with pulmonary.  Continue current regimen.  Orders:    CBC and differential; Future

## 2024-11-21 NOTE — PROGRESS NOTES
Name: Malachi Greenwood      : 1955      MRN: [id number]  Encounter Provider: Saranya Eubanks PA-C  Encounter Date: 2024   Encounter department: [facility]  :  Assessment & Plan  Moderate persistent asthma, unspecified whether complicated  Severe COPD asthma overlap.  He is following closely with pulmonary.  Continue current regimen.  Orders:    CBC and differential; Future    COPD, severe (HCC)  Continue current regimen.  Patient currently stable.  He is awaiting oxygen therapy  Orders:    CBC and differential; Future    Ambulatory Referral to Cardiology; Future    Alcoholic cirrhosis of liver without ascites (HCC)  Lasix 20 mg/day       Chronic ITP (idiopathic thrombocytopenia) (HCC)  Continue to monitor CBC and platelet count       Chronic gout due to renal impairment involving toe of left foot without tophus  Stable on allopurinol 100 mg a day  Orders:    Uric acid; Future    Stage 3b chronic kidney disease (HCC)  Lab Results   Component Value Date    EGFR 49 10/21/2024    EGFR 43 2024    EGFR 52 2024    CREATININE 1.44 (H) 10/21/2024    CREATININE 1.60 (H) 2024    CREATININE 1.38 (H) 2024     Renal functions are currently stable       Morbid obesity with BMI of 50.0-59.9, adult ([facility])  Exercise is limited due to osteoarthritis of his knees and severe COPD    Orders:    Ambulatory Referral to Cardiology; Future    Hyperlipidemia with low HDL  Lipids are currently diet controlled  Orders:    Comprehensive metabolic panel; Future    Lipid panel; Future    Ambulatory Referral to Cardiology; Future    BPH associated with nocturia  Increase Flomax   Orders:    tamsulosin (FLOMAX) 0.4 mg; Take 2 capsules (0.8 mg total) by mouth daily with dinner    Cellulitis of left lower extremity  Resolving nicely.  Finish Keflex       Screening for prostate cancer    Orders:    PSA, Total Screen; Future    Encounter for immunization    Orders:    influenza vaccine, high-dose,  PF 0.5 mL (Fluzone High Dose)    Respiratory Syncytial Virus (RSV) vaccine (recombinant) ([facility])    Closed fracture of proximal end of right humerus, unspecified fracture morphology, initial encounter  Continue sling.  Follow-up with pediatric as directed.  Patient is declining physical therapy at this time       Osteoarthritis of both knees, unspecified osteoarthritis type  Patient ambulates with a cane.  His ambulation is severely limited       Gait abnormality  Severely limited in ambulation due to orthopedic condition and copd              History of Present Illness     Patient presents in the office for follow-up chronic conditions.  Patient has COPD and overlap asthma.  He does follow with pulmonary.  He is currently on Advair 250/50 and Proventil fairly if needed.  Have a nebulizer.  He takes Mucinex on occasion.  Patient is awaiting home oxygen therapy.  He is also on CPAP for obstructive sleep apnea.  Has cirrhosis of the liver he is on Lasix 20 mg.  He also has thrombocytopenia.  He was following with hematology.  Has been stable for several years.  Will continue to monitor.  Has a history of gout due to chronic kidney disease.  No recent flareup he is on allopurinol 100 mg a day.  Functions are stable.  Has chronic pain due to osteoarthritis of his knees.  I put him on Cymbalta 30 mg.  States he still has a lot of pain but he is certainly happier patient is severely overweight.  Lipids are diet controlled.  He also has BPH.  He is using Flomax 0.4 grams daily.  This has greatly improved his flow we will try 0.8 mg patient had a fall in October he fell and he broke his right humerus.  His humerus is in a sling.  He is following with orthopedic.  He is not doing therapy at this time.  He would like DME equipment of shower chair and a new sling.  Due to the fall patient developed cellulitis of his left lower leg.  He has been on Keflex .currently on his second course.  There is markedly improved  "cellulitis.      Review of Systems   Constitutional:  Negative for activity change, appetite change, chills, fatigue and fever.   HENT:  Negative for ear pain and sore throat.    Eyes:  Negative for visual disturbance.   Respiratory:  Positive for shortness of breath. Negative for cough.    Cardiovascular:  Positive for leg swelling. Negative for chest pain and palpitations.   Gastrointestinal:  Negative for abdominal pain, blood in stool, constipation, diarrhea and nausea.   Genitourinary:  Negative for difficulty urinating.        Slow  stream.. nocturia   2-3  hrs.    Musculoskeletal:  Positive for arthralgias and gait problem. Negative for back pain and myalgias.        Fx  right  humerus   Skin:  Negative for rash.   Neurological:  Negative for dizziness, syncope and headaches.   Psychiatric/Behavioral:  Negative for self-injury, sleep disturbance and suicidal ideas. The patient is not nervous/anxious.           Objective   /74 (BP Location: Left arm, Patient Position: Sitting, Cuff Size: Large)   Pulse 102   Temp 98.1 °F (36.7 °C) (Temporal)   Ht 6' 2\" (1.88 m)   Wt (!) 188 kg (414 lb 12.8 oz)   SpO2 95%   BMI 53.26 kg/m²      Physical Exam  Vitals and nursing note reviewed.   Constitutional:       General: He is not in acute distress.     Appearance: He is well-developed. He is obese. He is not ill-appearing.   HENT:      Head: Normocephalic and atraumatic.      Right Ear: External ear normal.      Left Ear: External ear normal.   Eyes:      Conjunctiva/sclera: Conjunctivae normal.      Pupils: Pupils are equal, round, and reactive to light.   Neck:      Thyroid: No thyromegaly.      Vascular: No carotid bruit.   Cardiovascular:      Rate and Rhythm: Normal rate and regular rhythm.      Heart sounds: Heart sounds are distant. No murmur heard.  Pulmonary:      Effort: Pulmonary effort is normal.      Breath sounds: Decreased breath sounds present. No wheezing.   Abdominal:      General: Bowel " sounds are normal.      Palpations: Abdomen is soft. There is no mass.      Tenderness: There is no abdominal tenderness.   Musculoskeletal:      Right lower leg: No edema.      Left lower leg: Edema present.      Comments: Right arm in sling.  Limited range of motion due to recent fracture upper humerus.  Left lower leg is larger than the right.  There is no pitting edema.  It is pink it is not red or warm to the touch.  The skin is peeling.  Still has a lot of edema in his foot.  Lightest is resolving nicely.  He will finish his last few doses of Keflex.  Strongly advised to apply moisturizing lotion.  Patient is morbidly obese.  He has diffuse osteoarthritis of both his knees.  He ambulates with a cane.  Due to the fracture in the right arm he needs a shower chair   Lymphadenopathy:      Cervical: No cervical adenopathy.   Skin:     General: Skin is warm and dry.   Neurological:      General: No focal deficit present.      Mental Status: He is alert and oriented to person, place, and time.   Psychiatric:         Behavior: Behavior normal.         Thought Content: Thought content normal.         Judgment: Judgment normal.

## 2024-11-22 DIAGNOSIS — M17.0 OSTEOARTHRITIS OF BOTH KNEES, UNSPECIFIED OSTEOARTHRITIS TYPE: ICD-10-CM

## 2024-11-22 DIAGNOSIS — J44.9 COPD, SEVERE (HCC): Primary | ICD-10-CM

## 2024-11-22 DIAGNOSIS — R26.9 GAIT ABNORMALITY: ICD-10-CM

## 2024-11-22 DIAGNOSIS — S42.201A CLOSED FRACTURE OF PROXIMAL END OF RIGHT HUMERUS, UNSPECIFIED FRACTURE MORPHOLOGY, INITIAL ENCOUNTER: ICD-10-CM

## 2024-11-22 DIAGNOSIS — E66.01 MORBID OBESITY WITH BMI OF 50.0-59.9, ADULT (HCC): ICD-10-CM

## 2024-11-22 NOTE — PROGRESS NOTES
Patient was seen yesterday for follow-up visit.  Patient has a fracture of the right humerus.  He is in a sling.  Is requesting a shower chair assist with bathing..  Patient also has severe COPD.  He is unable to stand for any length of time.  He gets very short of breath.  Patient also is morbidly obese.  He has osteoarthritis of both his knees.  Needs to sit to bathe

## 2024-11-25 ENCOUNTER — TELEPHONE (OUTPATIENT)
Age: 69
End: 2024-11-25

## 2024-12-03 NOTE — INTERVAL H&P NOTE
Update: (This section must be completed if the H&P was completed greater than 24 hrs to procedure or admission)    H&P reviewed  After examining the patient, I find no changed to the H&P since it had been written  Patient re-evaluated   Accept as history and physical     Tran Hogan MD/April 7, 2023/8:44 AM Topical Sulfur Applications Pregnancy And Lactation Text: This medication is Pregnancy Category C and has an unknown safety profile during pregnancy. It is unknown if this topical medication is excreted in breast milk. Cleanser Recommendations: Cetaphil Gentle Cleanser\\nCerave Facial Cleanser Benzoyl Peroxide Counseling: Patient counseled that medicine may cause skin irritation and bleach clothing.  In the event of skin irritation, the patient was advised to reduce the amount of the drug applied or use it less frequently.   The patient verbalized understanding of the proper use and possible adverse effects of benzoyl peroxide.  All of the patient's questions and concerns were addressed. Azithromycin Counseling:  I discussed with the patient the risks of azithromycin including but not limited to GI upset, allergic reaction, drug rash, diarrhea, and yeast infections. Tazorac Pregnancy And Lactation Text: This medication is not safe during pregnancy. It is unknown if this medication is excreted in breast milk. Doxycycline Pregnancy And Lactation Text: This medication is Pregnancy Category D and not consider safe during pregnancy. It is also excreted in breast milk but is considered safe for shorter treatment courses. Tetracycline Pregnancy And Lactation Text: This medication is Pregnancy Category D and not consider safe during pregnancy. It is also excreted in breast milk. Birth Control Pills Counseling: Birth Control Pill Counseling: I discussed with the patient the potential side effects of OCPs including but not limited to increased risk of stroke, heart attack, thrombophlebitis, deep venous thrombosis, hepatic adenomas, breast changes, GI upset, headaches, and depression.  The patient verbalized understanding of the proper use and possible adverse effects of OCPs. All of the patient's questions and concerns were addressed. The 3 OCPs recommended to you for acne include: LATA, Orthotricyclen and Sprintec. Sarecycline Counseling: Patient advised regarding possible photosensitivity and discoloration of the teeth, skin, lips, tongue and gums.  Patient instructed to avoid sunlight, if possible.  When exposed to sunlight, patients should wear protective clothing, sunglasses, and sunscreen.  The patient was instructed to call the office immediately if the following severe adverse effects occur:  hearing changes, easy bruising/bleeding, severe headache, or vision changes.  The patient verbalized understanding of the proper use and possible adverse effects of sarecycline.  All of the patient's questions and concerns were addressed. Isotretinoin Pregnancy And Lactation Text: This medication is Pregnancy Category X and is considered extremely dangerous during pregnancy. It is unknown if it is excreted in breast milk. Azelaic Acid Pregnancy And Lactation Text: This medication is considered safe during pregnancy and breast feeding. Use Enhanced Medication Counseling?: Yes Winlevi Counseling:  I discussed with the patient the risks of topical clascoterone including but not limited to erythema, scaling, itching, and stinging. Patient voiced their understanding. Moisturizer Recommendations: Cetaphil Moisturizing Lotion\\nCerave Moisturizing Lotion Benzoyl Peroxide Pregnancy And Lactation Text: This medication is Pregnancy Category C. It is unknown if benzoyl peroxide is excreted in breast milk. Doxycycline Counseling:  Patient counseled regarding possible photosensitivity and increased risk for sunburn.  Advised the patient to take dosage at least 30 min before laying down, and always take with a full meal and large glass of water.  Patient instructed to avoid sunlight, if possible.  When exposed to sunlight, patients should wear protective clothing, sunglasses, and sunscreen.  The patient was instructed to call the office immediately if the following severe adverse effects occur:  hearing changes, easy bruising/bleeding, severe headache, or vision changes.  The patient verbalized understanding of the proper use and possible adverse effects of doxycycline.  All of the patient's questions and concerns were addressed. Tetracycline Counseling: Patient counseled regarding possible photosensitivity and increased risk for sunburn.  Patient instructed to avoid sunlight, if possible.  When exposed to sunlight, patients should wear protective clothing, sunglasses, and sunscreen.  The patient was instructed to call the office immediately if the following severe adverse effects occur:  hearing changes, easy bruising/bleeding, severe headache, or vision changes.  The patient verbalized understanding of the proper use and possible adverse effects of tetracycline.  All of the patient's questions and concerns were addressed. Patient understands to avoid pregnancy while on therapy due to potential birth defects. Bactrim Pregnancy And Lactation Text: This medication is Pregnancy Category D and is known to cause fetal risk.  It is also excreted in breast milk. Isotretinoin Counseling: Patient advised medication is a high dose of Vitamin A and should be taken with food for best absorption. No other Vitamin A supplements should be taken. Monthly visits are required for refills of medication and expect the course to be an average of 6 months. Female patients are required to have 2 forms of contraceptive. Side effects reviewed, but not limited to, dryness and joint pain. Instructions on how to combat side effects with OTC products reviewed in visit today. Pt to contact office should depression,suicidal/homicidal thoughts, broken bone occur. Avoid the following while on the medication and 30 days after completion: pregnancy, tetracycline antibiotics, tylenol, alcohol, donating blood, ablative lasers, tattoos/piercings. Notify your provider if you have a surgery scheduled while on the medication. If you have a history of crohns, ulcerative colitis or depression it is recommended you let your provider know prior to starting therapy. Azelaic Acid Counseling: Patient counseled that medicine may cause skin irritation and to avoid applying near the eyes.  In the event of skin irritation, the patient was advised to reduce the amount of the drug applied or use it less frequently.   The patient verbalized understanding of the proper use and possible adverse effects of azelaic acid.  All of the patient's questions and concerns were addressed. Topical Clindamycin Counseling: Patient counseled that this medication may cause skin irritation or allergic reactions.  In the event of skin irritation, the patient was advised to reduce the amount of the drug applied or use it less frequently.   The patient verbalized understanding of the proper use and possible adverse effects of clindamycin.  All of the patient's questions and concerns were addressed. Winlevi Pregnancy And Lactation Text: This medication is considered safe during pregnancy and breastfeeding. Topical Retinoid counseling:  Patient advised to apply a pea-sized amount only at bedtime. Patient may be asked to titrate up to nightly by your provider which means to only use 3 nights/week and increase as it becomes tolerable. It is recommended to apply a non-comedogenic moisturizer such as Cetaphil or Cerave after your topical retinoid application. If too drying, patient may moisturize both before and after application. The patient verbalized understanding of the proper use and possible adverse effects of retinoids.  All of the patient's questions and concerns were addressed. Dapsone Pregnancy And Lactation Text: This medication is Pregnancy Category C and is not considered safe during pregnancy or breast feeding. Spironolactone Pregnancy And Lactation Text: This medication can cause feminization of the male fetus and should be avoided during pregnancy. The active metabolite is also found in breast milk. Bactrim Counseling:  I discussed with the patient the risks of sulfa antibiotics including but not limited to GI upset, allergic reaction, drug rash, diarrhea, dizziness, photosensitivity, and yeast infections.  Rarely, more serious reactions can occur including but not limited to aplastic anemia, agranulocytosis, methemoglobinemia, blood dyscrasias, liver or kidney failure, lung infiltrates or desquamative/blistering drug rashes. Minocycline Counseling: Patient advised regarding possible photosensitivity and discoloration of the teeth, skin, lips, tongue and gums.  Patient instructed to avoid sunlight, if possible.  When exposed to sunlight, patients should wear protective clothing, sunglasses, and sunscreen.  The patient was instructed to call the office immediately if the following severe adverse effects occur:  hearing changes, easy bruising/bleeding, severe headache, or vision changes.  The patient verbalized understanding of the proper use and possible adverse effects of minocycline.  All of the patient's questions and concerns were addressed. Aklief Pregnancy And Lactation Text: It is unknown if this medication is safe to use during pregnancy.  It is unknown if this medication is excreted in breast milk.  Breastfeeding women should use the topical cream on the smallest area of the skin for the shortest time needed while breastfeeding.  Do not apply to nipple and areola. Detail Level: Detailed Topical Clindamycin Pregnancy And Lactation Text: This medication is Pregnancy Category B and is considered safe during pregnancy. It is unknown if it is excreted in breast milk. Topical Retinoid Pregnancy And Lactation Text: This medication is Pregnancy Category C. It is unknown if this medication is excreted in breast milk. Erythromycin Pregnancy And Lactation Text: This medication is Pregnancy Category B and is considered safe during pregnancy. It is also excreted in breast milk. Dapsone Counseling: I discussed with the patient the risks of dapsone including but not limited to hemolytic anemia, agranulocytosis, rashes, methemoglobinemia, kidney failure, peripheral neuropathy, headaches, GI upset, and liver toxicity.  Patients who start dapsone require monitoring including baseline LFTs and weekly CBCs for the first month, then every month thereafter.  The patient verbalized understanding of the proper use and possible adverse effects of dapsone.  All of the patient's questions and concerns were addressed. Spironolactone Counseling: Patient advised regarding risks of birth defects (for female patients), dizziness, menstrual irregularities, breast tenderness, hyperkalemia. The patient may need blood work to monitor liver and kidney function and potassium levels while on therapy if a higher dose is prescribed. The patient was instructed to discontinue medication or increase water intake (>64 oz) if dizziness occurs. It may be recommended for you to titrate up with your dose of Spironolactone to attempt to avoid the side effects. If patient plans to get pregnant or becomes pregnant while on medication, it is advised to stop the medication and contact the office. The patient verbalized understanding of the proper use and possible adverse effects of spironolactone.  All of the patient's questions and concerns were addressed. Azithromycin Pregnancy And Lactation Text: This medication is considered safe during pregnancy and is also secreted in breast milk. High Dose Vitamin A Pregnancy And Lactation Text: High dose vitamin A therapy is contraindicated during pregnancy and breast feeding. Sunscreen Recommendations: Cerave AM Facial Moisturizing Lotion SPF 30\\nElta MD UV Clear Broad Spectrum SPF 46 for acne prone skin Aklief counseling:  Patient advised to apply a pea-sized amount only at bedtime. If too drying, patient may add a non-comedogenic moisturizer.  The most commonly reported side effects including irritation, redness, scaling, dryness, stinging, burning, itching, and increased risk of sunburn.  The patient verbalized understanding of the proper use and possible adverse effects of retinoids.  All of the patient's questions and concerns were addressed. Topical Sulfur Applications Counseling: Topical Sulfur Counseling: Patient counseled that this medication may cause skin irritation or allergic reactions.  In the event of skin irritation, the patient was advised to reduce the amount of the drug applied or use it less frequently.   The patient verbalized understanding of the proper use and possible adverse effects of topical sulfur application.  All of the patient's questions and concerns were addressed. Bpo Recommendations: OTC Panoxyl Tazorac Counseling:  Patient advised that medication is irritating and drying.  Patient may need to apply sparingly and wash off after an hour before eventually leaving it on overnight.  The patient verbalized understanding of the proper use and possible adverse effects of tazorac.  All of the patient's questions and concerns were addressed. Erythromycin Counseling:  I discussed with the patient the risks of erythromycin including but not limited to GI upset, allergic reaction, drug rash, diarrhea, increase in liver enzymes, and yeast infections. Birth Control Pills Pregnancy And Lactation Text: This medication should be avoided if pregnant and for the first 30 days post-partum. High Dose Vitamin A Counseling: Side effects reviewed, pt to contact office should one occur. Detail Level: Simple

## 2024-12-04 ENCOUNTER — OFFICE VISIT (OUTPATIENT)
Dept: OBGYN CLINIC | Facility: CLINIC | Age: 69
End: 2024-12-04

## 2024-12-04 ENCOUNTER — HOSPITAL ENCOUNTER (OUTPATIENT)
Dept: RADIOLOGY | Facility: HOSPITAL | Age: 69
Discharge: HOME | End: 2024-12-04
Attending: ORTHOPAEDIC SURGERY
Payer: COMMERCIAL

## 2024-12-04 VITALS
DIASTOLIC BLOOD PRESSURE: 70 MMHG | HEIGHT: 74 IN | WEIGHT: 315 LBS | SYSTOLIC BLOOD PRESSURE: 122 MMHG | BODY MASS INDEX: 40.43 KG/M2 | HEART RATE: 76 BPM

## 2024-12-04 DIAGNOSIS — S42.294A OTHER CLOSED NONDISPLACED FRACTURE OF PROXIMAL END OF RIGHT HUMERUS, INITIAL ENCOUNTER: Primary | ICD-10-CM

## 2024-12-04 DIAGNOSIS — S42.294A OTHER CLOSED NONDISPLACED FRACTURE OF PROXIMAL END OF RIGHT HUMERUS, INITIAL ENCOUNTER: ICD-10-CM

## 2024-12-04 PROCEDURE — 99024 POSTOP FOLLOW-UP VISIT: CPT | Performed by: ORTHOPAEDIC SURGERY

## 2024-12-04 PROCEDURE — 73030 X-RAY EXAM OF SHOULDER: CPT

## 2024-12-04 NOTE — PROGRESS NOTES
[facility] [address] [id number] [phone]       Malachi Greenwood  [id number]  1955    ORTHOPAEDIC SURGERY OUTPATIENT NOTE  12/4/2024      HISTORY:  69 y.o. male presents for follow-up right proximal humerus fracture.  Overall patient is improving with his pain and shoulder function.  His visual analog score is 4 out of 10.  He refused to go to formal physical therapy and has been doing home exercises.    Past Medical History:   Diagnosis Date    Arthritis     Asthma     Chest pain     atypical    Chronic bronchitis ([facility]) 2014    Chronic kidney disease     Colon polyp     COPD (chronic obstructive pulmonary disease) ([facility])     CPAP (continuous positive airway pressure) dependence     Decreased glomerular filtration rate     Hamstring strain     Herniated lumbar intervertebral disc     History of alcohol use     uncomplicated    History of back pain     lower    History of colon polyps     sigmoid    History of genital warts     History of lipoma     History of muscle spasm     lumbar paraspinous muscle    History of umbilical hernia     Lateral pain of left hip     Low HDL (under 40)     Lumbar radiculopathy     Obesity     Pneumonia 1975    Respiratory distress     acute    Shortness of breath     Sleep apnea     CPAP PT WILL BRING    Sleep apnea, obstructive 2014       Past Surgical History:   Procedure Laterality Date    ADENOIDECTOMY      APPENDECTOMY      COLONOSCOPY      HAND SURGERY Left     CYST REMOVAL    HERNIA REPAIR  2017    HIP SURGERY Bilateral     IR BIOPSY BONE MARROW  04/07/2023    LIPECTOMY      thigh    LIPOMA RESECTION Left     HIP    AL RPR UMBILICAL HRNA 5 YRS/> REDUCIBLE N/A 02/23/2017    Procedure: REPAIR HERNIA UMBILICAL;  Surgeon: Sushant Buckner MD;  Location: [facility];  Service: General    TONSILLECTOMY      WRIST GANGLION EXCISION         Social History     Socioeconomic History     Marital status: /Civil Union     Spouse name: Not on file    Number of children: Not on file    Years of education: Not on file    Highest education level: Not on file   Occupational History    Occupation: Fulltime employment   Tobacco Use    Smoking status: Former     Current packs/day: 0.00     Average packs/day: 2.0 packs/day for 42.7 years (85.5 ttl pk-yrs)     Types: Cigarettes     Start date: 1972     Quit date: 10/2014     Years since quitting: 10.1     Passive exposure: Past    Smokeless tobacco: Never    Tobacco comments:     Quit for a 16 year period in between start and final quit date.   Vaping Use    Vaping status: Never Used   Substance and Sexual Activity    Alcohol use: Not Currently     Comment: Quit drinking     Drug use: Never    Sexual activity: Yes     Partners: Female     Birth control/protection: None   Other Topics Concern    Not on file   Social History Narrative    Always uses seatbelt     Social Drivers of Health     Financial Resource Strain: Low Risk  (2/15/2024)    Overall Financial Resource Strain (CARDIA)     Difficulty of Paying Living Expenses: Not very hard   Food Insecurity: Not on file   Transportation Needs: No Transportation Needs (2/15/2024)    PRAPARE - Transportation     Lack of Transportation (Medical): No     Lack of Transportation (Non-Medical): No   Physical Activity: Not on file   Stress: Not on file   Social Connections: Not on file   Intimate Partner Violence: Not on file   Housing Stability: Not on file       Family History   Problem Relation Age of Onset    Heart disease Mother         cardiac disorder    Hypertension Mother          at age 78    Cancer Mother         ovarian cancer  at age 78    COPD Father          at age 83    Heart disease Family         cardiac disorder    Cancer Family         lung    Emphysema Family         pulmonary unspecified emphysema    Diabetes Family     Glaucoma Family     Cancer Family         lung     "Cancer Paternal Grandmother          at age 78        Patient's Medications   New Prescriptions    No medications on file   Previous Medications    ALBUTEROL (2.5 MG/3 ML) 0.083 % NEBULIZER SOLUTION    Take 3 mL (2.5 mg total) by nebulization every 6 (six) hours as needed for wheezing    ALBUTEROL (PROVENTIL HFA,VENTOLIN HFA) 90 MCG/ACT INHALER    INHALE 2 PUFFS EVERY 4 HOURS AS NEEDED FOR SHORTNESS OF BREATH.    ALLOPURINOL (ZYLOPRIM) 100 MG TABLET    TAKE 1 TABLET BY MOUTH TWICE A DAY    CHOLECALCIFEROL (VITAMIN D3) 1000 UNITS CAPS    Take by mouth    CYCLOBENZAPRINE (FLEXERIL) 5 MG TABLET    TAKE 1 TABLET BY MOUTH THREE TIMES A DAY AS NEEDED FOR MUSCLE SPASM    DOCUSATE SODIUM (COLACE) 100 MG CAPSULE    Take 1 capsule (100 mg total) by mouth every 12 (twelve) hours    DULOXETINE (CYMBALTA) 30 MG DELAYED RELEASE CAPSULE    TAKE 1 CAPSULE BY MOUTH EVERY DAY    FLUTICASONE-SALMETEROL (ADVAIR DISKUS) 250-50 MCG/DOSE INHALER    Inhale 1 puff 2 (two) times a day Rinse mouth after use.    FUROSEMIDE (LASIX) 20 MG TABLET    TAKE 1 TABLET BY MOUTH EVERY DAY    GUAIFENESIN (MUCINEX) 600 MG 12 HR TABLET    Take 1,200 mg by mouth daily Once at hs    MULTIPLE VITAMINS TABLET    Take 1 tablet by mouth daily    SILDENAFIL (VIAGRA) 100 MG TABLET    TAKE 1 TABLET DAILY AS NEEDED FOR ERECTILE DYSFUNCTION    TAMSULOSIN (FLOMAX) 0.4 MG    Take 2 capsules (0.8 mg total) by mouth daily with dinner   Modified Medications    No medications on file   Discontinued Medications    No medications on file       Allergies   Allergen Reactions    Darvon [Propoxyphene] Other (See Comments)     hallucinations        /70 (BP Location: Left arm, Patient Position: Sitting, Cuff Size: Standard)   Pulse 76   Ht 6' 2\" (1.88 m)   Wt (!) 188 kg (414 lb)   BMI 53.15 kg/m²      REVIEW OF SYSTEMS:  Constitutional: Negative.    HEENT: Negative.    Respiratory: Negative.    Skin: Negative.    Neurological: Negative.    Psychiatric/Behavioral: " Negative.  Musculoskeletal: Negative except for that mentioned in the HPI.    Gen: No acute distress, resting comfortably in bed  HEENT: Eyes clear, moist mucus membranes, hearing intact  Respiratory: No audible wheezing or stridor  Cardiovascular: Well Perfused peripherally, 2+ distal pulse  Abdomen: nondistended, no peritoneal signs     PHYSICAL EXAM: Right shoulder: Active forward elevation 160 degrees.  ER: 40.  IR sacrum.  Abduction 80.  Strength 5 out of 5 in all range of motion    IMAGING: X-ray right shoulder by myself and taken in the office today: Healing proximal humerus fracture with no interval displacement.  There is callus formation seen.    ASSESSMENT AND PLAN:  69 y.o. male with healed right shoulder proximal humerus fracture.    At this time like to see the patient back in 3 months for repeat clinical evaluation and x-ray to evaluate for potential posttraumatic arthritis of the shoulder joint as there is a fracture extension into the humeral head.  Overall x-rays and clinical evaluation stable and he can slowly resume activities as tolerated.

## 2024-12-14 DIAGNOSIS — M48.02 NEURAL FORAMINAL STENOSIS OF CERVICAL SPINE: ICD-10-CM

## 2024-12-15 RX ORDER — DULOXETIN HYDROCHLORIDE 30 MG/1
30 CAPSULE, DELAYED RELEASE ORAL DAILY
Qty: 90 CAPSULE | Refills: 1 | Status: SHIPPED | OUTPATIENT
Start: 2024-12-15

## 2025-01-22 ENCOUNTER — TELEPHONE (OUTPATIENT)
Age: 70
End: 2025-01-22

## 2025-01-22 NOTE — TELEPHONE ENCOUNTER
Pt calling as he was scheduled for a 6m f/u with Dr. Rivas at [facility]. He states he did not okay apt, and cannot come in that early. No available apts to help r/s, he would like a cb to see when he may be seen by Dr. Rivas for a f/u

## 2025-02-20 ENCOUNTER — APPOINTMENT (OUTPATIENT)
Dept: LAB | Facility: MEDICAL CENTER | Age: 70
End: 2025-02-20
Payer: COMMERCIAL

## 2025-02-20 DIAGNOSIS — N18.32 STAGE 3B CHRONIC KIDNEY DISEASE (HCC): ICD-10-CM

## 2025-02-20 DIAGNOSIS — M1A.3720 CHRONIC GOUT DUE TO RENAL IMPAIRMENT INVOLVING TOE OF LEFT FOOT WITHOUT TOPHUS: ICD-10-CM

## 2025-02-20 DIAGNOSIS — Z12.5 SCREENING FOR PROSTATE CANCER: ICD-10-CM

## 2025-02-20 DIAGNOSIS — D69.6 THROMBOCYTOPENIA (HCC): ICD-10-CM

## 2025-02-20 DIAGNOSIS — J44.9 COPD, SEVERE (HCC): ICD-10-CM

## 2025-02-20 DIAGNOSIS — K70.30 ALCOHOLIC CIRRHOSIS OF LIVER WITHOUT ASCITES (HCC): ICD-10-CM

## 2025-02-20 DIAGNOSIS — E78.5 HYPERLIPIDEMIA WITH LOW HDL: ICD-10-CM

## 2025-02-20 DIAGNOSIS — J45.40 MODERATE PERSISTENT ASTHMA, UNSPECIFIED WHETHER COMPLICATED: ICD-10-CM

## 2025-02-20 DIAGNOSIS — E78.6 HYPERLIPIDEMIA WITH LOW HDL: ICD-10-CM

## 2025-02-20 LAB
ALBUMIN SERPL BCG-MCNC: 3.8 G/DL (ref 3.5–5)
ALP SERPL-CCNC: 117 U/L (ref 34–104)
ALT SERPL W P-5'-P-CCNC: 29 U/L (ref 7–52)
ANION GAP SERPL CALCULATED.3IONS-SCNC: 8 MMOL/L (ref 4–13)
AST SERPL W P-5'-P-CCNC: 39 U/L (ref 13–39)
BASOPHILS # BLD AUTO: 0.05 THOUSANDS/ΜL (ref 0–0.1)
BASOPHILS NFR BLD AUTO: 1 % (ref 0–1)
BILIRUB SERPL-MCNC: 1.5 MG/DL (ref 0.2–1)
BUN SERPL-MCNC: 16 MG/DL (ref 5–25)
CALCIUM SERPL-MCNC: 9.1 MG/DL (ref 8.4–10.2)
CHLORIDE SERPL-SCNC: 107 MMOL/L (ref 96–108)
CHOLEST SERPL-MCNC: 121 MG/DL (ref ?–200)
CO2 SERPL-SCNC: 26 MMOL/L (ref 21–32)
CREAT SERPL-MCNC: 1.36 MG/DL (ref 0.6–1.3)
EOSINOPHIL # BLD AUTO: 0.26 THOUSAND/ΜL (ref 0–0.61)
EOSINOPHIL NFR BLD AUTO: 5 % (ref 0–6)
ERYTHROCYTE [DISTWIDTH] IN BLOOD BY AUTOMATED COUNT: 15.6 % (ref 11.6–15.1)
GFR SERPL CREATININE-BSD FRML MDRD: 53 ML/MIN/1.73SQ M
GLUCOSE SERPL-MCNC: 119 MG/DL (ref 65–140)
HCT VFR BLD AUTO: 42.3 % (ref 36.5–49.3)
HDLC SERPL-MCNC: 38 MG/DL
HGB BLD-MCNC: 14.3 G/DL (ref 12–17)
IMM GRANULOCYTES # BLD AUTO: 0.02 THOUSAND/UL (ref 0–0.2)
IMM GRANULOCYTES NFR BLD AUTO: 0 % (ref 0–2)
LDLC SERPL CALC-MCNC: 66 MG/DL (ref 0–100)
LYMPHOCYTES # BLD AUTO: 0.8 THOUSANDS/ΜL (ref 0.6–4.47)
LYMPHOCYTES NFR BLD AUTO: 16 % (ref 14–44)
MCH RBC QN AUTO: 34.8 PG (ref 26.8–34.3)
MCHC RBC AUTO-ENTMCNC: 33.8 G/DL (ref 31.4–37.4)
MCV RBC AUTO: 103 FL (ref 82–98)
MONOCYTES # BLD AUTO: 0.53 THOUSAND/ΜL (ref 0.17–1.22)
MONOCYTES NFR BLD AUTO: 11 % (ref 4–12)
NEUTROPHILS # BLD AUTO: 3.33 THOUSANDS/ΜL (ref 1.85–7.62)
NEUTS SEG NFR BLD AUTO: 67 % (ref 43–75)
NONHDLC SERPL-MCNC: 83 MG/DL
NRBC BLD AUTO-RTO: 0 /100 WBCS
PLATELET # BLD AUTO: 86 THOUSANDS/UL (ref 149–390)
PMV BLD AUTO: 11.1 FL (ref 8.9–12.7)
POTASSIUM SERPL-SCNC: 4 MMOL/L (ref 3.5–5.3)
PROT SERPL-MCNC: 6.1 G/DL (ref 6.4–8.4)
PSA SERPL-MCNC: 2.33 NG/ML (ref 0–4)
RBC # BLD AUTO: 4.11 MILLION/UL (ref 3.88–5.62)
SODIUM SERPL-SCNC: 141 MMOL/L (ref 135–147)
TRIGL SERPL-MCNC: 85 MG/DL (ref ?–150)
URATE SERPL-MCNC: 6.8 MG/DL (ref 3.5–8.5)
WBC # BLD AUTO: 4.99 THOUSAND/UL (ref 4.31–10.16)

## 2025-02-20 PROCEDURE — G0103 PSA SCREENING: HCPCS

## 2025-02-20 PROCEDURE — 84550 ASSAY OF BLOOD/URIC ACID: CPT

## 2025-02-20 PROCEDURE — 36415 COLL VENOUS BLD VENIPUNCTURE: CPT

## 2025-02-21 ENCOUNTER — RESULTS FOLLOW-UP (OUTPATIENT)
Dept: FAMILY MEDICINE CLINIC | Facility: CLINIC | Age: 70
End: 2025-02-21

## 2025-03-03 ENCOUNTER — OFFICE VISIT (OUTPATIENT)
Dept: OBGYN CLINIC | Facility: CLINIC | Age: 70
End: 2025-03-03
Payer: COMMERCIAL

## 2025-03-03 ENCOUNTER — HOSPITAL ENCOUNTER (OUTPATIENT)
Dept: RADIOLOGY | Facility: HOSPITAL | Age: 70
Discharge: HOME | End: 2025-03-03
Attending: ORTHOPAEDIC SURGERY
Payer: COMMERCIAL

## 2025-03-03 VITALS — HEIGHT: 74 IN | BODY MASS INDEX: 40.43 KG/M2 | WEIGHT: 315 LBS

## 2025-03-03 DIAGNOSIS — S42.294A OTHER CLOSED NONDISPLACED FRACTURE OF PROXIMAL END OF RIGHT HUMERUS, INITIAL ENCOUNTER: ICD-10-CM

## 2025-03-03 DIAGNOSIS — S42.294A OTHER CLOSED NONDISPLACED FRACTURE OF PROXIMAL END OF RIGHT HUMERUS, INITIAL ENCOUNTER: Primary | ICD-10-CM

## 2025-03-03 PROCEDURE — 73030 X-RAY EXAM OF SHOULDER: CPT

## 2025-03-03 PROCEDURE — 99213 OFFICE O/P EST LOW 20 MIN: CPT | Performed by: ORTHOPAEDIC SURGERY

## 2025-03-03 NOTE — PROGRESS NOTES
:  Assessment & Plan  Other closed nondisplaced fracture of proximal end of right humerus, initial encounter    Orders:    XR shoulder 2+ vw right; Future  X-ray right shoulder was reviewed in the office today   He is to continue with therapy at home   He has no restrictions   He is to follow up PRN.              Malachi Greenwood  [id number]  1955    ORTHOPAEDIC SURGERY OUTPATIENT NOTE  3/3/2025      HISTORY:  69 y.o. male today follow-up for right proximal humerus fracture, DOI  10/21/24. Patient states he is doing well. He notes he continues to have pinching pain  on top of the right shoulder at rest and with movement. He notes his has good range of motion. HE has been doing his own therapy at home. His pain level is 2/10 and his sane score is 85%.     Past Medical History:   Diagnosis Date    Arthritis     Asthma     Chest pain     atypical    Chronic bronchitis (HCC) 2014    Chronic kidney disease     Colon polyp     COPD (chronic obstructive pulmonary disease) ([facility])     CPAP (continuous positive airway pressure) dependence     Decreased glomerular filtration rate     Hamstring strain     Herniated lumbar intervertebral disc     History of alcohol use     uncomplicated    History of back pain     lower    History of colon polyps     sigmoid    History of genital warts     History of lipoma     History of muscle spasm     lumbar paraspinous muscle    History of umbilical hernia     Lateral pain of left hip     Low HDL (under 40)     Lumbar radiculopathy     Obesity     Pneumonia 1975    Respiratory distress     acute    Shortness of breath     Sleep apnea     CPAP PT WILL BRING    Sleep apnea, obstructive 2014       Past Surgical History:   Procedure Laterality Date    ADENOIDECTOMY      APPENDECTOMY      COLONOSCOPY      HAND SURGERY Left     CYST REMOVAL    HERNIA REPAIR  2017    HIP SURGERY Bilateral     IR BIOPSY BONE MARROW  04/07/2023    LIPECTOMY      thigh    LIPOMA RESECTION Left     HIP    MA RPR  UMBILICAL HRNA 5 YRS/> REDUCIBLE N/A 2017    Procedure: REPAIR HERNIA UMBILICAL;  Surgeon: Sushant Buckner MD;  Location: [facility];  Service: General    TONSILLECTOMY      WRIST GANGLION EXCISION         Social History     Socioeconomic History    Marital status: /Civil Union     Spouse name: Not on file    Number of children: Not on file    Years of education: Not on file    Highest education level: Not on file   Occupational History    Occupation: Fulltime employment   Tobacco Use    Smoking status: Former     Current packs/day: 0.00     Average packs/day: 2.0 packs/day for 42.7 years (85.5 ttl pk-yrs)     Types: Cigarettes     Start date: 1972     Quit date: 10/2014     Years since quitting: 10.4     Passive exposure: Past    Smokeless tobacco: Never    Tobacco comments:     Quit for a 16 year period in between start and final quit date.   Vaping Use    Vaping status: Never Used   Substance and Sexual Activity    Alcohol use: Not Currently     Comment: Quit drinking     Drug use: Never    Sexual activity: Yes     Partners: Female     Birth control/protection: None   Other Topics Concern    Not on file   Social History Narrative    Always uses seatbelt     Social Drivers of Health     Financial Resource Strain: Low Risk  (2/15/2024)    Overall Financial Resource Strain (CARDIA)     Difficulty of Paying Living Expenses: Not very hard   Food Insecurity: Not on file   Transportation Needs: No Transportation Needs (2/15/2024)    PRAPARE - Transportation     Lack of Transportation (Medical): No     Lack of Transportation (Non-Medical): No   Physical Activity: Not on file   Stress: Not on file   Social Connections: Not on file   Intimate Partner Violence: Not on file   Housing Stability: Not on file       Family History   Problem Relation Age of Onset    Heart disease Mother         cardiac disorder    Hypertension Mother          at age 78    Cancer Mother         ovarian cancer  at age  "78    COPD Father          at age 83    Heart disease Family         cardiac disorder    Cancer Family         lung    Emphysema Family         pulmonary unspecified emphysema    Diabetes Family     Glaucoma Family     Cancer Family         lung    Cancer Paternal Grandmother          at age 78        Patient's Medications   New Prescriptions    No medications on file   Previous Medications    ALBUTEROL (2.5 MG/3 ML) 0.083 % NEBULIZER SOLUTION    Take 3 mL (2.5 mg total) by nebulization every 6 (six) hours as needed for wheezing    ALBUTEROL (PROVENTIL HFA,VENTOLIN HFA) 90 MCG/ACT INHALER    INHALE 2 PUFFS EVERY 4 HOURS AS NEEDED FOR SHORTNESS OF BREATH.    ALLOPURINOL (ZYLOPRIM) 100 MG TABLET    TAKE 1 TABLET BY MOUTH TWICE A DAY    CHOLECALCIFEROL (VITAMIN D3) 1000 UNITS CAPS    Take by mouth    CYCLOBENZAPRINE (FLEXERIL) 5 MG TABLET    TAKE 1 TABLET BY MOUTH THREE TIMES A DAY AS NEEDED FOR MUSCLE SPASM    DOCUSATE SODIUM (COLACE) 100 MG CAPSULE    Take 1 capsule (100 mg total) by mouth every 12 (twelve) hours    DULOXETINE (CYMBALTA) 30 MG DELAYED RELEASE CAPSULE    TAKE 1 CAPSULE BY MOUTH EVERY DAY    FLUTICASONE-SALMETEROL (ADVAIR DISKUS) 250-50 MCG/DOSE INHALER    Inhale 1 puff 2 (two) times a day Rinse mouth after use.    FUROSEMIDE (LASIX) 20 MG TABLET    TAKE 1 TABLET BY MOUTH EVERY DAY    GUAIFENESIN (MUCINEX) 600 MG 12 HR TABLET    Take 1,200 mg by mouth daily Once at hs    MULTIPLE VITAMINS TABLET    Take 1 tablet by mouth daily    SILDENAFIL (VIAGRA) 100 MG TABLET    TAKE 1 TABLET DAILY AS NEEDED FOR ERECTILE DYSFUNCTION    TAMSULOSIN (FLOMAX) 0.4 MG    Take 2 capsules (0.8 mg total) by mouth daily with dinner   Modified Medications    No medications on file   Discontinued Medications    No medications on file       Allergies   Allergen Reactions    Darvon [Propoxyphene] Other (See Comments)     hallucinations        Ht 6' 2\" (1.88 m)   Wt (!) 188 kg (414 lb)   BMI 53.15 kg/m²      REVIEW OF " SYSTEMS:  Constitutional: Negative.    HEENT: Negative.    Respiratory: Negative.    Skin: Negative.    Neurological: Negative.    Psychiatric/Behavioral: Negative.  Musculoskeletal: Negative except for that mentioned in the HPI.    Gen: No acute distress, resting comfortably in bed  HEENT: Eyes clear, moist mucus membranes, hearing intact  Respiratory: No audible wheezing or stridor  Cardiovascular: Well Perfused peripherally, 2+ distal pulse  Abdomen: nondistended, no peritoneal signs     PHYSICAL EXAM:    RIGHT SHOULDER:    Appearance: skin intact     Forward flexion:   180 degrees   Abduction:  180 degrees   External rotation at 0 degrees:   70 degrees   Internal rotation: T7     STRENGTH:  Forward flexion:  5/5   Abduction:  5/5   External rotation:  5/5   Internal rotation:  5/5      Radial/median/ulnar nerve intact    <2 sec cap refill       IMAGING:  X-ray right shoulder demonstrates well healed proximal humerus fracture with no interval displacement      Scribe Attestation      I,:  Walter Mcguire MA am acting as a scribe while in the presence of the attending physician.:       I,:  Seda Russell DO personally performed the services described in this documentation    as scribed in my presence.:

## 2025-03-16 ENCOUNTER — RA CDI HCC (OUTPATIENT)
Dept: OTHER | Facility: HOSPITAL | Age: 70
End: 2025-03-16

## 2025-03-24 ENCOUNTER — OFFICE VISIT (OUTPATIENT)
Dept: FAMILY MEDICINE CLINIC | Facility: CLINIC | Age: 70
End: 2025-03-24
Payer: COMMERCIAL

## 2025-03-24 VITALS
WEIGHT: 315 LBS | HEART RATE: 92 BPM | BODY MASS INDEX: 40.43 KG/M2 | HEIGHT: 74 IN | SYSTOLIC BLOOD PRESSURE: 134 MMHG | TEMPERATURE: 97.9 F | DIASTOLIC BLOOD PRESSURE: 68 MMHG | OXYGEN SATURATION: 96 %

## 2025-03-24 DIAGNOSIS — N18.32 STAGE 3B CHRONIC KIDNEY DISEASE (HCC): ICD-10-CM

## 2025-03-24 DIAGNOSIS — N40.1 BPH ASSOCIATED WITH NOCTURIA: ICD-10-CM

## 2025-03-24 DIAGNOSIS — K70.30 ALCOHOLIC CIRRHOSIS OF LIVER WITHOUT ASCITES (HCC): ICD-10-CM

## 2025-03-24 DIAGNOSIS — R35.1 BPH ASSOCIATED WITH NOCTURIA: ICD-10-CM

## 2025-03-24 DIAGNOSIS — E78.6 HYPERLIPIDEMIA WITH LOW HDL: ICD-10-CM

## 2025-03-24 DIAGNOSIS — F17.211 CIGARETTE NICOTINE DEPENDENCE IN REMISSION: ICD-10-CM

## 2025-03-24 DIAGNOSIS — F10.21 ALCOHOL DEPENDENCE IN SUSTAINED FULL REMISSION (HCC): ICD-10-CM

## 2025-03-24 DIAGNOSIS — S42.201A CLOSED FRACTURE OF PROXIMAL END OF RIGHT HUMERUS, UNSPECIFIED FRACTURE MORPHOLOGY, INITIAL ENCOUNTER: ICD-10-CM

## 2025-03-24 DIAGNOSIS — M85.811 OTHER SPECIFIED DISORDERS OF BONE DENSITY AND STRUCTURE, RIGHT SHOULDER: ICD-10-CM

## 2025-03-24 DIAGNOSIS — Z00.00 MEDICARE ANNUAL WELLNESS VISIT, SUBSEQUENT: Primary | ICD-10-CM

## 2025-03-24 DIAGNOSIS — D69.3 CHRONIC ITP (IDIOPATHIC THROMBOCYTOPENIA) (HCC): ICD-10-CM

## 2025-03-24 DIAGNOSIS — M1A.3720 CHRONIC GOUT DUE TO RENAL IMPAIRMENT INVOLVING TOE OF LEFT FOOT WITHOUT TOPHUS: ICD-10-CM

## 2025-03-24 DIAGNOSIS — E78.5 HYPERLIPIDEMIA WITH LOW HDL: ICD-10-CM

## 2025-03-24 DIAGNOSIS — E66.01 MORBID OBESITY WITH BMI OF 50.0-59.9, ADULT (HCC): ICD-10-CM

## 2025-03-24 DIAGNOSIS — J44.9 COPD, SEVERE (HCC): ICD-10-CM

## 2025-03-24 PROBLEM — L03.116 CELLULITIS OF LEFT LOWER EXTREMITY: Status: RESOLVED | Noted: 2024-10-29 | Resolved: 2025-03-24

## 2025-03-24 PROCEDURE — G2211 COMPLEX E/M VISIT ADD ON: HCPCS | Performed by: PHYSICIAN ASSISTANT

## 2025-03-24 PROCEDURE — G0439 PPPS, SUBSEQ VISIT: HCPCS | Performed by: PHYSICIAN ASSISTANT

## 2025-03-24 PROCEDURE — 99214 OFFICE O/P EST MOD 30 MIN: CPT | Performed by: PHYSICIAN ASSISTANT

## 2025-03-24 NOTE — ASSESSMENT & PLAN NOTE
Lab Results   Component Value Date    EGFR 53 02/20/2025    EGFR 49 10/21/2024    EGFR 43 08/12/2024    CREATININE 1.36 (H) 02/20/2025    CREATININE 1.44 (H) 10/21/2024    CREATININE 1.60 (H) 08/12/2024     Renal functions are stable.  Avoid over-the-counter NSAIDs  Orders:    Comprehensive metabolic panel

## 2025-03-24 NOTE — PROGRESS NOTES
Name: Malachi Greenwood      : 1955      MRN: [id number]  Encounter Provider: Saranya Eubanks PA-C  Encounter Date: 3/24/2025   Encounter department: [facility]    Assessment & Plan  Medicare annual wellness visit, subsequent         COPD, severe (HCC)  Breathing is currently stable continue current regimen  Orders:    CBC and differential    Alcoholic cirrhosis of liver without ascites (HCC)  Continue to monitor CBC and CMP       Chronic ITP (idiopathic thrombocytopenia) (HCC)  Continue to monitor CBC  Orders:    CBC and differential    Stage 3b chronic kidney disease (HCC)  Lab Results   Component Value Date    EGFR 53 2025    EGFR 49 10/21/2024    EGFR 43 2024    CREATININE 1.36 (H) 2025    CREATININE 1.44 (H) 10/21/2024    CREATININE 1.60 (H) 2024     Renal functions are stable.  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 tophus  This has resolved since being on allopurinol       Hyperlipidemia with low HDL  Lipids at goal continue low-fat diet  Orders:    Comprehensive metabolic panel    Lipid panel    Morbid obesity with BMI of 50.0-59.9, adult (HCC)           BPH associated with nocturia  Stable on Flomax 0.8 mg       Cigarette nicotine dependence in remission    Orders:    CT lung screening program; Future    Closed fracture of proximal end of right humerus, unspecified fracture morphology, initial encounter    Orders:    DXA bone density spine hip and pelvis; Future    Alcohol dependence in sustained full remission (HCC)         Other specified disorders of bone density and structure, right shoulder    Orders:    DXA bone density spine hip and pelvis; Future      Depression Screening and Follow-up Plan: Patient was screened for depression during today's encounter. They screened negative with a PHQ-2 score of 0.        Preventive health issues were discussed  with patient, and age appropriate screening tests were ordered as noted in patient's After Visit Summary. Personalized health advice and appropriate referrals for health education or preventive services given if needed, as noted in patient's After Visit Summary.    History of Present Illness     Patient presents in the office for follow-up chronic conditions.  Has moderate to severe COPD.  He is currently on Advair 250/50 and Proventil as needed.  Does have a nebulizer at home with albuterol inhalation solution.  His breathing is currently stable.  Does follow with pulmonary.  Due for his lung CT scanning for lung cancer screening in June.  Patient has alcohol and induced cirrhosis of the liver.  He has no cytopenia.  He does see hematology.  CBC is stable.  Kidney disease stage III.  Renal functions are stable.  GFR is 53.  3 of hyperuricemia and gout.  Currently on allopurinol 100 mg a day.  No episodes of gout.  Has diffuse osteoarthritis.  He is currently on Cymbalta 30 mg a day.  He uses cane for ambulation.  Has chronic but manageable.  Fell in October and broke his right humerus.  Will obtain DEXA scan to rule out osteoporosis.'s are diet controlled.  For BPH patient is on Flomax 0.8 mg at supper.  Patient has edema lower legs.  This is twofold.  Due to weight gravity and most likely venous insufficiency.  He takes Lasix 20 mg a day.  Other labs show normal PSA.  Uric acid is normal.       Patient Care Team:  Saranya Eubanks PA-C as PCP - General (Family Medicine)  MD Saranya Gresham PA-C Deborah Stahlnecker, DO Daniel P Verges, MD (Urology)    Review of Systems   Constitutional:  Negative for activity change, appetite change, chills, fatigue and fever.   HENT:  Negative for ear pain and sore throat.    Eyes:  Negative for visual disturbance.   Respiratory:  Negative for cough and shortness of breath.    Cardiovascular:  Positive for leg swelling. Negative for chest pain and palpitations.    Gastrointestinal:  Negative for abdominal pain, blood in stool, constipation, diarrhea and nausea.   Genitourinary:  Positive for frequency. Negative for difficulty urinating.   Musculoskeletal:  Positive for arthralgias and gait problem. Negative for back pain and myalgias.   Skin:  Negative for rash.   Neurological:  Negative for dizziness, syncope and headaches.   Psychiatric/Behavioral:  Negative for self-injury, sleep disturbance and suicidal ideas. The patient is not nervous/anxious.      Medical History Reviewed by provider this encounter:  Meds  Problems       Annual Wellness Visit Questionnaire   Malachi is here for his Subsequent Wellness visit.     Health Risk Assessment:   Patient rates overall health as poor. Patient feels that their physical health rating is slightly worse. Patient is dissatisfied with their life. Eyesight was rated as same. Hearing was rated as same. Patient feels that their emotional and mental health rating is same. Patients states they are sometimes angry. Patient states they are often unusually tired/fatigued. Pain experienced in the last 7 days has been some. Patient's pain rating has been 3/10. Patient states that he has experienced no weight loss or gain in last 6 months.     Depression Screening:   PHQ-2 Score: 0      Fall Risk Screening:   In the past year, patient has experienced: history of falling in past year      Home Safety:  Patient has trouble with stairs inside or outside of their home. Patient has working smoke alarms and has working carbon monoxide detector. Home safety hazards include: household clutter.     Nutrition:   Current diet is Unhealthy and Limited junk food.     Medications:   Patient is currently taking over-the-counter supplements. OTC medications include: see medication list. Patient is able to manage medications.     Activities of Daily Living (ADLs)/Instrumental Activities of Daily Living (IADLs):   Walk and transfer into and out of bed and  chair?: Yes  Dress and groom yourself?: Yes    Bathe or shower yourself?: Yes    Feed yourself? Yes  Do your laundry/housekeeping?: No  Manage your money, pay your bills and track your expenses?: Yes  Make your own meals?: Yes    Do your own shopping?: Yes    Durable Medical Equipment Suppliers  [employer] (or whatever they are calling themselves this week)    Previous Hospitalizations:   Any hospitalizations or ED visits within the last 12 months?: Yes    How many hospitalizations have you had in the last year?: 1-2    Hospitalization Comments: 2 visits to Emergency department in October due to a fall where I fractured my arm    Advance Care Planning:   Living will: No    Durable POA for healthcare: No    Advanced directive: No      Cognitive Screening:   Provider or family/friend/caregiver concerned regarding cognition?: No    PREVENTIVE SCREENINGS      Cardiovascular Screening:    General: History Lipid Disorder and Screening Current      Diabetes Screening:     General: Screening Current      Colorectal Cancer Screening:     General: Screening Current      Prostate Cancer Screening:    General: Screening Current      Osteoporosis Screening:      Due for: DXA Axial      Abdominal Aortic Aneurysm (AAA) Screening:    Risk factors include: age between 65-76 yo and tobacco use        Lung Cancer Screening:     General: Screening Current      Hepatitis C Screening:    General: Screening Current    Screening, Brief Intervention, and Referral to Treatment (SBIRT)     Screening  Typical number of drinks in a day: 0  Typical number of drinks in a week: 0  Interpretation: Low risk drinking behavior.    AUDIT-C Screenin) How often did you have a drink containing alcohol in the past year? never  2) How many drinks did you have on a typical day when you were drinking in the past year? 0  3) How often did you have 6 or more drinks on one occasion in the past year? never    AUDIT-C Score: 0  Interpretation: Score 0-3  "(male): Negative screen for alcohol misuse    Single Item Drug Screening:  How often have you used an illegal drug (including marijuana) or a prescription medication for non-medical reasons in the past year? never    Single Item Drug Screen Score: 0  Interpretation: Negative screen for possible drug use disorder    Brief Intervention  Alcohol & drug use screenings were reviewed. No concerns regarding substance use disorder identified.     SDOH Risk Assessment  Social determinants of health (SDOH) risk assesment tool was completed. The tool at a minimum covered housing stability, food insecurity, transportation needs, and utility difficulty. Patient had at risk responses for the following SDOH domains: transportation needs.     Social Drivers of Health     Financial Resource Strain: Low Risk  (2/15/2024)    Overall Financial Resource Strain (CARDIA)     Difficulty of Paying Living Expenses: Not very hard   Food Insecurity: No Food Insecurity (3/24/2025)    Hunger Vital Sign     Worried About Running Out of Food in the Last Year: Never true     Ran Out of Food in the Last Year: Never true   Transportation Needs: Unmet Transportation Needs (3/24/2025)    PRAPARE - Transportation     Lack of Transportation (Medical): Yes     Lack of Transportation (Non-Medical): No   Housing Stability: Low Risk  (3/24/2025)    Housing Stability Vital Sign     Unable to Pay for Housing in the Last Year: No     Number of Times Moved in the Last Year: 0     Homeless in the Last Year: No   Utilities: Not At Risk (3/24/2025)    [facility] Utilities     Threatened with loss of utilities: No     No results found.    Objective   /68 (BP Location: Right arm, Patient Position: Sitting, Cuff Size: Extra-Large)   Pulse 92   Temp 97.9 °F (36.6 °C) (Temporal)   Ht 6' 2\" (1.88 m)   Wt (!) 185 kg (407 lb 12.8 oz)   SpO2 96%   BMI 52.36 kg/m²     Physical Exam  Vitals and nursing note reviewed.   Constitutional:       General: He is not in acute " distress.     Appearance: He is obese. He is not ill-appearing.   HENT:      Head: Normocephalic and atraumatic.      Right Ear: Tympanic membrane, ear canal and external ear normal.      Left Ear: External ear normal. There is impacted cerumen.      Mouth/Throat:      Pharynx: No posterior oropharyngeal erythema.   Neck:      Vascular: No carotid bruit.   Cardiovascular:      Rate and Rhythm: Normal rate and regular rhythm.      Heart sounds: Normal heart sounds.   Pulmonary:      Effort: Pulmonary effort is normal.      Breath sounds: Normal breath sounds.   Abdominal:      General: Abdomen is protuberant. Bowel sounds are normal.      Tenderness: There is no abdominal tenderness.   Musculoskeletal:      Right lower leg: Edema present.      Left lower leg: Edema present.   Lymphadenopathy:      Cervical: No cervical adenopathy.   Skin:     General: Skin is warm and dry.   Neurological:      General: No focal deficit present.      Mental Status: He is alert and oriented to person, place, and time.   Psychiatric:         Mood and Affect: Mood normal.         Behavior: Behavior normal.         Thought Content: Thought content normal.         Judgment: Judgment normal.

## 2025-03-24 NOTE — PATIENT INSTRUCTIONS
Medicare Preventive Visit Patient Instructions  Thank you for completing your Welcome to Medicare Visit or Medicare Annual Wellness Visit today. Your next wellness visit will be due in one year (3/25/2026).  The screening/preventive services that you may require over the next 5-10 years are detailed below. Some tests may not apply to you based off risk factors and/or age. Screening tests ordered at today's visit but not completed yet may show as past due. Also, please note that scanned in results may not display below.  Preventive Screenings:  Service Recommendations Previous Testing/Comments   Colorectal Cancer Screening  Colonoscopy    Fecal Occult Blood Test (FOBT)/Fecal Immunochemical Test (FIT)  Fecal DNA/Cologuard Test  Flexible Sigmoidoscopy Age: 45-75 years old   Colonoscopy: every 10 years (May be performed more frequently if at higher risk)  OR  FOBT/FIT: every 1 year  OR  Cologuard: every 3 years  OR  Sigmoidoscopy: every 5 years  Screening may be recommended earlier than age 45 if at higher risk for colorectal cancer. Also, an individualized decision between you and your healthcare provider will decide whether screening between the ages of 76-85 would be appropriate. Colonoscopy: 03/22/2022  FOBT/FIT: Not on file  Cologuard: Not on file  Sigmoidoscopy: Not on file          Prostate Cancer Screening Individualized decision between patient and health care provider in men between ages of 55-69   Medicare will cover every 12 months beginning on the day after your 50th birthday PSA: 2.326 ng/mL     Screening Current     Hepatitis C Screening Once for adults born between 1945 and 1965  More frequently in patients at high risk for Hepatitis C Hep C Antibody: 09/05/2019    Screening Current   Diabetes Screening 1-2 times per year if you're at risk for diabetes or have pre-diabetes Fasting glucose: 102 mg/dL (2/19/2024)  A1C: No results in last 5 years (No results in last 5 years)  Screening Current   Cholesterol  Screening Once every 5 years if you don't have a lipid disorder. May order more often based on risk factors. Lipid panel: 02/20/2025  Screening Not Indicated  History Lipid Disorder      Other Preventive Screenings Covered by Medicare:  Abdominal Aortic Aneurysm (AAA) Screening: covered once if your at risk. You're considered to be at risk if you have a family history of AAA or a male between the age of 65-75 who smoking at least 100 cigarettes in your lifetime.  Lung Cancer Screening: covers low dose CT scan once per year if you meet all of the following conditions: (1) Age 55-77; (2) No signs or symptoms of lung cancer; (3) Current smoker or have quit smoking within the last 15 years; (4) You have a tobacco smoking history of at least 20 pack years (packs per day x number of years you smoked); (5) You get a written order from a healthcare provider.  Glaucoma Screening: covered annually if you're considered high risk: (1) You have diabetes OR (2) Family history of glaucoma OR (3)  aged 50 and older OR (4)  American aged 65 and older  Osteoporosis Screening: covered every 2 years if you meet one of the following conditions: (1) Have a vertebral abnormality; (2) On glucocorticoid therapy for more than 3 months; (3) Have primary hyperparathyroidism; (4) On osteoporosis medications and need to assess response to drug therapy.  HIV Screening: covered annually if you're between the age of 15-65. Also covered annually if you are younger than 15 and older than 65 with risk factors for HIV infection. For pregnant patients, it is covered up to 3 times per pregnancy.    Immunizations:  Immunization Recommendations   Influenza Vaccine Annual influenza vaccination during flu season is recommended for all persons aged >= 6 months who do not have contraindications   Pneumococcal Vaccine   * Pneumococcal conjugate vaccine = PCV13 (Prevnar 13), PCV15 (Vaxneuvance), PCV20 (Prevnar 20)  * Pneumococcal  polysaccharide vaccine = PPSV23 (Pneumovax) Adults 19-63 yo with certain risk factors or if 65+ yo  If never received any pneumonia vaccine: recommend Prevnar 20 (PCV20)  Give PCV20 if previously received 1 dose of PCV13 or PPSV23   Hepatitis B Vaccine 3 dose series if at intermediate or high risk (ex: diabetes, end stage renal disease, liver disease)   Respiratory syncytial virus (RSV) Vaccine - COVERED BY MEDICARE PART D  * RSVPreF3 (Arexvy) CDC recommends that adults 60 years of age and older may receive a single dose of RSV vaccine using shared clinical decision-making (SCDM)   Tetanus (Td) Vaccine - COST NOT COVERED BY MEDICARE PART B Following completion of primary series, a booster dose should be given every 10 years to maintain immunity against tetanus. Td may also be given as tetanus wound prophylaxis.   Tdap Vaccine - COST NOT COVERED BY MEDICARE PART B Recommended at least once for all adults. For pregnant patients, recommended with each pregnancy.   Shingles Vaccine (Shingrix) - COST NOT COVERED BY MEDICARE PART B  2 shot series recommended in those 19 years and older who have or will have weakened immune systems or those 50 years and older     Health Maintenance Due:      Topic Date Due   • Colorectal Cancer Screening  03/22/2023   • Lung Cancer Screening  06/04/2025   • Hepatitis C Screening  Completed     Immunizations Due:      Topic Date Due   • Hepatitis A Vaccine (1 of 2 - Risk 2-dose series) Never done   • Hepatitis B Vaccine (1 of 3 - Risk 3-dose series) Never done   • COVID-19 Vaccine (4 - 2024-25 season) 09/01/2024     Advance Directives   What are advance directives?  Advance directives are legal documents that state your wishes and plans for medical care. These plans are made ahead of time in case you lose your ability to make decisions for yourself. Advance directives can apply to any medical decision, such as the treatments you want, and if you want to donate organs.   What are the types  of advance directives?  There are many types of advance directives, and each state has rules about how to use them. You may choose a combination of any of the following:  Living will:  This is a written record of the treatment you want. You can also choose which treatments you do not want, which to limit, and which to stop at a certain time. This includes surgery, medicine, IV fluid, and tube feedings.   Durable power of  for healthcare (DPAHC):  This is a written record that states who you want to make healthcare choices for you when you are unable to make them for yourself. This person, called a proxy, is usually a family member or a friend. You may choose more than 1 proxy.  Do not resuscitate (DNR) order:  A DNR order is used in case your heart stops beating or you stop breathing. It is a request not to have certain forms of treatment, such as CPR. A DNR order may be included in other types of advance directives.  Medical directive:  This covers the care that you want if you are in a coma, near death, or unable to make decisions for yourself. You can list the treatments you want for each condition. Treatment may include pain medicine, surgery, blood transfusions, dialysis, IV or tube feedings, and a ventilator (breathing machine).  Values history:  This document has questions about your views, beliefs, and how you feel and think about life. This information can help others choose the care that you would choose.  Why are advance directives important?  An advance directive helps you control your care. Although spoken wishes may be used, it is better to have your wishes written down. Spoken wishes can be misunderstood, or not followed. Treatments may be given even if you do not want them. An advance directive may make it easier for your family to make difficult choices about your care.   Fall Prevention    Fall prevention  includes ways to make your home and other areas safer. It also includes ways you can  move more carefully to prevent a fall. Health conditions that cause changes in your blood pressure, vision, or muscle strength and coordination may increase your risk for falls. Medicines may also increase your risk for falls if they make you dizzy, weak, or sleepy.   Fall prevention tips:   Stand or sit up slowly.    Use assistive devices as directed.    Wear shoes that fit well and have soles that .    Wear a personal alarm.    Stay active.    Manage your medical conditions.    Home Safety Tips:  Add items to prevent falls in the bathroom.    Keep paths clear.    Install bright lights in your home.    Keep items you use often on shelves within reach.    Paint or place reflective tape on the edges of your stairs.    Weight Management   Why it is important to manage your weight:  Being overweight increases your risk of health conditions such as heart disease, high blood pressure, type 2 diabetes, and certain types of cancer. It can also increase your risk for osteoarthritis, sleep apnea, and other respiratory problems. Aim for a slow, steady weight loss. Even a small amount of weight loss can lower your risk of health problems.  How to lose weight safely:  A safe and healthy way to lose weight is to eat fewer calories and get regular exercise. You can lose up about 1 pound a week by decreasing the number of calories you eat by 500 calories each day.   Healthy meal plan for weight management:  A healthy meal plan includes a variety of foods, contains fewer calories, and helps you stay healthy. A healthy meal plan includes the following:  Eat whole-grain foods more often.  A healthy meal plan should contain fiber. Fiber is the part of grains, fruits, and vegetables that is not broken down by your body. Whole-grain foods are healthy and provide extra fiber in your diet. Some examples of whole-grain foods are whole-wheat breads and pastas, oatmeal, brown rice, and bulgur.  Eat a variety of vegetables every day.   Include dark, leafy greens such as spinach, kale, amelia greens, and mustard greens. Eat yellow and orange vegetables such as carrots, sweet potatoes, and winter squash.   Eat a variety of fruits every day.  Choose fresh or canned fruit (canned in its own juice or light syrup) instead of juice. Fruit juice has very little or no fiber.  Eat low-fat dairy foods.  Drink fat-free (skim) milk or 1% milk. Eat fat-free yogurt and low-fat cottage cheese. Try low-fat cheeses such as mozzarella and other reduced-fat cheeses.  Choose meat and other protein foods that are low in fat.  Choose beans or other legumes such as split peas or lentils. Choose fish, skinless poultry (chicken or turkey), or lean cuts of red meat (beef or pork). Before you cook meat or poultry, cut off any visible fat.   Use less fat and oil.  Try baking foods instead of frying them. Add less fat, such as margarine, sour cream, regular salad dressing and mayonnaise to foods. Eat fewer high-fat foods. Some examples of high-fat foods include french fries, doughnuts, ice cream, and cakes.  Eat fewer sweets.  Limit foods and drinks that are high in sugar. This includes candy, cookies, regular soda, and sweetened drinks.  Exercise:  Exercise at least 30 minutes per day on most days of the week. Some examples of exercise include walking, biking, dancing, and swimming. You can also fit in more physical activity by taking the stairs instead of the elevator or parking farther away from stores. Ask your healthcare provider about the best exercise plan for you.      © Copyright [employer] 2018 Information is for End User's use only and may not be sold, redistributed or otherwise used for commercial purposes. All illustrations and images included in CareNotes® are the copyrighted property of [employer].D.A[employer]M., Inc. or [employer]

## 2025-03-28 DIAGNOSIS — L03.116 CELLULITIS OF LEFT LOWER EXTREMITY: ICD-10-CM

## 2025-03-28 DIAGNOSIS — R35.1 BPH ASSOCIATED WITH NOCTURIA: ICD-10-CM

## 2025-03-28 DIAGNOSIS — N40.1 BPH ASSOCIATED WITH NOCTURIA: ICD-10-CM

## 2025-03-28 RX ORDER — FUROSEMIDE 20 MG/1
20 TABLET ORAL DAILY
Qty: 90 TABLET | Refills: 1 | Status: SHIPPED | OUTPATIENT
Start: 2025-03-28

## 2025-03-28 RX ORDER — TAMSULOSIN HYDROCHLORIDE 0.4 MG/1
0.8 CAPSULE ORAL
Qty: 180 CAPSULE | Refills: 1 | Status: SHIPPED | OUTPATIENT
Start: 2025-03-28

## 2025-05-20 NOTE — PROGRESS NOTES
Health Maintenance       Hepatitis B Vaccine (1 of 3 - 19+ 3-dose series)  Never done    Medicare Advantage- Medicare Wellness Visit (Yearly - January to December)  Scheduled for 9/30/2025           Following review of the above:  Patient is not proceeding with: Hep B--will check with insurance to see for coverage    Note: Refer to final orders and clinician documentation.       Pulmonary Follow Up Note   Jennyfer Jara 73 y o  male MRN: [id number]  9/15/2021      Assessment/Plan:     COPD, severe [facility]   Patient has severe COPD, maintained on Trelegy Ellipta once daily  Continue current regimen  Edema of both lower legs    Patient has increasing lower extremity edema with known chronic kidney disease  I will send him for a urinalysis to evaluate for proteinuria and I have also suggested that he see a nephrologist     ARACELIS on CPAP    Patient is using the CPAP device on a nightly basis  I reviewed his compliance data and he is using it on average 10 hours and 44 minutes per night  Average AHI is 0 6  His machine is greater than 11years old  I will send a new prescription to his home care company to get an auto titrating CPAP with pressures ranging from 7-15 cm water pressure  For now, he will continue using his current device, as there is no evidence of obvious breakdown  In the event we are not able to get a new device through his home care company, he will need to call the company directly about the recent recall  Visit orders:    Diagnoses and all orders for this visit:    Stage 3a chronic kidney disease ([facility] [address] )  -     Ambulatory referral to Nephrology; Future  -     Urinalysis with microscopic    ARACELIS on CPAP  -     CPAP Auto New DME    COPD, severe (HCC)    Edema of both lower legs        No follow-ups on file  History of Present Illness   HPI:  Jennyfer aJra is a 73 y o  male who is here today for follow-up regarding severe COPD and obstructive sleep apnea  He continues to have significant shortness of breath with even minimal exertion  He is compliant with Trelegy Ellipta and typically uses albuterol twice daily  He uses it as a routine at night and typically 1 time per day for symptoms of shortness of breath  He does not have wheezing  He has a cough productive of clear sputum  He has difficulty expectorating it at times    He reports increasing lower extremity edema  He does not take diuretic therapy  He has known chronic kidney disease but does not follow with a nephrologist   Patient has been using auto titrating CPAP during hours of sleep  He wakes up feeling refreshed  He did hear about the recent recall, but did not reach out to the company to get a new device  He does not note any evidence of breakdown in his device  Review of Systems    He denies headaches or vision changes  Denies chest pain  He denies nausea, vomiting or diarrhea  He has increasing lower extremity edema as described above  All other review of systems are negative      Medical, Family and Social history reviewed and updated as appropriate    Historical Information   Past Medical History:   Diagnosis Date    Arthritis     Asthma     Chest pain     atypical    Chronic kidney disease     COPD [facility]chronic obstructive pulmonary disease) ([facility])     CPAP (continuous positive airway pressure) dependence     Decreased glomerular filtration rate     Hamstring strain     Herniated lumbar intervertebral disc     History of alcohol use     uncomplicated    History of back pain     lower    History of colon polyps     sigmoid    History of genital warts     History of lipoma     History of muscle spasm     lumbar paraspinous muscle    History of umbilical hernia     Lateral pain of left hip     Low HDL (under 40)     Lumbar radiculopathy     Obesity     Respiratory distress     acute    Shortness of breath     Sleep apnea     CPAP PT WILL BRING     Past Surgical History:   Procedure Laterality Date    ADENOIDECTOMY      APPENDECTOMY      COLONOSCOPY      HAND SURGERY Left     CYST REMOVAL    HIP SURGERY Bilateral     LIPECTOMY      thigh    LIPOMA RESECTION Left     HIP    KS REPAIR UMBILICAL KUXF,8+Q/U,JIIGV N/A 2/23/2017    Procedure: REPAIR HERNIA UMBILICAL;  Surgeon: Mohsen Florian MD;  Location: [facility];  Service: General    TONSILLECTOMY      WRIST GANGLION EXCISION       Family History   Problem Relation Age of Onset    Heart disease Mother         cardiac disorder    Hypertension Mother     COPD Father     Heart disease Family         cardiac disorder    Cancer Family         lung    Emphysema Family         pulmonary unspecified emphysema    Diabetes Family     Glaucoma Family     Cancer Family         lung       Social History     Tobacco Use   Smoking Status Former Smoker    Packs/day: 2 00    Years: 25 00    Pack years: 50 00    Types: Cigarettes    Start date: 0    Quit date: 10/2014    Years since quittin 9   Smokeless Tobacco Never Used         Meds/Allergies     Current Outpatient Medications:     albuterol (2 5 mg/3 mL) 0 083 % nebulizer solution, Take 1 vial (2 5 mg total) by nebulization every 6 (six) hours as needed for wheezing, Disp: 50 vial, Rfl: 3    albuterol (PROVENTIL HFA,VENTOLIN HFA) 90 mcg/act inhaler, INHALE 2 PUFFS EVERY 4 (FOUR) HOURS AS NEEDED FOR SHORTNESS OF BREATH, Disp: 8 5 g, Rfl: 5    allopurinol (ZYLOPRIM) 100 mg tablet, Take 1 tablet (100 mg total) by mouth 2 (two) times a day, Disp: 60 tablet, Rfl: 5    betamethasone, augmented, (DIPROLENE-AF) 0 05 % cream, Apply topically 2 (two) times a day, Disp: 50 g, Rfl: 2    Cholecalciferol (VITAMIN D3) 1000 units CAPS, Take by mouth, Disp: , Rfl:     colchicine (COLCRYS) 0 6 mg tablet, Take  One  Po  Every f  4-6  Hours  Prn  Gout , Disp: 30 tablet, Rfl: 5    fluticasone-umeclidinium-vilanterol (TRELEGY) 100-62 5-25 MCG/INH inhaler, Inhale 1 puff daily Rinse mouth after use , Disp: 1 Inhaler, Rfl: 5    guaiFENesin (MUCINEX) 600 mg 12 hr tablet, Take 1,200 mg by mouth every 12 (twelve) hours, Disp: , Rfl:     imiquimod (ALDARA) 5 % cream, APPLY 1 PACKET TOPICALLY 3 (THREE) TIMES A WEEK, Disp: 12 packet, Rfl: 0    Multiple Vitamins tablet, Take 1 tablet by mouth daily, Disp: , Rfl:     tamsulosin (Flomax) 0 4 mg, Take 1 capsule (0 4 mg total) by mouth daily with dinner, Disp: 90 capsule, Rfl: 3  Allergies   Allergen Reactions    Darvon [Propoxyphene] Other (See Comments)     hallucinations       Vitals: Blood pressure 138/74, pulse 100, temperature (!) 96 4 °F (35 8 °C), temperature source Tympanic, resp  rate 18, height 6' 3" (1 905 m), weight (!) 183 kg (404 lb), SpO2 96 %  Body mass index is 50 5 kg/m²  Oxygen Therapy  SpO2: 96 %  Oxygen Therapy: None (Room air)    Physical Exam   Physical Exam  Constitutional:       General: He is not in acute distress  HENT:      Head: Normocephalic  Eyes:      General: No scleral icterus  Neck:      Vascular: No JVD  Cardiovascular:      Rate and Rhythm: Normal rate and regular rhythm  Pulmonary:      Breath sounds: No wheezing, rhonchi or rales  Comments:   Decreased at the bases  Abdominal:      Tenderness: There is no abdominal tenderness  Musculoskeletal:      Cervical back: Neck supple  Right lower leg: Edema present  Left lower leg: Edema present  Skin:     General: Skin is warm and dry  Neurological:      Mental Status: He is alert and oriented to person, place, and time  Psychiatric:         Mood and Affect: Mood normal          Labs: I have personally reviewed pertinent lab results  Lab Results   Component Value Date    WBC 6 85 06/23/2021    HGB 16 0 06/23/2021    HCT 47 8 06/23/2021    MCV 97 06/23/2021     (L) 06/23/2021     Lab Results   Component Value Date    CALCIUM 9 4 06/23/2021     06/14/2017    K 4 4 06/23/2021    CO2 26 06/23/2021     (H) 06/23/2021    BUN 21 06/23/2021    CREATININE 1 60 (H) 06/23/2021     No results found for: IGE  Lab Results   Component Value Date    ALT 41 06/14/2021    AST 31 06/14/2021    ALKPHOS 97 06/14/2021    BILITOT 0 9 06/14/2017       Imaging and other studies: I have personally reviewed pertinent reports     and I have personally reviewed pertinent films in PACS  CT of chest from 5/19/21 shows resolution of left lower lobe opacity  Otherwise stable 6 mm pulmonary nodule in the left lower lobe  Pulmonary function testing:  Performed   3/12/19 and personally interpreted  FEV1/FVC ratio 46%   FEV1 36% predicted  FVC 60% predicted  spirometry with severe obstruction and moderate reduction in vital capacity     ambulatory pulse oximetry today was attempted  He was only able to walk for 3 minutes and had to stop secondary to shortness of breath and back pain    Saturations remained 93% or greater on room air

## 2025-05-21 ENCOUNTER — HOSPITAL ENCOUNTER (OUTPATIENT)
Dept: RADIOLOGY | Facility: MEDICAL CENTER | Age: 70
Discharge: HOME | End: 2025-05-21
Payer: COMMERCIAL

## 2025-05-21 DIAGNOSIS — S42.201A CLOSED FRACTURE OF PROXIMAL END OF RIGHT HUMERUS, UNSPECIFIED FRACTURE MORPHOLOGY, INITIAL ENCOUNTER: ICD-10-CM

## 2025-05-21 DIAGNOSIS — M85.811 OTHER SPECIFIED DISORDERS OF BONE DENSITY AND STRUCTURE, RIGHT SHOULDER: ICD-10-CM

## 2025-05-21 PROCEDURE — 77080 DXA BONE DENSITY AXIAL: CPT

## 2025-05-30 ENCOUNTER — RESULTS FOLLOW-UP (OUTPATIENT)
Dept: FAMILY MEDICINE CLINIC | Facility: CLINIC | Age: 70
End: 2025-05-30

## 2025-05-30 NOTE — TELEPHONE ENCOUNTER
----- Message from Saranya Eubanks PA-C sent at 5/30/2025  2:14 PM EDT -----  Bone  density  shows  mild  bone  loss. Calcium 600 mg  bid and  vitamin  d 1,000 mg  daily  ----- Message -----  From: Interface, Radiology Results In  Sent: 5/30/2025  12:24 PM EDT  To: Saranya Eubanks PA-C

## 2025-06-02 NOTE — TELEPHONE ENCOUNTER
Patient returned call and RN reviewed provider comments for Dexa scan results and supplements recommended. Patient is currently taking vit D 1000 mg daily and will add Calcium 600 mg twice a day. Patient advised to follow up if he has additional questions or concerns.

## 2025-06-03 ENCOUNTER — HOSPITAL ENCOUNTER (OUTPATIENT)
Dept: RADIOLOGY | Facility: MEDICAL CENTER | Age: 70
Discharge: HOME | End: 2025-06-03
Attending: PHYSICIAN ASSISTANT

## 2025-06-03 DIAGNOSIS — F17.211 CIGARETTE NICOTINE DEPENDENCE IN REMISSION: ICD-10-CM

## 2025-06-10 ENCOUNTER — APPOINTMENT (OUTPATIENT)
Dept: RADIOLOGY | Facility: MEDICAL CENTER | Age: 70
End: 2025-06-10
Payer: COMMERCIAL

## 2025-06-10 ENCOUNTER — OFFICE VISIT (OUTPATIENT)
Dept: FAMILY MEDICINE CLINIC | Facility: CLINIC | Age: 70
End: 2025-06-10
Payer: COMMERCIAL

## 2025-06-10 VITALS
WEIGHT: 315 LBS | HEART RATE: 104 BPM | BODY MASS INDEX: 40.43 KG/M2 | OXYGEN SATURATION: 96 % | HEIGHT: 74 IN | TEMPERATURE: 98.3 F | DIASTOLIC BLOOD PRESSURE: 82 MMHG | SYSTOLIC BLOOD PRESSURE: 130 MMHG

## 2025-06-10 DIAGNOSIS — J44.9 COPD, SEVERE (HCC): Primary | ICD-10-CM

## 2025-06-10 DIAGNOSIS — R06.02 SHORTNESS OF BREATH: ICD-10-CM

## 2025-06-10 PROCEDURE — 99213 OFFICE O/P EST LOW 20 MIN: CPT | Performed by: FAMILY MEDICINE

## 2025-06-10 PROCEDURE — 71046 X-RAY EXAM CHEST 2 VIEWS: CPT

## 2025-06-10 PROCEDURE — G2211 COMPLEX E/M VISIT ADD ON: HCPCS | Performed by: FAMILY MEDICINE

## 2025-06-10 RX ORDER — PREDNISONE 20 MG/1
40 TABLET ORAL DAILY
Qty: 10 TABLET | Refills: 0 | Status: SHIPPED | OUTPATIENT
Start: 2025-06-10 | End: 2025-06-15

## 2025-06-10 NOTE — PROGRESS NOTES
"Outpatient Progress Note  Name: Malachi Greenwood      : 1955      MRN: [id number]  Encounter Provider: Tito Ma MD  Encounter Date: 6/10/2025   Encounter department: [facility]  :  Assessment & Plan  COPD, severe (HCC)  Patient has increased albuterol usage, baseline severe COPD  Will treat with Augmentin for total 7 days and prednisone 40 mg for total of 5 days  Orders:    amoxicillin-clavulanate (AUGMENTIN) 875-125 mg per tablet; Take 1 tablet by mouth every 12 (twelve) hours for 7 days    predniSONE 20 mg tablet; Take 2 tablets (40 mg total) by mouth daily for 5 days    Shortness of breath  Decreased breath sounds in bilateral lower bases, due to patient's baseline respiratory issues concern for possible early pneumonia  Obtain chest x-ray for evaluation  Start patient on Augmentin    Orders:    XR chest pa and lateral; Future    COVID-19 Plan       Recent Visits  No visits were found meeting these conditions.  Showing recent visits within past 7 days and meeting all other requirements  Today's Visits  Date Type Provider Dept   06/10/25 Office Visit Tito Ma MD [facility]   Showing today's visits and meeting all other requirements  Future Appointments  No visits were found meeting these conditions.  Showing future appointments within next 150 days and meeting all other requirements    History of Present Illness     Subjective:   Malachi Greenwood is a 70 y.o. male who is concerned about COVID-19. Patient's symptoms include fatigue, malaise, nasal congestion, rhinorrhea, sore throat, anosmia (due to congestion), cough, shortness of breath and chest tightness (\"has severe COPD\"). Patient denies fever (subjective fever), chills, loss of taste, abdominal pain, nausea, vomiting, diarrhea, myalgias and headaches.     - Date of symptom onset: 2025      COVID-19 vaccination status: Fully vaccinated with booster    Exposure:   Contact with a person who is under " "investigation (PUI) for or who is positive for COVID-19 within the last 14 days?: No    Hospitalized recently for fever and/or lower respiratory symptoms?: No      Currently a healthcare worker that is involved in direct patient care?: No      Works in a special setting where the risk of COVID-19 transmission may be high? (this may include long-term care, correctional and [facility] facilities; homeless shelters; assisted-living facilities and group homes.): No      Resident in a special setting where the risk of COVID-19 transmission may be high? (this may include long-term care, correctional and [facility] facilities; homeless shelters; assisted-living facilities and group homes.): No      Patient with 5-day history of URI symptoms, cough sneezing difficulty breathing (baseline COPD)  Patient has increased albuterol use in the last few days.  His wife is also sick with similar symptoms since 2 weeks ago        Lab Results   Component Value Date    SARSCOV2 Positive (A) 12/11/2020       Review of Systems   Constitutional:  Positive for fatigue. Negative for chills and fever (subjective fever).   HENT:  Positive for congestion, rhinorrhea and sore throat.    Respiratory:  Positive for cough, chest tightness (\"has severe COPD\") and shortness of breath.    Gastrointestinal:  Negative for abdominal pain, diarrhea, nausea and vomiting.   Musculoskeletal:  Negative for myalgias.   Neurological:  Negative for headaches.     Objective   /82 (BP Location: Right arm, Patient Position: Sitting, Cuff Size: Large)   Pulse 104   Temp 98.3 °F (36.8 °C) (Temporal)   Ht 6' 2\" (1.88 m)   Wt (!) 184 kg (406 lb 3.2 oz)   SpO2 96%   BMI 52.15 kg/m²     Physical Exam  Vitals reviewed.   Constitutional:       General: He is not in acute distress.     Appearance: Normal appearance. He is well-developed. He is obese. He is not ill-appearing, toxic-appearing or diaphoretic.   HENT:      Head: Normocephalic.     Cardiovascular:     "  Rate and Rhythm: Normal rate.      Pulses: Normal pulses.   Pulmonary:      Comments: Symptom of increased work of breathing, difficulty to assess lung sounds, decreased lung sounds in lower bases may be secondary to body habitus  Abdominal:      Palpations: Abdomen is soft.     Skin:     General: Skin is warm.      Capillary Refill: Capillary refill takes less than 2 seconds.     Neurological:      General: No focal deficit present.      Mental Status: He is alert.     Psychiatric:         Mood and Affect: Mood normal.             Administrative Statements   Encounter provider Tito Ma MD

## 2025-06-10 NOTE — ASSESSMENT & PLAN NOTE
Patient has increased albuterol usage, baseline severe COPD  Will treat with Augmentin for total 7 days and prednisone 40 mg for total of 5 days  Orders:    amoxicillin-clavulanate (AUGMENTIN) 875-125 mg per tablet; Take 1 tablet by mouth every 12 (twelve) hours for 7 days    predniSONE 20 mg tablet; Take 2 tablets (40 mg total) by mouth daily for 5 days

## 2025-06-13 ENCOUNTER — RESULTS FOLLOW-UP (OUTPATIENT)
Dept: FAMILY MEDICINE CLINIC | Facility: CLINIC | Age: 70
End: 2025-06-13

## 2025-06-19 ENCOUNTER — OFFICE VISIT (OUTPATIENT)
Dept: PULMONOLOGY | Facility: CLINIC | Age: 70
End: 2025-06-19
Payer: COMMERCIAL

## 2025-06-19 VITALS
SYSTOLIC BLOOD PRESSURE: 134 MMHG | DIASTOLIC BLOOD PRESSURE: 70 MMHG | HEIGHT: 74 IN | WEIGHT: 315 LBS | HEART RATE: 110 BPM | TEMPERATURE: 98.6 F | OXYGEN SATURATION: 97 % | BODY MASS INDEX: 40.43 KG/M2

## 2025-06-19 DIAGNOSIS — G47.33 OSA ON CPAP: ICD-10-CM

## 2025-06-19 DIAGNOSIS — J44.9 COPD, SEVERE (HCC): Primary | ICD-10-CM

## 2025-06-19 PROCEDURE — 99213 OFFICE O/P EST LOW 20 MIN: CPT | Performed by: INTERNAL MEDICINE

## 2025-06-19 NOTE — PROGRESS NOTES
"Follow-up  Visit - Pulmonary Medicine   Name: Malachi Greenwood      : 1955      MRN: [id number]  Encounter Provider: Jackie Rivas DO  Encounter Date: 2025   Encounter department: [facility] [place]  :  Assessment & Plan  COPD, severe (HCC)  Continue Advair twice daily.  I encouraged him to use the albuterol nebulizer as he continues to recover from recent URI.       ARACELIS on CPAP  Compliant with CPAP via full face mask         Follow-up in 6-9 months or sooner if needed    History of Present Illness   Malachi Greenwood is a 70 y.o. male who presents for follow-up regarding severe COPD and obstructive sleep apnea.  Patient suffered a fall back in October resulting in humeral head fracture.  Managed nonoperatively.  Pulmonary status have been stable up until a few weeks ago when he developed an upper respiratory infection.  His wife was also sick with a similar illness.  He was prescribed Augmentin and prednisone and is slowly recovering.  He is compliant with Advair twice daily but does not use albuterol on any regular basis.    Review of Systems   Constitutional:  Negative for appetite change and fever.   HENT:  Positive for postnasal drip, rhinorrhea, sneezing and sore throat. Negative for ear pain and trouble swallowing.    Respiratory:  Positive for cough, shortness of breath and wheezing.    Cardiovascular:  Negative for chest pain.   Musculoskeletal:  Negative for myalgias.   Neurological:  Positive for headaches.     Aside from what is mentioned in the HPI, ROS is otherwise negative         Medical History Reviewed by provider this encounter:     .    Objective   /70   Pulse (!) 110   Temp 98.6 °F (37 °C) (Tympanic)   Ht 6' 2\" (1.88 m)   Wt (!) 191 kg (421 lb)   SpO2 97%   BMI 54.05 kg/m²     Physical Exam  Vitals reviewed.   Constitutional:       General: He is not in acute distress.     Appearance: He is well-developed. He is obese.     Eyes:      " General: No scleral icterus.    Neck:      Vascular: No JVD.     Cardiovascular:      Rate and Rhythm: Normal rate and regular rhythm.   Pulmonary:      Breath sounds: No wheezing, rhonchi or rales.     Skin:     General: Skin is warm and dry.     Neurological:      Mental Status: He is alert and oriented to person, place, and time.     Psychiatric:         Mood and Affect: Mood normal.       Diagnostic Data:    Radiology results:    CT of the chest performed on 6/3/2025 shows stable 7 mm left lower lobe pulmonary nodule.  Otherwise clear.  There are mild emphysematous changes      Pulmonary function testing:  Performed 10/24/2023 and personally interpreted  FEV1/FVC ratio 57%   FEV1 45% predicted  FVC 60% predicted.  No response to bronchodilators  TLC 86 % predicted   % predicted  DLCO corrected for hemoglobin 76 % predicted.  Spirometry with severe obstruction         Jackie Rivas DO

## 2025-06-19 NOTE — ASSESSMENT & PLAN NOTE
Continue Advair twice daily.  I encouraged him to use the albuterol nebulizer as he continues to recover from recent URI.

## 2025-06-28 DIAGNOSIS — M48.02 NEURAL FORAMINAL STENOSIS OF CERVICAL SPINE: ICD-10-CM

## 2025-06-30 RX ORDER — DULOXETIN HYDROCHLORIDE 30 MG/1
30 CAPSULE, DELAYED RELEASE ORAL DAILY
Qty: 90 CAPSULE | Refills: 1 | Status: SHIPPED | OUTPATIENT
Start: 2025-06-30

## 2025-07-31 ENCOUNTER — OFFICE VISIT (OUTPATIENT)
Dept: CARDIOLOGY CLINIC | Facility: CLINIC | Age: 70
End: 2025-07-31
Payer: COMMERCIAL

## 2025-07-31 VITALS
DIASTOLIC BLOOD PRESSURE: 70 MMHG | BODY MASS INDEX: 40.43 KG/M2 | HEART RATE: 92 BPM | WEIGHT: 315 LBS | HEIGHT: 74 IN | SYSTOLIC BLOOD PRESSURE: 140 MMHG

## 2025-07-31 DIAGNOSIS — J44.9 COPD, SEVERE (HCC): ICD-10-CM

## 2025-07-31 DIAGNOSIS — R06.02 SOB (SHORTNESS OF BREATH): Primary | ICD-10-CM

## 2025-07-31 DIAGNOSIS — E66.01 MORBID OBESITY (HCC): ICD-10-CM

## 2025-07-31 DIAGNOSIS — R94.31 ABNORMAL EKG: ICD-10-CM

## 2025-07-31 DIAGNOSIS — R26.2 AMBULATORY DYSFUNCTION: ICD-10-CM

## 2025-07-31 DIAGNOSIS — N52.1 ERECTILE DYSFUNCTION DUE TO DISEASES CLASSIFIED ELSEWHERE: ICD-10-CM

## 2025-07-31 PROCEDURE — 99204 OFFICE O/P NEW MOD 45 MIN: CPT | Performed by: INTERNAL MEDICINE

## 2025-07-31 PROCEDURE — 93000 ELECTROCARDIOGRAM COMPLETE: CPT | Performed by: INTERNAL MEDICINE

## 2025-07-31 RX ORDER — SILDENAFIL 100 MG/1
TABLET, FILM COATED ORAL
Qty: 6 TABLET | Refills: 5 | Status: SHIPPED | OUTPATIENT
Start: 2025-07-31

## (undated) DEVICE — STRL UNIVERSAL MINOR GENERAL: Brand: CARDINAL HEALTH

## (undated) DEVICE — GLOVE SRG BIOGEL ORTHOPEDIC 7.5

## (undated) DEVICE — GLOVE INDICATOR PI UNDERGLOVE SZ 8 BLUE

## (undated) DEVICE — 3M™ TEGADERM™ TRANSPARENT FILM DRESSING FRAME STYLE, 1626W, 4 IN X 4-3/4 IN (10 CM X 12 CM), 50/CT 4CT/CASE: Brand: 3M™ TEGADERM™

## (undated) DEVICE — INTENDED FOR TISSUE SEPARATION, AND OTHER PROCEDURES THAT REQUIRE A SHARP SURGICAL BLADE TO PUNCTURE OR CUT.: Brand: BARD-PARKER SAFETY BLADES SIZE 15, STERILE

## (undated) DEVICE — REM POLYHESIVE ADULT PATIENT RETURN ELECTRODE: Brand: VALLEYLAB

## (undated) DEVICE — ADHESIVE SKN CLSR HISTOACRYL FLEX 0.5ML LF

## (undated) DEVICE — SUT MONOCRYL 4-0 PS-2 27 IN Y426H

## (undated) DEVICE — INTENDED FOR TISSUE SEPARATION, AND OTHER PROCEDURES THAT REQUIRE A SHARP SURGICAL BLADE TO PUNCTURE OR CUT.: Brand: BARD-PARKER SAFETY BLADES SIZE 11, STERILE

## (undated) DEVICE — CHLORAPREP HI-LITE 26ML ORANGE

## (undated) DEVICE — ROSEBUD DISSECTORS: Brand: DEROYAL

## (undated) DEVICE — COTTON BALLS: Brand: DEROYAL

## (undated) DEVICE — PLUMEPEN PRO 10FT

## (undated) DEVICE — 3M™ TEGADERM™ TRANSPARENT FILM DRESSING FRAME STYLE, 1624W, 2-3/8 IN X 2-3/4 IN (6 CM X 7 CM), 100/CT 4CT/CASE: Brand: 3M™ TEGADERM™

## (undated) RX ORDER — CYCLOSPORINE 0.5 MG/ML
1 EMULSION OPHTHALMIC TWICE A DAY
Start: 2024-10-02